# Patient Record
Sex: FEMALE | Race: WHITE | HISPANIC OR LATINO | Employment: OTHER | URBAN - METROPOLITAN AREA
[De-identification: names, ages, dates, MRNs, and addresses within clinical notes are randomized per-mention and may not be internally consistent; named-entity substitution may affect disease eponyms.]

---

## 2017-01-04 ENCOUNTER — OFFICE VISIT (OUTPATIENT)
Dept: FAMILY MEDICINE | Facility: CLINIC | Age: 67
End: 2017-01-04
Payer: MEDICARE

## 2017-01-04 ENCOUNTER — HOSPITAL ENCOUNTER (OUTPATIENT)
Dept: RADIOLOGY | Facility: CLINIC | Age: 67
Discharge: HOME | End: 2017-01-04
Attending: FAMILY MEDICINE
Payer: MEDICARE

## 2017-01-04 VITALS
RESPIRATION RATE: 12 BRPM | DIASTOLIC BLOOD PRESSURE: 59 MMHG | SYSTOLIC BLOOD PRESSURE: 99 MMHG | HEIGHT: 66 IN | HEART RATE: 59 BPM | BODY MASS INDEX: 18.39 KG/M2 | WEIGHT: 114.44 LBS

## 2017-01-04 DIAGNOSIS — E07.9 THYROID DISEASE: ICD-10-CM

## 2017-01-04 DIAGNOSIS — F41.9 ANXIETY: Primary | ICD-10-CM

## 2017-01-04 DIAGNOSIS — E08.649 DIABETES MELLITUS DUE TO UNDERLYING CONDITION WITH HYPOGLYCEMIA WITHOUT COMA, WITHOUT LONG-TERM CURRENT USE OF INSULIN: Chronic | ICD-10-CM

## 2017-01-04 DIAGNOSIS — R19.4 CHANGE IN BOWEL HABITS: ICD-10-CM

## 2017-01-04 DIAGNOSIS — F33.9 RECURRENT MAJOR DEPRESSION RESISTANT TO TREATMENT: ICD-10-CM

## 2017-01-04 DIAGNOSIS — K21.9 GASTROESOPHAGEAL REFLUX DISEASE, ESOPHAGITIS PRESENCE NOT SPECIFIED: ICD-10-CM

## 2017-01-04 DIAGNOSIS — R51.9 OCCIPITAL HEADACHE: ICD-10-CM

## 2017-01-04 DIAGNOSIS — R63.4 ABNORMAL WEIGHT LOSS: ICD-10-CM

## 2017-01-04 DIAGNOSIS — M32.9 LUPUS (SYSTEMIC LUPUS ERYTHEMATOSUS): Primary | ICD-10-CM

## 2017-01-04 DIAGNOSIS — E16.2 HYPOGLYCEMIA: ICD-10-CM

## 2017-01-04 DIAGNOSIS — Z23 NEED FOR INFLUENZA VACCINATION: ICD-10-CM

## 2017-01-04 DIAGNOSIS — B02.29 POST HERPETIC NEURALGIA: ICD-10-CM

## 2017-01-04 DIAGNOSIS — F41.8 DEPRESSION WITH ANXIETY: Chronic | ICD-10-CM

## 2017-01-04 DIAGNOSIS — Z23 NEED FOR PNEUMOCOCCAL VACCINATION: ICD-10-CM

## 2017-01-04 DIAGNOSIS — J31.0 CHRONIC RHINITIS: ICD-10-CM

## 2017-01-04 PROCEDURE — G0008 ADMIN INFLUENZA VIRUS VAC: HCPCS | Mod: S$GLB,,, | Performed by: FAMILY MEDICINE

## 2017-01-04 PROCEDURE — 1159F MED LIST DOCD IN RCRD: CPT | Mod: S$GLB,,, | Performed by: PHYSICIAN ASSISTANT

## 2017-01-04 PROCEDURE — 99214 OFFICE O/P EST MOD 30 MIN: CPT | Mod: 25,S$GLB,, | Performed by: PHYSICIAN ASSISTANT

## 2017-01-04 PROCEDURE — 99999 PR PBB SHADOW E&M-EST. PATIENT-LVL V: CPT | Mod: PBBFAC,,, | Performed by: PHYSICIAN ASSISTANT

## 2017-01-04 PROCEDURE — 90670 PCV13 VACCINE IM: CPT | Mod: S$GLB,,, | Performed by: PHYSICIAN ASSISTANT

## 2017-01-04 PROCEDURE — G0009 ADMIN PNEUMOCOCCAL VACCINE: HCPCS | Mod: S$GLB,,, | Performed by: PHYSICIAN ASSISTANT

## 2017-01-04 PROCEDURE — 72050 X-RAY EXAM NECK SPINE 4/5VWS: CPT | Mod: 26,,, | Performed by: RADIOLOGY

## 2017-01-04 PROCEDURE — 72050 X-RAY EXAM NECK SPINE 4/5VWS: CPT | Mod: TC,PO

## 2017-01-04 PROCEDURE — 90662 IIV NO PRSV INCREASED AG IM: CPT | Mod: S$GLB,,, | Performed by: FAMILY MEDICINE

## 2017-01-04 RX ORDER — HYDROXYCHLOROQUINE SULFATE 200 MG/1
TABLET, FILM COATED ORAL NIGHTLY
Status: CANCELLED | OUTPATIENT
Start: 2017-01-04

## 2017-01-04 RX ORDER — CYPROHEPTADINE HYDROCHLORIDE 4 MG/1
4 TABLET ORAL 2 TIMES DAILY PRN
Qty: 180 TABLET | Refills: 3 | Status: SHIPPED | OUTPATIENT
Start: 2017-01-04 | End: 2017-08-14

## 2017-01-04 RX ORDER — VALACYCLOVIR HYDROCHLORIDE 500 MG/1
500 TABLET, FILM COATED ORAL DAILY
Qty: 90 TABLET | Refills: 3 | Status: SHIPPED | OUTPATIENT
Start: 2017-01-04 | End: 2017-03-13 | Stop reason: SDUPTHER

## 2017-01-04 RX ORDER — GABAPENTIN 800 MG/1
800 TABLET ORAL 3 TIMES DAILY
Qty: 270 TABLET | Refills: 3 | Status: SHIPPED | OUTPATIENT
Start: 2017-01-04 | End: 2019-01-24 | Stop reason: SDUPTHER

## 2017-01-04 RX ORDER — FLUTICASONE PROPIONATE 50 MCG
SPRAY, SUSPENSION (ML) NASAL
Qty: 48 G | Refills: 3 | Status: SHIPPED | OUTPATIENT
Start: 2017-01-04 | End: 2017-08-14 | Stop reason: SDUPTHER

## 2017-01-04 RX ORDER — LEVOTHYROXINE SODIUM 125 UG/1
TABLET ORAL
Qty: 90 TABLET | Refills: 3 | Status: SHIPPED | OUTPATIENT
Start: 2017-01-04 | End: 2018-02-08 | Stop reason: SDUPTHER

## 2017-01-04 RX ORDER — CITALOPRAM 40 MG/1
20 TABLET, FILM COATED ORAL DAILY
Qty: 90 TABLET | Refills: 4 | Status: SHIPPED | OUTPATIENT
Start: 2017-01-04 | End: 2017-07-19 | Stop reason: SDUPTHER

## 2017-01-04 NOTE — PROGRESS NOTES
"Subjective:       Patient ID: Erica Marsh is a 66 y.o. female.    Chief Complaint: Physical    HPI Comments: Ms. Marsh comes to clinic today for follow up. She recent had blood work that returned normal. The patient does have chronic pain related to fibromyalgia and lupus. She is followed by Dr. Alvarenga. She reports her pain has been increased recently and Dr. Alvarenga has prescribed tramadol; the patient does not like to take this as she is afraid she will become addicted. The patient is due for flu and prevnar vaccines. The patient does complain of change in her stools; she reports her stools are "fatty." The patient was prescribed pancreatic enzymes to help with this but there has been little improvement. The patient would like to see gastroenterology for further evaluation. The patient does complain of frequent hypoglycemia episodes. She has to take glucose pills and check her blood sugar frequently. The patient reports that she keeps the glucose tabs on hand. The patient is interested in seeing an endocrinologist. The patient does continue to have depression due to the death of her twin brother just over a year ago. She is traveling to [facility] to be with her family for the next 6 months.     Review of Systems   Constitutional: Positive for activity change and fatigue. Negative for appetite change and fever.   HENT: Negative for postnasal drip, rhinorrhea and sinus pressure.    Eyes: Negative for visual disturbance.   Respiratory: Negative for cough and shortness of breath.    Cardiovascular: Negative for chest pain.   Gastrointestinal: Negative for abdominal distention and abdominal pain.   Genitourinary: Negative for difficulty urinating and dysuria.   Musculoskeletal: Positive for arthralgias and myalgias.   Neurological: Negative for headaches.   Hematological: Negative for adenopathy.   Psychiatric/Behavioral: Positive for dysphoric mood. The patient is nervous/anxious.        Objective:      Physical " Exam   Constitutional: She is oriented to person, place, and time.   HENT:   Mouth/Throat: Oropharynx is clear and moist. No oropharyngeal exudate.   Cardiovascular: Normal rate and regular rhythm.    Pulmonary/Chest: Effort normal and breath sounds normal. She has no wheezes.   Abdominal: Soft. Bowel sounds are normal. There is no tenderness.   Musculoskeletal: She exhibits tenderness. She exhibits no edema.   Lymphadenopathy:     She has no cervical adenopathy.   Neurological: She is alert and oriented to person, place, and time.   Skin: No erythema.   Psychiatric: Her behavior is normal.   Patient continues to have dysphoric mood.        Assessment:       1. Lupus (systemic lupus erythematosus)    2. Thyroid disease    3. Depression with anxiety    4. Gastroesophageal reflux disease, esophagitis presence not specified    5. Post herpetic neuralgia    6. Chronic rhinitis    7. Abnormal weight loss    8. Hypoglycemia    9. Occipital headache    10. Diabetes mellitus due to underlying condition with hypoglycemia without coma, without long-term current use of insulin    11. Change in bowel habits    12. Need for influenza vaccination    13. Need for pneumococcal vaccination        Plan:     Erica was seen today for physical.    Diagnoses and all orders for this visit:    Lupus (systemic lupus erythematosus)  Continue follow up with Dr. Alvarenga  Thyroid disease  -     levothyroxine (SYNTHROID) 125 MCG tablet; TAKE 1 TABLET ONE TIME DAILY    Depression with anxiety  Continue current medication  Clonazepam refill sent to Dr. Ornelas  Gastroesophageal reflux disease, esophagitis presence not specified  Stable  Avoid trigger foods  Stop eating 2-3 hours before bed  Post herpetic neuralgia  -     valacyclovir (VALTREX) 500 MG tablet; Take 1 tablet (500 mg total) by mouth once daily.    Chronic rhinitis  -     fluticasone (FLONASE) 50 mcg/actuation nasal spray; ADMINISTER 1 SPRAY IN EACH NARE 2 TIMES DAILY.    Abnormal weight  loss  -     cyproheptadine (PERIACTIN) 4 mg tablet; Take 1 tablet (4 mg total) by mouth 2 (two) times daily as needed.    Hypoglycemia  -     blood sugar diagnostic (BLOOD GLUCOSE TEST) Strp; Accucheck Annabelle strip ac x 3 daily due to hypoglycemia    Occipital headache  -     X-Ray Cervical Spine Complete 5 view; Future    Diabetes mellitus due to underlying condition with hypoglycemia without coma, without long-term current use of insulin  -     Ambulatory referral to Endocrinology    Change in bowel habits  -     Ambulatory referral to Gastroenterology    Need for influenza vaccination  -     Influenza - High Dose (65+) (PF) (IM)    Need for pneumococcal vaccination  -     Pneumococcal Conjugate Vaccine (13 Valent) (IM)    Other orders  -     Cancel: hydroxychloroquine (PLAQUENIL) 200 mg tablet; every evening. Three times a day  -     gabapentin (NEURONTIN) 800 MG tablet; Take 1 tablet (800 mg total) by mouth 3 (three) times daily.

## 2017-01-04 NOTE — TELEPHONE ENCOUNTER
----- Message from Tiffani Nation sent at 1/4/2017 11:02 AM CST -----  Contact: Anju from [employer] [phone]  Anju called from [employer] a few days ago for a refill on the patient's test strips please call back today.

## 2017-01-04 NOTE — PROGRESS NOTES
Two Patient identifiers used, Name and . VIS information given to patient and procedure was explained. Patient verbalized understanding. FluZone HD administered IM in the  right deltoid  And Prevnar 13 administered IM in the left deltoid both using sterile technique.  No residual bleeding noted. Patient tolerated procedure well.

## 2017-01-04 NOTE — MR AVS SNAPSHOT
[facility]  [address]  [place] [address]  Phone: [phone]  Fax: [phone]                  Erica Marsh   2017 3:40 PM   Office Visit    Description:  Female : 1950   Provider:  Michelle Renteria PA-C   Department:  [place] - Family [facility]           Reason for Visit     Physical           Diagnoses this Visit        Comments    Lupus (systemic lupus erythematosus)    -  Primary     Thyroid disease         Depression with anxiety         Gastroesophageal reflux disease, esophagitis presence not specified         Post herpetic neuralgia         Chronic rhinitis         Abnormal weight loss         Hypoglycemia         Occipital headache         Diabetes mellitus due to underlying condition with hypoglycemia without coma, without long-term current use of insulin         Change in bowel habits         Need for influenza vaccination         Need for pneumococcal vaccination                To Do List           Future Appointments        Provider Department Dept Phone    2017 4:45 PM [facility] XR1 [facility]- X[facility]Ray [phone]    3/15/2017 1:00 PM Jose Guadalupe Ornelas MD [facility] [phone]    2017 8:00 AM Jose Guadalupe Ornelas MD [facility] [phone]      Goals (5 Years of Data)     None       These Medications        Disp Refills Start End    valacyclovir (VALTREX) 500 MG tablet 90 tablet 3 2017     Take 1 tablet (500 mg total) by mouth once daily. - Oral    Pharmacy: [facility] [address] [place] [address]  [facility] Ph #: [phone]       levothyroxine (SYNTHROID) 125 MCG tablet 90 tablet 3 2017     TAKE 1 TABLET ONE TIME DAILY    Pharmacy: [facility] [address] [place] [address]  [facility] Ph #: [phone]       gabapentin (NEURONTIN) 800 MG tablet 270 tablet 3 2017     Take 1 tablet (800 mg total) by mouth 3 (three) times daily. - Oral     Pharmacy: [facility] [address] AT [facility] #: [phone]       fluticasone (FLONASE) 50 mcg/actuation nasal spray 48 g 3 1/4/2017     ADMINISTER 1 SPRAY IN EACH NARE 2 TIMES DAILY.    Pharmacy: [address] #: [phone]       cyproheptadine (PERIACTIN) 4 mg tablet 180 tablet 3 1/4/2017     Take 1 tablet (4 mg total) by mouth 2 (two) times daily as needed. - Oral    Pharmacy: [facility] [address] #: [phone]       Notes to Pharmacy: 4 months    blood sugar diagnostic (BLOOD GLUCOSE TEST) Strp 300 each 3 1/4/2017     Accucheck Annabelle strip ac x 3 daily due to hypoglycemia    Pharmacy: [facility] [address] #: [phone]         [facility]     [facility] - 24/7 Assistance  Registered nurses in the [facility] provide clinical advisement, health education, appointment booking, and other advisory services.  Call for this free service at [phone].             Medications           Message regarding Medications     Verify the changes and/or additions to your medication regime listed below are the same as discussed with your clinician today.  If any of these changes or additions are incorrect, please notify your healthcare provider.             Verify that the below list of medications is an accurate representation of the medications you are currently taking.  If none reported, the list may be blank. If incorrect, please contact your healthcare provider. Carry this list with you in case of emergency.           Current Medications     aspirin (ECOTRIN) 81 MG EC tablet Take 81 mg by mouth nightly.    blood sugar diagnostic (BLOOD GLUCOSE TEST) Strp Accucheck Annabelle strip ac x 3 daily due to hypoglycemia    blood  sugar diagnostic Strp 1 each by Misc.(Non-Drug; Combo Route) route 3 (three) times daily before meals.    cholecalciferol, vitamin D3, 1,000 unit capsule Take 2 capsules (2,000 Units total) by mouth once daily.    citalopram (CELEXA) 40 MG tablet Take 0.5 tablets (20 mg total) by mouth once daily.    clonazePAM (KLONOPIN) 1 MG tablet Take 1 tablet (1 mg total) by mouth 3 (three) times daily.    cyanocobalamin, vitamin B-12, 2,500 mcg Subl Place 5,000 mcg under the tongue once daily.    cyproheptadine (PERIACTIN) 4 mg tablet Take 1 tablet (4 mg total) by mouth 2 (two) times daily as needed.    fluticasone (FLONASE) 50 mcg/actuation nasal spray ADMINISTER 1 SPRAY IN EACH NARE 2 TIMES DAILY.    gabapentin (NEURONTIN) 800 MG tablet Take 1 tablet (800 mg total) by mouth 3 (three) times daily.    hydroxychloroquine (PLAQUENIL) 200 mg tablet every evening. Three times a day    lancets (ACCU-CHEK SOFTCLIX LANCETS) Misc Use as directed    lansoprazole (PREVACID) 30 MG capsule Take 1 capsule (30 mg total) by mouth once daily. Every day    levothyroxine (SYNTHROID) 125 MCG tablet TAKE 1 TABLET ONE TIME DAILY    lidocaine (LIDODERM) 5 % Remove & Discard patch within 12 hours or as directed by MD    lipase-protease-amylase 24,000-76,000-120,000 units (CREON) 24,000-76,000 -120,000 unit capsule Take 1 capsule by mouth 3 (three) times daily with meals.    thiamine 50 MG tablet Take 1 tablet (50 mg total) by mouth once daily.    valacyclovir (VALTREX) 500 MG tablet Take 1 tablet (500 mg total) by mouth once daily.    vitamin E 1000 UNIT capsule Take 1,000 Units by mouth once daily.    glucagon (human recombinant) inj 1mg/mL kit Inject 1 mL (1 mg total) into the muscle as needed.           Clinical Reference Information           Vital Signs - Last Recorded  Most recent update: 1/4/2017  3:21 PM by Roman Mccormick MA    BP Pulse Resp Ht Wt BMI    (!) 99/59 (BP Location: Right arm, Patient Position: Sitting, BP Method: Automatic)  "(!) 59 12 5' 6" (1.676 m) 51.9 kg (114 lb 6.7 oz) 18.47 kg/m2      Blood Pressure          Most Recent Value    BP  (!)  99/59      Allergies as of 1/4/2017     Codeine    Plaquenil [Hydroxychloroquine]    Sulfa (Sulfonamide Antibiotics)      Immunizations Administered on Date of Encounter - 1/4/2017     Name Date Dose VIS Date Route    Influenza - High Dose  Incomplete 0.5 mL 8/7/2015 Intramuscular    Pneumococcal Conjugate - 13 Valent  Incomplete 0.5 mL 11/5/2015 Intramuscular      Orders Placed During Today's Visit      Normal Orders This Visit    Ambulatory referral to Endocrinology     Ambulatory referral to Gastroenterology     Influenza - High Dose (65+) (PF) (IM)     Pneumococcal Conjugate Vaccine (13 Valent) (IM)     Future Labs/Procedures Expected by Expires    X-Ray Cervical Spine Complete 5 view  1/4/2017 1/4/2018      "

## 2017-01-07 RX ORDER — CLONAZEPAM 1 MG/1
1 TABLET ORAL 3 TIMES DAILY
Qty: 90 TABLET | Refills: 3 | Status: SHIPPED | OUTPATIENT
Start: 2017-01-07 | End: 2017-03-20 | Stop reason: SDUPTHER

## 2017-01-31 ENCOUNTER — TELEPHONE (OUTPATIENT)
Dept: ENDOCRINOLOGY | Facility: CLINIC | Age: 67
End: 2017-01-31

## 2017-03-13 DIAGNOSIS — K21.9 GASTROESOPHAGEAL REFLUX DISEASE WITHOUT ESOPHAGITIS: ICD-10-CM

## 2017-03-13 DIAGNOSIS — B02.29 POST HERPETIC NEURALGIA: ICD-10-CM

## 2017-03-13 RX ORDER — VALACYCLOVIR HYDROCHLORIDE 500 MG/1
TABLET, FILM COATED ORAL
Qty: 180 TABLET | Refills: 9 | Status: SHIPPED | OUTPATIENT
Start: 2017-03-13 | End: 2018-02-08 | Stop reason: SDUPTHER

## 2017-03-13 RX ORDER — LANSOPRAZOLE 30 MG/1
CAPSULE, DELAYED RELEASE ORAL
Qty: 90 CAPSULE | Refills: 0 | Status: SHIPPED | OUTPATIENT
Start: 2017-03-13 | End: 2017-06-15 | Stop reason: SDUPTHER

## 2017-03-13 RX ORDER — AMPICILLIN TRIHYDRATE 500 MG
CAPSULE ORAL
Qty: 180 CAPSULE | Refills: 0 | Status: SHIPPED | OUTPATIENT
Start: 2017-03-13 | End: 2018-02-08 | Stop reason: SDUPTHER

## 2017-03-13 RX ORDER — MAGNESIUM HYDROXIDE 400 MG/5ML
SUSPENSION, ORAL (FINAL DOSE FORM) ORAL
Qty: 90 TABLET | Refills: 0 | Status: SHIPPED | OUTPATIENT
Start: 2017-03-13 | End: 2017-08-14 | Stop reason: SDUPTHER

## 2017-03-20 DIAGNOSIS — F41.9 ANXIETY: ICD-10-CM

## 2017-03-20 DIAGNOSIS — E08.649: Chronic | ICD-10-CM

## 2017-03-20 RX ORDER — CLONAZEPAM 1 MG/1
1 TABLET ORAL 3 TIMES DAILY
Qty: 90 TABLET | Refills: 3 | Status: CANCELLED | OUTPATIENT
Start: 2017-03-20

## 2017-03-20 RX ORDER — LANCETS
EACH MISCELLANEOUS
Qty: 100 EACH | Refills: 11 | Status: SHIPPED | OUTPATIENT
Start: 2017-03-20 | End: 2018-02-08 | Stop reason: SDUPTHER

## 2017-03-21 RX ORDER — CLONAZEPAM 1 MG/1
1 TABLET ORAL 3 TIMES DAILY
Qty: 90 TABLET | Refills: 3 | Status: SHIPPED | OUTPATIENT
Start: 2017-03-21 | End: 2017-07-19 | Stop reason: SDUPTHER

## 2017-06-15 DIAGNOSIS — K21.9 GASTROESOPHAGEAL REFLUX DISEASE WITHOUT ESOPHAGITIS: ICD-10-CM

## 2017-06-15 RX ORDER — PANCRELIPASE 24000; 76000; 120000 [USP'U]/1; [USP'U]/1; [USP'U]/1
CAPSULE, DELAYED RELEASE PELLETS ORAL
Qty: 90 CAPSULE | Refills: 0 | Status: SHIPPED | OUTPATIENT
Start: 2017-06-15 | End: 2017-08-14 | Stop reason: SDUPTHER

## 2017-06-16 RX ORDER — LANSOPRAZOLE 30 MG/1
CAPSULE, DELAYED RELEASE ORAL
Qty: 90 CAPSULE | Refills: 11 | Status: SHIPPED | OUTPATIENT
Start: 2017-06-16 | End: 2017-08-14 | Stop reason: SINTOL

## 2017-07-17 ENCOUNTER — TELEPHONE (OUTPATIENT)
Dept: FAMILY MEDICINE | Facility: CLINIC | Age: 67
End: 2017-07-17

## 2017-07-17 NOTE — TELEPHONE ENCOUNTER
----- Message from Annmarie Miller sent at 7/17/2017  8:50 AM CDT -----  Contact: self  Patient calling regarding her appointment on 08/14/17. [place] and would like to be seen by Dr Ornelas this week if possible. Please call patient at [phone]. Thanks!

## 2017-07-17 NOTE — TELEPHONE ENCOUNTER
Spoke to patient .  No available appointment with dr. Ornelas at this time.  Offered patient appointment with [facility] level.  Patient declined.   Will keep appointment in August.

## 2017-07-19 ENCOUNTER — OFFICE VISIT (OUTPATIENT)
Dept: RHEUMATOLOGY | Facility: CLINIC | Age: 67
End: 2017-07-19
Payer: MEDICARE

## 2017-07-19 VITALS
BODY MASS INDEX: 18.4 KG/M2 | HEIGHT: 66 IN | SYSTOLIC BLOOD PRESSURE: 106 MMHG | DIASTOLIC BLOOD PRESSURE: 66 MMHG | WEIGHT: 114.5 LBS

## 2017-07-19 DIAGNOSIS — F33.9 RECURRENT MAJOR DEPRESSION RESISTANT TO TREATMENT: ICD-10-CM

## 2017-07-19 DIAGNOSIS — M32.8 OTHER FORMS OF SYSTEMIC LUPUS ERYTHEMATOSUS: Primary | ICD-10-CM

## 2017-07-19 DIAGNOSIS — F41.9 ANXIETY: ICD-10-CM

## 2017-07-19 LAB
ALBUMIN SERPL-MCNC: 3.9 G/DL (ref 3.1–4.7)
ALP SERPL-CCNC: 57 IU/L (ref 40–104)
ALT (SGPT): 21 IU/L (ref 3–33)
AST SERPL-CCNC: 27 IU/L (ref 10–40)
BASOPHILS NFR BLD: 0.1 K/UL (ref 0–0.2)
BASOPHILS NFR BLD: 1.4 %
BILIRUB SERPL-MCNC: 0.7 MG/DL (ref 0.3–1)
BUN SERPL-MCNC: 18 MG/DL (ref 8–20)
CALCIUM SERPL-MCNC: 9.3 MG/DL (ref 7.7–10.4)
CHLORIDE: 103 MMOL/L (ref 98–110)
CO2 SERPL-SCNC: 26.2 MMOL/L (ref 22.8–31.6)
CREATININE: 0.78 MG/DL (ref 0.6–1.4)
CRP SERPL-MCNC: 0.05 MG/DL (ref 0–1.4)
EOSINOPHIL NFR BLD: 0.2 K/UL (ref 0–0.7)
EOSINOPHIL NFR BLD: 3.8 %
ERYTHROCYTE [DISTWIDTH] IN BLOOD BY AUTOMATED COUNT: 12 % (ref 11.7–14.9)
GLUCOSE: 82 MG/DL (ref 70–99)
GRAN #: 2.3 K/UL (ref 1.4–6.5)
GRAN%: 42.1 %
HCT VFR BLD AUTO: 36.9 % (ref 36–48)
HGB BLD-MCNC: 12.4 G/DL (ref 12–15)
IMMATURE GRANS (ABS): 0 K/UL (ref 0–1)
IMMATURE GRANULOCYTES: 0.2 %
LYMPH #: 2.4 K/UL (ref 1.2–3.4)
LYMPH%: 43.3 %
MCH RBC QN AUTO: 33.2 PG (ref 25–35)
MCHC RBC AUTO-ENTMCNC: 33.6 G/DL (ref 31–36)
MCV RBC AUTO: 98.7 FL (ref 79–98)
MONO #: 0.5 K/UL (ref 0.1–0.6)
MONO%: 9.2 %
NUCLEATED RBCS: 0 %
PLATELET # BLD AUTO: 301 K/UL (ref 140–440)
PMV BLD AUTO: 10.6 FL (ref 8.8–12.7)
POTASSIUM SERPL-SCNC: 4.2 MMOL/L (ref 3.5–5)
PROT SERPL-MCNC: 6.8 G/DL (ref 6–8.2)
RBC # BLD AUTO: 3.74 M/UL (ref 3.5–5.5)
SODIUM: 140 MMOL/L (ref 134–144)
WBC # BLD AUTO: 5.6 K/UL (ref 5–10)

## 2017-07-19 PROCEDURE — 99213 OFFICE O/P EST LOW 20 MIN: CPT | Mod: ,,, | Performed by: INTERNAL MEDICINE

## 2017-07-19 PROCEDURE — 1159F MED LIST DOCD IN RCRD: CPT | Mod: ,,, | Performed by: INTERNAL MEDICINE

## 2017-07-19 RX ORDER — CLONAZEPAM 1 MG/1
1 TABLET ORAL 3 TIMES DAILY
Qty: 90 TABLET | Refills: 3 | Status: SHIPPED | OUTPATIENT
Start: 2017-07-19 | End: 2017-08-14 | Stop reason: SDUPTHER

## 2017-07-19 RX ORDER — HYDROXYCHLOROQUINE SULFATE 200 MG/1
200 TABLET, FILM COATED ORAL 2 TIMES DAILY
Qty: 180 TABLET | Refills: 1 | Status: SHIPPED | OUTPATIENT
Start: 2017-07-19 | End: 2019-01-24 | Stop reason: SDUPTHER

## 2017-07-19 RX ORDER — CITALOPRAM 40 MG/1
20 TABLET, FILM COATED ORAL DAILY
Qty: 90 TABLET | Refills: 4 | Status: SHIPPED | OUTPATIENT
Start: 2017-07-19 | End: 2018-02-12 | Stop reason: SDUPTHER

## 2017-07-19 NOTE — PROGRESS NOTES
[facility] RHEUMATOLOGY            PROGRESS NOTE      Subjective:       Patient ID:   NAME: Erica Marsh : 1950     67 y.o. female    Referring Doc: No ref. provider found  Other Physicians:    Chief Complaint:  Lupus (feeling  flu like x 7 days ,did have fever and joint pains)      History of Present Illness:     Patient returns today for a regularly scheduled follow-up visit for  SLE.     The patient is recovering from URI.  Still feeling tired and achy.No CP,SOB prior to URI.Occ palpitations.  Occ diarrhea ,with mucus but no blood.Will see bariatric surgeon for fololw up.              ROS:   GEN:  No  fever, night sweats . weight is stable   + fatigue  SKIN: no rashes, no bruising, no ulcerations, no Raynaud's  HEENT: + HA's, No visual changes, + mucosal ulcers, + sicca symptoms,  CV:   no CP, SOB, PND, HUGHES, no orthopnea, no palpitations  PULM: normal with no SOB, cough, hemoptysis, sputum or pleuritic pain  GI:  no abdominal pain, nausea, vomiting, constipation, +diarrhea, melanotic stools, BRBPR, hematemesis, no dysphagia  :   no dysuria  NEURO: no paresthesias, +headaches, visual disturbances, muscle weakness  MUSCULOSKELETAL:no joint swelling, prolonged AM stiffness, + chronic  back pain, + muscle pain  Allergies:  Review of patient's allergies indicates:   Allergen Reactions    Codeine Rash and Other (See Comments)    Plaquenil [hydroxychloroquine] Rash and Other (See Comments)     ONE BRAND OF MED    Sulfa (sulfonamide antibiotics) Rash and Other (See Comments)       Medications:    Current Outpatient Prescriptions:     aspirin (ECOTRIN) 81 MG EC tablet, Take 81 mg by mouth nightly., Disp: , Rfl:     blood sugar diagnostic (BLOOD GLUCOSE TEST) Strp, Accucheck Annabelle strip ac x 3 daily due to hypoglycemia, Disp: 300 each, Rfl: 3    blood sugar diagnostic Strp, 1 each by Misc.(Non-Drug; Combo Route) route 3 (three) times daily before meals., Disp: 200 each, Rfl: 11    citalopram (CELEXA) 40  MG tablet, Take 0.5 tablets (20 mg total) by mouth once daily., Disp: 90 tablet, Rfl: 4    clonazePAM (KLONOPIN) 1 MG tablet, Take 1 tablet (1 mg total) by mouth 3 (three) times daily., Disp: 90 tablet, Rfl: 3    CREON 24,000-76,000 -120,000 unit capsule, TAKE 1 CAPSULE BY MOUTH THREE TIMES DAILY WITH MEALS, Disp: 90 capsule, Rfl: 0    cyanocobalamin, vitamin B-12, 2,500 mcg Subl, Place 5,000 mcg under the tongue once daily., Disp: 180 each, Rfl: 3    cyanocobalamin, vitamin B-12, 5,000 mcg TbDL, PLACE ONE TABLET ON THE TONGUE AND LET DISSOLVE DAILY, Disp: 90 tablet, Rfl: 0    cyproheptadine (PERIACTIN) 4 mg tablet, Take 1 tablet (4 mg total) by mouth 2 (two) times daily as needed., Disp: 180 tablet, Rfl: 3    fluticasone (FLONASE) 50 mcg/actuation nasal spray, ADMINISTER 1 SPRAY IN EACH NARE 2 TIMES DAILY., Disp: 48 g, Rfl: 3    gabapentin (NEURONTIN) 800 MG tablet, Take 1 tablet (800 mg total) by mouth 3 (three) times daily., Disp: 270 tablet, Rfl: 3    hydroxychloroquine (PLAQUENIL) 200 mg tablet, every evening. Three times a day, Disp: , Rfl:     lancets (ACCU-CHEK SOFTCLIX LANCETS) Misc, Use as directed, Disp: 100 each, Rfl: 11    lansoprazole (PREVACID) 30 MG capsule, TAKE ONE CAPSULE BY MOUTH DAILY, Disp: 90 capsule, Rfl: 11    levothyroxine (SYNTHROID) 125 MCG tablet, TAKE 1 TABLET ONE TIME DAILY, Disp: 90 tablet, Rfl: 3    lidocaine (LIDODERM) 5 %, Remove & Discard patch within 12 hours or as directed by MD, Disp: 60 patch, Rfl: 3    thiamine 50 MG tablet, Take 1 tablet (50 mg total) by mouth once daily., Disp: 90 tablet, Rfl: 4    valacyclovir (VALTREX) 500 MG tablet, TAKE ONE TABLET BY MOUTH DAILY, Disp: 180 tablet, Rfl: 9    VITAMIN D3 1,000 unit capsule, TAKE 2 CAPSULES BY MOUTH ONCE A DAY, Disp: 180 capsule, Rfl: 0    vitamin E 1000 UNIT capsule, Take 1,000 Units by mouth once daily., Disp: , Rfl:     glucagon (human recombinant) inj 1mg/mL kit, Inject 1 mL (1 mg total) into the muscle  "as needed., Disp: 3 kit, Rfl: 11    PMHx/PSHx Updates:          Last Eye Exam :6 months ago      Objective:     Vitals:  Blood pressure 106/66, height 5' 6" (1.676 m), weight 51.9 kg (114 lb 8 oz).    Physical Examination:   GEN: no apparent distress, comfortable; AAOx3  SKIN: no rashes,no ulceration, no Raynaud's, no petechiae, no SQ nodules,  HEAD: normal  EYES: no pallor, no icterus, PERRLA  ENT:  ,no mucosal dryness or ulcerations  NECK: no masses, thyroid normal, trachea midline, no LAD/LN's, supple  CV: RRR with no murmur; l S1 and S2 reg. ,no gallop no rubs,   CHEST: Normal respiratory effort; CTAB; normal breath sounds; no wheeze or crackles  ABDOM: nontender and nondistended; soft; no masses; no rebound/guarding  MUSC/Skeletal: ROM normal; no crepitus; joints without synovitis,  no deformities  No joint swelling or tenderness of PIP, MCP, wrist, elbow, shoulder, or knee joints.Widespread trigger point  EXTREM: no clubbing, cyanosis, no edema,normal  pulses   NEURO: grossly intact; motor WNL; AAOx3;   LYMPH: normal cervical, supraclavicular          Labs:   Lab Results   Component Value Date    WBC 6.35 12/29/2016    HGB 12.9 12/29/2016    HCT 38.7 12/29/2016     (H) 12/29/2016     12/29/2016    CMP  @LASTLAB(NA,K,CL,CO2,GLU,BUN,Creatinine,Calcium,PROT,Albumin,Bilitot,Alkphos,AST,ALT,CRP,ESR,RF,CCP,CARLENE,SSA,CPK,uric acid) )@  I have reviewed all available lab results and radiology reports.    Radiology/Diagnostic Studies:        Assessment/Plan:   (1) 67 y.o. female with diagnosis of SLE,Fibromyalgia,Osteoarthritis..stable      CBC,CMP,UA,C3,C4          Discussion:     I have explained all of the above in detail and the patient understands all of the current recommendation(s). I have answered all questions to the best of my ability and to their complete satisfaction.       The patient is to continue with the current management plan         RTC in   4 months      Electronically signed by " Mingo Alvarenga MD

## 2017-07-20 LAB
C3 SERPL-MCNC: 83 MG/DL (ref 82–167)
C4 NEF SERPL-MCNC: 12 MG/DL (ref 14–44)

## 2017-07-25 RX ORDER — HYDROXYCHLOROQUINE SULFATE 200 MG/1
200 TABLET, FILM COATED ORAL DAILY
Qty: 90 TABLET | Refills: 1 | Status: SHIPPED | OUTPATIENT
Start: 2017-07-25 | End: 2018-02-08 | Stop reason: SDUPTHER

## 2017-07-25 RX ORDER — TRAMADOL HYDROCHLORIDE 50 MG/1
TABLET ORAL
COMMUNITY
End: 2018-02-22 | Stop reason: SDUPTHER

## 2017-07-25 RX ORDER — HYDROXYCHLOROQUINE SULFATE 200 MG/1
TABLET, FILM COATED ORAL
COMMUNITY
Start: 2016-12-29 | End: 2017-07-25 | Stop reason: SDUPTHER

## 2017-07-25 RX ORDER — CYCLOBENZAPRINE HCL 10 MG
TABLET ORAL
COMMUNITY
Start: 2016-12-29 | End: 2018-02-15

## 2017-07-25 RX ORDER — CITALOPRAM 40 MG/1
TABLET, FILM COATED ORAL
COMMUNITY
Start: 2016-12-29 | End: 2017-08-28 | Stop reason: SDUPTHER

## 2017-07-26 DIAGNOSIS — R11.0 NAUSEA: Primary | ICD-10-CM

## 2017-07-26 DIAGNOSIS — R63.4 WEIGHT LOSS: ICD-10-CM

## 2017-07-27 ENCOUNTER — LAB VISIT (OUTPATIENT)
Dept: LAB | Facility: HOSPITAL | Age: 67
End: 2017-07-27
Attending: SURGERY
Payer: MEDICARE

## 2017-07-27 DIAGNOSIS — Z13.220 SCREENING FOR LIPOID DISORDERS: ICD-10-CM

## 2017-07-27 DIAGNOSIS — Z13.6 SCREENING FOR ISCHEMIC HEART DISEASE: ICD-10-CM

## 2017-07-27 DIAGNOSIS — R68.89 OTHER ABNORMAL CLINICAL FINDING: ICD-10-CM

## 2017-07-27 DIAGNOSIS — R53.83 FATIGUE: ICD-10-CM

## 2017-07-27 DIAGNOSIS — Z79.899 ENCOUNTER FOR LONG-TERM (CURRENT) USE OF OTHER MEDICATIONS: Primary | ICD-10-CM

## 2017-07-27 DIAGNOSIS — E56.9 UNSPECIFIED VITAMIN DEFICIENCY: ICD-10-CM

## 2017-07-27 DIAGNOSIS — Z13.21 SCREENING FOR MALNUTRITION: ICD-10-CM

## 2017-07-27 LAB
25(OH)D3+25(OH)D2 SERPL-MCNC: 49 NG/ML
ALBUMIN SERPL BCP-MCNC: 3.7 G/DL
ALP SERPL-CCNC: 71 U/L
ALT SERPL W/O P-5'-P-CCNC: 20 U/L
ANION GAP SERPL CALC-SCNC: 9 MMOL/L
AST SERPL-CCNC: 27 U/L
BASOPHILS # BLD AUTO: 0.06 K/UL
BASOPHILS NFR BLD: 1.4 %
BILIRUB SERPL-MCNC: 0.6 MG/DL
BUN SERPL-MCNC: 17 MG/DL
CALCIUM SERPL-MCNC: 9.3 MG/DL
CHLORIDE SERPL-SCNC: 104 MMOL/L
CHOLEST/HDLC SERPL: 2.2 {RATIO}
CO2 SERPL-SCNC: 28 MMOL/L
CREAT SERPL-MCNC: 0.9 MG/DL
DIFFERENTIAL METHOD: ABNORMAL
EOSINOPHIL # BLD AUTO: 0.2 K/UL
EOSINOPHIL NFR BLD: 3.6 %
ERYTHROCYTE [DISTWIDTH] IN BLOOD BY AUTOMATED COUNT: 12.9 %
EST. GFR  (AFRICAN AMERICAN): >60 ML/MIN/1.73 M^2
EST. GFR  (NON AFRICAN AMERICAN): >60 ML/MIN/1.73 M^2
FERRITIN SERPL-MCNC: 249 NG/ML
GLUCOSE SERPL-MCNC: 97 MG/DL
HCT VFR BLD AUTO: 40.3 %
HDL/CHOLESTEROL RATIO: 44.7 %
HDLC SERPL-MCNC: 190 MG/DL
HDLC SERPL-MCNC: 85 MG/DL
HGB BLD-MCNC: 13.3 G/DL
LDLC SERPL CALC-MCNC: 92.6 MG/DL
LYMPHOCYTES # BLD AUTO: 2.3 K/UL
LYMPHOCYTES NFR BLD: 51.1 %
MAGNESIUM SERPL-MCNC: 2.2 MG/DL
MCH RBC QN AUTO: 32.9 PG
MCHC RBC AUTO-ENTMCNC: 33 G/DL
MCV RBC AUTO: 100 FL
MONOCYTES # BLD AUTO: 0.4 K/UL
MONOCYTES NFR BLD: 8.4 %
NEUTROPHILS # BLD AUTO: 1.6 K/UL
NEUTROPHILS NFR BLD: 35.5 %
NONHDLC SERPL-MCNC: 105 MG/DL
PLATELET # BLD AUTO: 259 K/UL
PMV BLD AUTO: 10.6 FL
POTASSIUM SERPL-SCNC: 4.6 MMOL/L
PROT SERPL-MCNC: 6.8 G/DL
PTH-INTACT SERPL-MCNC: 42 PG/ML
RBC # BLD AUTO: 4.04 M/UL
SODIUM SERPL-SCNC: 141 MMOL/L
TRIGL SERPL-MCNC: 62 MG/DL
TSH SERPL DL<=0.005 MIU/L-ACNC: 0.49 UIU/ML
WBC # BLD AUTO: 4.4 K/UL

## 2017-07-27 PROCEDURE — 80061 LIPID PANEL: CPT

## 2017-07-27 PROCEDURE — 82728 ASSAY OF FERRITIN: CPT

## 2017-07-27 PROCEDURE — 84443 ASSAY THYROID STIM HORMONE: CPT

## 2017-07-27 PROCEDURE — 80053 COMPREHEN METABOLIC PANEL: CPT

## 2017-07-27 PROCEDURE — 82306 VITAMIN D 25 HYDROXY: CPT

## 2017-07-27 PROCEDURE — 83735 ASSAY OF MAGNESIUM: CPT

## 2017-07-27 PROCEDURE — 85025 COMPLETE CBC W/AUTO DIFF WBC: CPT

## 2017-07-27 PROCEDURE — 36415 COLL VENOUS BLD VENIPUNCTURE: CPT | Mod: PO

## 2017-07-27 PROCEDURE — 84425 ASSAY OF VITAMIN B-1: CPT

## 2017-07-27 PROCEDURE — 83036 HEMOGLOBIN GLYCOSYLATED A1C: CPT

## 2017-07-27 PROCEDURE — 84630 ASSAY OF ZINC: CPT

## 2017-07-27 PROCEDURE — 84590 ASSAY OF VITAMIN A: CPT

## 2017-07-27 PROCEDURE — 83970 ASSAY OF PARATHORMONE: CPT

## 2017-07-28 LAB
ESTIMATED AVG GLUCOSE: 103 MG/DL
HBA1C MFR BLD HPLC: 5.2 %

## 2017-07-29 ENCOUNTER — HOSPITAL ENCOUNTER (OUTPATIENT)
Dept: RADIOLOGY | Facility: HOSPITAL | Age: 67
Discharge: HOME | End: 2017-07-29
Attending: SURGERY
Payer: MEDICARE

## 2017-07-29 DIAGNOSIS — R11.0 NAUSEA: ICD-10-CM

## 2017-07-29 DIAGNOSIS — R63.4 WEIGHT LOSS: ICD-10-CM

## 2017-07-29 PROCEDURE — 74177 CT ABD & PELVIS W/CONTRAST: CPT | Mod: 26,,, | Performed by: RADIOLOGY

## 2017-07-29 PROCEDURE — 25500020 PHARM REV CODE 255

## 2017-07-29 PROCEDURE — 74177 CT ABD & PELVIS W/CONTRAST: CPT | Mod: TC

## 2017-07-29 RX ADMIN — IOHEXOL 75 ML: 350 INJECTION, SOLUTION INTRAVENOUS at 11:07

## 2017-07-31 LAB
VIT A SERPL-MCNC: 48 UG/DL (ref 38–106)
VIT B1 SERPL-MCNC: 76 UG/L (ref 38–122)
ZINC SERPL-MCNC: 74 UG/DL (ref 60–130)

## 2017-08-11 ENCOUNTER — DOCUMENTATION ONLY (OUTPATIENT)
Dept: FAMILY MEDICINE | Facility: CLINIC | Age: 67
End: 2017-08-11

## 2017-08-11 NOTE — PROGRESS NOTES
Pre-Visit Chart Review  For Appointment Scheduled on 08/14/2017    Health Maintenance Due   Topic Date Due    Colonoscopy  06/29/2001    Zoster Vaccine  06/29/2011    Influenza Vaccine  08/01/2017

## 2017-08-14 ENCOUNTER — CLINICAL SUPPORT (OUTPATIENT)
Dept: DIABETES | Facility: CLINIC | Age: 67
End: 2017-08-14
Payer: MEDICARE

## 2017-08-14 ENCOUNTER — TELEPHONE (OUTPATIENT)
Dept: FAMILY MEDICINE | Facility: CLINIC | Age: 67
End: 2017-08-14

## 2017-08-14 ENCOUNTER — OFFICE VISIT (OUTPATIENT)
Dept: FAMILY MEDICINE | Facility: CLINIC | Age: 67
End: 2017-08-14
Payer: MEDICARE

## 2017-08-14 ENCOUNTER — HOSPITAL ENCOUNTER (OUTPATIENT)
Dept: RADIOLOGY | Facility: CLINIC | Age: 67
Discharge: HOME | End: 2017-08-14
Attending: FAMILY MEDICINE
Payer: MEDICARE

## 2017-08-14 VITALS
TEMPERATURE: 98 F | DIASTOLIC BLOOD PRESSURE: 66 MMHG | WEIGHT: 116.88 LBS | HEART RATE: 48 BPM | BODY MASS INDEX: 18.79 KG/M2 | HEIGHT: 66 IN | SYSTOLIC BLOOD PRESSURE: 115 MMHG

## 2017-08-14 DIAGNOSIS — E11.9 DM TYPE 2, NOT AT GOAL: ICD-10-CM

## 2017-08-14 DIAGNOSIS — R00.1 BRADYCARDIA, SINUS: ICD-10-CM

## 2017-08-14 DIAGNOSIS — R63.4 ABNORMAL WEIGHT LOSS: ICD-10-CM

## 2017-08-14 DIAGNOSIS — E16.2 HYPOGLYCEMIA: ICD-10-CM

## 2017-08-14 DIAGNOSIS — Z11.4 ENCOUNTER FOR SCREENING FOR HIV: ICD-10-CM

## 2017-08-14 DIAGNOSIS — E11.9 DM TYPE 2, NOT AT GOAL: Primary | ICD-10-CM

## 2017-08-14 DIAGNOSIS — E41 NUTRITIONAL MARASMUS: ICD-10-CM

## 2017-08-14 DIAGNOSIS — F41.9 ANXIETY: ICD-10-CM

## 2017-08-14 DIAGNOSIS — R19.7 DIARRHEA IN ADULT PATIENT: ICD-10-CM

## 2017-08-14 DIAGNOSIS — Z12.31 ENCOUNTER FOR SCREENING MAMMOGRAM FOR BREAST CANCER: ICD-10-CM

## 2017-08-14 DIAGNOSIS — B02.29 POST HERPETIC NEURALGIA: ICD-10-CM

## 2017-08-14 DIAGNOSIS — Z12.31 ENCOUNTER FOR SCREENING MAMMOGRAM FOR BREAST CANCER: Primary | ICD-10-CM

## 2017-08-14 DIAGNOSIS — Z71.3 ENCOUNTER FOR NUTRITION EVALUATION PRIOR TO BARIATRIC SURGERY: ICD-10-CM

## 2017-08-14 DIAGNOSIS — J30.1 CHRONIC SEASONAL ALLERGIC RHINITIS DUE TO POLLEN: ICD-10-CM

## 2017-08-14 PROCEDURE — 99214 OFFICE O/P EST MOD 30 MIN: CPT | Mod: S$GLB,,, | Performed by: FAMILY MEDICINE

## 2017-08-14 PROCEDURE — G0108 DIAB MANAGE TRN  PER INDIV: HCPCS | Mod: S$GLB,,, | Performed by: DIETITIAN, REGISTERED

## 2017-08-14 PROCEDURE — 77067 SCR MAMMO BI INCL CAD: CPT | Mod: 26,,, | Performed by: RADIOLOGY

## 2017-08-14 PROCEDURE — 1159F MED LIST DOCD IN RCRD: CPT | Mod: S$GLB,,, | Performed by: FAMILY MEDICINE

## 2017-08-14 PROCEDURE — 1126F AMNT PAIN NOTED NONE PRSNT: CPT | Mod: S$GLB,,, | Performed by: FAMILY MEDICINE

## 2017-08-14 PROCEDURE — 99999 PR PBB SHADOW E&M-EST. PATIENT-LVL III: CPT | Mod: PBBFAC,,,

## 2017-08-14 PROCEDURE — 77063 BREAST TOMOSYNTHESIS BI: CPT | Mod: 26,,, | Performed by: RADIOLOGY

## 2017-08-14 PROCEDURE — 3008F BODY MASS INDEX DOCD: CPT | Mod: S$GLB,,, | Performed by: FAMILY MEDICINE

## 2017-08-14 PROCEDURE — 99999 PR PBB SHADOW E&M-EST. PATIENT-LVL III: CPT | Mod: PBBFAC,,, | Performed by: FAMILY MEDICINE

## 2017-08-14 PROCEDURE — 3044F HG A1C LEVEL LT 7.0%: CPT | Mod: S$GLB,,, | Performed by: FAMILY MEDICINE

## 2017-08-14 PROCEDURE — 77067 SCR MAMMO BI INCL CAD: CPT | Mod: TC

## 2017-08-14 RX ORDER — MAGNESIUM HYDROXIDE 400 MG/5ML
2 SUSPENSION, ORAL (FINAL DOSE FORM) ORAL DAILY
Qty: 180 TABLET | Refills: 6 | Status: SHIPPED | OUTPATIENT
Start: 2017-08-14 | End: 2017-08-14 | Stop reason: SDUPTHER

## 2017-08-14 RX ORDER — AMPICILLIN TRIHYDRATE 500 MG
CAPSULE ORAL
Qty: 180 CAPSULE | Refills: 0 | Status: SHIPPED | OUTPATIENT
Start: 2017-08-14 | End: 2018-02-08 | Stop reason: SDUPTHER

## 2017-08-14 RX ORDER — FLUTICASONE PROPIONATE 50 MCG
SPRAY, SUSPENSION (ML) NASAL
Qty: 48 G | Refills: 3 | Status: SHIPPED | OUTPATIENT
Start: 2017-08-14 | End: 2018-02-08 | Stop reason: SDUPTHER

## 2017-08-14 RX ORDER — MAGNESIUM HYDROXIDE 400 MG/5ML
SUSPENSION, ORAL (FINAL DOSE FORM) ORAL
Qty: 90 TABLET | Refills: 0 | Status: SHIPPED | OUTPATIENT
Start: 2017-08-14 | End: 2018-02-08 | Stop reason: SDUPTHER

## 2017-08-14 RX ORDER — THIAMINE HCL 50 MG
50 TABLET ORAL DAILY
Qty: 90 TABLET | Refills: 4 | Status: SHIPPED | OUTPATIENT
Start: 2017-08-14 | End: 2018-07-05

## 2017-08-14 RX ORDER — CLONAZEPAM 1 MG/1
1 TABLET ORAL 3 TIMES DAILY
Qty: 90 TABLET | Refills: 3 | Status: SHIPPED | OUTPATIENT
Start: 2017-08-14 | End: 2018-02-20 | Stop reason: SDUPTHER

## 2017-08-14 RX ORDER — MEGESTROL ACETATE 125 MG/ML
625 SUSPENSION ORAL DAILY
Qty: 450 ML | Refills: 11 | Status: SHIPPED | OUTPATIENT
Start: 2017-08-14 | End: 2018-02-08 | Stop reason: SDUPTHER

## 2017-08-14 NOTE — PROGRESS NOTES
SUBJECTIVE:  67 y.o. female for follow up of diabetes/glucose intolerance. She has asthenia, loss of mood. She has chest tightness, and recent poos sleep cycles due to family stress. Denies suicidal ROS: no polyuria or polydipsia, no chest pain, dyspnea or TIA's, no numbness, tingling or pain in extremities, no unusual visual symptoms, blurry vision.  Other symptoms and concerns: Hypoglycemia..She has infrequent low blood sugars since she has lost weight.she has skip meals and better protein intake, and sporadically exercises.Great labs from recent . Weight loss of 100 pounds.she has diarrhea, fatty and foul smelling stool, better since Lipase. Depression is better, good appetite.She has rheumatoid and generalized myalgia.    Current Outpatient Prescriptions   Medication Sig Dispense Refill    aspirin (ECOTRIN) 81 MG EC tablet Take 81 mg by mouth nightly.      blood sugar diagnostic (BLOOD GLUCOSE TEST) Strp Accucheck Annabelle strip ac x 3 daily due to hypoglycemia 300 each 3    blood sugar diagnostic Strp 1 each by Misc.(Non-Drug; Combo Route) route 3 (three) times daily before meals. 200 each 11    citalopram (CELEXA) 40 MG tablet Take 0.5 tablets (20 mg total) by mouth once daily. 90 tablet 4    citalopram (CELEXA) 40 MG tablet Take by mouth.      clonazePAM (KLONOPIN) 1 MG tablet Take 1 tablet (1 mg total) by mouth 3 (three) times daily. 90 tablet 3    cyanocobalamin, vitamin B-12, 5,000 mcg TbDL Take 2 tablets by mouth once daily. 180 tablet 6    cyclobenzaprine (FLEXERIL) 10 MG tablet Take by mouth.      fluticasone (FLONASE) 50 mcg/actuation nasal spray ADMINISTER 1 SPRAY IN EACH NARE 2 TIMES DAILY. 48 g 3    gabapentin (NEURONTIN) 800 MG tablet Take 1 tablet (800 mg total) by mouth 3 (three) times daily. 270 tablet 3    hydroxychloroquine (PLAQUENIL) 200 mg tablet Take 1 tablet (200 mg total) by mouth 2 (two) times daily. Three times a day 180 tablet 1    hydroxychloroquine (PLAQUENIL) 200 mg tablet  "Take 1 tablet (200 mg total) by mouth once daily. 90 tablet 1    lancets (ACCU-CHEK SOFTCLIX LANCETS) Misc Use as directed 100 each 11    levothyroxine (SYNTHROID) 125 MCG tablet TAKE 1 TABLET ONE TIME DAILY 90 tablet 3    lidocaine (LIDODERM) 5 % Remove & Discard patch within 12 hours or as directed by MD 60 patch 3    lipase-protease-amylase 24,000-76,000-120,000 units (CREON) 24,000-76,000 -120,000 unit capsule Take 3 capsules by mouth 3 (three) times daily with meals. 810 capsule 3    thiamine 50 MG tablet Take 1 tablet (50 mg total) by mouth once daily. 90 tablet 4    tramadol (ULTRAM) 50 mg tablet Take by mouth.      valacyclovir (VALTREX) 500 MG tablet TAKE ONE TABLET BY MOUTH DAILY 180 tablet 9    VITAMIN D3 1,000 unit capsule TAKE 2 CAPSULES BY MOUTH ONCE A  capsule 0    vitamin E 1000 UNIT capsule Take 1,000 Units by mouth once daily.      glucagon (human recombinant) inj 1mg/mL kit Inject 1 mL (1 mg total) into the muscle as needed. 3 kit 11    megestrol (MEGACE ES) 625 mg/5 mL Susp Take 5 mLs (625 mg total) by mouth once daily. 450 mL 11    ranitidine (ZANTAC) 150 MG tablet Take 1 tablet (150 mg total) by mouth 2 (two) times daily. 60 tablet 11     No current facility-administered medications for this visit.        OBJECTIVE:  Appearance: alert, well appearing, and in no distress, oriented to person, place, and time, overweight and playful, active.  /66 (BP Location: Right arm, Patient Position: Sitting, BP Method: Small (Automatic))   Pulse (!) 48   Temp 98.3 °F (36.8 °C) (Oral)   Ht 5' 6" (1.676 m)   Wt 53 kg (116 lb 13.5 oz)   BMI 18.86 kg/m²     Exam: fundi A:V decreased, no background diabetic retinopathy, no hemorrhages or exudates and no hypertensive retinopathy, heart sounds normal rate, regular rhythm, normal S1, S2, no murmurs, rubs, clicks or gallops, normal rate and regular rhythm, S1 and S2 normal, no murmurs noted, no gallops noted, normal bilateral carotid " upstroke without bruits, chest clear, no hepatosplenomegaly, no carotid bruits, feet: warm, good capillary refill, blanching with elevation noted, normal DP and PT pulses and normal monofilament exam  Chronic neck and low back pain.she has been chronically depressed.  No psychosis no sebaceous.  No recent lab work revealed.   ASSESSMENT:  Diabetes Mellitus: well controlled, improved and needs to follow diet more regularly  DM type 2, not at goal  -     C-PEPTIDE; Future; Expected date: 08/14/2017  -     Ambulatory consult to Diabetic Education    Abnormal weight loss  -     Rotavirus antigen, stool; Future; Expected date: 08/14/2017  -     Stool Exam-Ova,Cysts,Parasites; Future; Expected date: 08/14/2017  -     WBC, Stool; Future; Expected date: 08/14/2017  -     Occult blood x 1, stool; Future; Expected date: 08/14/2017  -     Occult blood x 1, stool; Future; Expected date: 08/14/2017  -     Occult blood x 1, stool; Future; Expected date: 08/14/2017  -     lipase-protease-amylase 24,000-76,000-120,000 units (CREON) 24,000-76,000 -120,000 unit capsule; Take 3 capsules by mouth 3 (three) times daily with meals.  Dispense: 810 capsule; Refill: 3  -     Vitamin B12; Future; Expected date: 08/14/2017  -     megestrol (MEGACE ES) 625 mg/5 mL Susp; Take 5 mLs (625 mg total) by mouth once daily.  Dispense: 450 mL; Refill: 11  -     HIV 1 / 2 ANTIBODY; Future; Expected date: 08/14/2017  -     cyanocobalamin, vitamin B-12, 5,000 mcg TbDL; Take 2 tablets by mouth once daily.  Dispense: 180 tablet; Refill: 6  -     LACTIC ACID, PLASMA; Future; Expected date: 08/14/2017  -     CORTISOL, 8AM; Future; Expected date: 08/14/2017    Diarrhea in adult patient  -     Fecal fat, qualitative; Future; Expected date: 08/14/2017  -     lipase-protease-amylase 24,000-76,000-120,000 units (CREON) 24,000-76,000 -120,000 unit capsule; Take 3 capsules by mouth 3 (three) times daily with meals.  Dispense: 810 capsule; Refill: 3  -     Vitamin  B12; Future; Expected date: 08/14/2017  -     Amylase; Future; Expected date: 08/14/2017  -     Lipase; Future; Expected date: 08/14/2017    Encounter for nutrition evaluation prior to bariatric surgery  -     ranitidine (ZANTAC) 150 MG tablet; Take 1 tablet (150 mg total) by mouth 2 (two) times daily.  Dispense: 60 tablet; Refill: 11  -     Folate; Future; Expected date: 08/14/2017  -     megestrol (MEGACE ES) 625 mg/5 mL Susp; Take 5 mLs (625 mg total) by mouth once daily.  Dispense: 450 mL; Refill: 11  -     Amylase; Future; Expected date: 08/14/2017    Bradycardia, sinus  -     Holter monitor - 48 hour; Future  -     cyanocobalamin, vitamin B-12, 5,000 mcg TbDL; Take 2 tablets by mouth once daily.  Dispense: 180 tablet; Refill: 6  -     LACTIC ACID, PLASMA; Future; Expected date: 08/14/2017  -     CORTISOL, 8AM; Future; Expected date: 08/14/2017    Anxiety  -     clonazePAM (KLONOPIN) 1 MG tablet; Take 1 tablet (1 mg total) by mouth 3 (three) times daily.  Dispense: 90 tablet; Refill: 3    Nutritional marasmus   -     Vitamin B12; Future; Expected date: 08/14/2017  -     Folate; Future; Expected date: 08/14/2017  -     megestrol (MEGACE ES) 625 mg/5 mL Susp; Take 5 mLs (625 mg total) by mouth once daily.  Dispense: 450 mL; Refill: 11  -     Amylase; Future; Expected date: 08/14/2017  -     Lipase; Future; Expected date: 08/14/2017  -     C-PEPTIDE; Future; Expected date: 08/14/2017    Encounter for screening for HIV   -     HIV 1 / 2 ANTIBODY; Future; Expected date: 08/14/2017    Chronic seasonal allergic rhinitis due to pollen  -     fluticasone (FLONASE) 50 mcg/actuation nasal spray; ADMINISTER 1 SPRAY IN EACH NARE 2 TIMES DAILY.  Dispense: 48 g; Refill: 3    Post herpetic neuralgia  -     thiamine 50 MG tablet; Take 1 tablet (50 mg total) by mouth once daily.  Dispense: 90 tablet; Refill: 4    Hypoglycemia  -     blood sugar diagnostic (BLOOD GLUCOSE TEST) Strp; Accucheck Annabelle strip ac x 3 daily due to  hypoglycemia  Dispense: 300 each; Refill: 3      PLAN:Patient readiness/barriers to change: acceptance    During the course of the visit the patient was educated and counseled about the following:     Diabetes:  Discussed general issues about diabetes pathophysiology and management.  Educational material distributed.  Addressed ADA diet.  Suggested low cholesterol diet.  Encouraged aerobic exercise.  Discussed foot care.  Reminded to get yearly retinal exam.  Discussed ways to avoid symptomatic hypoglycemia.    The following self management tools provided: blood pressure log  blood glucose log  excercise log    Patient Instructions (the written plan) was given to the patient/family.     Time spent with patient: 30 minutes  See orders for this visit as documented in the electronic medical record.  Issues reviewed with her: home glucose monitoring demonstrated and taught, all medications, side effects and compliance discussed carefully, glycohemoglobin and other lab monitoring discussed and labs immediately prior to next visit.

## 2017-08-15 ENCOUNTER — LAB VISIT (OUTPATIENT)
Dept: LAB | Facility: HOSPITAL | Age: 67
End: 2017-08-15
Attending: FAMILY MEDICINE
Payer: MEDICARE

## 2017-08-15 VITALS — BODY MASS INDEX: 18.79 KG/M2 | HEIGHT: 66 IN | WEIGHT: 116.88 LBS

## 2017-08-15 DIAGNOSIS — R00.1 BRADYCARDIA, SINUS: ICD-10-CM

## 2017-08-15 DIAGNOSIS — R63.4 ABNORMAL WEIGHT LOSS: ICD-10-CM

## 2017-08-15 LAB
CORTIS SERPL-MCNC: 13.2 UG/DL
O+P STL TRI STN: NORMAL
RV AG STL QL IA.RAPID: NEGATIVE

## 2017-08-15 PROCEDURE — 36415 COLL VENOUS BLD VENIPUNCTURE: CPT | Mod: PO

## 2017-08-15 PROCEDURE — 82533 TOTAL CORTISOL: CPT

## 2017-08-15 RX ORDER — HYDROXYCHLOROQUINE SULFATE 200 MG/1
TABLET, FILM COATED ORAL
Qty: 180 TABLET | Refills: 0 | Status: SHIPPED | OUTPATIENT
Start: 2017-08-15 | End: 2018-11-11 | Stop reason: SDUPTHER

## 2017-08-15 NOTE — PROGRESS NOTES
08/14/17 0000   Diabetes Education Visit   Diabetes Education Record Assessment/Progress Initial   Diabetes Type   Diabetes Type  Type II   Diabetes History   Diabetes Diagnosis >10 years   Nutrition   Meal Planning water;snacks between meal   Meal Plan 24 Hour Recall - Breakfast (One egg with black coffee)   Meal Plan 24 Hour Recall - Lunch (Poultry with vegetable will also have a fruit smoothie)   Meal Plan 24 Hour Recall - Dinner (Toast with cream cheese and sometimes will eat beans)   Meal Plan 24 Hour Recall - Snack (Snacks on fruit through out the day)   Monitoring    Monitoring Other   Self Monitoring  (Reports blood sugars <100)   Blood Glucose Logs No   Exercise    Exercise Type (No routine exercise)   Current Diabetes Treatment    Current Treatment Diet   Social History   Preferred Learning Method Face to Face   Primary Support Self;Family   Smoking Status Never a Smoker   Alcohol Use Never   Barriers to Change   Barriers to Change None   Learning Challenges  None   Readiness to Learn    Readiness to Learn  Acceptance   Cultural Influences   Cultural Influences None   Diabetes Education Assessment/Progress   Acute Complications (preventing, detecting, and treating acute complications) DC   Chronic Complications (preventing, detecting, and treating chronic complications) DC   Diabetes Disease Process (diabetes disease process and treatment options) DC   Nutrition (Incorporating nutritional management into one's lifestyle) DC Educated patient on importance of meeting protein needs.  Patient consuming <15 grams of protein per day. Most of her intake is from fresh fruits.  Patient underwent Gastric bypass surgery over 10 years prior and is losing too much weight as she is not eating calorie/nutritional dense foods.   Physical Activity (incorporating physical activity into one's lifestyle) DC   Medications (states correct name, dose, onset, peak, duration, side effects & timing of meds) DC   Monitoring  (monitoring blood glucose/other parameters & using results) DC Instructed patient to test blood sugars more often to assess glucose ranges.   Goal Setting and Problem Solving (verbalizes behavior change strategies & sets realistic goals) DC   Behavior Change (developing personal strategies to health & behavior change) DC   Psychosocial Issues (developing personal srategies to address psychosocial concerns) DC   Goals   Healthy Eating Set   Start Date 08/14/17   Target Date 09/14/17   Physical Activity In Progress   Monitoring In Progress   Medications In Progress   Problem Solving In Progress   Healthy Coping In Progress   Reducing Risks In Progress   Diabetes Self-Management Support Plan   Exercise/Nutrition other   Other exercise/nutrition (Provided list of ready to drink protein supplements and list of protein contents of foods)   Stress Management family;friends   Review Status Patient has selected and agrees to support plan.   Diabetes Care Plan/Intervention   Education Plan/Intervention In F/U DSMT   Diabetes Meal Plan   Restrictions Restricted Carbohydrate   Calories 1400   Carbohydrate Per Meal 30-45g   Carbohydrate Per Snack  7-15g   Protein (Eat protein with meals and snacks)   Education Units of Time    Time Spent 60 min

## 2017-08-21 ENCOUNTER — CLINICAL SUPPORT (OUTPATIENT)
Dept: CARDIOLOGY | Facility: CLINIC | Age: 67
End: 2017-08-21
Payer: MEDICARE

## 2017-08-21 DIAGNOSIS — R00.1 BRADYCARDIA, SINUS: ICD-10-CM

## 2017-08-21 PROCEDURE — 93224 XTRNL ECG REC UP TO 48 HRS: CPT | Mod: S$GLB,,, | Performed by: INTERNAL MEDICINE

## 2017-08-24 DIAGNOSIS — Z12.11 COLON CANCER SCREENING: ICD-10-CM

## 2017-08-28 ENCOUNTER — OFFICE VISIT (OUTPATIENT)
Dept: FAMILY MEDICINE | Facility: CLINIC | Age: 67
End: 2017-08-28
Payer: MEDICARE

## 2017-08-28 VITALS
DIASTOLIC BLOOD PRESSURE: 60 MMHG | HEART RATE: 50 BPM | BODY MASS INDEX: 18.67 KG/M2 | HEIGHT: 66 IN | SYSTOLIC BLOOD PRESSURE: 113 MMHG | TEMPERATURE: 98 F | WEIGHT: 116.19 LBS | RESPIRATION RATE: 12 BRPM | OXYGEN SATURATION: 97 %

## 2017-08-28 DIAGNOSIS — M54.9 BACK PAIN, UNSPECIFIED BACK LOCATION, UNSPECIFIED BACK PAIN LATERALITY, UNSPECIFIED CHRONICITY: ICD-10-CM

## 2017-08-28 DIAGNOSIS — M32.9 SYSTEMIC LUPUS ERYTHEMATOSUS, UNSPECIFIED SLE TYPE, UNSPECIFIED ORGAN INVOLVEMENT STATUS: ICD-10-CM

## 2017-08-28 DIAGNOSIS — F41.8 DEPRESSION WITH ANXIETY: Chronic | ICD-10-CM

## 2017-08-28 DIAGNOSIS — R19.4 CHANGE IN BOWEL HABITS: ICD-10-CM

## 2017-08-28 DIAGNOSIS — R00.1 BRADYCARDIA: Primary | ICD-10-CM

## 2017-08-28 PROCEDURE — 3008F BODY MASS INDEX DOCD: CPT | Mod: S$GLB,,, | Performed by: PHYSICIAN ASSISTANT

## 2017-08-28 PROCEDURE — 1159F MED LIST DOCD IN RCRD: CPT | Mod: S$GLB,,, | Performed by: PHYSICIAN ASSISTANT

## 2017-08-28 PROCEDURE — 99214 OFFICE O/P EST MOD 30 MIN: CPT | Mod: S$GLB,,, | Performed by: PHYSICIAN ASSISTANT

## 2017-08-28 PROCEDURE — 1125F AMNT PAIN NOTED PAIN PRSNT: CPT | Mod: S$GLB,,, | Performed by: PHYSICIAN ASSISTANT

## 2017-08-28 PROCEDURE — 99999 PR PBB SHADOW E&M-EST. PATIENT-LVL V: CPT | Mod: PBBFAC,,, | Performed by: PHYSICIAN ASSISTANT

## 2017-08-28 NOTE — PROGRESS NOTES
Subjective:       Patient ID: Erica Marsh is a 67 y.o. female.    Chief Complaint: Follow-up (2wk f/u)    Ms. Marsh comes to clinic today for follow up. The patient recently had stool studies that returned normal. The patient also had a holter monitor to evaluate bradycardia. The patient continues to have heart rates dropping into the 40s. The patient does not take medication that would lower her heart rate. The patient has chronic pain related to lupus and fibromyalgia. The patient is followed by rheumatologist, Dr. Alvarenga. The patient does complain of back pain today. She reports this is chronic but recently worsening due to weather changes. The patient was also seen by Dr. Ornelas for changes in his bowel habits. The patient has been started on creon with improvement in diarrhea symptoms. The patient reports she cannot have a colonoscopy due to the fasting required with the prep due to her hypoglycemia. The patient will be leaving the country and going to [facility] for 6 months starting this week. She reports this has greatly improved her depression symptoms to be with her family in her home country.       Back Pain   This is a recurrent problem. The current episode started more than 1 month ago. The problem occurs daily. The problem has been waxing and waning since onset. The pain is present in the gluteal, lumbar spine, thoracic spine and costovertebral angle. The quality of the pain is described as aching, cramping and shooting. The pain radiates to the left knee, left thigh, right knee and right thigh. The pain is at a severity of 8/10. The pain is moderate. The pain is the same all the time. The symptoms are aggravated by standing. Stiffness is present in the morning. Associated symptoms include leg pain and tingling. Pertinent negatives include no abdominal pain, chest pain, dysuria, fever, headaches or pelvic pain. She has tried analgesics, heat, home exercises and walking for the symptoms. The treatment  provided mild relief.     Review of Systems   Constitutional: Negative for activity change, appetite change and fever.   HENT: Negative for postnasal drip, rhinorrhea and sinus pressure.    Eyes: Negative for visual disturbance.   Respiratory: Negative for cough and shortness of breath.    Cardiovascular: Negative for chest pain.   Gastrointestinal: Negative for abdominal distention and abdominal pain.   Genitourinary: Negative for difficulty urinating, dysuria and pelvic pain.   Musculoskeletal: Positive for arthralgias, back pain and myalgias.   Neurological: Positive for tingling. Negative for headaches.   Hematological: Negative for adenopathy.   Psychiatric/Behavioral: The patient is not nervous/anxious.        Objective:      Physical Exam   Constitutional: She is oriented to person, place, and time.   Cardiovascular: Regular rhythm.    Decreased heart rate   Pulmonary/Chest: Effort normal and breath sounds normal. She has no wheezes.   Abdominal: Soft. Bowel sounds are normal. She exhibits no distension. There is no tenderness.   Musculoskeletal: She exhibits no edema.   Neurological: She is alert and oriented to person, place, and time.   Skin: No erythema.   Psychiatric: Her behavior is normal.       Assessment:       1. Bradycardia    2. Systemic lupus erythematosus, unspecified SLE type, unspecified organ involvement status    3. Change in bowel habits    4. Back pain, unspecified back location, unspecified back pain laterality, unspecified chronicity    5. Depression with anxiety        Plan:     Erica was seen today for follow-up.    Diagnoses and all orders for this visit:    Bradycardia  -     Ambulatory referral to Cardiology    Systemic lupus erythematosus, unspecified SLE type, unspecified organ involvement status  Continue follow up with Dr. Alvarenga  Change in bowel habits  Continue creon  Back pain, unspecified back location, unspecified back pain laterality, unspecified chronicity  -     Cancel:  Ambulatory Referral to Physical/Occupational Therapy  -     Ambulatory referral to Physical Medicine Rehab    Depression with anxiety  Improving   Continue current medication

## 2017-08-28 NOTE — PATIENT INSTRUCTIONS
Diabetes (General Information)  Diabetes is a long-term health problem. It means your body does not make enough insulin. Or it may mean that your body cannot use the insulin it makes. Insulin is a hormone in your body. It lets blood sugar (glucose) reach the cells in your body. All of your cells need glucose for fuel.  When you have diabetes, the glucose in your blood builds up because it cannot get into the cells. This buildup is called high blood sugar (hyperglycemia).  Your blood sugar level depends on several things. It depends on what kind of food you eat and how much of it you eat. It also depends on how much exercise you get, and how much insulin you have in your body. Eating too much of the wrong kinds of food or not taking diabetes medicine on time can cause high blood sugar. Infections can cause high blood sugar even if you are taking medicines correctly.  These things can also cause low blood sugar:  · Missing meals  · Not eating enough food  · Taking too much diabetes medicine  Diabetes can cause serious problems over time if you do not get treated. These problems include heart disease, stroke, kidney failure, and blindness. They also include nerve pain or loss of feeling in your legs and feet, and gangrene of the feet. By keeping your blood sugar under control you can prevent or delay these problems.  Normal blood sugar levels are 80 to 100 before a meal and less than 180 in the 1 to 2 hours after a meal.  Home care  Follow these guidelines when caring for yourself at home:  · Follow the diet your healthcare provider gives you. Take insulin or other diabetes medicine exactly as told to.  · Watch your blood sugar as you are told to. Keep a log of your results. This will help your provider change your medicines to keep your blood sugar under control.  · Try to reach your ideal weight. You may be able to cut back on or not have to take diabetes medicine if you eat the right foods and get exercise.  · Do  not smoke. Smoking worsens the effects of diabetes on your circulation. You are much more likely to have a heart attack if you have diabetes and you smoke.  · Take good care of your feet. If you have lost feeling in your feet, you may not see an injury or infection. Check your feet and between your toes at least once a week.  · Wear a medical alert bracelet or necklace, or carry a card in your wallet that says you have diabetes. This will help healthcare providers give you the right care if you get very ill and cannot tell them that you have diabetes.  Sick day plan  If you get a cold, the flu, or a bacterial or viral infection, take these steps:  · Look at your diabetes sick plan and call your healthcare provider as you were told to. You may need to call your provider right away if:  ¨ Your blood sugar is above 240 while taking your diabetes medicine  ¨ Your urine ketone levels are above normal or high  ¨ You have been vomiting more than 6 hours  ¨ You have trouble breathing or your breath ha s a fruity smell  ¨ You have a high fever  ¨ You have a fever for several days and you are not getting better  ¨ You get light-headed and are sleepier than usual  · Keep taking your diabetes pills (oral medicine) even if you have been vomiting and are feeling sick. Call your provider right away because you may need insulin to lower your blood sugar until you recover from your illness.  · Keep taking your insulin even if you have been vomiting and are feeling sick. Call your provider right away to ask if you need to change your insulin dose. This will depend on your blood sugar results.  · Check your blood sugar every 2 to 4 hours, or at least 4 times a day.  · Check your ketones often. If you are vomiting and having diarrhea, watch them more often.  · Do not skip meals. Try to eat small meals on a regular schedule. Do this even if you do not feel like eating.  · Drink water or other liquids that do not have caffeine or  calories. This will keep you from getting dehydrated. If you are nauseated or vomiting, takes small sips every 5 minutes. To prevent dehydration try to drink a cup (8 ounces) of fluids every hour while you are awake.  General care  Always bring a source of fast-acting sugar with you in case you have symptoms of low blood sugar (below 70). At the first sign of low blood sugar, eat or drink 15 to 20 grams of fast-acting sugar to raise your blood sugar. Examples are:  · 3 to 4 glucose tablets. You can buy these at most [employer].  · 4 ounces (1/2 cup) of regular (not diet) soft drinks  · 4 ounces (1/2 cup) of any fruit juice  · 8 ounces (1 cup) of milk  · 5 to 6 pieces of hard candy  · 1 tablespoon of honey  Check your blood sugar 15 minutes after treating yourself. If it is still below 70, take 15 to 20 more grams of fast-acting sugar. Test again in 15 minutes. If it returns to normal (70 or above), eat a snack or meal to keep your blood sugar in a safe range. If it stays low, call your doctor or go to an emergency room.  Follow-up care  Follow-up with your healthcare provider, or as advised. For more information about diabetes, visit the American Diabetes Association website at www.diabetes.org or call 610-871-1300.  When to seek medical advice  Call your healthcare provider right away if you have any of these symptoms of high blood sugar:  · Frequent urination  · Dizziness  · Drowsiness  · Thirst  · Headache  · Nausea or vomiting  · Abdominal pain  · Eyesight changes  · Fast breathing  · Confusion or loss of consciousness  Also call your provider right away if you have any of these signs of low blood sugar:  · Fatigue  · Headache  · Shakes  · Excess sweating  · Hunger  · Feeling anxious or restless  · Eyesight changes  · Drowsiness  · Weakness  · Confusion or loss of consciousness  Call 911  Call for emergency help right away if any of these occur:  · Chest pain or shortness of breath  · Dizziness or  fainting  · Weakness of an arm or leg or one side of the face  · Trouble speaking or seeing   Date Last Reviewed: 6/1/2016  © 1326-9608 [employer] [address] [place] [address], [place] [address]. All rights reserved. This information is not intended as a substitute for professional medical care. Always follow your healthcare professional's instructions.

## 2017-09-18 ENCOUNTER — TELEPHONE (OUTPATIENT)
Dept: FAMILY MEDICINE | Facility: CLINIC | Age: 67
End: 2017-09-18

## 2017-10-02 ENCOUNTER — TELEPHONE (OUTPATIENT)
Dept: FAMILY MEDICINE | Facility: CLINIC | Age: 67
End: 2017-10-02

## 2017-10-12 DIAGNOSIS — E11.9 TYPE 2 DIABETES MELLITUS WITHOUT COMPLICATION: ICD-10-CM

## 2017-12-11 ENCOUNTER — TELEPHONE (OUTPATIENT)
Dept: CARDIOLOGY | Facility: CLINIC | Age: 67
End: 2017-12-11

## 2017-12-11 NOTE — TELEPHONE ENCOUNTER
Called pt to offer options for reschedule due to Dr Cash no longer seeing pts at this office. No answer. Left message for pt to call back to discuss.

## 2017-12-12 NOTE — TELEPHONE ENCOUNTER
Called pt to offer options for reschedule due to Dr Cash no longer seeing pts at this office. No answer. No voicemail. Unable to leave message. Will continue to call.

## 2017-12-15 NOTE — TELEPHONE ENCOUNTER
Made multiple calls to pt to offer options for reschedule due to Dr Cash no longer seeing pts at this office. No answer. Left message advising pt that the appointment was being rescheduled to Dr Chavez's schedule and a letter would be sent out. Left message for pt to call back to discuss if she wished to do so. Letter mailed out with new date and time.     Also placed call to Emergency contact in attempt to reach pt. No answer. Left message for pt to call back.

## 2018-01-18 DIAGNOSIS — Z13.5 DIABETIC RETINOPATHY SCREENING: ICD-10-CM

## 2018-01-26 ENCOUNTER — PATIENT MESSAGE (OUTPATIENT)
Dept: CARDIOLOGY | Facility: CLINIC | Age: 68
End: 2018-01-26

## 2018-02-01 ENCOUNTER — TELEPHONE (OUTPATIENT)
Dept: CARDIOLOGY | Facility: CLINIC | Age: 68
End: 2018-02-01

## 2018-02-01 NOTE — TELEPHONE ENCOUNTER
Called pt to reschedule 2/12 appt due to Dr Chavez out of office. Spoke to sister, Tiffanie, who advised pt was out of town. Left message with sister that appt was canceled on 2/12 and rescheduled to first availabe and that pt needed to call back to reschedule if date does not work for her.  Sister verbalized understanding and stated that she would have pt call back. Letter also mailed out. No further issues discussed.

## 2018-02-06 DIAGNOSIS — E11.9 TYPE 2 DIABETES MELLITUS WITHOUT COMPLICATION, WITHOUT LONG-TERM CURRENT USE OF INSULIN: Primary | ICD-10-CM

## 2018-02-07 DIAGNOSIS — R63.4 ABNORMAL WEIGHT LOSS: ICD-10-CM

## 2018-02-07 DIAGNOSIS — R19.7 DIARRHEA IN ADULT PATIENT: ICD-10-CM

## 2018-02-08 DIAGNOSIS — E41 NUTRITIONAL MARASMUS: ICD-10-CM

## 2018-02-08 DIAGNOSIS — R00.1 BRADYCARDIA, SINUS: ICD-10-CM

## 2018-02-08 DIAGNOSIS — J30.1 CHRONIC SEASONAL ALLERGIC RHINITIS DUE TO POLLEN: ICD-10-CM

## 2018-02-08 DIAGNOSIS — E07.9 THYROID DISEASE: ICD-10-CM

## 2018-02-08 DIAGNOSIS — R19.7 DIARRHEA IN ADULT PATIENT: ICD-10-CM

## 2018-02-08 DIAGNOSIS — B02.29 POST HERPETIC NEURALGIA: ICD-10-CM

## 2018-02-08 DIAGNOSIS — F41.9 ANXIETY: ICD-10-CM

## 2018-02-08 DIAGNOSIS — Z71.3 ENCOUNTER FOR NUTRITION EVALUATION PRIOR TO BARIATRIC SURGERY: ICD-10-CM

## 2018-02-08 DIAGNOSIS — R63.4 ABNORMAL WEIGHT LOSS: ICD-10-CM

## 2018-02-08 RX ORDER — HYDROXYCHLOROQUINE SULFATE 200 MG/1
TABLET, FILM COATED ORAL
Qty: 90 TABLET | Refills: 1 | OUTPATIENT
Start: 2018-02-08

## 2018-02-08 RX ORDER — LANCETS
EACH MISCELLANEOUS
Qty: 300 EACH | Refills: 3 | Status: SHIPPED | OUTPATIENT
Start: 2018-02-08 | End: 2021-10-28 | Stop reason: SDUPTHER

## 2018-02-08 RX ORDER — CLONAZEPAM 1 MG/1
1 TABLET ORAL 3 TIMES DAILY
Qty: 90 TABLET | Refills: 3 | Status: CANCELLED | OUTPATIENT
Start: 2018-02-08

## 2018-02-08 RX ORDER — MAGNESIUM HYDROXIDE 400 MG/5ML
1 SUSPENSION, ORAL (FINAL DOSE FORM) ORAL DAILY
Qty: 90 TABLET | Refills: 3 | Status: SHIPPED | OUTPATIENT
Start: 2018-02-08 | End: 2019-06-07 | Stop reason: SDUPTHER

## 2018-02-08 RX ORDER — VIT C/E/ZN/COPPR/LUTEIN/ZEAXAN 250MG-90MG
1000 CAPSULE ORAL 2 TIMES DAILY
Qty: 180 CAPSULE | Refills: 3 | Status: SHIPPED | OUTPATIENT
Start: 2018-02-08 | End: 2019-06-07 | Stop reason: SDUPTHER

## 2018-02-08 RX ORDER — LEVOTHYROXINE SODIUM 125 UG/1
TABLET ORAL
Qty: 90 TABLET | Refills: 1 | Status: SHIPPED | OUTPATIENT
Start: 2018-02-08 | End: 2018-05-11 | Stop reason: SDUPTHER

## 2018-02-08 RX ORDER — VALACYCLOVIR HYDROCHLORIDE 500 MG/1
500 TABLET, FILM COATED ORAL DAILY
Qty: 180 TABLET | Refills: 9 | Status: SHIPPED | OUTPATIENT
Start: 2018-02-08 | End: 2019-01-24 | Stop reason: SDUPTHER

## 2018-02-08 RX ORDER — VIT C/E/ZN/COPPR/LUTEIN/ZEAXAN 250MG-90MG
CAPSULE ORAL
Qty: 180 CAPSULE | Refills: 0 | Status: CANCELLED | OUTPATIENT
Start: 2018-02-08

## 2018-02-08 RX ORDER — PANCRELIPASE 24000; 76000; 120000 [USP'U]/1; [USP'U]/1; [USP'U]/1
CAPSULE, DELAYED RELEASE PELLETS ORAL
Qty: 200 CAPSULE | Refills: 2 | Status: SHIPPED | OUTPATIENT
Start: 2018-02-08 | End: 2018-02-16

## 2018-02-08 RX ORDER — MEGESTROL ACETATE 125 MG/ML
625 SUSPENSION ORAL DAILY
Qty: 450 ML | Refills: 3 | Status: SHIPPED | OUTPATIENT
Start: 2018-02-08 | End: 2018-02-12

## 2018-02-08 RX ORDER — FLUTICASONE PROPIONATE 50 MCG
SPRAY, SUSPENSION (ML) NASAL
Qty: 48 G | Refills: 3 | Status: SHIPPED | OUTPATIENT
Start: 2018-02-08 | End: 2019-06-07 | Stop reason: SDUPTHER

## 2018-02-12 ENCOUNTER — LAB VISIT (OUTPATIENT)
Dept: LAB | Facility: HOSPITAL | Age: 68
End: 2018-02-12
Attending: FAMILY MEDICINE
Payer: MEDICARE

## 2018-02-12 ENCOUNTER — OFFICE VISIT (OUTPATIENT)
Dept: FAMILY MEDICINE | Facility: CLINIC | Age: 68
End: 2018-02-12
Payer: MEDICARE

## 2018-02-12 VITALS
SYSTOLIC BLOOD PRESSURE: 98 MMHG | DIASTOLIC BLOOD PRESSURE: 54 MMHG | TEMPERATURE: 98 F | BODY MASS INDEX: 18.69 KG/M2 | WEIGHT: 119.06 LBS | HEART RATE: 57 BPM | HEIGHT: 67 IN

## 2018-02-12 DIAGNOSIS — G89.29 CHRONIC MIDLINE THORACIC BACK PAIN: ICD-10-CM

## 2018-02-12 DIAGNOSIS — F33.9 RECURRENT MAJOR DEPRESSION RESISTANT TO TREATMENT: ICD-10-CM

## 2018-02-12 DIAGNOSIS — R93.7 ABNORMAL X-RAY OF THORACIC SPINE: ICD-10-CM

## 2018-02-12 DIAGNOSIS — G57.00 SCIATIC NEUROPATHY, UNSPECIFIED LATERALITY: ICD-10-CM

## 2018-02-12 DIAGNOSIS — M54.12 NEUROPATHY, CERVICAL (RADICULAR): Primary | ICD-10-CM

## 2018-02-12 DIAGNOSIS — R00.2 INTERMITTENT PALPITATIONS: ICD-10-CM

## 2018-02-12 DIAGNOSIS — M54.6 CHRONIC MIDLINE THORACIC BACK PAIN: ICD-10-CM

## 2018-02-12 LAB
ALBUMIN SERPL BCP-MCNC: 3.6 G/DL
ALP SERPL-CCNC: 78 U/L
ALT SERPL W/O P-5'-P-CCNC: 53 U/L
ANION GAP SERPL CALC-SCNC: 9 MMOL/L
AST SERPL-CCNC: 45 U/L
BASOPHILS # BLD AUTO: 0.07 K/UL
BASOPHILS NFR BLD: 1 %
BILIRUB SERPL-MCNC: 0.4 MG/DL
BUN SERPL-MCNC: 20 MG/DL
CALCIUM SERPL-MCNC: 9 MG/DL
CHLORIDE SERPL-SCNC: 102 MMOL/L
CO2 SERPL-SCNC: 30 MMOL/L
CREAT SERPL-MCNC: 0.8 MG/DL
CRP SERPL-MCNC: 0.3 MG/L
DIFFERENTIAL METHOD: ABNORMAL
EOSINOPHIL # BLD AUTO: 0.3 K/UL
EOSINOPHIL NFR BLD: 3.9 %
ERYTHROCYTE [DISTWIDTH] IN BLOOD BY AUTOMATED COUNT: 13.1 %
EST. GFR  (AFRICAN AMERICAN): >60 ML/MIN/1.73 M^2
EST. GFR  (NON AFRICAN AMERICAN): >60 ML/MIN/1.73 M^2
GLUCOSE SERPL-MCNC: 84 MG/DL
HCT VFR BLD AUTO: 38.1 %
HGB BLD-MCNC: 12.7 G/DL
IMM GRANULOCYTES # BLD AUTO: 0.01 K/UL
IMM GRANULOCYTES NFR BLD AUTO: 0.1 %
LYMPHOCYTES # BLD AUTO: 2.9 K/UL
LYMPHOCYTES NFR BLD: 40.8 %
MCH RBC QN AUTO: 33.6 PG
MCHC RBC AUTO-ENTMCNC: 33.3 G/DL
MCV RBC AUTO: 101 FL
MONOCYTES # BLD AUTO: 0.7 K/UL
MONOCYTES NFR BLD: 9.1 %
NEUTROPHILS # BLD AUTO: 3.2 K/UL
NEUTROPHILS NFR BLD: 45.1 %
NRBC BLD-RTO: 0 /100 WBC
PLATELET # BLD AUTO: 254 K/UL
PMV BLD AUTO: 10.7 FL
POTASSIUM SERPL-SCNC: 4.7 MMOL/L
PROT SERPL-MCNC: 6.8 G/DL
RBC # BLD AUTO: 3.78 M/UL
SODIUM SERPL-SCNC: 141 MMOL/L
TSH SERPL DL<=0.005 MIU/L-ACNC: 1.03 UIU/ML
WBC # BLD AUTO: 7.13 K/UL

## 2018-02-12 PROCEDURE — 99999 PR PBB SHADOW E&M-EST. PATIENT-LVL V: CPT | Mod: PBBFAC,,, | Performed by: FAMILY MEDICINE

## 2018-02-12 PROCEDURE — 3008F BODY MASS INDEX DOCD: CPT | Mod: S$GLB,,, | Performed by: FAMILY MEDICINE

## 2018-02-12 PROCEDURE — 1126F AMNT PAIN NOTED NONE PRSNT: CPT | Mod: S$GLB,,, | Performed by: FAMILY MEDICINE

## 2018-02-12 PROCEDURE — 85025 COMPLETE CBC W/AUTO DIFF WBC: CPT

## 2018-02-12 PROCEDURE — 80053 COMPREHEN METABOLIC PANEL: CPT

## 2018-02-12 PROCEDURE — 36415 COLL VENOUS BLD VENIPUNCTURE: CPT | Mod: PO

## 2018-02-12 PROCEDURE — 1159F MED LIST DOCD IN RCRD: CPT | Mod: S$GLB,,, | Performed by: FAMILY MEDICINE

## 2018-02-12 PROCEDURE — 86140 C-REACTIVE PROTEIN: CPT

## 2018-02-12 PROCEDURE — 84443 ASSAY THYROID STIM HORMONE: CPT

## 2018-02-12 PROCEDURE — 99214 OFFICE O/P EST MOD 30 MIN: CPT | Mod: S$GLB,,, | Performed by: FAMILY MEDICINE

## 2018-02-12 RX ORDER — CITALOPRAM 40 MG/1
40 TABLET, FILM COATED ORAL DAILY
Qty: 90 TABLET | Refills: 4 | Status: SHIPPED | OUTPATIENT
Start: 2018-02-12 | End: 2018-07-23 | Stop reason: SDUPTHER

## 2018-02-12 RX ORDER — FOLIC ACID 1 MG/1
1 TABLET ORAL DAILY
Qty: 90 TABLET | Refills: 4 | Status: SHIPPED | OUTPATIENT
Start: 2018-02-12 | End: 2019-05-28 | Stop reason: SDUPTHER

## 2018-02-12 RX ORDER — BUPROPION HYDROCHLORIDE 75 MG/1
75 TABLET ORAL 2 TIMES DAILY
Qty: 60 TABLET | Refills: 11 | Status: SHIPPED | OUTPATIENT
Start: 2018-02-12 | End: 2018-02-22 | Stop reason: SDUPTHER

## 2018-02-12 NOTE — PROGRESS NOTES
Subjective:       Patient ID: Erica Marsh is a 67 y.o. female.    Chief Complaint: Back Pain and Leg Pain    She has frequent hypoglycemia,       Back Pain   This is a chronic problem. The problem occurs intermittently. The problem has been gradually worsening since onset. The pain is present in the thoracic spine. The pain is at a severity of 7/10. The pain is moderate. The pain is worse during the night. The symptoms are aggravated by lying down. Associated symptoms include leg pain. Pertinent negatives include no abdominal pain, bladder incontinence, bowel incontinence or headaches.   Leg Pain        Review of Systems   Gastrointestinal: Negative for abdominal pain and bowel incontinence.   Genitourinary: Negative for bladder incontinence.   Musculoskeletal: Positive for back pain.   Neurological: Negative for headaches.       Patient Active Problem List   Diagnosis    Lupus (systemic lupus erythematosus)    GERD (gastroesophageal reflux disease)    Arthritis    DM type 2, not at goal    Thyroid disease    Trigger finger    Hypoglycemia    Abdominal pain, other specified site    Constipation    Change in bowel habits    Rectal bleeding    Hypoglycemia after GI (gastrointestinal) surgery    Head trauma    Depression with anxiety       Objective:      Physical Exam   Musculoskeletal:        Thoracic back: She exhibits decreased range of motion, tenderness and bony tenderness.   She has progressive hyperesthesia, mild scoliosis, mild DDD, cervical and lumbar radiculopathy. Hx of Herpes. Poor anxiety controlled, poor exercise , poor family support.       Lab Results   Component Value Date    WBC 4.40 07/27/2017    HGB 13.3 07/27/2017    HCT 40.3 07/27/2017     07/27/2017    CHOL 190 07/27/2017    TRIG 62 07/27/2017    HDL 85 (H) 07/27/2017    ALT 20 07/27/2017    AST 27 07/27/2017     07/27/2017    K 4.6 07/27/2017     07/27/2017    CREATININE 0.9 07/27/2017    BUN 17 07/27/2017     CO2 28 07/27/2017    TSH 0.486 07/27/2017    HGBA1C 5.2 07/27/2017     The 10-year ASCVD risk score (Edenyesi CALIXTO Jr., et al., 2013) is: 5.9%    Values used to calculate the score:      Age: 67 years      Sex: Female      Is Non- : No      Diabetic: Yes      Tobacco smoker: No      Systolic Blood Pressure: 98 mmHg      Is BP treated: No      HDL Cholesterol: 85 mg/dL      Total Cholesterol: 190 mg/dL    Assessment:       1. Neuropathy, cervical (radicular)    2. Sciatic neuropathy, unspecified laterality    3. Chronic midline thoracic back pain    4. Recurrent major depression resistant to treatment    5. Abnormal x-ray of thoracic spine    6. Intermittent palpitations        Plan:       Neuropathy, cervical (radicular)    Sciatic neuropathy, unspecified laterality  -     Comprehensive metabolic panel; Future; Expected date: 02/12/2018  -     CBC auto differential; Future; Expected date: 02/12/2018  -     C-reactive protein; Future; Expected date: 02/12/2018  -     Ambulatory referral to Pain Clinic  -     MRI Thoracic Spine Without Contrast; Future; Expected date: 02/12/2018  -     folic acid (FOLVITE) 1 MG tablet; Take 1 tablet (1 mg total) by mouth once daily.  Dispense: 90 tablet; Refill: 4    Chronic midline thoracic back pain    Recurrent major depression resistant to treatment  -     citalopram (CELEXA) 40 MG tablet; Take 1 tablet (40 mg total) by mouth once daily.  Dispense: 90 tablet; Refill: 4  -     TSH; Future; Expected date: 02/12/2018  -     buPROPion (WELLBUTRIN) 75 MG tablet; Take 1 tablet (75 mg total) by mouth 2 (two) times daily.  Dispense: 60 tablet; Refill: 11    Abnormal x-ray of thoracic spine  -     MRI Thoracic Spine Without Contrast; Future; Expected date: 02/12/2018    Intermittent palpitations  -     Ambulatory referral to Cardiology      Patient readiness: acceptance and barriers:poor sleep habits    During the course of the visit the patient was educated and counseled  about the following:     Underweight:  Nutritional supplements    Goals: Underweight: Increase calorie intake and BMI      Did patient meet goals/outcomes: Yes    The following self management tools provided: excercise log    Patient Instructions (the written plan) was given to the patient/family.     Time spent with patient: 45 minutes    Barriers to medications present (yes )    Adverse reactions to current medications (yes)    Over the counter medications reviewed (Yes)         30 minutes spent counseling patient on diet, exercise, and weight loss.  This has been fully explained to the patient, who indicates understanding.

## 2018-02-15 ENCOUNTER — LAB VISIT (OUTPATIENT)
Dept: LAB | Facility: HOSPITAL | Age: 68
End: 2018-02-15
Attending: PHYSICAL MEDICINE & REHABILITATION
Payer: MEDICARE

## 2018-02-15 ENCOUNTER — INITIAL CONSULT (OUTPATIENT)
Dept: PHYSICAL MEDICINE AND REHAB | Facility: CLINIC | Age: 68
End: 2018-02-15
Payer: MEDICARE

## 2018-02-15 VITALS
SYSTOLIC BLOOD PRESSURE: 93 MMHG | DIASTOLIC BLOOD PRESSURE: 61 MMHG | HEART RATE: 60 BPM | BODY MASS INDEX: 18.69 KG/M2 | WEIGHT: 119.06 LBS | HEIGHT: 67 IN

## 2018-02-15 DIAGNOSIS — M79.10 MYALGIA: ICD-10-CM

## 2018-02-15 DIAGNOSIS — M54.50 CHRONIC BILATERAL LOW BACK PAIN WITHOUT SCIATICA: ICD-10-CM

## 2018-02-15 DIAGNOSIS — M25.50 ARTHRALGIA, UNSPECIFIED JOINT: Primary | ICD-10-CM

## 2018-02-15 DIAGNOSIS — M25.50 ARTHRALGIA, UNSPECIFIED JOINT: ICD-10-CM

## 2018-02-15 DIAGNOSIS — G89.29 CHRONIC BILATERAL LOW BACK PAIN WITHOUT SCIATICA: ICD-10-CM

## 2018-02-15 LAB
CCP AB SER IA-ACNC: <0.5 U/ML
CRP SERPL-MCNC: 0.3 MG/L
ERYTHROCYTE [SEDIMENTATION RATE] IN BLOOD BY WESTERGREN METHOD: 9 MM/HR

## 2018-02-15 PROCEDURE — 85651 RBC SED RATE NONAUTOMATED: CPT | Mod: PO

## 2018-02-15 PROCEDURE — 99999 PR PBB SHADOW E&M-EST. PATIENT-LVL IV: CPT | Mod: PBBFAC,,, | Performed by: PHYSICAL MEDICINE & REHABILITATION

## 2018-02-15 PROCEDURE — 86200 CCP ANTIBODY: CPT

## 2018-02-15 PROCEDURE — 86431 RHEUMATOID FACTOR QUANT: CPT

## 2018-02-15 PROCEDURE — 86140 C-REACTIVE PROTEIN: CPT

## 2018-02-15 PROCEDURE — 20553 NJX 1/MLT TRIGGER POINTS 3/>: CPT | Mod: S$GLB,,, | Performed by: PHYSICAL MEDICINE & REHABILITATION

## 2018-02-15 PROCEDURE — 86038 ANTINUCLEAR ANTIBODIES: CPT

## 2018-02-15 PROCEDURE — 81374 HLA I TYPING 1 ANTIGEN LR: CPT | Mod: PO

## 2018-02-15 PROCEDURE — 99204 OFFICE O/P NEW MOD 45 MIN: CPT | Mod: 25,S$GLB,, | Performed by: PHYSICAL MEDICINE & REHABILITATION

## 2018-02-15 PROCEDURE — 1125F AMNT PAIN NOTED PAIN PRSNT: CPT | Mod: S$GLB,,, | Performed by: PHYSICAL MEDICINE & REHABILITATION

## 2018-02-15 PROCEDURE — 36415 COLL VENOUS BLD VENIPUNCTURE: CPT | Mod: PO

## 2018-02-15 PROCEDURE — 3008F BODY MASS INDEX DOCD: CPT | Mod: S$GLB,,, | Performed by: PHYSICAL MEDICINE & REHABILITATION

## 2018-02-15 PROCEDURE — 1159F MED LIST DOCD IN RCRD: CPT | Mod: S$GLB,,, | Performed by: PHYSICAL MEDICINE & REHABILITATION

## 2018-02-15 RX ORDER — BACLOFEN 10 MG/1
10 TABLET ORAL NIGHTLY
Qty: 30 TABLET | Refills: 6 | Status: SHIPPED | OUTPATIENT
Start: 2018-02-15 | End: 2018-03-15

## 2018-02-15 RX ORDER — LIDOCAINE HYDROCHLORIDE 10 MG/ML
3 INJECTION INFILTRATION; PERINEURAL
Status: COMPLETED | OUTPATIENT
Start: 2018-02-15 | End: 2018-02-15

## 2018-02-15 RX ORDER — DICLOFENAC SODIUM AND MISOPROSTOL 50; 200 MG/1; UG/1
1 TABLET, DELAYED RELEASE ORAL 3 TIMES DAILY
Qty: 90 TABLET | Refills: 11 | Status: SHIPPED | OUTPATIENT
Start: 2018-02-15 | End: 2018-03-07 | Stop reason: SDUPTHER

## 2018-02-15 RX ADMIN — LIDOCAINE HYDROCHLORIDE 3 ML: 10 INJECTION INFILTRATION; PERINEURAL at 11:02

## 2018-02-15 NOTE — LETTER
February 15, 2018      Michelle Renteria PA-C  [address]  [facility] [address]           [facility]  [address] [place] [address] Drive [facility] [address]  [place] [address]  Phone: [phone]  Fax: [phone]          Patient: Erica Marsh   MR Number: [id number]   YOB: 1950   Date of Visit: 2/15/2018       Dear Michelle Renteria:    Thank you for referring Erica Marsh to me for evaluation. Attached you will find relevant portions of my assessment and plan of care.    If you have questions, please do not hesitate to call me. I look forward to following Erica Marsh along with you.    Sincerely,    Hattie Stallings,     Enclosure  CC:  No Recipients    If you would like to receive this communication electronically, please contact [email or web address] or [phone] to request more information on [facility] access.    For providers and/or their staff who would like to refer a patient to [facility], please contact us through our one-stop-shop provider referral line, [facility], at [phone].    If you feel you have received this communication in error or would no longer like to receive these types of communications, please e-mail [email or web address]

## 2018-02-15 NOTE — PROGRESS NOTES
HPI:  Patient is a 67 y.o. year old female w. Neck and back pain. She has a h/o lupus and fibromyalgia , she is followed by Katelyn. She has pain all over her body including hands.In past her rheum work up has been neg for RA. She has a h/o gastric bypass she lost over 100lbs. She is compliant w. Vitamin supplements.    Imaging    AP, lateral, open mouth and bilateral oblique views of the cervical spine were obtained.    The alignment is normal.  The vertebral bodies are intact without fracture or compression.  There is disc space narrowing identified at C5-6 and C6-7 with spondylosis and mild left neural foraminal stenosis identified at C5-6 and C6-7 and right neural foraminal stenosis identified at C4-5, C5-6 and C6-7.  The rest of the disc spaces are well-maintained.  The posterior elements and the dens are intact.   Impression      Chronic degenerative disc disease with spondylosis and neural foraminal stenosis at C5-6 and C6-7.  Right-sided spondylosis and neural foraminal stenosis also seen at C4-5.     Routine multiplanar imaging through this 63-year-old females lumbar spine was obtained with comparison made to 11/7/08.  A stir sequence was added.    The spinal cord appears to terminate at the T12-L1 level.  Intervertebral disk height loss is noted at the L1-L2 L4-L5 and L5-S1 levels in particular.  Modic type degenerative changes are noted at the L4-L5 and L5-S1 levels.  Decreased T2 signal is noted   from the L1-L2 through the L 5 S1 levels suggestive of degenerative disk desiccation.      The spinal cord appears to terminate at the T12-L1 level.  The vertebral bodies appear well aligned.  Vertebral body heights appear well preserved.    Mild bulging is noted within the lower thoracic spine at the T10-T11 and T11-T12 levels it may be mildly progressed since the prior exam.  These are not covered axially but no obvious cord contact is noted , mild central canal narrowing would be noted   that most and only  mild neural foraminal narrowing would be suspected.    No stir signal abnormality is noted to suggest an aggressive bone marrow replacement process or recent fracture.    The common bile duct appears prominent 11 mm.  A distal biliary obstructive type process cannot be excluded by size alone.  Please clinically correlate.    At the T12-L1 level, mild facet arthropathy and ligamentous hypertrophy is noted.  Mild broad-based bulging is noted on the 2 mm paracentric to the left greater than right.  Minimal central canal narrowing is noted.  No significant nerve foraminal   narrowing is noted.    At the L1-L2 level, facet arthropathy is noted greater on the left than the right.  Broad-based bulging is noted towards the left nerve foramen greater than right neural foramen and centrally of approximately 4 mm maximally.  Mild right and mild/moderate   left neural foraminal narrowing appears to be present.  Mild central canal narrowing is noted to 13 mm.  Mild progression of neural foraminal narrowing on the left appears to be present since the prior.      At the L2-L3 level, broad-based bulging appears to be present centrally and paracentric to the right of approximately 3 mm.  Mild right greater than left nerve foraminal narrowing is noted.  Mild central canal narrowing is noted.  Facet arthropathy and   ligamentous hypertrophy are noted.  A similar appearance is noted on the prior.  Mild progression of bulging may be noted.    At the L3-L4 level, broad based bulging appears to be present along with facet arthropathy and ligamentous hypertrophy.  The bulging or protrusion is to approximately 4 mm.  Mild central canal stenosis is noted.  Mild bilateral neural femoral stenosis is   noted.  No convincing detrimental change is noted since the prior.  A slightly asymmetric component to the bulge may be noted at this level towards the far lateral right and right neural foramen    At the L4-L5 level, broad-based bulging or  protrusion is noted of approximately 4 to 5 mm that actually appears less noticeable than on the prior.  Moderate bilateral nerve foraminal stenosis appears to be present similar since the prior.  Mild central   canal narrowing is noted.  The exiting nerve roots bilaterally come near the disk material and contact cannot be excluded particularly on the right    At the L5-S1 level, broad-based bulging is noted of approximately 3 mm.  Increased T2 signal is noted within the bulge centrally suggestive of an annular tear.  Minimal central canal narrowing is noted.  Mild bilateral neural foraminal narrowing is   noted.  No convincing detrimental change is noted since the prior.   Impression          1.  Multilevel degenerative changes as described above.    2.  Prominent common bile duct.  A distal biliary obstructive type process cannot be excluded by size alone.  Please clinically correlate.            Electronically signed by: Juan Manuel Lucas MD     Routine multiplanar imaging through this 63-year-old females cervical spine was obtained.  Comparison was made with 8/3/11.  The vertebral bodies appear well aligned the vertebral body heights appear well preserved.    Decreased T2 signal is noted at the C2-C3 C3-C4 C4-C5 C5-C6 C6-C7 levels consistent with degenerative disk desiccation.  Intervertebral disk height loss is noted at the C5-C6 and C6-C7 levels.    At the C2-C3 level, uncovertebral spurring is noted of 2 mm.  No significant central canal stenosis or neuroforaminal stenosis is noted.    At the C3-C4 level, bilateral facet arthropathy is noted.  Minimal disk osteophyte and bulging of one to 2 mm may be noted.  No significant central canal stenosis or neuroforaminal stenosis is noted.    At the C4-C5 level, a mild disk osteophyte complex is noted of 3 mm paracentric to the right.  Mild right nerve foraminal narrowing is noted.  Mild central canal narrowing is noted.  No significant left-sided nerve foraminal stenosis  is noted.  No   convincing detriment change is noted since the prior.    At the C5-C6 level, a broad based disk osteophyte bulge or protrusion appears to present a 3 mm paracentric to the right and centrally.  Mild central canal stenosis is noted.  Mild bilateral neuroforaminal narrowing is noted.  Bilateral  facet   arthropathy is noted.  No convincing detriment change is noted since the prior    At the C6-C7 level, a disk osteophyte protrusion appears to be present paracentrally to the right and left of approximately 4 mm maximally paracentric to the left greater than right.  Bilateral facet arthropathy is noted.  Mild bilateral nerve foraminal   narrowing is noted.  Mild central canal narrowing is noted 8 mm.    At the C7-T1 level, no significant disk bulge, central canal stenosis, or foraminal stenosis is noted.    At the T2-T3 level, a minimal bulge is noted of approximately 2 to 3 mm.  No significant central canal stenosis or nerve foraminal stenosis noted.   Impression      This examination is probably most notable for protrusion at the C6-C7 level and to a lesser degree at the C5-C6 level without convincing detrimental change since the prior of 8/3/11     Labs  AST/ALT elev 45/53  egfr cr gluc nl    Past Medical History:   Diagnosis Date    Arthritis     Depression     Diabetes mellitus     NO MED SINCE GASTRIC BYPASS    Diabetes mellitus type II     GERD (gastroesophageal reflux disease)     Hypertension     NO MED SINCE GASTRIC BYPASS    Kidney disease     Lupus erythematosus     Shingles     Thyroid disease     Trigger finger     RIGHT LONG     Past Surgical History:   Procedure Laterality Date    APPENDECTOMY      BREAST BIOPSY Left 1979    benign    CHOLECYSTECTOMY      GASTRIC BYPASS      HAND SURGERY      HYSTERECTOMY      TONSILLECTOMY       No family history on file.  Social History     Social History    Marital status:      Spouse name: N/A    Number of children: N/A     Years of education: N/A     Social History Main Topics    Smoking status: Never Smoker    Smokeless tobacco: Never Used    Alcohol use No    Drug use: No    Sexual activity: No     Other Topics Concern    Not on file     Social History Narrative    No narrative on file       Review of patient's allergies indicates:   Allergen Reactions    Codeine     Sulfa (sulfonamide antibiotics)     Codeine Rash and Other (See Comments)    Plaquenil [hydroxychloroquine] Rash and Other (See Comments)     ONE BRAND OF MED    Sulfa (sulfonamide antibiotics) Rash and Other (See Comments)       Current Outpatient Prescriptions:     aspirin (ECOTRIN) 81 MG EC tablet, Take 81 mg by mouth nightly., Disp: , Rfl:     blood sugar diagnostic Strp, 1 each by Misc.(Non-Drug; Combo Route) route 3 (three) times daily before meals., Disp: 200 each, Rfl: 11    buPROPion (WELLBUTRIN) 75 MG tablet, Take 1 tablet (75 mg total) by mouth 2 (two) times daily., Disp: 60 tablet, Rfl: 11    cholecalciferol, vitamin D3, (VITAMIN D3) 1,000 unit capsule, Take 1 capsule (1,000 Units total) by mouth 2 (two) times daily., Disp: 180 capsule, Rfl: 3    citalopram (CELEXA) 40 MG tablet, Take 1 tablet (40 mg total) by mouth once daily., Disp: 90 tablet, Rfl: 4    clonazePAM (KLONOPIN) 1 MG tablet, Take 1 tablet (1 mg total) by mouth 3 (three) times daily., Disp: 90 tablet, Rfl: 3    CREON 24,000-76,000 -120,000 unit capsule, TAKE 1 CAPSULE THREE TIMES DAILY WITH MEALS, Disp: 200 capsule, Rfl: 2    cyanocobalamin, vitamin B-12, 5,000 mcg TbDL, Take 1 tablet by mouth once daily., Disp: 90 tablet, Rfl: 3    cyclobenzaprine (FLEXERIL) 10 MG tablet, Take by mouth., Disp: , Rfl:     fluticasone (FLONASE) 50 mcg/actuation nasal spray, ADMINISTER 1 SPRAY IN EACH NARE 2 TIMES DAILY., Disp: 48 g, Rfl: 3    folic acid (FOLVITE) 1 MG tablet, Take 1 tablet (1 mg total) by mouth once daily., Disp: 90 tablet, Rfl: 4    gabapentin (NEURONTIN) 800 MG tablet,  Take 1 tablet (800 mg total) by mouth 3 (three) times daily., Disp: 270 tablet, Rfl: 3    hydroxychloroquine (PLAQUENIL) 200 mg tablet, Take 1 tablet (200 mg total) by mouth 2 (two) times daily. Three times a day, Disp: 180 tablet, Rfl: 1    lancets (ACCU-CHEK SOFTCLIX LANCETS) Misc, Use to test blood sugar three times a day   DX: E08.649, Disp: 300 each, Rfl: 3    levothyroxine (SYNTHROID) 125 MCG tablet, TAKE 1 TABLET ONE TIME DAILY, Disp: 90 tablet, Rfl: 1    lidocaine (LIDODERM) 5 %, Remove & Discard patch within 12 hours or as directed by MD, Disp: 60 patch, Rfl: 3    lipase-protease-amylase 24,000-76,000-120,000 units (CREON) 24,000-76,000 -120,000 unit capsule, Take 3 capsules by mouth 3 (three) times daily with meals., Disp: 810 capsule, Rfl: 3    ranitidine (ZANTAC) 150 MG tablet, Take 1 tablet (150 mg total) by mouth 2 (two) times daily., Disp: 60 tablet, Rfl: 11    thiamine 50 MG tablet, Take 1 tablet (50 mg total) by mouth once daily., Disp: 90 tablet, Rfl: 4    tramadol (ULTRAM) 50 mg tablet, Take by mouth., Disp: , Rfl:     valACYclovir (VALTREX) 500 MG tablet, Take 1 tablet (500 mg total) by mouth once daily., Disp: 180 tablet, Rfl: 9    vitamin E 1000 UNIT capsule, Take 1,000 Units by mouth once daily., Disp: , Rfl:     glucagon (human recombinant) inj 1mg/mL kit, Inject 1 mL (1 mg total) into the muscle as needed., Disp: 3 kit, Rfl: 11      Review of Systems:    No nausea, vomiting, fevers, chills , contipation, diarrhea + sweats,no weight change, +neck stiffness, no chest pain, no sob, no change bladder habits,+constipation  no coordination issues      Physical Exam:      Vitals:    02/15/18 1149   BP: 93/61   Pulse: 60     alert and oriented ×4 follows commands answers all questions appropriately,affect blunted  Manual muscle test 5 out of 5 sensation to light touch grossly intact  +excruciate tenderness b/l QL and cervical paraspinal  unlevelled iliac crest  Prominent scoliotic  "curvature    antalig gait  - modified slR  No clonus  DTR's symmetric 2+  No C/C/E      Assessment:  Fibromyalgia   lupus  Lumbar and cervical spondylosis w. Recurrent musc spasm  Possible RA    Plan:  Rheumatologic work up  Trial of arthrotec and baclofen  tpi's to low back today   if they work will do neck next time  Since pt has h/o ckd will check egfr, cr after a couple of mos of starting new meds    pROCEDURE NOTE:  Risk and benefit of trigger point injections given to pt. Injections performed w. A 1.5" 25G needle after sterile prep w. Betadine, verbal consent obtained . NO complications. B/l Quadratus Lumborum  AND ILIOCOSTALIS were inJected with a total of 3ML of 1% Lidocaine          "

## 2018-02-16 ENCOUNTER — TELEPHONE (OUTPATIENT)
Dept: FAMILY MEDICINE | Facility: CLINIC | Age: 68
End: 2018-02-16

## 2018-02-16 DIAGNOSIS — R63.4 ABNORMAL WEIGHT LOSS: ICD-10-CM

## 2018-02-16 DIAGNOSIS — R19.7 DIARRHEA IN ADULT PATIENT: ICD-10-CM

## 2018-02-16 LAB
ANA SER QL IF: NORMAL
RHEUMATOID FACT SERPL-ACNC: <10 IU/ML

## 2018-02-16 NOTE — TELEPHONE ENCOUNTER
----- Message from Smita Fisher sent at 2/16/2018  9:50 AM CST -----  Stefano with [facility] pharmacy at [phone] called regarding medication: Creon capsules for above patient. They are requesting Rx clarification. Thanks!

## 2018-02-16 NOTE — TELEPHONE ENCOUNTER
Spoke to representative at [facility] who states they received 2 different e-scribes for the same medication on the same day with different directions.   1. The first prescription states the following:  Lipase-Protease-Amylase 24,000 take 3 capsules by mouth 3 times daily with meals.   2. The second prescription states the following:  Creon 24,000 take 1 capsule by mouth 3 times daily.     Pharmacy requesting clarification.  Writer requested the two prescriptions e-scribed on 2-8-18 be canceled, requesting new prescription with correct dosage directions be e-scribed to [facility] for clarity. Please advise.

## 2018-02-20 DIAGNOSIS — Z51.89 ENCOUNTER FOR PATIENT COMPLIANCE MONITORING IN DRUG TREATMENT PROGRAM: Primary | ICD-10-CM

## 2018-02-20 DIAGNOSIS — F41.9 ANXIETY: ICD-10-CM

## 2018-02-20 NOTE — TELEPHONE ENCOUNTER
Patient requesting a refill of Clonazepam.  LR--8-14-17  LOV--2-12-18  FOV--4-3-18  Urine Toxicology--None Noted  Pain Contract--None Noted

## 2018-02-20 NOTE — TELEPHONE ENCOUNTER
----- Message from Talia Harrell sent at 2/20/2018  2:48 PM CST -----  Contact: Nerissa Perdue [phone]  Requesting prescription for the patients clonazePAM (KLONOPIN) 1 MG tablet.  Please fax over to [phone].   Thank you!

## 2018-02-21 ENCOUNTER — PATIENT MESSAGE (OUTPATIENT)
Dept: RHEUMATOLOGY | Facility: CLINIC | Age: 68
End: 2018-02-21

## 2018-02-21 RX ORDER — CLONAZEPAM 1 MG/1
1 TABLET ORAL 3 TIMES DAILY
Qty: 90 TABLET | Refills: 0 | Status: SHIPPED | OUTPATIENT
Start: 2018-02-21 | End: 2018-07-23 | Stop reason: SDUPTHER

## 2018-02-22 DIAGNOSIS — R19.7 DIARRHEA IN ADULT PATIENT: ICD-10-CM

## 2018-02-22 DIAGNOSIS — R63.4 ABNORMAL WEIGHT LOSS: ICD-10-CM

## 2018-02-22 DIAGNOSIS — F33.9 RECURRENT MAJOR DEPRESSION RESISTANT TO TREATMENT: ICD-10-CM

## 2018-02-22 RX ORDER — BUPROPION HYDROCHLORIDE 75 MG/1
75 TABLET ORAL 2 TIMES DAILY
Qty: 180 TABLET | Refills: 3 | Status: SHIPPED | OUTPATIENT
Start: 2018-02-22 | End: 2018-04-18

## 2018-02-22 RX ORDER — TRAMADOL HYDROCHLORIDE 50 MG/1
50 TABLET ORAL EVERY 6 HOURS PRN
Qty: 180 TABLET | Refills: 1 | Status: SHIPPED | OUTPATIENT
Start: 2018-02-22 | End: 2020-04-23 | Stop reason: SDUPTHER

## 2018-02-23 ENCOUNTER — TELEPHONE (OUTPATIENT)
Dept: FAMILY MEDICINE | Facility: CLINIC | Age: 68
End: 2018-02-23

## 2018-02-23 LAB
HLA-B27 RELATED AG QL: NEGATIVE
HLA-B27 RELATED AG QL: NORMAL

## 2018-02-23 NOTE — TELEPHONE ENCOUNTER
Please see attached message from April Ross requesting to know if MRI/Cat Scan is medically urgent or non-medically. Please advise.

## 2018-02-23 NOTE — TELEPHONE ENCOUNTER
"Is this procedure medically urgent or non-medically?   Please respond via In-basket or contact [facility] @ [phone]                   Medical Urgency Definition( below)   If you can answer Yes to one of the following questions, the    Case will be considered "Medically Urgent" and will be done as   Scheduled irrespective of whether [facility] will receive payment.      *If this particular case is not done as scheduled, would the patient   Experience loss of life?   *Loss of Limb?   *Irreversible loss of function?   *Would their condition significantly worsen?         If none of these questions have a yes answer, the case   Should be postponed until such a time as the finances   can be sorted out.    Thanks April        "

## 2018-02-24 NOTE — TELEPHONE ENCOUNTER
Ms Erica Starrda with chronic weight loss, persistent mid back pain despite therapy and medications.She has severe thoracic neuropathy with DD of Bone cancer vs Osteoporosis  And vertebra fracture.

## 2018-02-26 ENCOUNTER — OFFICE VISIT (OUTPATIENT)
Dept: PODIATRY | Facility: CLINIC | Age: 68
End: 2018-02-26
Payer: MEDICARE

## 2018-02-26 ENCOUNTER — HOSPITAL ENCOUNTER (OUTPATIENT)
Dept: RADIOLOGY | Facility: HOSPITAL | Age: 68
Discharge: HOME | End: 2018-02-26
Attending: FAMILY MEDICINE
Payer: MEDICARE

## 2018-02-26 ENCOUNTER — HOSPITAL ENCOUNTER (OUTPATIENT)
Dept: RADIOLOGY | Facility: CLINIC | Age: 68
Discharge: HOME | End: 2018-02-26
Attending: PODIATRIST
Payer: MEDICARE

## 2018-02-26 VITALS — BODY MASS INDEX: 18.89 KG/M2 | WEIGHT: 120.56 LBS

## 2018-02-26 DIAGNOSIS — M79.672 FOOT PAIN, BILATERAL: ICD-10-CM

## 2018-02-26 DIAGNOSIS — M79.671 FOOT PAIN, BILATERAL: ICD-10-CM

## 2018-02-26 DIAGNOSIS — G57.00 SCIATIC NEUROPATHY, UNSPECIFIED LATERALITY: ICD-10-CM

## 2018-02-26 DIAGNOSIS — R93.7 ABNORMAL X-RAY OF THORACIC SPINE: ICD-10-CM

## 2018-02-26 DIAGNOSIS — M72.2 PLANTAR FASCIITIS: ICD-10-CM

## 2018-02-26 DIAGNOSIS — M72.2 PLANTAR FASCIITIS: Primary | ICD-10-CM

## 2018-02-26 DIAGNOSIS — M24.573 EQUINUS CONTRACTURE OF ANKLE: ICD-10-CM

## 2018-02-26 PROCEDURE — 3008F BODY MASS INDEX DOCD: CPT | Mod: S$GLB,,, | Performed by: PODIATRIST

## 2018-02-26 PROCEDURE — 73630 X-RAY EXAM OF FOOT: CPT | Mod: 26,RT,S$GLB, | Performed by: RADIOLOGY

## 2018-02-26 PROCEDURE — 72146 MRI CHEST SPINE W/O DYE: CPT | Mod: TC

## 2018-02-26 PROCEDURE — 73630 X-RAY EXAM OF FOOT: CPT | Mod: 50,TC,FY,PO

## 2018-02-26 PROCEDURE — 72146 MRI CHEST SPINE W/O DYE: CPT | Mod: 26,,, | Performed by: RADIOLOGY

## 2018-02-26 PROCEDURE — 99999 PR PBB SHADOW E&M-EST. PATIENT-LVL III: CPT | Mod: PBBFAC,,, | Performed by: PODIATRIST

## 2018-02-26 PROCEDURE — 29540 STRAPPING ANKLE &/FOOT: CPT | Mod: 50,S$GLB,, | Performed by: PODIATRIST

## 2018-02-26 PROCEDURE — 99203 OFFICE O/P NEW LOW 30 MIN: CPT | Mod: 25,S$GLB,, | Performed by: PODIATRIST

## 2018-02-26 PROCEDURE — 73630 X-RAY EXAM OF FOOT: CPT | Mod: 26,59,LT,S$GLB | Performed by: RADIOLOGY

## 2018-02-26 PROCEDURE — 1125F AMNT PAIN NOTED PAIN PRSNT: CPT | Mod: S$GLB,,, | Performed by: PODIATRIST

## 2018-02-26 PROCEDURE — 1159F MED LIST DOCD IN RCRD: CPT | Mod: S$GLB,,, | Performed by: PODIATRIST

## 2018-02-26 RX ORDER — LIDOCAINE HYDROCHLORIDE 20 MG/ML
JELLY TOPICAL
Qty: 30 ML | Refills: 2 | Status: SHIPPED | OUTPATIENT
Start: 2018-02-26 | End: 2021-07-30 | Stop reason: ALTCHOICE

## 2018-02-26 NOTE — PROGRESS NOTES
Subjective:      Patient ID: Erica aMrsh is a 67 y.o. female.    Chief Complaint: Foot Pain (bilateral )    Sharp deep pain bottom both heels.  Gradual onset, worsening over past several weeks, aggravated by increased weight bearing, shoe gear, pressure.  No previous medical treatment.  OTC pain med not helping.  Denies trauma, surgery both feet.    Review of Systems   Constitution: Negative for chills, diaphoresis, fever, malaise/fatigue and night sweats.   Cardiovascular: Negative for claudication, cyanosis, leg swelling and syncope.   Skin: Negative for color change, dry skin, nail changes, rash, suspicious lesions and unusual hair distribution.   Musculoskeletal: Negative for falls, joint pain, joint swelling, muscle cramps, muscle weakness and stiffness.   Gastrointestinal: Negative for constipation, diarrhea, nausea and vomiting.   Neurological: Negative for brief paralysis, disturbances in coordination, focal weakness, numbness, paresthesias, sensory change and tremors.           Objective:      Physical Exam   Constitutional: She is oriented to person, place, and time. She appears well-developed and well-nourished. She is cooperative. No distress.   Cardiovascular:   Pulses:       Popliteal pulses are 2+ on the right side, and 2+ on the left side.        Dorsalis pedis pulses are 2+ on the right side, and 2+ on the left side.        Posterior tibial pulses are 2+ on the right side, and 2+ on the left side.   Capillary refill 3 seconds all toes/distal feet, all toes/both feet warm to touch.      Negative lymphadenopathy bilateral popliteal fossa and tarsal tunnel.      Negavie lower extremity edema bilateral.     Musculoskeletal:        Right ankle: She exhibits normal range of motion, no swelling, no ecchymosis, no deformity, no laceration and normal pulse. Achilles tendon normal. Achilles tendon exhibits no pain, no defect and normal Braun's test results.   Sharp deep pain to palpation inferior heel  right and left at medial calcaneal tubercle without ecchymosis, erythema, edema, or cardinal signs infection, and no signs of trauma.    Ankle dorsiflexion decreased at <10 degrees bilateral with moderate increase with knee flexion bilateral.    Otherwise, Normal angle, base, station of gait. All ten toes without clubbing, cyanosis, or signs of ischemia.  No pain to palpation bilateral lower extremities.  Range of motion, stability, muscle strength, and muscle tone normal bilateral feet and legs.     Lymphadenopathy:   Negative lymphadenopathy bilateral popliteal fossa and tarsal tunnel.    Negative lymphangitic streaking bilateral feet/ankles/legs.   Neurological: She is alert and oriented to person, place, and time. She has normal strength. She displays no atrophy and no tremor. No sensory deficit. She exhibits normal muscle tone. Gait normal.   Reflex Scores:       Patellar reflexes are 2+ on the right side and 2+ on the left side.       Achilles reflexes are 2+ on the right side and 2+ on the left side.  Negative tinel sign to percussion sural, superficial peroneal, deep peroneal, saphenous, and posterior tibial nerves right and left ankles and feet.     Skin: Skin is warm, dry and intact. Capillary refill takes 2 to 3 seconds. No abrasion, no bruising, no burn, no ecchymosis, no laceration, no lesion and no rash noted. She is not diaphoretic. No cyanosis or erythema. No pallor. Nails show no clubbing.     Skin is normal age and health appropriate color, turgor, texture, and temperature bilateral lower extremities without ulceration, hyperpigmentation, discoloration, masses nodules or cords palpated.  No ecchymosis, erythema, edema, or cardinal signs of infection bilateral lower extremities.     Psychiatric: She has a normal mood and affect.             Assessment:       Encounter Diagnoses   Name Primary?    Plantar fasciitis Yes    Foot pain, bilateral     Equinus contracture of ankle          Plan:        Erica was seen today for foot pain.    Diagnoses and all orders for this visit:    Plantar fasciitis  -     X-Ray Foot Complete Bilateral; Future    Foot pain, bilateral  -     X-Ray Foot Complete Bilateral; Future    Equinus contracture of ankle  -     X-Ray Foot Complete Bilateral; Future    Other orders  -     lidocaine HCL 2% (XYLOCAINE) 2 % jelly; Apply topically as needed. Apply topically once nightly to affected area both heels.      I counseled the patient on her conditions, their implications and medical management.    Patient will stretch the tendo achilles complex three times daily as demonstrated in the office.  Literature was dispensed illustrating proper stretching technique.    I applied a plantar rest strapping to the patient's right and left foot to offload symptomatic area, support the arch, and relieve pain.    Patient will obtain over the counter arch supports and wear them in shoes whenever possible.  Athletic shoes intended for walking or running are usually best.    The patient was advised that NSAID-type medications have two very important potential side effects: gastrointestinal irritation including hemorrhage and renal injuries. She was asked to take the medication with food and to stop if she experiences any GI upset. I asked her to call for vomiting, abdominal pain or black/bloody stools. The patient expresses understanding of these issues and questions were answered.    Discussed conservative treatment with shoes of adequate dimensions, material, and style to alleviate symptoms and delay or prevent surgical intervention.    Rx xrays, lidocaine gel.          Follow-up in about 1 month (around 3/26/2018).

## 2018-03-07 ENCOUNTER — TELEPHONE (OUTPATIENT)
Dept: PHYSICAL MEDICINE AND REHAB | Facility: CLINIC | Age: 68
End: 2018-03-07

## 2018-03-07 DIAGNOSIS — E08.649: Chronic | ICD-10-CM

## 2018-03-07 RX ORDER — DICLOFENAC SODIUM AND MISOPROSTOL 50; 200 MG/1; UG/1
1 TABLET, DELAYED RELEASE ORAL 3 TIMES DAILY
Qty: 270 TABLET | Refills: 2 | Status: SHIPPED | OUTPATIENT
Start: 2018-03-07 | End: 2018-03-15

## 2018-03-15 ENCOUNTER — OFFICE VISIT (OUTPATIENT)
Dept: PHYSICAL MEDICINE AND REHAB | Facility: CLINIC | Age: 68
End: 2018-03-15
Payer: MEDICARE

## 2018-03-15 ENCOUNTER — OFFICE VISIT (OUTPATIENT)
Dept: RHEUMATOLOGY | Facility: CLINIC | Age: 68
End: 2018-03-15
Payer: MEDICARE

## 2018-03-15 ENCOUNTER — TELEPHONE (OUTPATIENT)
Dept: FAMILY MEDICINE | Facility: CLINIC | Age: 68
End: 2018-03-15

## 2018-03-15 ENCOUNTER — HOSPITAL ENCOUNTER (OUTPATIENT)
Dept: RADIOLOGY | Facility: CLINIC | Age: 68
Discharge: HOME | End: 2018-03-15
Attending: INTERNAL MEDICINE
Payer: MEDICARE

## 2018-03-15 VITALS — WEIGHT: 122 LBS | DIASTOLIC BLOOD PRESSURE: 60 MMHG | BODY MASS INDEX: 19.11 KG/M2 | SYSTOLIC BLOOD PRESSURE: 108 MMHG

## 2018-03-15 VITALS
SYSTOLIC BLOOD PRESSURE: 95 MMHG | BODY MASS INDEX: 18.83 KG/M2 | HEIGHT: 67 IN | DIASTOLIC BLOOD PRESSURE: 58 MMHG | HEART RATE: 50 BPM | WEIGHT: 120 LBS

## 2018-03-15 DIAGNOSIS — M79.7 FIBROMYALGIA: ICD-10-CM

## 2018-03-15 DIAGNOSIS — M54.50 ACUTE BILATERAL LOW BACK PAIN WITHOUT SCIATICA: ICD-10-CM

## 2018-03-15 DIAGNOSIS — M54.50 ACUTE LOW BACK PAIN WITHOUT SCIATICA, UNSPECIFIED BACK PAIN LATERALITY: ICD-10-CM

## 2018-03-15 DIAGNOSIS — M32.19 OTHER SYSTEMIC LUPUS ERYTHEMATOSUS WITH OTHER ORGAN INVOLVEMENT: Primary | ICD-10-CM

## 2018-03-15 DIAGNOSIS — R52 PAIN: Primary | ICD-10-CM

## 2018-03-15 LAB
CK SERPL-CCNC: 74 IU/L (ref 13–135)
MAGNESIUM SERPL-MCNC: 2.2 MG/DL (ref 1.5–2.6)

## 2018-03-15 PROCEDURE — 99214 OFFICE O/P EST MOD 30 MIN: CPT | Mod: ,,, | Performed by: INTERNAL MEDICINE

## 2018-03-15 PROCEDURE — 72110 X-RAY EXAM L-2 SPINE 4/>VWS: CPT | Mod: TC,FY,PO

## 2018-03-15 PROCEDURE — 72110 X-RAY EXAM L-2 SPINE 4/>VWS: CPT | Mod: 26,,, | Performed by: RADIOLOGY

## 2018-03-15 PROCEDURE — 99214 OFFICE O/P EST MOD 30 MIN: CPT | Mod: S$GLB,,, | Performed by: PHYSICAL MEDICINE & REHABILITATION

## 2018-03-15 PROCEDURE — 99999 PR PBB SHADOW E&M-EST. PATIENT-LVL II: CPT | Mod: PBBFAC,,, | Performed by: PHYSICAL MEDICINE & REHABILITATION

## 2018-03-15 RX ORDER — DICLOFENAC SODIUM 50 MG/1
50 TABLET, DELAYED RELEASE ORAL 3 TIMES DAILY
Qty: 90 TABLET | Refills: 3 | Status: SHIPPED | OUTPATIENT
Start: 2018-03-15 | End: 2018-05-17 | Stop reason: SDUPTHER

## 2018-03-15 RX ORDER — BACLOFEN 20 MG/1
20 TABLET ORAL 3 TIMES DAILY
Qty: 90 TABLET | Refills: 6 | Status: SHIPPED | OUTPATIENT
Start: 2018-03-15 | End: 2018-03-15

## 2018-03-15 RX ORDER — CYCLOBENZAPRINE HCL 10 MG
TABLET ORAL
Qty: 30 TABLET | Refills: 1 | Status: SHIPPED | OUTPATIENT
Start: 2018-03-15 | End: 2018-03-21 | Stop reason: SDUPTHER

## 2018-03-15 NOTE — TELEPHONE ENCOUNTER
----- Message from Nataliia Lai sent at 3/15/2018  4:33 PM CDT -----  Contact: Patient  Pt had a few questions for the nurse in regards to her Psychology referral from Dr Ornelas. Please give her a call back at [phone].    Thank you,  Elsy ALMANZA

## 2018-03-15 NOTE — PROGRESS NOTES
HPI:  Patient is a 67 y.o. year old female w. A h/o lupus and fibromyalgia. She lives in chronic pain. I did a rheum work up to rule out RA - it was neg. TPI's last visit didn't help. Baclofen 10mg 3x a day has helped- it does not make her tired or give her any bad side effects. Arthrotec was not covered by insurance    Labs  Labs AST/ALT 45/53  egfr cr nl    Imaging    FINDINGS:  Vertebral column: The thoracic vertebral bodies maintain normal height and alignment.  There is no fracture.  The discs are desiccated.  There is mild disc space narrowing from T5-6 through T9-10 with moderate disc space narrowing at the T10-11 and T11-12 levels.  There is no significant disc space narrowing in the upper thoracic spine.  There is extensive multilevel degenerative disc disease in the included lower cervical spine as seen on the sagittal images.  This does result in at least mild spinal stenosis but no cord compression.  Degenerative change in the thoracic spine will be described by level.    Spinal canal, cord, epidural space: The spinal canal is developmentally normal.  The cord is normal in caliber, contour and signal intensity.  There is no abnormal epidural collection or mass.    Findings by level: The C6-7 level is included on the axial images.  There is bilateral uncovertebral spurring and facet joint arthropathy in addition to a disc osteophyte complex which narrows subarachnoid space and contributes to mild spinal stenosis without cord compression.  There is moderate bilateral foraminal stenosis.    C7-T1: There is mild facet joint arthropathy.  There is no spinal canal or foraminal stenosis.    T1-2: There is no spinal canal or foraminal stenosis.    T2-3: There is a very shallow broad right paracentral disc protrusion.  There is no spinal canal or foraminal stenosis.    T3-4: There is no spinal canal or foraminal stenosis.    T4-5: There is no spinal canal or foraminal stenosis.    T5-6: There is no spinal canal  or foraminal stenosis.    T6-7: There is no spinal canal or foraminal stenosis.    T7-8: There is no spinal canal or foraminal stenosis.    T8-9: There is no spinal canal or foraminal stenosis.    T9-10: There is disc space narrowing.  There is a disc bulge with shallow broad left paracentral disc protrusion/osteophyte.  There is mild facet joint arthropathy.  There is no spinal stenosis or cord compression.  There is mild left foraminal stenosis.    T10-11: There is moderate disc space narrowing.  There is a diffuse disc bulge and mild facet joint arthropathy.  There is no spinal stenosis or cord compression but there is mild-to-moderate bilateral foraminal stenosis.    T11-12: There is moderate disc space narrowing.  There is a broad left paracentral disc protrusion/osteophyte.  There is mild bilateral, left greater than right, facet joint arthropathy with ligamentum flavum thickening.  There is no spinal stenosis or cord compression.  There is moderate left foraminal stenosis.  The right foramina is patent.    T12-L1: There is mild-to-moderate facet joint arthropathy.  There is a central left paracentral broad shallow disc protrusion.  There is no spinal stenosis.  There is mild left foraminal stenosis.    Soft tissues, other: The prevertebral soft tissues are normal.  There is atherosclerosis but no aneurysmal dilatation of the aorta.   Impression       1. There is degenerative change in the included lower cervical spine as well as in the lower thoracic spine.  There is however no significant spinal stenosis and there is no cord compression.  Changes of disc bulge and/or protrusions in addition to facet joint arthropathy contribute to some degree of foraminal stenosis at several levels.  2. At the T9-10 level, there is mild left foraminal stenosis.  There is mild-to-moderate bilateral foraminal stenosis at the T10-11 level.  There is moderate left foraminal stenosis at the T11-T12 level.  There is mild left  foraminal stenosis at the T12-L1 level.  Please see above complete discussion.       Routine multiplanar imaging through this 63-year-old females lumbar spine was obtained with comparison made to 11/7/08.  A stir sequence was added.    The spinal cord appears to terminate at the T12-L1 level.  Intervertebral disk height loss is noted at the L1-L2 L4-L5 and L5-S1 levels in particular.  Modic type degenerative changes are noted at the L4-L5 and L5-S1 levels.  Decreased T2 signal is noted   from the L1-L2 through the L 5 S1 levels suggestive of degenerative disk desiccation.      The spinal cord appears to terminate at the T12-L1 level.  The vertebral bodies appear well aligned.  Vertebral body heights appear well preserved.    Mild bulging is noted within the lower thoracic spine at the T10-T11 and T11-T12 levels it may be mildly progressed since the prior exam.  These are not covered axially but no obvious cord contact is noted , mild central canal narrowing would be noted   that most and only mild neural foraminal narrowing would be suspected.    No stir signal abnormality is noted to suggest an aggressive bone marrow replacement process or recent fracture.    The common bile duct appears prominent 11 mm.  A distal biliary obstructive type process cannot be excluded by size alone.  Please clinically correlate.    At the T12-L1 level, mild facet arthropathy and ligamentous hypertrophy is noted.  Mild broad-based bulging is noted on the 2 mm paracentric to the left greater than right.  Minimal central canal narrowing is noted.  No significant nerve foraminal   narrowing is noted.    At the L1-L2 level, facet arthropathy is noted greater on the left than the right.  Broad-based bulging is noted towards the left nerve foramen greater than right neural foramen and centrally of approximately 4 mm maximally.  Mild right and mild/moderate   left neural foraminal narrowing appears to be present.  Mild central canal narrowing  is noted to 13 mm.  Mild progression of neural foraminal narrowing on the left appears to be present since the prior.      At the L2-L3 level, broad-based bulging appears to be present centrally and paracentric to the right of approximately 3 mm.  Mild right greater than left nerve foraminal narrowing is noted.  Mild central canal narrowing is noted.  Facet arthropathy and   ligamentous hypertrophy are noted.  A similar appearance is noted on the prior.  Mild progression of bulging may be noted.    At the L3-L4 level, broad based bulging appears to be present along with facet arthropathy and ligamentous hypertrophy.  The bulging or protrusion is to approximately 4 mm.  Mild central canal stenosis is noted.  Mild bilateral neural femoral stenosis is   noted.  No convincing detrimental change is noted since the prior.  A slightly asymmetric component to the bulge may be noted at this level towards the far lateral right and right neural foramen    At the L4-L5 level, broad-based bulging or protrusion is noted of approximately 4 to 5 mm that actually appears less noticeable than on the prior.  Moderate bilateral nerve foraminal stenosis appears to be present similar since the prior.  Mild central   canal narrowing is noted.  The exiting nerve roots bilaterally come near the disk material and contact cannot be excluded particularly on the right    At the L5-S1 level, broad-based bulging is noted of approximately 3 mm.  Increased T2 signal is noted within the bulge centrally suggestive of an annular tear.  Minimal central canal narrowing is noted.  Mild bilateral neural foraminal narrowing is   noted.  No convincing detrimental change is noted since the prior.   Impression          1.  Multilevel degenerative changes as described above.    2.  Prominent common bile duct.  A distal biliary obstructive type process cannot be excluded by size alone.  Please clinically correlate.       Routine multiplanar imaging through this  63-year-old females cervical spine was obtained.  Comparison was made with 8/3/11.  The vertebral bodies appear well aligned the vertebral body heights appear well preserved.    Decreased T2 signal is noted at the C2-C3 C3-C4 C4-C5 C5-C6 C6-C7 levels consistent with degenerative disk desiccation.  Intervertebral disk height loss is noted at the C5-C6 and C6-C7 levels.    At the C2-C3 level, uncovertebral spurring is noted of 2 mm.  No significant central canal stenosis or neuroforaminal stenosis is noted.    At the C3-C4 level, bilateral facet arthropathy is noted.  Minimal disk osteophyte and bulging of one to 2 mm may be noted.  No significant central canal stenosis or neuroforaminal stenosis is noted.    At the C4-C5 level, a mild disk osteophyte complex is noted of 3 mm paracentric to the right.  Mild right nerve foraminal narrowing is noted.  Mild central canal narrowing is noted.  No significant left-sided nerve foraminal stenosis is noted.  No   convincing detriment change is noted since the prior.    At the C5-C6 level, a broad based disk osteophyte bulge or protrusion appears to present a 3 mm paracentric to the right and centrally.  Mild central canal stenosis is noted.  Mild bilateral neuroforaminal narrowing is noted.  Bilateral  facet   arthropathy is noted.  No convincing detriment change is noted since the prior    At the C6-C7 level, a disk osteophyte protrusion appears to be present paracentrally to the right and left of approximately 4 mm maximally paracentric to the left greater than right.  Bilateral facet arthropathy is noted.  Mild bilateral nerve foraminal   narrowing is noted.  Mild central canal narrowing is noted 8 mm.    At the C7-T1 level, no significant disk bulge, central canal stenosis, or foraminal stenosis is noted.    At the T2-T3 level, a minimal bulge is noted of approximately 2 to 3 mm.  No significant central canal stenosis or nerve foraminal stenosis noted.   Impression       This examination is probably most notable for protrusion at the C6-C7 level and to a lesser degree at the C5-C6 level without convincing detrimental change since the prior of 8/3/11           Past Medical History:   Diagnosis Date    Arthritis     Depression     Diabetes mellitus     NO MED SINCE GASTRIC BYPASS    Diabetes mellitus type II     GERD (gastroesophageal reflux disease)     Hypertension     NO MED SINCE GASTRIC BYPASS    Kidney disease     Lupus erythematosus     Shingles     Thyroid disease     Trigger finger     RIGHT LONG     Past Surgical History:   Procedure Laterality Date    APPENDECTOMY      BREAST BIOPSY Left 1979    benign    CHOLECYSTECTOMY      GASTRIC BYPASS      HAND SURGERY      HYSTERECTOMY      TONSILLECTOMY       No family history on file.  Social History     Social History    Marital status:      Spouse name: N/A    Number of children: N/A    Years of education: N/A     Social History Main Topics    Smoking status: Never Smoker    Smokeless tobacco: Never Used    Alcohol use No    Drug use: No    Sexual activity: No     Other Topics Concern    Not on file     Social History Narrative    No narrative on file       Review of patient's allergies indicates:   Allergen Reactions    Codeine     Sulfa (sulfonamide antibiotics)     Codeine Rash and Other (See Comments)    Plaquenil [hydroxychloroquine] Rash and Other (See Comments)     ONE BRAND OF MED    Sulfa (sulfonamide antibiotics) Rash and Other (See Comments)       Current Outpatient Prescriptions:     aspirin (ECOTRIN) 81 MG EC tablet, Take 81 mg by mouth nightly., Disp: , Rfl:     baclofen (LIORESAL) 20 MG tablet, Take 1 tablet (20 mg total) by mouth 3 (three) times daily., Disp: 90 tablet, Rfl: 6    blood sugar diagnostic Strp, 1 each by Misc.(Non-Drug; Combo Route) route 3 (three) times daily before meals., Disp: 200 each, Rfl: 11    buPROPion (WELLBUTRIN) 75 MG tablet, Take 1 tablet  (75 mg total) by mouth 2 (two) times daily., Disp: 180 tablet, Rfl: 3    cholecalciferol, vitamin D3, (VITAMIN D3) 1,000 unit capsule, Take 1 capsule (1,000 Units total) by mouth 2 (two) times daily., Disp: 180 capsule, Rfl: 3    citalopram (CELEXA) 40 MG tablet, Take 1 tablet (40 mg total) by mouth once daily., Disp: 90 tablet, Rfl: 4    clonazePAM (KLONOPIN) 1 MG tablet, Take 1 tablet (1 mg total) by mouth 3 (three) times daily., Disp: 90 tablet, Rfl: 0    cyanocobalamin, vitamin B-12, 5,000 mcg TbDL, Take 1 tablet by mouth once daily., Disp: 90 tablet, Rfl: 3    diclofenac (VOLTAREN) 50 MG EC tablet, Take 1 tablet (50 mg total) by mouth 3 (three) times daily., Disp: 90 tablet, Rfl: 3    fluticasone (FLONASE) 50 mcg/actuation nasal spray, ADMINISTER 1 SPRAY IN EACH NARE 2 TIMES DAILY., Disp: 48 g, Rfl: 3    folic acid (FOLVITE) 1 MG tablet, Take 1 tablet (1 mg total) by mouth once daily., Disp: 90 tablet, Rfl: 4    gabapentin (NEURONTIN) 800 MG tablet, Take 1 tablet (800 mg total) by mouth 3 (three) times daily., Disp: 270 tablet, Rfl: 3    glucagon (human recombinant) inj 1mg/mL kit, Inject 1 mL (1 mg total) into the muscle as needed., Disp: 3 kit, Rfl: 11    hydroxychloroquine (PLAQUENIL) 200 mg tablet, Take 1 tablet (200 mg total) by mouth 2 (two) times daily. Three times a day, Disp: 180 tablet, Rfl: 1    lancets (ACCU-CHEK SOFTCLIX LANCETS) Misc, Use to test blood sugar three times a day   DX: E08.649, Disp: 300 each, Rfl: 3    levothyroxine (SYNTHROID) 125 MCG tablet, TAKE 1 TABLET ONE TIME DAILY, Disp: 90 tablet, Rfl: 1    lidocaine (LIDODERM) 5 %, Remove & Discard patch within 12 hours or as directed by MD, Disp: 60 patch, Rfl: 3    lidocaine HCL 2% (XYLOCAINE) 2 % jelly, Apply topically as needed. Apply topically once nightly to affected area both heels., Disp: 30 mL, Rfl: 2    lipase-protease-amylase 24,000-76,000-120,000 units (PANLIPASE) 24,000-76,000 -120,000 unit capsule, Take 1  capsule by mouth 3 (three) times daily with meals., Disp: 270 capsule, Rfl: 3    ranitidine (ZANTAC) 150 MG tablet, Take 1 tablet (150 mg total) by mouth 2 (two) times daily., Disp: 60 tablet, Rfl: 11    thiamine 50 MG tablet, Take 1 tablet (50 mg total) by mouth once daily., Disp: 90 tablet, Rfl: 4    traMADol (ULTRAM) 50 mg tablet, Take 1 tablet (50 mg total) by mouth every 6 (six) hours as needed for Pain., Disp: 180 tablet, Rfl: 1    valACYclovir (VALTREX) 500 MG tablet, Take 1 tablet (500 mg total) by mouth once daily., Disp: 180 tablet, Rfl: 9    vitamin E 1000 UNIT capsule, Take 1,000 Units by mouth once daily., Disp: , Rfl:          Review of Systems  No nausea, vomiting, fevers, Chills , contipation, diarrhea or sweats      Physical Exam:      Vitals:    03/15/18 1338   BP: (!) 95/58   Pulse: (!) 50     alert and oriented ×4 follows commands answers all questions appropriately,affect wnl  Manual muscle test 5 out of 5 sensation to light touch grossly intact  antalgic gait  No C/C/E      Assessment:  Lupus  Fibromyalgia  Chronic pain  Cervical, lumbar, thoracic spondylosis  elev LFT's-f/u w. pcp  Plan:  Inc baclofen to 20mg tid  Diclofenac trial- if this helps will need to recheck lft's in a couple of months  Keep appt. W. Pain managment

## 2018-03-15 NOTE — TELEPHONE ENCOUNTER
Please see attached message from patient. Call placed to patient. No answer received. Left message requesting return call to office.

## 2018-03-15 NOTE — PROGRESS NOTES
[facility] RHEUMATOLOGY            PROGRESS NOTE      Subjective:       Patient ID:   NAME: Erica Marsh : 1950     67 y.o. female    Referring Doc: No ref. provider found  Other Physicians:    Chief Complaint:  Low-back Pain      History of Present Illness:     Patient returns today for a regularly scheduled follow-up visit for SLE,Fibromyalgia     The patient has been experiencing  severe  back pain( thoracic) for last 3 wks.  She had trigger point injections 3 wks ago without relief.MRI of thoracic spine with DJD changes.Since yesterday with low back pain.No thoracic painNo paresthesias.Diffuse musculoskeletal pains,mainly myalgias.Maybe some weakness,difficulty getting out of bed and chairs.No fevers.No skin rashes.Fatigue.No abdominal pain,some nausea occ.No joint swelling.  No cough,SOB or chest pains,Occ palpitations,feels she is anxious occ.  Occ headaches ,from post neck to occiput      ROS:   GEN:  No  fever, night sweats . weight is stable   + fatigue  SKIN: no rashes, no bruising, no ulcerations, no Raynaud's  HEENT: no HA's, No visual changes, no mucosal ulcers, no sicca symptoms,  CV:   no CP, SOB, PND, HUGHES, no orthopnea, no palpitations  PULM: normal with no SOB, cough, hemoptysis, sputum or pleuritic pain  GI:  no abdominal pain, nausea, vomiting, constipation, diarrhea, melanotic stools, BRBPR, hematemesis, no dysphagia  :   no dysuria  NEURO: no paresthesias, headaches, visual disturbances, muscle weakness  MUSCULOSKELETAL:no joint swelling, prolonged AM stiffness, no back pain, no muscle pain  Allergies:  Review of patient's allergies indicates:   Allergen Reactions    Codeine     Sulfa (sulfonamide antibiotics)     Codeine Rash and Other (See Comments)    Plaquenil [hydroxychloroquine] Rash and Other (See Comments)     ONE BRAND OF MED    Sulfa (sulfonamide antibiotics) Rash and Other (See Comments)       Medications:    Current Outpatient Prescriptions:     aspirin (ECOTRIN)  81 MG EC tablet, Take 81 mg by mouth nightly., Disp: , Rfl:     baclofen (LIORESAL) 20 MG tablet, Take 1 tablet (20 mg total) by mouth 3 (three) times daily., Disp: 90 tablet, Rfl: 6    blood sugar diagnostic Strp, 1 each by Misc.(Non-Drug; Combo Route) route 3 (three) times daily before meals., Disp: 200 each, Rfl: 11    buPROPion (WELLBUTRIN) 75 MG tablet, Take 1 tablet (75 mg total) by mouth 2 (two) times daily., Disp: 180 tablet, Rfl: 3    cholecalciferol, vitamin D3, (VITAMIN D3) 1,000 unit capsule, Take 1 capsule (1,000 Units total) by mouth 2 (two) times daily., Disp: 180 capsule, Rfl: 3    citalopram (CELEXA) 40 MG tablet, Take 1 tablet (40 mg total) by mouth once daily., Disp: 90 tablet, Rfl: 4    clonazePAM (KLONOPIN) 1 MG tablet, Take 1 tablet (1 mg total) by mouth 3 (three) times daily., Disp: 90 tablet, Rfl: 0    cyanocobalamin, vitamin B-12, 5,000 mcg TbDL, Take 1 tablet by mouth once daily., Disp: 90 tablet, Rfl: 3    diclofenac (VOLTAREN) 50 MG EC tablet, Take 1 tablet (50 mg total) by mouth 3 (three) times daily., Disp: 90 tablet, Rfl: 3    fluticasone (FLONASE) 50 mcg/actuation nasal spray, ADMINISTER 1 SPRAY IN EACH NARE 2 TIMES DAILY., Disp: 48 g, Rfl: 3    folic acid (FOLVITE) 1 MG tablet, Take 1 tablet (1 mg total) by mouth once daily., Disp: 90 tablet, Rfl: 4    gabapentin (NEURONTIN) 800 MG tablet, Take 1 tablet (800 mg total) by mouth 3 (three) times daily., Disp: 270 tablet, Rfl: 3    hydroxychloroquine (PLAQUENIL) 200 mg tablet, Take 1 tablet (200 mg total) by mouth 2 (two) times daily. Three times a day, Disp: 180 tablet, Rfl: 1    lancets (ACCU-CHEK SOFTCLIX LANCETS) Misc, Use to test blood sugar three times a day   DX: E08.649, Disp: 300 each, Rfl: 3    levothyroxine (SYNTHROID) 125 MCG tablet, TAKE 1 TABLET ONE TIME DAILY, Disp: 90 tablet, Rfl: 1    lidocaine (LIDODERM) 5 %, Remove & Discard patch within 12 hours or as directed by MD, Disp: 60 patch, Rfl: 3    lidocaine  HCL 2% (XYLOCAINE) 2 % jelly, Apply topically as needed. Apply topically once nightly to affected area both heels., Disp: 30 mL, Rfl: 2    lipase-protease-amylase 24,000-76,000-120,000 units (PANLIPASE) 24,000-76,000 -120,000 unit capsule, Take 1 capsule by mouth 3 (three) times daily with meals., Disp: 270 capsule, Rfl: 3    ranitidine (ZANTAC) 150 MG tablet, Take 1 tablet (150 mg total) by mouth 2 (two) times daily., Disp: 60 tablet, Rfl: 11    thiamine 50 MG tablet, Take 1 tablet (50 mg total) by mouth once daily., Disp: 90 tablet, Rfl: 4    traMADol (ULTRAM) 50 mg tablet, Take 1 tablet (50 mg total) by mouth every 6 (six) hours as needed for Pain., Disp: 180 tablet, Rfl: 1    valACYclovir (VALTREX) 500 MG tablet, Take 1 tablet (500 mg total) by mouth once daily., Disp: 180 tablet, Rfl: 9    vitamin E 1000 UNIT capsule, Take 1,000 Units by mouth once daily., Disp: , Rfl:     glucagon (human recombinant) inj 1mg/mL kit, Inject 1 mL (1 mg total) into the muscle as needed., Disp: 3 kit, Rfl: 11    PMHx/PSHx Updates:        Last Eye Exam:4 months ago,ok      Objective:     Vitals:  Blood pressure 108/60, weight 55.3 kg (122 lb).    Physical Examination:   GEN: no apparent distress, comfortable; AAOx3  SKIN: no rashes,no ulceration, no Raynaud's, no petechiae, no SQ nodules,  HEAD: normal  EYES: no pallor, no icterus, PERRLA  ENT:  ,no mucosal dryness or ulcerations  NECK: no masses, thyroid normal, trachea midline, no LAD/LN's, supple  CV: RRR with no murmur; l S1 and S2 reg. ,no gallop no rubs,   CHEST: Normal respiratory effort; CTAB; normal breath sounds; no wheeze or crackles  ABDOM: nontender and nondistended; soft; no masses; no rebound/guarding  MUSC/Skeletal: ROM normal; no crepitus; joints without synovitis,  no deformities  No joint swelling or tenderness of PIP, MCP, wrist, elbow, shoulder, or knee joints.Paraspinal lumbar spasm  EXTREM: no clubbing, cyanosis, no edema,normal  pulses   NEURO:  grossly intact; motor WNL; AAOx3; , DTR's symmetric  PSYCH: normal mood, affect and behavior  LYMPH: normal cervical, supraclavicular          Labs:   Lab Results   Component Value Date    WBC 7.13 02/12/2018    HGB 12.7 02/12/2018    HCT 38.1 02/12/2018     (H) 02/12/2018     02/12/2018    CMP  @LASTLAB(NA,K,CL,CO2,GLU,BUN,Creatinine,Calcium,PROT,Albumin,Bilitot,Alkphos,AST,ALT,CRP,ESR,RF,CCP,CARLENE,SSA,CPK,uric acid) )@  I have reviewed all available lab results and radiology reports.    Radiology/Diagnostic Studies:        Assessment/Plan:   (1) 67 y.o. female with diagnosis of low back pain since yesterday  Plan: Flexeril 5-10 mg bid-tid prn  LS series  PT  2) Hx of SLE with GMN was on Cell Cept for 2 yrs ( 12 yrs ago)  Has not had any major SLE flares for a number of yrs ( recent serology negative) Plan: UA and complement levels  3) Depression ,should see a psychiatrist         Discussion:     I have explained all of the above in detail and the patient understands all of the current recommendation(s). I have answered all questions to the best of my ability and to their complete satisfaction.       The patient is to continue with the current management plan         RTC in   3 wks      Electronically signed by Mingo Alvarenga MD

## 2018-03-17 LAB
C3 SERPL-MCNC: 85 MG/DL (ref 82–167)
C4 NEF SERPL-MCNC: 12 MG/DL (ref 14–44)

## 2018-03-19 LAB — ALDOLASE SERPL-CCNC: 3 U/L (ref 3.3–10.3)

## 2018-03-21 ENCOUNTER — OFFICE VISIT (OUTPATIENT)
Dept: PAIN MEDICINE | Facility: CLINIC | Age: 68
End: 2018-03-21
Payer: MEDICARE

## 2018-03-21 VITALS
HEIGHT: 67 IN | SYSTOLIC BLOOD PRESSURE: 100 MMHG | DIASTOLIC BLOOD PRESSURE: 64 MMHG | BODY MASS INDEX: 18.83 KG/M2 | HEART RATE: 48 BPM | WEIGHT: 120 LBS

## 2018-03-21 DIAGNOSIS — M51.36 DDD (DEGENERATIVE DISC DISEASE), LUMBAR: ICD-10-CM

## 2018-03-21 DIAGNOSIS — M79.10 MYALGIA: ICD-10-CM

## 2018-03-21 DIAGNOSIS — M47.896 OTHER SPONDYLOSIS, LUMBAR REGION: Primary | ICD-10-CM

## 2018-03-21 PROCEDURE — 99204 OFFICE O/P NEW MOD 45 MIN: CPT | Mod: S$GLB,,, | Performed by: ANESTHESIOLOGY

## 2018-03-21 PROCEDURE — 99999 PR PBB SHADOW E&M-EST. PATIENT-LVL IV: CPT | Mod: PBBFAC,,, | Performed by: ANESTHESIOLOGY

## 2018-03-21 RX ORDER — CYCLOBENZAPRINE HCL 10 MG
TABLET ORAL
Qty: 90 TABLET | Refills: 0 | Status: SHIPPED | OUTPATIENT
Start: 2018-03-21 | End: 2018-05-12 | Stop reason: SDUPTHER

## 2018-03-21 NOTE — PROGRESS NOTES
This note was completed with dictation software and grammatical errors may exist.    Referring Physician: Jose Guadalupe Ornelas MD    PCP: Jose Guadalupe Ornelas MD      CC: low back pain    HPI:   Erica Marsh is a 67 y.o. female referred to us for lower back pain.  She has a history of fibromyalgia as well as lupus.  She seized rheumatology for above conditions.  Lower back pain is been present for many years.  She presents with constant aching, throbbing pain in her lower back.  Pain radiates to her bilateral buttocks and hips.  Pain does not travel past her knee.  Pain worsens with prolonged sitting, standing, bending, walking and getting up.  Pain improves with rest.  She does have history of lumbar DDD and lumbar facet arthropathy.  She underwent trigger point injections with minimal benefit.  She has not had any other lumbar spine interventions.  He denies any weakness.  No bowel bladder changes.  She rates her pain 6/10 today.    ROS:  CONSTITUTIONAL: No fevers, chills, night sweats, wt. loss, appetite changes  SKIN: no rashes or itching  ENT: No headaches, head trauma, vision changes, or eye pain  LYMPH NODES: None noticed   CV: No chest pain, palpitations.   RESP: No shortness of breath, dyspnea on exertion, cough, wheezing, or hemoptysis  GI: No nausea, emesis, diarrhea, constipation, melena, hematochezia, pain.    : No dysuria, hematuria, urgency, or frequency   HEME: No easy bruising, bleeding problems  PSYCHIATRIC: No depression, anxiety, psychosis, hallucinations.  NEURO: No seizures, memory loss, dizziness or difficulty sleeping  MSK: + History of present illness      Past Medical History:   Diagnosis Date    Arthritis     Depression     Diabetes mellitus     NO MED SINCE GASTRIC BYPASS    Diabetes mellitus type II     GERD (gastroesophageal reflux disease)     Hypertension     NO MED SINCE GASTRIC BYPASS    Kidney disease     Lupus erythematosus     Shingles     Thyroid disease     Trigger finger      "RIGHT LONG     Past Surgical History:   Procedure Laterality Date    APPENDECTOMY      BREAST BIOPSY Left 1979    benign    CHOLECYSTECTOMY      GASTRIC BYPASS      HAND SURGERY      HYSTERECTOMY      TONSILLECTOMY       No family history on file.  Social History     Social History    Marital status:      Spouse name: N/A    Number of children: N/A    Years of education: N/A     Social History Main Topics    Smoking status: Never Smoker    Smokeless tobacco: Never Used    Alcohol use No    Drug use: No    Sexual activity: No     Other Topics Concern    None     Social History Narrative    None         Medications/Allergies: See med card    Vitals:    03/21/18 1012   BP: 100/64   Pulse: (!) 48   Weight: 54.4 kg (120 lb)   Height: 5' 7" (1.702 m)   PainSc:   6   PainLoc: Back         Physical exam:    GENERAL: A and O x3, the patient appears well groomed and is in no acute distress.  Skin: No rashes or obvious lesions  HEENT: normocephalic, atraumatic  CARDIOVASCULAR:  Palpable peripheral pulses  LUNGS: easy work of breathing  ABDOMEN: soft, nontender   UPPER EXTREMITIES: Normal alignment, normal range of motion, no atrophy, no skin changes,  hair growth and nail growth normal and equal bilaterally. No swelling, no tenderness.    LOWER EXTREMITIES:  Normal alignment, normal range of motion, no atrophy, no skin changes,  hair growth and nail growth normal and equal bilaterally. No swelling, no tenderness.  CERVICAL SPINE:  Cervical spine: ROM is full in flexion, extension and lateral rotation without increased pain.  Spurling's maneuver causes no neck pain to either side.  Myofascial exam: Mild Tenderness to palpation across cervical paraspinous region bilaterally.    LUMBAR SPINE  Lumbar spine: ROM is limited with extension and oblique extension with moderate increased pain.    William's test causes no increased pain on either side.    Supine straight leg raise is negative bilaterally.  "   Internal and external rotation of the hip causes no increased pain on either side.  Myofascial exam: Mild tenderness to palpation across lumbar paraspinous muscles.      MENTAL STATUS: normal orientation, speech, language, and fund of knowledge for social situation.  Emotional state appropriate.    CRANIAL NERVES:  II:  PERRL bilaterally,   III,IV,VI: EOMI.    V:  Facial sensation equal bilaterally  VII:  Facial motor function normal.  VIII:  Hearing equal to finger rub bilaterally  IX/X: Gag normal, palate symmetric  XI:  Shoulder shrug equal, head turn equal  XII:  Tongue midline without fasciculations      MOTOR: Tone and bulk: normal bilateral upper and lower Strength: normal   Delt Bi Tri WE WF     R 5 5 5 5 5 5   L 5 5 5 5 5 5     IP ADD ABD Quad TA Gas HAM  R 5 5 5 5 5 5 5  L 5 5 5 5 5 5 5    SENSATION: Light touch and pinprick intact bilaterally  REFLEXES: normal, symmetric, nonbrisk.  Toes down, no clonus. No hoffmans.  GAIT: normal rise, base, steps, and arm swing.        Imaging:  MRI L-spine  8/2013  At the L1-L2 level, facet arthropathy is noted greater on the left than the right.  Broad-based bulging is noted towards the left nerve foramen greater than right neural foramen and centrally of approximately 4 mm maximally.  Mild right and mild/moderate   left neural foraminal narrowing appears to be present.  Mild central canal narrowing is noted to 13 mm.  Mild progression of neural foraminal narrowing on the left appears to be present since the prior.      At the L2-L3 level, broad-based bulging appears to be present centrally and paracentric to the right of approximately 3 mm.  Mild right greater than left nerve foraminal narrowing is noted.  Mild central canal narrowing is noted.  Facet arthropathy and   ligamentous hypertrophy are noted.  A similar appearance is noted on the prior.  Mild progression of bulging may be noted.    At the L3-L4 level, broad based bulging appears to be present along  with facet arthropathy and ligamentous hypertrophy.  The bulging or protrusion is to approximately 4 mm.  Mild central canal stenosis is noted.  Mild bilateral neural femoral stenosis is   noted.  No convincing detrimental change is noted since the prior.  A slightly asymmetric component to the bulge may be noted at this level towards the far lateral right and right neural foramen    At the L4-L5 level, broad-based bulging or protrusion is noted of approximately 4 to 5 mm that actually appears less noticeable than on the prior.  Moderate bilateral nerve foraminal stenosis appears to be present similar since the prior.  Mild central   canal narrowing is noted.  The exiting nerve roots bilaterally come near the disk material and contact cannot be excluded particularly on the right    Xray L-Spine 3/15/18  There is moderate-to-severe disc space narrowing noted throughout the lumbar spine and greatest at the L1-2, L4-5 and L5-S1 levels.  Prominent bilateral facet arthropathy noted from the L3-4 through the L5-S1 levels.     Assessment:  Patient referred for lower back pain  1. Other spondylosis, lumbar region    2. DDD (degenerative disc disease), lumbar    3. Myalgia          Plan:  - I have stressed the importance of physical activity and exercise to improve overall health  - Reviewed imaging with patient  - I believe her low back pain maybe due to facet arthropathy and have recommended lumbar medial branch blocks as a diagnostic procedure.  If successful, would proceed with radiofrequency ablation.  - Follow up after procedure      Thank you for referring this interesting patient, and I look forward to continuing to collaborate in her care.

## 2018-03-22 NOTE — TELEPHONE ENCOUNTER
Spoke to patient who requested to know the name and contact information of the physician Dr. Ornelas placed psychology referral. Contact information given. Referral faxed to Gaston Eaton's office at [phone]. Fax confirmation received.

## 2018-03-27 DIAGNOSIS — M48.8X6 OTHER SPECIFIED SPONDYLOPATHIES, LUMBAR REGION: Primary | ICD-10-CM

## 2018-04-03 ENCOUNTER — HOSPITAL ENCOUNTER (OUTPATIENT)
Facility: AMBULARY SURGERY CENTER | Age: 68
Discharge: HOME | End: 2018-04-03
Attending: ANESTHESIOLOGY | Admitting: ANESTHESIOLOGY
Payer: MEDICARE

## 2018-04-03 ENCOUNTER — SURGERY (OUTPATIENT)
Age: 68
End: 2018-04-03

## 2018-04-03 DIAGNOSIS — M47.896 OTHER SPONDYLOSIS, LUMBAR REGION: Primary | ICD-10-CM

## 2018-04-03 PROCEDURE — 64493 INJ PARAVERT F JNT L/S 1 LEV: CPT | Mod: 50,,, | Performed by: ANESTHESIOLOGY

## 2018-04-03 PROCEDURE — 64494 INJ PARAVERT F JNT L/S 2 LEV: CPT | Mod: RT | Performed by: ANESTHESIOLOGY

## 2018-04-03 PROCEDURE — 64494 INJ PARAVERT F JNT L/S 2 LEV: CPT | Mod: 50,,, | Performed by: ANESTHESIOLOGY

## 2018-04-03 PROCEDURE — 64495 INJ PARAVERT F JNT L/S 3 LEV: CPT | Mod: RT | Performed by: ANESTHESIOLOGY

## 2018-04-03 PROCEDURE — 99152 MOD SED SAME PHYS/QHP 5/>YRS: CPT | Mod: ,,, | Performed by: ANESTHESIOLOGY

## 2018-04-03 PROCEDURE — 64493 INJ PARAVERT F JNT L/S 1 LEV: CPT | Mod: RT | Performed by: ANESTHESIOLOGY

## 2018-04-03 RX ORDER — MIDAZOLAM HYDROCHLORIDE 2 MG/2ML
INJECTION, SOLUTION INTRAMUSCULAR; INTRAVENOUS
Status: DISCONTINUED | OUTPATIENT
Start: 2018-04-03 | End: 2018-04-03 | Stop reason: HOSPADM

## 2018-04-03 RX ORDER — MIDAZOLAM HYDROCHLORIDE 1 MG/ML
INJECTION INTRAMUSCULAR; INTRAVENOUS
Status: DISCONTINUED
Start: 2018-04-03 | End: 2018-04-03 | Stop reason: HOSPADM

## 2018-04-03 RX ORDER — BUPIVACAINE HYDROCHLORIDE 5 MG/ML
INJECTION, SOLUTION EPIDURAL; INTRACAUDAL
Status: DISCONTINUED | OUTPATIENT
Start: 2018-04-03 | End: 2018-04-03 | Stop reason: HOSPADM

## 2018-04-03 RX ORDER — SODIUM CHLORIDE, SODIUM LACTATE, POTASSIUM CHLORIDE, CALCIUM CHLORIDE 600; 310; 30; 20 MG/100ML; MG/100ML; MG/100ML; MG/100ML
INJECTION, SOLUTION INTRAVENOUS ONCE AS NEEDED
Status: COMPLETED | OUTPATIENT
Start: 2018-04-03 | End: 2018-04-03

## 2018-04-03 RX ADMIN — MIDAZOLAM HYDROCHLORIDE 2 MG: 2 INJECTION, SOLUTION INTRAMUSCULAR; INTRAVENOUS at 02:04

## 2018-04-03 RX ADMIN — BUPIVACAINE HYDROCHLORIDE 5 ML: 5 INJECTION, SOLUTION EPIDURAL; INTRACAUDAL at 02:04

## 2018-04-03 RX ADMIN — SODIUM CHLORIDE, SODIUM LACTATE, POTASSIUM CHLORIDE, CALCIUM CHLORIDE: 600; 310; 30; 20 INJECTION, SOLUTION INTRAVENOUS at 02:04

## 2018-04-03 NOTE — INTERVAL H&P NOTE
The patient has been examined and the H&P has been reviewed:    I concur with the findings and no changes have occurred since H&P was written.   This patient has been cleared for surgery in an ambulatory surgical facility    ASA 3,  Mallampatti Score 3  No history of anesthetic complications  Plan for RN IV sedation      Anesthesia/Surgery risks, benefits and alternative options discussed and understood by patient/family.          There are no hospital problems to display for this patient.

## 2018-04-03 NOTE — DISCHARGE INSTRUCTIONS
Anesthesia information    Anesthesia Safety      You have been given medicine  to sedate you during your procedure today. This may have included both a pain medicine and sleeping medicine. Most of the effects have worn off; however, you may continue to have some drowsiness for the next  24 hours. Anesthesia and pain medicines can cause nausea, sleepiness, dizziness and  constipation.    HOME CARE:  1) For the next EIGHT HOURS, you should be watched by a responsible adult to look for any worsening of your condition.  2) DO NOT DRINK any ALCOHOL for the next 24 HOURS.  3) DO NOT DRIVE or operate dangerous machinery during the next 24 HOURS.  FOLLOW UP with your doctor or this facility if you are not alert and back to your usual level of activity within 24 hrs.  GET PROMPT MEDICAL ATTENTION if any of the following occur:  -- Increased drowsiness  -- Increased weakness or dizziness  -- Repeated vomiting  -- If you cannot be awakened    NERVE BLOCK   This was a test not a treatment. Please keep pain journal as instructed  Tips for recovery  · You may use an ice pack at your injection site for comfort.  · You may shower this evening.   · Do not use a heating pad or take tub baths or swim for 2 days.  · Take your usual medication for pain if needed.  · Dont drive the day of the procedure.  · Wait until tomorrow to resume any blood thinners (aspirin, Plavix, Coumadin) but you may resume all your other medications today.  When to call your doctor  Call right away if you notice any of the following symptoms:  · Severe pain or headache  · Fever or chills  · Redness or swelling around the injection site   · Difficulty breathing  · Vomiting  · Increasing numbness or weakness in arms or legs  ·

## 2018-04-03 NOTE — OP NOTE
PROCEDURE DATE: 4/3/2018    PROCEDURE:  Bilateral L2,3,4,5 medial branch nerve blocks    DIAGNOSIS:  Other lumbar spondylosis    Post Op diagnosis: Same    PHYSICIAN: Christoph Pratt MD    MEDICATIONS INJECTED: 0.5% bupivicaine, 0.5 ml at each level    SEDATION MEDICATIONS:IV Versed    LOCAL ANESTHETIC USED: None    ESTIMATED BLOOD LOSS:  None    COMPLICATIONS:  None    TECHNIQUE: A time out was taken to identify the patient, procedure and side of the procedure. The patient was placed in a prone position, then prepped and draped in the usual sterile fashion using ChloraPrep and sterile towels.  The levels were determined under fluoroscopic guidance and then marked.  A 25-gauge 3.5 inch needle was introduced to the anatomic location of the L2,3,4,5 medial branch nerves on the bilateral side. The above medication was then injected. The patient tolerated the procedure well.     The patient was monitored after the procedure. Patient was given pain diary to record pain levels at home.     If found to have greater than a 50% recovery and so will be scheduled for a radiofrequency ablation of the corresponding nerves.  Patient was given post procedure and discharge instructions to follow at home.  The patient was discharged in a stable condition.

## 2018-04-03 NOTE — PLAN OF CARE
Patient is being driven home by her . Her upcoming appointments were reviewed. Her pain diary was reviewed and sent home with her.

## 2018-04-03 NOTE — DISCHARGE SUMMARY
[facility]  Discharge Note  Short Stay    Admit Date: 4/3/2018    Discharge Date and Time: 4/3/2018    Attending Physician: Christoph Pratt MD     Discharge Provider: Christoph Pratt    Diagnoses:  Active Hospital Problems    Diagnosis  POA    *Other spondylosis, lumbar region [M47.896]  Yes      Resolved Hospital Problems    Diagnosis Date Resolved POA   No resolved problems to display.       Hospital Course: Lumbar MBB  Discharged Condition: Good    Final Diagnoses:   Active Hospital Problems    Diagnosis  POA    *Other spondylosis, lumbar region [M47.896]  Yes      Resolved Hospital Problems    Diagnosis Date Resolved POA   No resolved problems to display.       Disposition: Home or Self Care    Follow up/Patient Instructions:    Medications:  Reconciled Home Medications:      Medication List      CONTINUE taking these medications    aspirin 81 MG EC tablet  Commonly known as:  ECOTRIN     blood sugar diagnostic Strp  1 each by Misc.(Non-Drug; Combo Route) route 3 (three) times daily before meals.     buPROPion 75 MG tablet  Commonly known as:  WELLBUTRIN  Take 1 tablet (75 mg total) by mouth 2 (two) times daily.     cholecalciferol (vitamin D3) 1,000 unit capsule  Commonly known as:  VITAMIN D3  Take 1 capsule (1,000 Units total) by mouth 2 (two) times daily.     citalopram 40 MG tablet  Commonly known as:  CELEXA  Take 1 tablet (40 mg total) by mouth once daily.     clonazePAM 1 MG tablet  Commonly known as:  KLONOPIN  Take 1 tablet (1 mg total) by mouth 3 (three) times daily.     cyanocobalamin (vitamin B-12) 5,000 mcg Tbdl  Take 1 tablet by mouth once daily.     cyclobenzaprine 10 MG tablet  Commonly known as:  FLEXERIL  TAKE 1 TABLET BY MOUTH THREE TIMES DAILY AS NEEDED     diclofenac 50 MG EC tablet  Commonly known as:  VOLTAREN  Take 1 tablet (50 mg total) by mouth 3 (three) times daily.     fluticasone 50 mcg/actuation nasal spray  Commonly known as:  FLONASE  ADMINISTER 1 SPRAY IN EACH NARE 2 TIMES  DAILY.     folic acid 1 MG tablet  Commonly known as:  FOLVITE  Take 1 tablet (1 mg total) by mouth once daily.     gabapentin 800 MG tablet  Commonly known as:  NEURONTIN  Take 1 tablet (800 mg total) by mouth 3 (three) times daily.     glucagon (human recombinant) 1 mg Kit  Commonly known as:  GLUCAGON EMERGENCY KIT (HUMAN)  Inject 1 mL (1 mg total) into the muscle as needed.     hydroxychloroquine 200 mg tablet  Commonly known as:  PLAQUENIL  Take 1 tablet (200 mg total) by mouth 2 (two) times daily. Three times a day     lancets Misc  Commonly known as:  ACCU-CHEK SOFTCLIX LANCETS  Use to test blood sugar three times a day   DX: E08.649     levothyroxine 125 MCG tablet  Commonly known as:  SYNTHROID  TAKE 1 TABLET ONE TIME DAILY     lidocaine 5 %  Commonly known as:  LIDODERM  Remove & Discard patch within 12 hours or as directed by MD     lidocaine HCL 2% 2 % jelly  Commonly known as:  XYLOCAINE  Apply topically as needed. Apply topically once nightly to affected area both heels.     lipase-protease-amylase 24,000-76,000-120,000 units 24,000-76,000 -120,000 unit capsule  Commonly known as:  PANLIPASE  Take 1 capsule by mouth 3 (three) times daily with meals.     ranitidine 150 MG tablet  Commonly known as:  ZANTAC  Take 1 tablet (150 mg total) by mouth 2 (two) times daily.     thiamine 50 MG tablet  Take 1 tablet (50 mg total) by mouth once daily.     traMADol 50 mg tablet  Commonly known as:  ULTRAM  Take 1 tablet (50 mg total) by mouth every 6 (six) hours as needed for Pain.     valACYclovir 500 MG tablet  Commonly known as:  VALTREX  Take 1 tablet (500 mg total) by mouth once daily.     vitamin E 1000 UNIT capsule        STOP taking these medications    QUEtiapine 50 MG tablet  Commonly known as:  SEROQUEL            Discharge Procedure Orders  Call MD for:  temperature >100.4     Call MD for:  persistent nausea and vomiting or diarrhea     Call MD for:  severe uncontrolled pain     Call MD for:  redness,  tenderness, or signs of infection (pain, swelling, redness, odor or green/yellow discharge around incision site)     Call MD for:  difficulty breathing or increased cough     Call MD for:  severe persistent headache          Follow up with MD in 2-3 weeks    Discharge Procedure Orders (must include Diet, Follow-up, Activity):    Discharge Procedure Orders (must include Diet, Follow-up, Activity)  Call MD for:  temperature >100.4     Call MD for:  persistent nausea and vomiting or diarrhea     Call MD for:  severe uncontrolled pain     Call MD for:  redness, tenderness, or signs of infection (pain, swelling, redness, odor or green/yellow discharge around incision site)     Call MD for:  difficulty breathing or increased cough     Call MD for:  severe persistent headache

## 2018-04-04 VITALS
RESPIRATION RATE: 18 BRPM | HEART RATE: 57 BPM | OXYGEN SATURATION: 95 % | BODY MASS INDEX: 18.83 KG/M2 | TEMPERATURE: 98 F | DIASTOLIC BLOOD PRESSURE: 67 MMHG | SYSTOLIC BLOOD PRESSURE: 119 MMHG | HEIGHT: 67 IN | WEIGHT: 120 LBS

## 2018-04-04 LAB — POCT GLUCOSE: 88 MG/DL (ref 70–110)

## 2018-04-09 ENCOUNTER — TELEPHONE (OUTPATIENT)
Dept: PAIN MEDICINE | Facility: CLINIC | Age: 68
End: 2018-04-09

## 2018-04-09 DIAGNOSIS — M48.8X6 OTHER SPECIFIED SPONDYLOPATHIES, LUMBAR REGION: Primary | ICD-10-CM

## 2018-04-11 ENCOUNTER — TELEPHONE (OUTPATIENT)
Dept: RHEUMATOLOGY | Facility: CLINIC | Age: 68
End: 2018-04-11

## 2018-04-17 ENCOUNTER — HOSPITAL ENCOUNTER (OUTPATIENT)
Facility: AMBULARY SURGERY CENTER | Age: 68
Discharge: HOME | End: 2018-04-17
Attending: ANESTHESIOLOGY | Admitting: ANESTHESIOLOGY
Payer: MEDICARE

## 2018-04-17 ENCOUNTER — SURGERY (OUTPATIENT)
Age: 68
End: 2018-04-17

## 2018-04-17 DIAGNOSIS — M47.896 OTHER SPONDYLOSIS, LUMBAR REGION: Primary | ICD-10-CM

## 2018-04-17 LAB — POCT GLUCOSE: 86 MG/DL (ref 70–110)

## 2018-04-17 PROCEDURE — 64636 DESTROY L/S FACET JNT ADDL: CPT | Mod: LT | Performed by: ANESTHESIOLOGY

## 2018-04-17 PROCEDURE — 99152 MOD SED SAME PHYS/QHP 5/>YRS: CPT | Mod: ,,, | Performed by: ANESTHESIOLOGY

## 2018-04-17 PROCEDURE — 64636 DESTROY L/S FACET JNT ADDL: CPT | Mod: 50,,, | Performed by: ANESTHESIOLOGY

## 2018-04-17 PROCEDURE — 64635 DESTROY LUMB/SAC FACET JNT: CPT | Mod: RT | Performed by: ANESTHESIOLOGY

## 2018-04-17 PROCEDURE — 64635 DESTROY LUMB/SAC FACET JNT: CPT | Mod: 50,,, | Performed by: ANESTHESIOLOGY

## 2018-04-17 RX ORDER — METHYLPREDNISOLONE ACETATE 80 MG/ML
INJECTION, SUSPENSION INTRA-ARTICULAR; INTRALESIONAL; INTRAMUSCULAR; SOFT TISSUE
Status: DISCONTINUED
Start: 2018-04-17 | End: 2018-04-17 | Stop reason: HOSPADM

## 2018-04-17 RX ORDER — MIDAZOLAM HYDROCHLORIDE 2 MG/2ML
INJECTION, SOLUTION INTRAMUSCULAR; INTRAVENOUS
Status: DISCONTINUED | OUTPATIENT
Start: 2018-04-17 | End: 2018-04-17 | Stop reason: HOSPADM

## 2018-04-17 RX ORDER — FENTANYL CITRATE 50 UG/ML
INJECTION, SOLUTION INTRAMUSCULAR; INTRAVENOUS
Status: DISCONTINUED
Start: 2018-04-17 | End: 2018-04-17 | Stop reason: HOSPADM

## 2018-04-17 RX ORDER — MIDAZOLAM HYDROCHLORIDE 1 MG/ML
INJECTION INTRAMUSCULAR; INTRAVENOUS
Status: DISCONTINUED
Start: 2018-04-17 | End: 2018-04-17 | Stop reason: HOSPADM

## 2018-04-17 RX ORDER — BUPIVACAINE HYDROCHLORIDE 2.5 MG/ML
INJECTION, SOLUTION EPIDURAL; INFILTRATION; INTRACAUDAL
Status: DISCONTINUED | OUTPATIENT
Start: 2018-04-17 | End: 2018-04-17 | Stop reason: HOSPADM

## 2018-04-17 RX ORDER — LIDOCAINE HYDROCHLORIDE 10 MG/ML
INJECTION, SOLUTION EPIDURAL; INFILTRATION; INTRACAUDAL; PERINEURAL
Status: DISCONTINUED
Start: 2018-04-17 | End: 2018-04-17 | Stop reason: HOSPADM

## 2018-04-17 RX ORDER — SODIUM CHLORIDE, SODIUM LACTATE, POTASSIUM CHLORIDE, CALCIUM CHLORIDE 600; 310; 30; 20 MG/100ML; MG/100ML; MG/100ML; MG/100ML
INJECTION, SOLUTION INTRAVENOUS ONCE AS NEEDED
Status: DISCONTINUED | OUTPATIENT
Start: 2018-04-17 | End: 2018-04-17 | Stop reason: HOSPADM

## 2018-04-17 RX ORDER — BUPIVACAINE HYDROCHLORIDE 2.5 MG/ML
INJECTION, SOLUTION EPIDURAL; INFILTRATION; INTRACAUDAL
Status: DISCONTINUED
Start: 2018-04-17 | End: 2018-04-17 | Stop reason: HOSPADM

## 2018-04-17 RX ORDER — DEXAMETHASONE SODIUM PHOSPHATE 10 MG/ML
INJECTION INTRAMUSCULAR; INTRAVENOUS
Status: DISCONTINUED | OUTPATIENT
Start: 2018-04-17 | End: 2018-04-17 | Stop reason: HOSPADM

## 2018-04-17 RX ORDER — METHYLPREDNISOLONE ACETATE 80 MG/ML
INJECTION, SUSPENSION INTRA-ARTICULAR; INTRALESIONAL; INTRAMUSCULAR; SOFT TISSUE
Status: DISCONTINUED | OUTPATIENT
Start: 2018-04-17 | End: 2018-04-17 | Stop reason: HOSPADM

## 2018-04-17 RX ORDER — LIDOCAINE HYDROCHLORIDE 20 MG/ML
INJECTION, SOLUTION EPIDURAL; INFILTRATION; INTRACAUDAL; PERINEURAL
Status: DISCONTINUED | OUTPATIENT
Start: 2018-04-17 | End: 2018-04-17 | Stop reason: HOSPADM

## 2018-04-17 RX ORDER — SODIUM CHLORIDE 9 MG/ML
INJECTION, SOLUTION INTRAVENOUS CONTINUOUS
Status: DISCONTINUED | OUTPATIENT
Start: 2018-04-17 | End: 2018-04-17 | Stop reason: HOSPADM

## 2018-04-17 RX ORDER — FENTANYL CITRATE 50 UG/ML
INJECTION, SOLUTION INTRAMUSCULAR; INTRAVENOUS
Status: DISCONTINUED | OUTPATIENT
Start: 2018-04-17 | End: 2018-04-17 | Stop reason: HOSPADM

## 2018-04-17 RX ORDER — LIDOCAINE HYDROCHLORIDE 10 MG/ML
INJECTION, SOLUTION EPIDURAL; INFILTRATION; INTRACAUDAL; PERINEURAL
Status: DISCONTINUED | OUTPATIENT
Start: 2018-04-17 | End: 2018-04-17 | Stop reason: HOSPADM

## 2018-04-17 RX ORDER — LIDOCAINE HYDROCHLORIDE 20 MG/ML
INJECTION, SOLUTION EPIDURAL; INFILTRATION; INTRACAUDAL; PERINEURAL
Status: DISCONTINUED
Start: 2018-04-17 | End: 2018-04-17 | Stop reason: HOSPADM

## 2018-04-17 RX ADMIN — BUPIVACAINE HYDROCHLORIDE 6 ML: 2.5 INJECTION, SOLUTION EPIDURAL; INFILTRATION; INTRACAUDAL at 03:04

## 2018-04-17 RX ADMIN — FENTANYL CITRATE 25 MCG: 50 INJECTION, SOLUTION INTRAMUSCULAR; INTRAVENOUS at 03:04

## 2018-04-17 RX ADMIN — SODIUM CHLORIDE: 9 INJECTION, SOLUTION INTRAVENOUS at 02:04

## 2018-04-17 RX ADMIN — METHYLPREDNISOLONE ACETATE 80 MG: 80 INJECTION, SUSPENSION INTRA-ARTICULAR; INTRALESIONAL; INTRAMUSCULAR; SOFT TISSUE at 03:04

## 2018-04-17 RX ADMIN — LIDOCAINE HYDROCHLORIDE 6 ML: 20 INJECTION, SOLUTION EPIDURAL; INFILTRATION; INTRACAUDAL; PERINEURAL at 03:04

## 2018-04-17 RX ADMIN — FENTANYL CITRATE 50 MCG: 50 INJECTION, SOLUTION INTRAMUSCULAR; INTRAVENOUS at 03:04

## 2018-04-17 RX ADMIN — MIDAZOLAM HYDROCHLORIDE 2 MG: 2 INJECTION, SOLUTION INTRAMUSCULAR; INTRAVENOUS at 03:04

## 2018-04-17 RX ADMIN — LIDOCAINE HYDROCHLORIDE 10 ML: 10 INJECTION, SOLUTION EPIDURAL; INFILTRATION; INTRACAUDAL; PERINEURAL at 03:04

## 2018-04-17 NOTE — DISCHARGE SUMMARY
[facility]  Discharge Note  Short Stay    Admit Date: 4/17/2018    Discharge Date and Time: 4/17/2018    Attending Physician: Christoph Pratt MD     Discharge Provider: Christoph Pratt    Diagnoses:  Active Hospital Problems    Diagnosis  POA    *Other spondylosis, lumbar region [M47.896]  Yes      Resolved Hospital Problems    Diagnosis Date Resolved POA   No resolved problems to display.       Hospital Course: Lumbar MB RFA  Discharged Condition: Good    Final Diagnoses:   Active Hospital Problems    Diagnosis  POA    *Other spondylosis, lumbar region [M47.896]  Yes      Resolved Hospital Problems    Diagnosis Date Resolved POA   No resolved problems to display.       Disposition: Home or Self Care    Follow up/Patient Instructions:    Medications:  Reconciled Home Medications:      Medication List      CONTINUE taking these medications    aspirin 81 MG EC tablet  Commonly known as:  ECOTRIN  Take 81 mg by mouth nightly.     blood sugar diagnostic Strp  1 each by Misc.(Non-Drug; Combo Route) route 3 (three) times daily before meals.     buPROPion 75 MG tablet  Commonly known as:  WELLBUTRIN  Take 1 tablet (75 mg total) by mouth 2 (two) times daily.     cholecalciferol (vitamin D3) 1,000 unit capsule  Commonly known as:  VITAMIN D3  Take 1 capsule (1,000 Units total) by mouth 2 (two) times daily.     citalopram 40 MG tablet  Commonly known as:  CELEXA  Take 1 tablet (40 mg total) by mouth once daily.     clonazePAM 1 MG tablet  Commonly known as:  KLONOPIN  Take 1 tablet (1 mg total) by mouth 3 (three) times daily.     cyanocobalamin (vitamin B-12) 5,000 mcg Tbdl  Take 1 tablet by mouth once daily.     cyclobenzaprine 10 MG tablet  Commonly known as:  FLEXERIL  TAKE 1 TABLET BY MOUTH THREE TIMES DAILY AS NEEDED     diclofenac 50 MG EC tablet  Commonly known as:  VOLTAREN  Take 1 tablet (50 mg total) by mouth 3 (three) times daily.     fluticasone 50 mcg/actuation nasal spray  Commonly known as:   FLONASE  ADMINISTER 1 SPRAY IN EACH NARE 2 TIMES DAILY.     folic acid 1 MG tablet  Commonly known as:  FOLVITE  Take 1 tablet (1 mg total) by mouth once daily.     gabapentin 800 MG tablet  Commonly known as:  NEURONTIN  Take 1 tablet (800 mg total) by mouth 3 (three) times daily.     glucagon (human recombinant) 1 mg Kit  Commonly known as:  GLUCAGON EMERGENCY KIT (HUMAN)  Inject 1 mL (1 mg total) into the muscle as needed.     hydroxychloroquine 200 mg tablet  Commonly known as:  PLAQUENIL  Take 1 tablet (200 mg total) by mouth 2 (two) times daily. Three times a day     lancets Misc  Commonly known as:  ACCU-CHEK SOFTCLIX LANCETS  Use to test blood sugar three times a day   DX: E08.649     levothyroxine 125 MCG tablet  Commonly known as:  SYNTHROID  TAKE 1 TABLET ONE TIME DAILY     lidocaine 5 %  Commonly known as:  LIDODERM  Remove & Discard patch within 12 hours or as directed by MD     lidocaine HCL 2% 2 % jelly  Commonly known as:  XYLOCAINE  Apply topically as needed. Apply topically once nightly to affected area both heels.     lipase-protease-amylase 24,000-76,000-120,000 units 24,000-76,000 -120,000 unit capsule  Commonly known as:  PANLIPASE  Take 1 capsule by mouth 3 (three) times daily with meals.     ranitidine 150 MG tablet  Commonly known as:  ZANTAC  Take 1 tablet (150 mg total) by mouth 2 (two) times daily.     thiamine 50 MG tablet  Take 1 tablet (50 mg total) by mouth once daily.     traMADol 50 mg tablet  Commonly known as:  ULTRAM  Take 1 tablet (50 mg total) by mouth every 6 (six) hours as needed for Pain.     valACYclovir 500 MG tablet  Commonly known as:  VALTREX  Take 1 tablet (500 mg total) by mouth once daily.     vitamin E 1000 UNIT capsule  Take 1,000 Units by mouth once daily.        STOP taking these medications    QUEtiapine 50 MG tablet  Commonly known as:  SEROQUEL            Discharge Procedure Orders  Call MD for:  temperature >100.4     Call MD for:  persistent nausea and  vomiting or diarrhea     Call MD for:  severe uncontrolled pain     Call MD for:  redness, tenderness, or signs of infection (pain, swelling, redness, odor or green/yellow discharge around incision site)     Call MD for:  difficulty breathing or increased cough     Call MD for:  severe persistent headache          Follow up with MD in 2-3 weeks    Discharge Procedure Orders (must include Diet, Follow-up, Activity):    Discharge Procedure Orders (must include Diet, Follow-up, Activity)  Call MD for:  temperature >100.4     Call MD for:  persistent nausea and vomiting or diarrhea     Call MD for:  severe uncontrolled pain     Call MD for:  redness, tenderness, or signs of infection (pain, swelling, redness, odor or green/yellow discharge around incision site)     Call MD for:  difficulty breathing or increased cough     Call MD for:  severe persistent headache

## 2018-04-17 NOTE — OR NURSING
Patient ate canteloupe at 9.  Dr Pratt said she can wait until the end of the day to get the procedure done.

## 2018-04-17 NOTE — DISCHARGE INSTRUCTIONS
Recovery After Procedural Sedation (Adult)  You have been given medicine by vein to make you sleep during your surgery. This may have included both a pain medicine and sleeping medicine. Most of the effects have worn off. But you may still have some drowsiness for the next 6 to 8 hours.  Home care  Follow these guidelines when you get home:  · For the next 8 hours, you should be watched by a responsible adult. This person should make sure your condition is not getting worse.  · Don't drink any alcohol for the next 24 hours.  · Don't drive, operate dangerous machinery, or make important business or personal decisions during the next 24 hours.  Note: Your healthcare provider may tell you not to take any medicine by mouth for pain or sleep in the next 4 hours. These medicines may react with the medicines you were given in the hospital. This could cause a much stronger response than usual.  Follow-up care  Follow up with your healthcare provider if you are not alert and back to your usual level of activity within 12 hours.  When to seek medical advice  Call your healthcare provider right away if any of these occur:  · Drowsiness gets worse  · Weakness or dizziness gets worse  · Repeated vomiting  · You can't be awakened   Date Last Reviewed: 10/18/2016  © 2368-5716 [employer]. [address] [place] [address], [place] [address]. All rights reserved. This information is not intended as a substitute for professional medical care. Always follow your healthcare professional's instructions.      Radiofrequency Thermocoagulation Recovery at Home  What to know about pain relief  An injection to reduce inflammation takes a few days to work, sometimes even up to a week. There may even be more pain at first.  Tips for recovery  · You may use an ice pack at your injection site for comfort.  · You may shower this evening.   · Do not use a heating pad or take tub baths or swim for 2 days.  · Take your usual medication for  pain if needed.  · Gradually increase your activities.  · Dont lift anything over 10 lbs for the first 24 hours  · Dont drive the day of the procedure.  · Wait until tomorrow to resume any blood thinners (aspirin, Plavix, Coumadin) but you may resume all your other medications today.  When to call your doctor  Call right away if you notice any of the following symptoms:  · Severe pain or headache  · Fever or chills  · Redness or swelling around the injection site   · Difficulty breathing  · Vomiting  · Increasing numbness or weakness in arms or legs  · If you are diabetic, a steroid injection can increase your blood sugar so moniter it carefully.

## 2018-04-17 NOTE — OP NOTE
PROCEDURE DATE: 4/17/2018    PROCEDURE:  Radiofrequency ablation of the L2,3,4,5 medial branch nerves on the bilateral-side utilizing fluoroscopy    DIAGNOSIS:  Other lumbar spondylosis  Post op Diagnosis: Same    PHYSICIAN: Christoph Pratt MD    MEDICATIONS INJECTED:  From a mixture of 6ml of 0.25% bupivicaine and 80mg of methylprednisone,  1ml of this solution was injected at each level.    LOCAL ANESTHETIC USED: Lidocaine 1%, 2 ml given at each site.    SEDATION MEDICATIONS: RN IV Sedation    ESTIMATED BLOOD LOSS:  None    COMPLICATIONS:  None    TECHNIQUE:  A time out was taken to identify patient and procedure side prior to starting the procedure. Laying in a prone position, the patient was prepped and draped in the usual sterile fashion using ChloraPrep and sterile towels.  The levels were determined under fluoroscopic guidance and then marked.  Local anesthetic was given by raising a wheal at the skin over each site and then infiltrated approximately 2cm deeper.  A 20-gauge  100 mm RF needle was introduced to the anatomic location of the bilateral L2,3,4,5 medial branch nerves. S Motor stimulation up to 2 Volts at each level confirmed no motor nerve involvement.  Impedance was less than 800 ohms at each level.  1ml of 2% lidocaine was instilled prior to lesioning.  Ablation was performed per level utilizing radiofrequency generator 80°C for 60 seconds.  Needles were then rotated 90° and a second lesion was performed.  The above noted medication was then injected slowly. The patient tolerated the procedure well.     The patient was monitored after the procedure.  Patient was given post procedure and discharge instructions to follow at home.  The patient was discharged in a stable condition

## 2018-04-18 ENCOUNTER — OFFICE VISIT (OUTPATIENT)
Dept: FAMILY MEDICINE | Facility: CLINIC | Age: 68
End: 2018-04-18
Payer: MEDICARE

## 2018-04-18 VITALS
HEIGHT: 67 IN | RESPIRATION RATE: 12 BRPM | OXYGEN SATURATION: 97 % | BODY MASS INDEX: 19.44 KG/M2 | TEMPERATURE: 98 F | WEIGHT: 123.88 LBS | HEART RATE: 58 BPM | DIASTOLIC BLOOD PRESSURE: 60 MMHG | SYSTOLIC BLOOD PRESSURE: 95 MMHG

## 2018-04-18 VITALS
TEMPERATURE: 99 F | DIASTOLIC BLOOD PRESSURE: 61 MMHG | HEIGHT: 67 IN | OXYGEN SATURATION: 97 % | SYSTOLIC BLOOD PRESSURE: 117 MMHG | WEIGHT: 120 LBS | BODY MASS INDEX: 18.83 KG/M2 | RESPIRATION RATE: 18 BRPM | HEART RATE: 57 BPM

## 2018-04-18 DIAGNOSIS — F13.20 BENZODIAZEPINE DEPENDENCE: ICD-10-CM

## 2018-04-18 DIAGNOSIS — Z12.11 COLON CANCER SCREENING: ICD-10-CM

## 2018-04-18 DIAGNOSIS — Z79.899 MEDICATION MANAGEMENT: ICD-10-CM

## 2018-04-18 DIAGNOSIS — Z12.11 COLON CANCER SCREENING: Primary | ICD-10-CM

## 2018-04-18 DIAGNOSIS — M32.9 SYSTEMIC LUPUS ERYTHEMATOSUS, UNSPECIFIED SLE TYPE, UNSPECIFIED ORGAN INVOLVEMENT STATUS: ICD-10-CM

## 2018-04-18 DIAGNOSIS — F41.8 DEPRESSION WITH ANXIETY: Primary | Chronic | ICD-10-CM

## 2018-04-18 DIAGNOSIS — F41.8 DEPRESSION WITH ANXIETY: Chronic | ICD-10-CM

## 2018-04-18 DIAGNOSIS — G47.9 SLEEP DISTURBANCE: ICD-10-CM

## 2018-04-18 DIAGNOSIS — G89.29 OTHER CHRONIC PAIN: ICD-10-CM

## 2018-04-18 PROCEDURE — 99999 PR PBB SHADOW E&M-EST. PATIENT-LVL III: CPT | Mod: PBBFAC,,, | Performed by: PHYSICIAN ASSISTANT

## 2018-04-18 PROCEDURE — 80307 DRUG TEST PRSMV CHEM ANLYZR: CPT

## 2018-04-18 PROCEDURE — 99214 OFFICE O/P EST MOD 30 MIN: CPT | Mod: S$GLB,,, | Performed by: PHYSICIAN ASSISTANT

## 2018-04-18 RX ORDER — MIRTAZAPINE 7.5 MG/1
TABLET, FILM COATED ORAL
Qty: 90 TABLET | Refills: 0 | OUTPATIENT
Start: 2018-04-18

## 2018-04-18 RX ORDER — AMITRIPTYLINE HYDROCHLORIDE 10 MG/1
10 TABLET, FILM COATED ORAL NIGHTLY PRN
Qty: 30 TABLET | Refills: 0 | Status: SHIPPED | OUTPATIENT
Start: 2018-04-18 | End: 2018-05-17 | Stop reason: SDUPTHER

## 2018-04-18 RX ORDER — MIRTAZAPINE 7.5 MG/1
7.5 TABLET, FILM COATED ORAL NIGHTLY
Qty: 30 TABLET | Refills: 0 | Status: SHIPPED | OUTPATIENT
Start: 2018-04-18 | End: 2018-04-18 | Stop reason: ALTCHOICE

## 2018-04-18 NOTE — TELEPHONE ENCOUNTER
Please advise pharmacy to not fill remeron but fill elavil. Please advise patient that we will prescribed elavil rather than remeron as I believe this will help depression, insomnia, and pain.

## 2018-04-18 NOTE — PROGRESS NOTES
Subjective:       Patient ID: Erica Marsh is a 67 y.o. female.    Chief Complaint: Follow-up (2mth f/u)    Ms. Marsh comes to clinic today for follow up. The patient recently returned [facility] where she spent 4 months with family. The patient reports that she was recently started on wellbutrin; she did not tolerate this medication and stopped it. She would like to try a different medication to help with depression. The patient also has insomnia. The patient recently had a nerve block to help with back pain. She reports that her back pain is worse today but she hopes this will improve over the next 2 days. The patient is followed by Dr. Alvarenga for Lupus; Dr. Alvarenga prescribe's the patients tramadol. The patient does have chronic anxiety for which she takes clonazepam as needed. The patient also continues to take celexa. The patient is due for colonoscopy; she is unable to complete this due to her risk for hypoglycemia with the prep. The patient reports she had her eye exam completed 2 weeks ago with Dr. Mo.       Review of Systems   Constitutional: Negative for activity change, appetite change and fever.   HENT: Negative for postnasal drip, rhinorrhea and sinus pressure.    Eyes: Negative for visual disturbance.   Respiratory: Negative for cough and shortness of breath.    Cardiovascular: Negative for chest pain.   Gastrointestinal: Negative for abdominal distention and abdominal pain.   Genitourinary: Negative for difficulty urinating, dysuria and pelvic pain.   Musculoskeletal: Positive for arthralgias, back pain and myalgias.   Neurological: Negative for headaches.   Hematological: Negative for adenopathy.   Psychiatric/Behavioral: The patient is not nervous/anxious.        Objective:      Physical Exam   Constitutional: She is oriented to person, place, and time.   HENT:   Mouth/Throat: Oropharynx is clear and moist. No oropharyngeal exudate.   Eyes: Conjunctivae are normal. Pupils are equal, round,  and reactive to light.   Cardiovascular: Normal rate and regular rhythm.    Pulmonary/Chest: Effort normal and breath sounds normal. She has no wheezes.   Abdominal: Soft. Bowel sounds are normal. There is no tenderness.   Musculoskeletal: She exhibits no edema.   Lymphadenopathy:     She has no cervical adenopathy.   Neurological: She is alert and oriented to person, place, and time.   Skin: No erythema.   Psychiatric: Her behavior is normal.       Assessment:       1. Depression with anxiety    2. Systemic lupus erythematosus, unspecified SLE type, unspecified organ involvement status    3. Colon cancer screening    4. Medication management    5. Other chronic pain    6. Benzodiazepine dependence    7. Sleep disturbance        Plan:   Erica was seen today for follow-up.    Diagnoses and all orders for this visit:    Depression with anxiety  -     Discontinue: mirtazapine (REMERON) 7.5 MG Tab; Take 1 tablet (7.5 mg total) by mouth every evening.  -     amitriptyline (ELAVIL) 10 MG tablet; Take 1 tablet (10 mg total) by mouth nightly as needed for Insomnia.  Continue celexa  Medication contract signed today. Urine tox today  Follow up in 3 months  Systemic lupus erythematosus, unspecified SLE type, unspecified organ involvement status  Continue current medication  Follow up with Dr. Alvarenga as scheduled  Colon cancer screening  FIT kit given  Medication management  -     [facility] Drug Screen  Medication contract signed today. Urine tox today  Other chronic pain  -     [facility] Drug Screen  -     amitriptyline (ELAVIL) 10 MG tablet; Take 1 tablet (10 mg total) by mouth nightly as needed for Insomnia.  Continue follow up with Dr. Alvarenga for follow up and treatment of lupus.  Follow up with Dr. Pratt for back pain  Benzodiazepine dependence  -     [facility] Drug Screen    Sleep disturbance  -     Discontinue: mirtazapine (REMERON) 7.5 MG Tab; Take 1 tablet (7.5 mg total) by mouth every evening.  -      amitriptyline (ELAVIL) 10 MG tablet; Take 1 tablet (10 mg total) by mouth nightly as needed for Insomnia.

## 2018-04-18 NOTE — TELEPHONE ENCOUNTER
Called and spoke to pharmacist regarding medication. Pharmacist was informed to not filled the Remeron, but to fill Elavil instead.

## 2018-04-23 LAB
6MAM UR QL: NOT DETECTED
7AMINOCLONAZEPAM UR QL: PRESENT
A-OH ALPRAZ UR QL: NOT DETECTED
ALPRAZ UR QL: NOT DETECTED
AMPHET UR QL SCN: NOT DETECTED
ANNOTATION COMMENT IMP: NORMAL
ANNOTATION COMMENT IMP: NORMAL
BARBITURATES UR QL: NOT DETECTED
BUPRENORPHINE UR QL: NOT DETECTED
BZE UR QL: NOT DETECTED
CARBOXYTHC UR QL: NOT DETECTED
CARISOPRODOL UR QL: NOT DETECTED
CLONAZEPAM UR QL: NOT DETECTED
CODEINE UR QL: NOT DETECTED
CREAT UR-MCNC: 98.4 MG/DL (ref 20–400)
DIAZEPAM UR QL: NOT DETECTED
ETHYL GLUCURONIDE UR QL: NOT DETECTED
FENTANYL UR QL: PRESENT
HYDROCODONE UR QL: NOT DETECTED
HYDROMORPHONE UR QL: NOT DETECTED
LORAZEPAM UR QL: NOT DETECTED
MDA UR QL: NOT DETECTED
MDEA UR QL: NOT DETECTED
MDMA UR QL: NOT DETECTED
ME-PHENIDATE UR QL: NOT DETECTED
MEPERIDINE UR QL: NOT DETECTED
METHADONE UR QL: NOT DETECTED
METHAMPHET UR QL: NOT DETECTED
MIDAZOLAM UR QL SCN: NOT DETECTED
MORPHINE UR QL: NOT DETECTED
NORBUPRENORPHINE UR QL CFM: NOT DETECTED
NORDIAZEPAM UR QL: NOT DETECTED
NORFENTANYL UR QL: PRESENT
NORHYDROCODONE UR QL CFM: NOT DETECTED
NOROXYCODONE UR QL CFM: NOT DETECTED
NOROXYMORPHONE: NOT DETECTED
OXAZEPAM UR QL: NOT DETECTED
OXYCODONE UR QL: NOT DETECTED
OXYMORPHONE UR QL: NOT DETECTED
PATHOLOGY STUDY: NORMAL
PCP UR QL: NOT DETECTED
PHENTERMINE UR QL: NOT DETECTED
PROPOXYPH UR QL: NOT DETECTED
SERVICE CMNT-IMP: NORMAL
TAPENTADOL UR QL SCN: NOT DETECTED
TAPENTADOL-O-SULF: NOT DETECTED
TEMAZEPAM UR QL: NOT DETECTED
TRAMADOL UR QL: NOT DETECTED
ZOLPIDEM UR QL: NOT DETECTED

## 2018-05-11 DIAGNOSIS — E07.9 THYROID DISEASE: ICD-10-CM

## 2018-05-11 RX ORDER — LEVOTHYROXINE SODIUM 125 UG/1
TABLET ORAL
Qty: 90 TABLET | Refills: 1 | Status: SHIPPED | OUTPATIENT
Start: 2018-05-11 | End: 2019-04-15 | Stop reason: SDUPTHER

## 2018-05-12 RX ORDER — CYCLOBENZAPRINE HCL 10 MG
TABLET ORAL
Qty: 30 TABLET | Refills: 1 | Status: SHIPPED | OUTPATIENT
Start: 2018-05-12 | End: 2019-01-24 | Stop reason: ALTCHOICE

## 2018-05-17 DIAGNOSIS — F41.8 DEPRESSION WITH ANXIETY: Chronic | ICD-10-CM

## 2018-05-17 DIAGNOSIS — G47.9 SLEEP DISTURBANCE: ICD-10-CM

## 2018-05-17 DIAGNOSIS — G89.29 OTHER CHRONIC PAIN: ICD-10-CM

## 2018-05-18 RX ORDER — DICLOFENAC SODIUM 50 MG/1
TABLET, DELAYED RELEASE ORAL
Qty: 270 TABLET | Refills: 3 | Status: SHIPPED | OUTPATIENT
Start: 2018-05-18 | End: 2018-07-05 | Stop reason: SDUPTHER

## 2018-05-18 RX ORDER — AMITRIPTYLINE HYDROCHLORIDE 10 MG/1
TABLET, FILM COATED ORAL
Qty: 30 TABLET | Refills: 0 | Status: SHIPPED | OUTPATIENT
Start: 2018-05-18 | End: 2018-06-11 | Stop reason: SDUPTHER

## 2018-05-29 ENCOUNTER — OFFICE VISIT (OUTPATIENT)
Dept: PAIN MEDICINE | Facility: CLINIC | Age: 68
End: 2018-05-29
Payer: MEDICARE

## 2018-05-29 ENCOUNTER — OFFICE VISIT (OUTPATIENT)
Dept: PHYSICAL MEDICINE AND REHAB | Facility: CLINIC | Age: 68
End: 2018-05-29
Payer: MEDICARE

## 2018-05-29 VITALS
HEART RATE: 49 BPM | HEIGHT: 67 IN | BODY MASS INDEX: 19.3 KG/M2 | SYSTOLIC BLOOD PRESSURE: 92 MMHG | DIASTOLIC BLOOD PRESSURE: 56 MMHG | WEIGHT: 123 LBS

## 2018-05-29 VITALS
SYSTOLIC BLOOD PRESSURE: 96 MMHG | BODY MASS INDEX: 19.26 KG/M2 | WEIGHT: 123 LBS | HEART RATE: 54 BPM | DIASTOLIC BLOOD PRESSURE: 58 MMHG

## 2018-05-29 DIAGNOSIS — M54.14 THORACIC RADICULOPATHY: Primary | ICD-10-CM

## 2018-05-29 DIAGNOSIS — M51.36 DDD (DEGENERATIVE DISC DISEASE), LUMBAR: ICD-10-CM

## 2018-05-29 DIAGNOSIS — M25.50 ARTHRALGIA, UNSPECIFIED JOINT: Primary | ICD-10-CM

## 2018-05-29 DIAGNOSIS — M79.10 MYALGIA: ICD-10-CM

## 2018-05-29 DIAGNOSIS — M47.819 FACET ARTHROPATHY: Primary | ICD-10-CM

## 2018-05-29 PROCEDURE — 99999 PR PBB SHADOW E&M-EST. PATIENT-LVL IV: CPT | Mod: PBBFAC,,, | Performed by: PHYSICAL MEDICINE & REHABILITATION

## 2018-05-29 PROCEDURE — 99214 OFFICE O/P EST MOD 30 MIN: CPT | Mod: S$GLB,,, | Performed by: PHYSICAL MEDICINE & REHABILITATION

## 2018-05-29 PROCEDURE — 99214 OFFICE O/P EST MOD 30 MIN: CPT | Mod: S$GLB,,, | Performed by: PHYSICIAN ASSISTANT

## 2018-05-29 PROCEDURE — 99999 PR PBB SHADOW E&M-EST. PATIENT-LVL IV: CPT | Mod: PBBFAC,,, | Performed by: PHYSICIAN ASSISTANT

## 2018-05-29 RX ORDER — FLUOROMETHOLONE 1 MG/ML
SUSPENSION/ DROPS OPHTHALMIC
Refills: 0 | COMMUNITY
Start: 2018-05-08 | End: 2018-07-23

## 2018-05-29 RX ORDER — BACLOFEN 20 MG/1
20 TABLET ORAL 3 TIMES DAILY
Qty: 270 TABLET | Refills: 2 | Status: SHIPPED | OUTPATIENT
Start: 2018-05-29 | End: 2019-01-24 | Stop reason: SDUPTHER

## 2018-05-29 RX ORDER — BACLOFEN 20 MG/1
TABLET ORAL
Refills: 5 | COMMUNITY
Start: 2018-04-21 | End: 2018-07-05 | Stop reason: SDUPTHER

## 2018-05-29 RX ORDER — DICLOFENAC SODIUM 50 MG/1
50 TABLET, DELAYED RELEASE ORAL 3 TIMES DAILY
Qty: 270 TABLET | Refills: 2 | Status: SHIPPED | OUTPATIENT
Start: 2018-05-29 | End: 2018-07-23

## 2018-05-29 NOTE — PATIENT INSTRUCTIONS
Exercises To Strengthen Your Lower Back  Strong lower-back and abdominal muscles work together to support your spine. These exercises will help strengthen the muscles of the lower back. It is important that you begin exercising slowly and increase levels gradually.  Always begin any exercise program with stretching. If you feel pain while doing any of these exercises, stop and talk to your doctor about a more specific exercise program that suits your condition better.  Low Back Stretch  · Lie on your back with your knees bent and both feet on the ground.  ·   Slowly raise your left knee to your chest as you flatten your lower back against the floor. Hold for 5 seconds.  · Relax and repeat the exercise with your right knee.  · Do 10 of these exercises for each leg.  · Repeat hugging both knees to your chest at the same time.  Building Lower Back Strength  1. Kneeling Lumbar Extension: Begin on your hands and knees. Simultaneously raise and straighten your right arm and left leg until they are parallel to the ground. Hold for 2 seconds and come back slowly to a starting position. Repeat with left arm and right leg, alternating 10 times.  2. Prone Lumbar Extension: Lie face down, arms extended overhead, palms on the floor. Simultaneously raise your right arm and left leg as high as comfortably possible. Hold for 10 seconds and slowly return to start. Repeat with left arm and right leg, alternating 10 times. Gradually build up to 20 times. (Advanced: Repeat this exercise raising both arms and both legs a few inches off the floor at the same time. Hold for 5 seconds and release.)  3. Pelvic Tilt: Lie on the floor on your back with your knees bent at 90 degrees. Your feet should be flat on the floor. Inhale, exhale, then slowly contract your abdominal muscles bringing your navel toward your spine. Let your pelvis rock back until your lower back is flat on the floor. Hold for 10 seconds while breathing  smoothly.  4. Abdominal Crunch: Perform a Pelvic Tilt (above) flattening your lower back against the floor. Holding the tension in your abdominal muscles, take another breath and raise your shoulder blades off the ground (this is not a full sit-up). Keep your head in line with your body (dont bend your neck forward). Hold for 2 seconds, then slowly lower.      Back Exercises: Abdominal Lift  The Abdominal Lift strengthens your lower abdominal muscles, helping you keep your pelvis and back stable.  · Lie on the floor with both knees bent. Put your feet flat on the floor and your arms by your sides. Tighten your abdominal muscles.  · Lift one bent knee and move it toward your upper body. Keep your abdominal muscles tight and your back flat on the floor. Hold for 10 seconds.  · Repeat 3 times. Then, switch legs.         Back Exercises: Bridge  The Bridge exercise strengthens your abdominal, buttocks, and hamstring muscles. This helps keep your back stable and aligned when you walk.  · Lie on the floor with your back and palms flat. Bend your knees. Keep your feet flat on the floor.  · Contract your abdominal and buttocks muscles. Slowly lift your buttocks off the floor until there is a straight line from your knees to your shoulders.  · Hold for 5  seconds. Repeat 10 times.          Back Exercises: Hip Lift        To start, lie on your back with your knees bent and feet flat on the floor. Dont press your neck or lower back to the floor. Breathe deeply. You should feel comfortable and relaxed in this position.  · Slowly raise your hips upward.  · Tighten your abdomen and buttocks. Be careful not to arch your back.  · Hold for 5 seconds. Lower your hips to the floor.  · Repeat 10 times.  For your safety, check with your healthcare provider before starting an exercise program.       Back Exercises: Hip Rotator Stretch        To start, lie on your back with your knees bent and feet flat on the floor. Dont press your  neck or lower back to the floor. Breathe deeply. You should feel comfortable and relaxed in this position.  · Rest your right ankle on your left knee.  · Place a towel behind your left thigh and use it to pull the knee toward your chest. Feel the stretch in your buttocks.  · Hold for 30-60 seconds. Release.  · Repeat 2 times.  · Switch legs.   For your safety, check with your healthcare provider before starting an exercise program.       Back Exercises: Knee Lift        To start, lie on your back with your knees bent and feet flat on the floor. Dont press your neck or lower back to the floor. Breathe deeply. You should feel comfortable and relaxed in this position.  · Lift one bent knee and move it toward your upper body. Keep your abdominal muscles tight and your back flat on the floor.  · Hold for 10 seconds. Return to start position.  · Repeat 3 times.  · Switch legs.        Back Exercises: Leg Pull        To start, lie on your back with your knees bent and feet flat on the floor. Dont press your neck or lower back to the floor. Breathe deeply. You should feel comfortable and relaxed in this position.  · Pull one knee to your chest.  · Hold for 30-60 seconds. Return to starting position.  · Repeat 2 times.  · Switch legs.  · For a double leg pull, pull both legs to your chest at the same time. Repeat 2 times.  For your safety, check with your healthcare provider before starting an exercise program.       Back Exercises: Leg Reach        Do this exercise on your hands and knees. Keep your knees under your hips and your hands under your shoulders. Keep your spine in a neutral position (not arched or sagging). Be sure to maintain your necks natural curve.  · Extend one leg straight back. Dont arch your back or let your head or body sag.  · Hold for 5 seconds. Return to starting position.  · Repeat 5 times.  · Switch legs.       Back Exercises: Lower Back Rotation  To start, lie on your back with your knees bent  and feet flat on the floor. Dont press your neck or lower back to the floor. Breathe deeply. You should feel comfortable and relaxed in this position.  · Drop both knees to one side. Turn your head to the other side. Keep your shoulders flat on the floor.  · Hold for 20 seconds.  · Slowly switch sides.  · Repeat 2 times.                                   Back Exercises: Lower Back Stretch                        To start, sit in a chair with your feet flat on the floor. Shift your weight slightly forward to avoid rounding your back. Relax, and keep your ears, shoulders, and hips aligned.  · Sit with your feet well apart.  · Bend forward and touch the floor with the backs of your hands. Relax and let your body drop.  · Hold for 20 seconds. Return to starting position.  · Repeat 2 times.       Back Exercises: Partial Curl-Ups        To start, lie on your back with your knees bent and feet flat on the floor. Dont press your neck or lower back to the floor. Breathe deeply. You should feel comfortable and relaxed in this position.  · Cross your arms loosely.  · Tighten your abdomen and curl assisted up, keeping your head in line with your shoulders.  · Hold for 5 seconds. Uncurl to lie down.  · Repeat 5 times.       Back Exercises: Pelvic Tilt  To start, lie on your back with your knees bent and feet flat on the floor. Dont press your neck or lower back to the floor. Breathe deeply. You should feel comfortable and relaxed in this position.  · Tighten your abdomen and buttocks, and press your lower back toward the floor. This should be a small, subtle movement.  · Hold for 5 seconds. Release.  · Repeat 5 times.                           Back Exercises: Seated Rotation      To start, sit in a chair with your feet flat on the floor. Shift your weight slightly forward to avoid rounding your back. Relax, and keep your ears, shoulders, and hips aligned.  · Fold your arms, elbows just below shoulder height.  · Turn from the  waist with hips forward. Turn your head last.  · Hold for a count of 5. Return to starting position.  · Repeat 5 times on one side. Then switch sides.          Back Exercises: Side Stretch  To start, sit in a chair with your feet flat on the floor. Shift your weight slightly forward to avoid rounding your back. Relax. Keep your ears, shoulders, and hips aligned.  · Stretch your right arm overhead.  · Slowly bend to the left. Dont twist your torso.  · Hold for 20 seconds. Return to starting position.  · Repeat 2 times. Then, switch to the other side.      © 0442-6732 [employer]. [address] [place] [address], [place] [address]. All rights reserved. This information is not intended as a substitute for professional medical care. Always follow your healthcare professional's instructions.              Exercises at Your Workstation: Eyes, Neck, and Head  Breathe deeply as you do your exercises. Inhale through your nose, and exhale through your mouth.   Tired eyes? Stiff neck? A few easy moves can help prevent these kinds of problems. Take a few minutes during your day to do these exercises--right at your desk. They'll loosen up your muscles, keep you more alert, and make a big difference in how you work and feel.    For Your Eyes  Eye Cup  · Lean forward with your elbows on your desk.  · Cup your hands and place them lightly over your closed eyes. Hold for a minute, while breathing deeply in and out.  · Slowly uncover and open your eyes. Repeat 2 times.  Eye Roll  · Close your eyes. Slowly roll your eyeballs clockwise all the way around. Repeat 3 times.  · Now slowly roll them all the way around counterclockwise. Repeat 3 times.  Eye Rest  · Every 20 minutes, look away from the computer screen. Focus on an object at least 20 feet away. Stay focused on this object for a full 20 seconds.    For Your Neck and Head  Warm-up  · Drop your head gently to your chest. While breathing in, slowly roll your head up to your  left shoulder. While breathing out, slowly roll your head back to center. Repeat to the right.  · Repeat 3 times on each side.  Head Tilt  · Sit up straight. Tuck in your chin.  · Slowly tip your head to the left. Return to the center. Then, tip your head to the right.  · Repeat 3 times on each side.  If you feel pain while performing these stretching exercises, please stop and consult your doctor.   Head Turn  · Sit up straight.  · Slowly turn your head and look over your left shoulder. Hold for a few seconds. Go back to the center, then repeat to your right.  · Repeat 3 times on each side.    Neck Exercises: Active Neck Rotation    To start, lie on your back, knees bent and feet flat on the floor. Keep your ears, shoulders, and hips aligned, but dont press your lower back to the floor. Rest your hands on your pelvis. Breathe deeply and relax.    · Use your neck muscles to turn your head to one side until you feel a stretch in the muscles.  · Hold for 5 seconds. Then turn to the other side.  · Repeat 5 times on each side.  Note: Keep your shoulders on the floor. Dont lift or tuck your chin as you turn your head.      Neck Exercises: Arm Lift            To start, lie on your back, knees bent and feet flat on the floor. Keep your ears, shoulders, and hips aligned, but dont press your lower back to the floor. Rest your hands on your pelvis. Breathe deeply and relax.  · Raise one arm overhead, then lower it. As you lower that arm, raise the other arm.  · Continue to move both arms in slow, smooth arcs. Keep your arms straight and your head and neck relaxed.  · Repeat 10 times with each arm.  For your safety, check with your healthcare provider before starting an exercise program.     Neck Exercises: Head Lifts     Do this exercise on your hands and knees. Keep your knees under your hips and your hands under your shoulders. Keep your spine in a neutral position (not arched or sagging). Keep your ears in line with  your shoulders. Hold for a few seconds before starting the exercise.  · Keeping your back straight, slowly drop your chin toward your chest. Tuck in your chin.  · Hold for 5 seconds. Then lift your head until your neck is level with your back.  · Hold for 5 seconds. Repeat 5 times.      Neck Exercises: Neck Flex      To start, sit in a chair with your feet flat on the floor. Your weight should be slightly forward so that youre balanced evenly on your buttocks. Relax your shoulders and keep your head level. Avoid arching your back or rounding your shoulders. Using a chair with arms may help you keep your balance.  · Rest the back of your left hand against your lower back. Place your right palm on the top of your head.  · Gently pull your head forward and down until you feel a stretch in the neck muscles. Dont force the motion.  · Hold for 20 seconds, then return to starting position. Switch arms.  © 7872-0741 [employer]. [address] [place] [address]. All rights reserved. This information is not intended as a substitute for professional medical care. Always follow your healthcare professional's instructions.          Exercises: Neck Isometrics  To start, sit in a chair with your feet flat on the floor. Your weight should be slightly forward so that youre balanced evenly on your buttocks. Relax your shoulders and keep your head level. Using a chair with arms may help you keep your balance.  1. Press your palm against your forehead. Resist with your neck muscles. Hold for 10 seconds. Relax. Repeat 5 times.  2. Do the exercise again, pressing on the side of your head. Repeat 5 times. Switch sides.  3. Do the exercise again, pressing on the back of your head. Repeat 5 times.       For your safety, check with your healthcare provider before starting an exercise program.       Neck Exercises: Passive Neck Rotation        To start, lie on your back, knees bent and feet flat on the floor. Keep  your ears, shoulders, and hips aligned, but dont press your lower back to the floor. Rest your hands on your pelvis. Breathe deeply and relax.  · With your neck relaxed, place the palm of one hand on your forehead. Use your hand to turn your head to one side until you feel a stretch in the neck muscles. Do not push through pain.  · Hold for 5 seconds. Then turn to the other side.  · Repeat 5 times on each side.   Note: Keep your shoulders on the floor. Dont lift your chin as you turn your head.    Neck Exercises: Neck Glide  To start, lie on your back, knees bent and feet flat on the floor. Keep your ears, shoulders, and hips aligned. Dont press your lower back to the floor. Rest your hands on your pelvis. Breathe deeply and relax.  · Gently flatten the curve of your neck against the floor.  · Lengthen your neck as though youre growing taller. Dont jam your chin into your chest, and dont push too hard.  · Hold for 5 seconds. Release. Repeat 3 times.      © 3323-2013 [employer]. [address] [place] [address], [place] [address]. All rights reserved. This information is not intended as a substitute for professional medical care. Always follow your healthcare professional's instructions.

## 2018-05-29 NOTE — PROGRESS NOTES
HPI:  Patient is a 67 y.o. year old female w. Lupus,fibromyalgia and cervical and lumbar ddd. She is doing well w. Baclofen 10mg tid and diclofenac 50mg tid.She is also following w. Pain management. Overall, she is doing better w. Pain.     Labs  2/2018  elev lft's        Past Medical History:   Diagnosis Date    Arthritis     Depression     Diabetes mellitus     NO MED SINCE GASTRIC BYPASS has hypoglycemia    Diabetes mellitus type II     GERD (gastroesophageal reflux disease)     Hypertension     NO MED SINCE GASTRIC BYPASS    Kidney disease     Lupus erythematosus     Shingles     Thyroid disease     Trigger finger     RIGHT LONG     Past Surgical History:   Procedure Laterality Date    APPENDECTOMY      BREAST BIOPSY Left 1979    benign    CHOLECYSTECTOMY      GASTRIC BYPASS      HAND SURGERY      HYSTERECTOMY      TONSILLECTOMY       No family history on file.  Social History     Social History    Marital status:      Spouse name: N/A    Number of children: N/A    Years of education: N/A     Social History Main Topics    Smoking status: Never Smoker    Smokeless tobacco: Never Used    Alcohol use No    Drug use: No    Sexual activity: No     Other Topics Concern    Not on file     Social History Narrative    No narrative on file       Review of patient's allergies indicates:   Allergen Reactions    Codeine     Sulfa (sulfonamide antibiotics)     Codeine Rash and Other (See Comments)    Plaquenil [hydroxychloroquine] Rash and Other (See Comments)     ONE BRAND OF MED    Sulfa (sulfonamide antibiotics) Rash and Other (See Comments)       Current Outpatient Prescriptions:     amitriptyline (ELAVIL) 10 MG tablet, TAKE 1 TABLET(10 MG) BY MOUTH EVERY NIGHT AS NEEDED FOR INSOMNIA, Disp: 30 tablet, Rfl: 0    aspirin (ECOTRIN) 81 MG EC tablet, Take 81 mg by mouth nightly., Disp: , Rfl:     baclofen (LIORESAL) 20 MG tablet, , Disp: , Rfl: 5    blood sugar diagnostic Strp, 1  each by Misc.(Non-Drug; Combo Route) route 3 (three) times daily before meals., Disp: 200 each, Rfl: 11    cholecalciferol, vitamin D3, (VITAMIN D3) 1,000 unit capsule, Take 1 capsule (1,000 Units total) by mouth 2 (two) times daily., Disp: 180 capsule, Rfl: 3    citalopram (CELEXA) 40 MG tablet, Take 1 tablet (40 mg total) by mouth once daily., Disp: 90 tablet, Rfl: 4    clonazePAM (KLONOPIN) 1 MG tablet, Take 1 tablet (1 mg total) by mouth 3 (three) times daily., Disp: 90 tablet, Rfl: 0    cyanocobalamin, vitamin B-12, 5,000 mcg TbDL, Take 1 tablet by mouth once daily., Disp: 90 tablet, Rfl: 3    cyclobenzaprine (FLEXERIL) 10 MG tablet, TAKE 1/2 TO 1 TABLET THREE TIMES DAILY AS NEEDED, Disp: 30 tablet, Rfl: 1    diclofenac (VOLTAREN) 50 MG EC tablet, TAKE 1 TABLET(50 MG) BY MOUTH THREE TIMES DAILY, Disp: 270 tablet, Rfl: 3    fluorometholone 0.1% (FML) 0.1 % DrpS, , Disp: , Rfl: 0    fluticasone (FLONASE) 50 mcg/actuation nasal spray, ADMINISTER 1 SPRAY IN EACH NARE 2 TIMES DAILY., Disp: 48 g, Rfl: 3    folic acid (FOLVITE) 1 MG tablet, Take 1 tablet (1 mg total) by mouth once daily., Disp: 90 tablet, Rfl: 4    gabapentin (NEURONTIN) 800 MG tablet, Take 1 tablet (800 mg total) by mouth 3 (three) times daily., Disp: 270 tablet, Rfl: 3    glucagon (human recombinant) inj 1mg/mL kit, Inject 1 mL (1 mg total) into the muscle as needed., Disp: 3 kit, Rfl: 11    hydroxychloroquine (PLAQUENIL) 200 mg tablet, Take 1 tablet (200 mg total) by mouth 2 (two) times daily. Three times a day, Disp: 180 tablet, Rfl: 1    lancets (ACCU-CHEK SOFTCLIX LANCETS) Misc, Use to test blood sugar three times a day   DX: E08.649, Disp: 300 each, Rfl: 3    levothyroxine (SYNTHROID) 125 MCG tablet, TAKE 1 TABLET EVERY DAY, Disp: 90 tablet, Rfl: 1    lidocaine (LIDODERM) 5 %, Remove & Discard patch within 12 hours or as directed by MD, Disp: 60 patch, Rfl: 3    lidocaine HCL 2% (XYLOCAINE) 2 % jelly, Apply topically as needed.  Apply topically once nightly to affected area both heels., Disp: 30 mL, Rfl: 2    lipase-protease-amylase 24,000-76,000-120,000 units (PANLIPASE) 24,000-76,000 -120,000 unit capsule, Take 1 capsule by mouth 3 (three) times daily with meals., Disp: 270 capsule, Rfl: 3    ranitidine (ZANTAC) 150 MG tablet, Take 1 tablet (150 mg total) by mouth 2 (two) times daily., Disp: 60 tablet, Rfl: 11    thiamine 50 MG tablet, Take 1 tablet (50 mg total) by mouth once daily., Disp: 90 tablet, Rfl: 4    traMADol (ULTRAM) 50 mg tablet, Take 1 tablet (50 mg total) by mouth every 6 (six) hours as needed for Pain., Disp: 180 tablet, Rfl: 1    valACYclovir (VALTREX) 500 MG tablet, Take 1 tablet (500 mg total) by mouth once daily., Disp: 180 tablet, Rfl: 9    vitamin E 1000 UNIT capsule, Take 1,000 Units by mouth once daily., Disp: , Rfl:     baclofen (LIORESAL) 20 MG tablet, Take 1 tablet (20 mg total) by mouth 3 (three) times daily., Disp: 270 tablet, Rfl: 2    diclofenac (VOLTAREN) 50 MG EC tablet, Take 1 tablet (50 mg total) by mouth 3 (three) times daily., Disp: 270 tablet, Rfl: 2    Review of Systems  No nausea, vomiting, fevers, Chills , contipation, diarrhea or sweats    Physical Exam:      Vitals:    05/29/18 1112   BP: (!) 92/56   Pulse: (!) 49     alert and oriented ×4 follows commands answers all questions appropriately,affect blunted  Manual muscle test 5 out of 5 sensation to light touch grossly intact  Nl gait  No C/C/E      Assessment:  Lumbar ddd w.radic  Cervical ddd w. radic  fibromyaliga  lupus    Plan:    Refill baclofen and diclofenac  Inc baclofen to 20mg tid if tolerated, may cause sedation  Cont. F/u w. Pain management  Check cmp

## 2018-05-29 NOTE — PROGRESS NOTES
Referring Physician: No ref. provider found    PCP: Jose Guadalupe Ornelas MD      CC: low back pain    Interval History:  Erica Marsh is a 67 y.o. female with chronic back pain who presents today for f/u s/p lumbar MB RFA at L2, 3, 4, 5 bilaterally. Reports 40% pain relief. Today c/o mid back pain that radiates bilaterally but does not wrap around to abdomin. She also c/o neck pain. She denies LE weakness or b/b changes. Pain today is rated 4/10.  Pt has been seen in the clinic before, however pt is new to me.     History below per Dr. Pratt    HPI:   Erica Marsh is a 67 y.o. female referred to us for lower back pain.  She has a history of fibromyalgia as well as lupus.  She seized rheumatology for above conditions.  Lower back pain is been present for many years.  She presents with constant aching, throbbing pain in her lower back.  Pain radiates to her bilateral buttocks and hips.  Pain does not travel past her knee.  Pain worsens with prolonged sitting, standing, bending, walking and getting up.  Pain improves with rest.  She does have history of lumbar DDD and lumbar facet arthropathy.  She underwent trigger point injections with minimal benefit.  She has not had any other lumbar spine interventions.  He denies any weakness.  No bowel bladder changes.  She rates her pain 6/10 today.    ROS:  CONSTITUTIONAL: No fevers, chills, night sweats, wt. loss, appetite changes  SKIN: no rashes or itching  ENT: No headaches, head trauma, vision changes, or eye pain  LYMPH NODES: None noticed   CV: No chest pain, palpitations.   RESP: No shortness of breath, dyspnea on exertion, cough, wheezing, or hemoptysis  GI: No nausea, emesis, diarrhea, constipation, melena, hematochezia, pain.    : No dysuria, hematuria, urgency, or frequency   HEME: No easy bruising, bleeding problems  PSYCHIATRIC: No depression, anxiety, psychosis, hallucinations.  NEURO: No seizures, memory loss, dizziness or difficulty sleeping  MSK: + History of  present illness      Past Medical History:   Diagnosis Date    Arthritis     Depression     Diabetes mellitus     NO MED SINCE GASTRIC BYPASS has hypoglycemia    Diabetes mellitus type II     GERD (gastroesophageal reflux disease)     Hypertension     NO MED SINCE GASTRIC BYPASS    Kidney disease     Lupus erythematosus     Shingles     Thyroid disease     Trigger finger     RIGHT LONG     Past Surgical History:   Procedure Laterality Date    APPENDECTOMY      BREAST BIOPSY Left 1979    benign    CHOLECYSTECTOMY      GASTRIC BYPASS      HAND SURGERY      HYSTERECTOMY      TONSILLECTOMY       History reviewed. No pertinent family history.  Social History     Social History    Marital status:      Spouse name: N/A    Number of children: N/A    Years of education: N/A     Social History Main Topics    Smoking status: Never Smoker    Smokeless tobacco: Never Used    Alcohol use No    Drug use: No    Sexual activity: No     Other Topics Concern    None     Social History Narrative    None         Medications/Allergies: See med card    Vitals:    05/29/18 0913   BP: (!) 96/58   Pulse: (!) 54   Weight: 55.8 kg (123 lb)   PainSc:   4   PainLoc: Back         Physical exam:    GENERAL: A and O x3, the patient appears well groomed and is in no acute distress.  Skin: No rashes or obvious lesions  HEENT: normocephalic, atraumatic  CARDIOVASCULAR: Bradycardia  LUNGS: easy work of breathing  ABDOMEN: soft, nontender   UPPER EXTREMITIES: Normal alignment, normal range of motion, no atrophy, no skin changes,  hair growth and nail growth normal and equal bilaterally. No swelling, no tenderness.    LOWER EXTREMITIES:  Normal alignment, normal range of motion, no atrophy, no skin changes,  hair growth and nail growth normal and equal bilaterally. No swelling, no tenderness.  CERVICAL SPINE:  Cervical spine: ROM is full in flexion, extension and lateral rotation without increased pain.  Spurling's  maneuver causes no neck pain to either side.  Myofascial exam: Mild Tenderness to palpation across cervical paraspinous region bilaterally.    Thoracic spine: TTP at T9, 10, 11. Mild TTP across thoracic paraspinous muscles bilaterally.   LUMBAR SPINE  Lumbar spine: ROM is limited with extension and oblique extension with moderate increased pain.    William's test causes no increased pain on either side.    Supine straight leg raise is negative bilaterally.    Internal and external rotation of the hip causes no increased pain on either side.  Myofascial exam: Mild tenderness to palpation across lumbar paraspinous muscles.      MENTAL STATUS: normal orientation, speech, language, and fund of knowledge for social situation.  Emotional state appropriate.    CRANIAL NERVES:  II:  PERRL bilaterally,   III,IV,VI: EOMI.    V:  Facial sensation equal bilaterally  VII:  Facial motor function normal.  VIII:  Hearing equal to finger rub bilaterally  IX/X: Gag normal, palate symmetric  XI:  Shoulder shrug equal, head turn equal  XII:  Tongue midline without fasciculations      MOTOR: Tone and bulk: normal bilateral upper and lower Strength: normal   Delt Bi Tri WE WF     R 5 5 5 5 5 5   L 5 5 5 5 5 5     IP ADD ABD Quad TA Gas HAM  R 5 5 5 5 5 5 5  L 5 5 5 5 5 5 5    SENSATION: Light touch and pinprick intact bilaterally  REFLEXES: normal, symmetric, nonbrisk.  Toes down, no clonus. No hoffmans.  GAIT: normal rise, base, steps, and arm swing.        Imaging:    Thoracic Spine MRI 2/12/18  T9-10: There is disc space narrowing.  There is a disc bulge with shallow broad left paracentral disc protrusion/osteophyte.  There is mild facet joint arthropathy.  There is no spinal stenosis or cord compression.  There is mild left foraminal stenosis.    T10-11: There is moderate disc space narrowing.  There is a diffuse disc bulge and mild facet joint arthropathy.  There is no spinal stenosis or cord compression but there is  mild-to-moderate bilateral foraminal stenosis.    T11-12: There is moderate disc space narrowing.  There is a broad left paracentral disc protrusion/osteophyte.  There is mild bilateral, left greater than right, facet joint arthropathy with ligamentum flavum thickening.  There is no spinal stenosis or cord compression.  There is moderate left foraminal stenosis.  The right foramina is patent.    T12-L1: There is mild-to-moderate facet joint arthropathy.  There is a central left paracentral broad shallow disc protrusion.  There is no spinal stenosis.  There is mild left foraminal stenosis.    Soft tissues, other: The prevertebral soft tissues are normal.  There is atherosclerosis but no aneurysmal dilatation of the aorta.   Impression       1. There is degenerative change in the included lower cervical spine as well as in the lower thoracic spine.  There is however no significant spinal stenosis and there is no cord compression.  Changes of disc bulge and/or protrusions in addition to facet joint arthropathy contribute to some degree of foraminal stenosis at several levels.  2. At the T9-10 level, there is mild left foraminal stenosis.  There is mild-to-moderate bilateral foraminal stenosis at the T10-11 level.  There is moderate left foraminal stenosis at the T11-T12 level.  There is mild left foraminal stenosis at the T12-L1 level.  Please see above complete discussion.       MRI L-spine  8/2013  At the L1-L2 level, facet arthropathy is noted greater on the left than the right.  Broad-based bulging is noted towards the left nerve foramen greater than right neural foramen and centrally of approximately 4 mm maximally.  Mild right and mild/moderate   left neural foraminal narrowing appears to be present.  Mild central canal narrowing is noted to 13 mm.  Mild progression of neural foraminal narrowing on the left appears to be present since the prior.      At the L2-L3 level, broad-based bulging appears to be present  centrally and paracentric to the right of approximately 3 mm.  Mild right greater than left nerve foraminal narrowing is noted.  Mild central canal narrowing is noted.  Facet arthropathy and   ligamentous hypertrophy are noted.  A similar appearance is noted on the prior.  Mild progression of bulging may be noted.    At the L3-L4 level, broad based bulging appears to be present along with facet arthropathy and ligamentous hypertrophy.  The bulging or protrusion is to approximately 4 mm.  Mild central canal stenosis is noted.  Mild bilateral neural femoral stenosis is   noted.  No convincing detrimental change is noted since the prior.  A slightly asymmetric component to the bulge may be noted at this level towards the far lateral right and right neural foramen    At the L4-L5 level, broad-based bulging or protrusion is noted of approximately 4 to 5 mm that actually appears less noticeable than on the prior.  Moderate bilateral nerve foraminal stenosis appears to be present similar since the prior.  Mild central   canal narrowing is noted.  The exiting nerve roots bilaterally come near the disk material and contact cannot be excluded particularly on the right    Xray L-Spine 3/15/18  There is moderate-to-severe disc space narrowing noted throughout the lumbar spine and greatest at the L1-2, L4-5 and L5-S1 levels.  Prominent bilateral facet arthropathy noted from the L3-4 through the L5-S1 levels.     Assessment:  Erica Marsh is a 67 y.o. female with low back pain  1. Facet arthropathy    2. DDD (degenerative disc disease), lumbar    3. Myalgia      Plan:  1. I have stressed the importance of physical activity and exercise to improve overall health  2. Schedule Thoracic HERMINIA at T10-11. I have explained the risks, benefits, and alternatives of the procedure in detail. The patient voices understanding and all questions have been answered. The patient agrees to proceed as planned. Written Consent obtained.   3. Home  lumbar and cervical exercises provided.   4. Will further evaluate neck pain in the future.  5. F/u s/p thoracic HERMINIA

## 2018-05-30 ENCOUNTER — OFFICE VISIT (OUTPATIENT)
Dept: CARDIOLOGY | Facility: CLINIC | Age: 68
End: 2018-05-30
Payer: MEDICARE

## 2018-05-30 VITALS
HEIGHT: 67 IN | DIASTOLIC BLOOD PRESSURE: 62 MMHG | SYSTOLIC BLOOD PRESSURE: 104 MMHG | BODY MASS INDEX: 19.8 KG/M2 | WEIGHT: 126.13 LBS | HEART RATE: 50 BPM | OXYGEN SATURATION: 98 %

## 2018-05-30 DIAGNOSIS — Z76.89 ESTABLISHING CARE WITH NEW DOCTOR, ENCOUNTER FOR: ICD-10-CM

## 2018-05-30 DIAGNOSIS — Z76.89 ESTABLISHING CARE WITH NEW DOCTOR, ENCOUNTER FOR: Primary | ICD-10-CM

## 2018-05-30 DIAGNOSIS — R00.1 BRADYCARDIA: Primary | ICD-10-CM

## 2018-05-30 DIAGNOSIS — R00.1 SINUS BRADYCARDIA: ICD-10-CM

## 2018-05-30 DIAGNOSIS — E07.9 THYROID DISEASE: ICD-10-CM

## 2018-05-30 PROCEDURE — 93000 ELECTROCARDIOGRAM COMPLETE: CPT | Mod: S$GLB,,, | Performed by: INTERNAL MEDICINE

## 2018-05-30 PROCEDURE — 99204 OFFICE O/P NEW MOD 45 MIN: CPT | Mod: S$GLB,,, | Performed by: INTERNAL MEDICINE

## 2018-05-30 PROCEDURE — 99999 PR PBB SHADOW E&M-EST. PATIENT-LVL III: CPT | Mod: PBBFAC,,, | Performed by: INTERNAL MEDICINE

## 2018-05-30 NOTE — LETTER
May 30, 2018      Michelle Renteria PA-C  [address]  [place] [address]           [place] - Cardiology  [address]  [place] [address]  Phone: [phone]          Patient: Erica Marsh   MR Number: [id number]   YOB: 1950   Date of Visit: 5/30/2018       Dear Michelle Renteria:    Thank you for referring Erica Marsh to me for evaluation. Attached you will find relevant portions of my assessment and plan of care.    If you have questions, please do not hesitate to call me. I look forward to following Erica Marsh along with you.    Sincerely,    Waqas Chavez MD    Enclosure  CC:  No Recipients    If you would like to receive this communication electronically, please contact [email or web address] or [phone] to request more information on [facility] access.    For providers and/or their staff who would like to refer a patient to [facility], please contact us through our one-stop-shop provider referral line, [facility] , at [phone].    If you feel you have received this communication in error or would no longer like to receive these types of communications, please e-mail [email or web address]

## 2018-05-30 NOTE — PROGRESS NOTES
[facility]    CC: Bradycardia    Patient ID: Erica Marsh is a 67 y.o. female with a past medical history of diabetes, hypertension  HPI  Patient is here to seek help with her bradycardia.  She is known to have bradycardia for a while.  She has been investigated with a Holter monitor.  Reports are dictated below.  Patient denies any episodes of presyncope or syncope  She complains of occasional chest pains and some heaviness in her neck.  This is not associated with shortness of breath.  Past Medical History:   Diagnosis Date    Arthritis     Depression     Diabetes mellitus     NO MED SINCE GASTRIC BYPASS has hypoglycemia    Diabetes mellitus type II     GERD (gastroesophageal reflux disease)     Hypertension     NO MED SINCE GASTRIC BYPASS    Kidney disease     Lupus erythematosus     Shingles     Thyroid disease     Trigger finger     RIGHT LONG     Past Surgical History:   Procedure Laterality Date    APPENDECTOMY      BREAST BIOPSY Left 1979    benign    CHOLECYSTECTOMY      GASTRIC BYPASS      HAND SURGERY      HYSTERECTOMY      TONSILLECTOMY       Social History     Social History    Marital status:      Spouse name: N/A    Number of children: N/A    Years of education: N/A     Occupational History    Not on file.     Social History Main Topics    Smoking status: Never Smoker    Smokeless tobacco: Never Used    Alcohol use No    Drug use: No    Sexual activity: No     Other Topics Concern    Not on file     Social History Narrative    No narrative on file     No family history on file.    Review of patient's allergies indicates:   Allergen Reactions    Codeine     Sulfa (sulfonamide antibiotics)     Codeine Rash and Other (See Comments)    Plaquenil [hydroxychloroquine] Rash and Other (See Comments)     ONE BRAND OF MED    Sulfa (sulfonamide antibiotics) Rash and Other (See Comments)       Medication List with Changes/Refills   Current Medications     AMITRIPTYLINE (ELAVIL) 10 MG TABLET    TAKE 1 TABLET(10 MG) BY MOUTH EVERY NIGHT AS NEEDED FOR INSOMNIA    ASPIRIN (ECOTRIN) 81 MG EC TABLET    Take 81 mg by mouth nightly.    BACLOFEN (LIORESAL) 20 MG TABLET        BACLOFEN (LIORESAL) 20 MG TABLET    Take 1 tablet (20 mg total) by mouth 3 (three) times daily.    BLOOD SUGAR DIAGNOSTIC STRP    1 each by Misc.(Non-Drug; Combo Route) route 3 (three) times daily before meals.    CHOLECALCIFEROL, VITAMIN D3, (VITAMIN D3) 1,000 UNIT CAPSULE    Take 1 capsule (1,000 Units total) by mouth 2 (two) times daily.    CITALOPRAM (CELEXA) 40 MG TABLET    Take 1 tablet (40 mg total) by mouth once daily.    CLONAZEPAM (KLONOPIN) 1 MG TABLET    Take 1 tablet (1 mg total) by mouth 3 (three) times daily.    CYANOCOBALAMIN, VITAMIN B-12, 5,000 MCG TBDL    Take 1 tablet by mouth once daily.    CYCLOBENZAPRINE (FLEXERIL) 10 MG TABLET    TAKE 1/2 TO 1 TABLET THREE TIMES DAILY AS NEEDED    DICLOFENAC (VOLTAREN) 50 MG EC TABLET    TAKE 1 TABLET(50 MG) BY MOUTH THREE TIMES DAILY    DICLOFENAC (VOLTAREN) 50 MG EC TABLET    Take 1 tablet (50 mg total) by mouth 3 (three) times daily.    FLUOROMETHOLONE 0.1% (FML) 0.1 % DRPS        FLUTICASONE (FLONASE) 50 MCG/ACTUATION NASAL SPRAY    ADMINISTER 1 SPRAY IN EACH NARE 2 TIMES DAILY.    FOLIC ACID (FOLVITE) 1 MG TABLET    Take 1 tablet (1 mg total) by mouth once daily.    GABAPENTIN (NEURONTIN) 800 MG TABLET    Take 1 tablet (800 mg total) by mouth 3 (three) times daily.    GLUCAGON (HUMAN RECOMBINANT) INJ 1MG/ML KIT    Inject 1 mL (1 mg total) into the muscle as needed.    HYDROXYCHLOROQUINE (PLAQUENIL) 200 MG TABLET    Take 1 tablet (200 mg total) by mouth 2 (two) times daily. Three times a day    LANCETS (ACCU-CHEK SOFTCLIX LANCETS) MISC    Use to test blood sugar three times a day   DX: E08.649    LEVOTHYROXINE (SYNTHROID) 125 MCG TABLET    TAKE 1 TABLET EVERY DAY    LIDOCAINE (LIDODERM) 5 %    Remove & Discard patch within 12 hours or as  "directed by MD    LIDOCAINE HCL 2% (XYLOCAINE) 2 % JELLY    Apply topically as needed. Apply topically once nightly to affected area both heels.    LIPASE-PROTEASE-AMYLASE 24,000-76,000-120,000 UNITS (PANLIPASE) 24,000-76,000 -120,000 UNIT CAPSULE    Take 1 capsule by mouth 3 (three) times daily with meals.    RANITIDINE (ZANTAC) 150 MG TABLET    Take 1 tablet (150 mg total) by mouth 2 (two) times daily.    THIAMINE 50 MG TABLET    Take 1 tablet (50 mg total) by mouth once daily.    TRAMADOL (ULTRAM) 50 MG TABLET    Take 1 tablet (50 mg total) by mouth every 6 (six) hours as needed for Pain.    VALACYCLOVIR (VALTREX) 500 MG TABLET    Take 1 tablet (500 mg total) by mouth once daily.    VITAMIN E 1000 UNIT CAPSULE    Take 1,000 Units by mouth once daily.       Review of Systems  Constitution: Denies chills, fever, and sweats.  HENT: Denies headaches or blurry vision.  Cardiovascular: Denies chest pain or irregular heart beat.  Respiratory: Denies cough or shortness of breath.  Gastrointestinal: Denies abdominal pain, nausea, or vomiting.  Musculoskeletal: Denies muscle cramps.  Neurological: Denies dizziness or focal weakness.  Psychiatric/Behavioral: Normal mental status.  Hematologic/Lymphatic: Denies bleeding problem or easy bruising/bleeding.  Skin: Denies rash or suspicious lesions    Physical Examination  /62 (BP Location: Left arm, Patient Position: Sitting)   Pulse (!) 50   Ht 5' 7" (1.702 m)   Wt 57.2 kg (126 lb 1.7 oz)   SpO2 98%   BMI 19.75 kg/m²     Constitutional: No acute distress, conversant  HEENT: Sclera anicteric, Pupils equal, round and reactive to light, extraocular motions intact, Oropharynx clear  Neck: No JVD, no carotid bruits  Cardiovascular: regular rate and rhythm, no murmur, rubs or gallops, normal S1/S2  Pulmonary: Clear to auscultation bilaterally  Abdominal: Abdomen soft, nontender, nondistended, positive bowel sounds  Extremities: No lower extremity edema,   Pulses:  Carotid " pulses are 2+ on the right side, and 2+ on the left side.  Radial pulses are 2+ on the right side, and 2+ on the left side.      Skin: No ecchymosis, erythema, or ulcers  Psych: Alert and oriented x 3, appropriate affect  Neuro: CNII-XII intact, no focal deficits    Labs:  Most Recent Data  CBC:   Lab Results   Component Value Date    WBC 7.13 02/12/2018    HGB 12.7 02/12/2018    HCT 38.1 02/12/2018     02/12/2018     (H) 02/12/2018    RDW 13.1 02/12/2018     BMP:   Lab Results   Component Value Date     02/12/2018    K 4.7 02/12/2018     02/12/2018    CO2 30 (H) 02/12/2018    BUN 20 02/12/2018    CREATININE 0.8 02/12/2018    GLU 84 02/12/2018    CALCIUM 9.0 02/12/2018    MG 2.2 03/15/2018     LFTS;   Lab Results   Component Value Date    PROT 6.8 02/12/2018    ALBUMIN 3.6 02/12/2018    BILITOT 0.4 02/12/2018    AST 45 (H) 02/12/2018    ALKPHOS 78 02/12/2018    ALT 53 (H) 02/12/2018     COAGS: No results found for: INR, PROTIME, PTT  FLP:   Lab Results   Component Value Date    CHOL 190 07/27/2017    HDL 85 (H) 07/27/2017    LDLCALC 92.6 07/27/2017    TRIG 62 07/27/2017    CHOLHDL 44.7 07/27/2017     CARDIAC:   Lab Results   Component Value Date    BNP 61 07/22/2009       Imaging:    EKG: Sinus bradycardia    PRE-TEST DATA   The diary was returned, but not completed.        TEST DESCRIPTION   PREDOMINANT RHYTHM  1. Sinus arrhythmia with heart rates varying between 46 and 96 bpm with an average of 59 bpm.     VENTRICULAR ARRHYTHMIAS  1. There were rare mostly monomorphic PVCs totalling 172 and averaging 3 per hour.     2. There were no episodes of ventricular tachycardia.    SUPRA VENTRICULAR ARRHYTHMIAS  1. There were rare PACs totalling 239 and averaging 4 per hour.  There were 2 couplets.    2. There were no episodes of sustained supraventricular tachycardia.    SINUS NODE FUNCTION  1. There was no evidence of high grade SA dalia block.     AV CONDUCTION  1. There was no evidence of high  grade AV block.     2. The longest RR interval was 1645 msec.     DIARY  1. The diary was returned, but not completed    2. There was 1 episode of   palpitations reported. The corresponding rhythm strips revealed the following:             During event 1 the rhythm was sinus bradycardia at 59 bpm.     MISCELLANEOUS  1. This was a tape of adequate length (48 hrs).         I have personally reviewed these images and echo data    Assessment/Plan:  Erica was seen today for advice only.    Diagnoses and all orders for this visit:    Bradycardia  -     Cancel: Holter monitor - 48 hour (CUPID ONLY-[facility], [facility], [facility], [place], [place]); Future  -     Transthoracic echo (TTE) complete (CUPID ONLY-[facility], [facility], [facility], [place], [place]); Future  -     Echocardiogram stress test (CUPID ONLY-[facility], [facility], [facility], [place], [place]); Future    Establishing care with new doctor, encounter for  -     EKG 12-lead    Thyroid disease    Sinus bradycardia        based on her current clinical status she does not need a permanent pacemaker.  Her bradycardia could be related to the multiple medications she is on including gabapentin and Flexeril    Follow-up if symptoms worsen or fail to improve.           Total duration of face to face visit time 45 minutes.  Total time spent counseling greater than fifty percent of total visit time.  Counseling included discussion regarding imaging findings, diagnosis, possibilities, treatment options, risks and benefits.  The patient had many questions regarding the options and long-term effect which were all answered to my best ability.      Waqas Chavez MD,MRCP,RPVI,FACC,FSCAI.  Interventional Cardiology   Phone [phone]

## 2018-05-30 NOTE — PROGRESS NOTES
[facility]    CC:   Chief Complaint   Patient presents with    Advice Only     Consult Jamar Renteria/Bradycardia       Patient ID: Erica Marsh is a 67 y.o. female with a past medical history of ***.  Pertinent history events include:  ***    HPI  Today {MR/MRS/MS/DR/PARENTSOF:07158} presents with ***    Past Medical History:   Diagnosis Date    Arthritis     Depression     Diabetes mellitus     NO MED SINCE GASTRIC BYPASS has hypoglycemia    Diabetes mellitus type II     GERD (gastroesophageal reflux disease)     Hypertension     NO MED SINCE GASTRIC BYPASS    Kidney disease     Lupus erythematosus     Shingles     Thyroid disease     Trigger finger     RIGHT LONG     Past Surgical History:   Procedure Laterality Date    APPENDECTOMY      BREAST BIOPSY Left 1979    benign    CHOLECYSTECTOMY      GASTRIC BYPASS      HAND SURGERY      HYSTERECTOMY      TONSILLECTOMY       Social History     Social History    Marital status:      Spouse name: N/A    Number of children: N/A    Years of education: N/A     Occupational History    Not on file.     Social History Main Topics    Smoking status: Never Smoker    Smokeless tobacco: Never Used    Alcohol use No    Drug use: No    Sexual activity: No     Other Topics Concern    Not on file     Social History Narrative    No narrative on file     No family history on file.    Review of patient's allergies indicates:   Allergen Reactions    Codeine     Sulfa (sulfonamide antibiotics)     Codeine Rash and Other (See Comments)    Plaquenil [hydroxychloroquine] Rash and Other (See Comments)     ONE BRAND OF MED    Sulfa (sulfonamide antibiotics) Rash and Other (See Comments)       Medication List with Changes/Refills   Current Medications    AMITRIPTYLINE (ELAVIL) 10 MG TABLET    TAKE 1 TABLET(10 MG) BY MOUTH EVERY NIGHT AS NEEDED FOR INSOMNIA    ASPIRIN (ECOTRIN) 81 MG EC TABLET    Take 81 mg by mouth nightly.    BACLOFEN  (LIORESAL) 20 MG TABLET        BACLOFEN (LIORESAL) 20 MG TABLET    Take 1 tablet (20 mg total) by mouth 3 (three) times daily.    BLOOD SUGAR DIAGNOSTIC STRP    1 each by Misc.(Non-Drug; Combo Route) route 3 (three) times daily before meals.    CHOLECALCIFEROL, VITAMIN D3, (VITAMIN D3) 1,000 UNIT CAPSULE    Take 1 capsule (1,000 Units total) by mouth 2 (two) times daily.    CITALOPRAM (CELEXA) 40 MG TABLET    Take 1 tablet (40 mg total) by mouth once daily.    CLONAZEPAM (KLONOPIN) 1 MG TABLET    Take 1 tablet (1 mg total) by mouth 3 (three) times daily.    CYANOCOBALAMIN, VITAMIN B-12, 5,000 MCG TBDL    Take 1 tablet by mouth once daily.    CYCLOBENZAPRINE (FLEXERIL) 10 MG TABLET    TAKE 1/2 TO 1 TABLET THREE TIMES DAILY AS NEEDED    DICLOFENAC (VOLTAREN) 50 MG EC TABLET    TAKE 1 TABLET(50 MG) BY MOUTH THREE TIMES DAILY    DICLOFENAC (VOLTAREN) 50 MG EC TABLET    Take 1 tablet (50 mg total) by mouth 3 (three) times daily.    FLUOROMETHOLONE 0.1% (FML) 0.1 % DRPS        FLUTICASONE (FLONASE) 50 MCG/ACTUATION NASAL SPRAY    ADMINISTER 1 SPRAY IN EACH NARE 2 TIMES DAILY.    FOLIC ACID (FOLVITE) 1 MG TABLET    Take 1 tablet (1 mg total) by mouth once daily.    GABAPENTIN (NEURONTIN) 800 MG TABLET    Take 1 tablet (800 mg total) by mouth 3 (three) times daily.    GLUCAGON (HUMAN RECOMBINANT) INJ 1MG/ML KIT    Inject 1 mL (1 mg total) into the muscle as needed.    HYDROXYCHLOROQUINE (PLAQUENIL) 200 MG TABLET    Take 1 tablet (200 mg total) by mouth 2 (two) times daily. Three times a day    LANCETS (ACCU-CHEK SOFTCLIX LANCETS) MISC    Use to test blood sugar three times a day   DX: E08.649    LEVOTHYROXINE (SYNTHROID) 125 MCG TABLET    TAKE 1 TABLET EVERY DAY    LIDOCAINE (LIDODERM) 5 %    Remove & Discard patch within 12 hours or as directed by MD    LIDOCAINE HCL 2% (XYLOCAINE) 2 % JELLY    Apply topically as needed. Apply topically once nightly to affected area both heels.    LIPASE-PROTEASE-AMYLASE  "24,000-76,000-120,000 UNITS (PANLIPASE) 24,000-76,000 -120,000 UNIT CAPSULE    Take 1 capsule by mouth 3 (three) times daily with meals.    RANITIDINE (ZANTAC) 150 MG TABLET    Take 1 tablet (150 mg total) by mouth 2 (two) times daily.    THIAMINE 50 MG TABLET    Take 1 tablet (50 mg total) by mouth once daily.    TRAMADOL (ULTRAM) 50 MG TABLET    Take 1 tablet (50 mg total) by mouth every 6 (six) hours as needed for Pain.    VALACYCLOVIR (VALTREX) 500 MG TABLET    Take 1 tablet (500 mg total) by mouth once daily.    VITAMIN E 1000 UNIT CAPSULE    Take 1,000 Units by mouth once daily.       Review of Systems  Constitution: Denies chills, fever, and sweats.  HENT: Denies headaches or blurry vision.  Cardiovascular: Denies chest pain or irregular heart beat.  Respiratory: Denies cough or shortness of breath.  Gastrointestinal: Denies abdominal pain, nausea, or vomiting.  Musculoskeletal: Denies muscle cramps.  Neurological: Denies dizziness or focal weakness.  Psychiatric/Behavioral: Normal mental status.  Hematologic/Lymphatic: Denies bleeding problem or easy bruising/bleeding.  Skin: Denies rash or suspicious lesions    Physical Examination  /62 (BP Location: Left arm, Patient Position: Sitting)   Pulse (!) 50   Ht 5' 7" (1.702 m)   Wt 57.2 kg (126 lb 1.7 oz)   SpO2 98%   BMI 19.75 kg/m²     Constitutional: No acute distress, conversant  HEENT: Sclera anicteric, Pupils equal, round and reactive to light, extraocular motions intact, Oropharynx clear  Neck: No JVD, no carotid bruits  Cardiovascular: regular rate and rhythm, no murmur, rubs or gallops, normal S1/S2  Pulmonary: Clear to auscultation bilaterally  Abdominal: Abdomen soft, nontender, nondistended, positive bowel sounds  Extremities: No lower extremity edema,   Pulses:  Carotid pulses are 2+ on the right side, and 2+ on the left side.  Radial pulses are 2+ on the right side, and 2+ on the left side.   Femoral pulses are 2+ on the right side, and " 2+ on the left side.  Popliteal pulses are 2+ on the right side, and 2+ on the left side.   Dorsalis pedis pulses are 2+ on the right side, and 2+ on the left side.   Posterior tibial pulses are 2+ on the right side, and 2+ on the left side.    Skin: No ecchymosis, erythema, or ulcers  Psych: Alert and oriented x 3, appropriate affect  Neuro: CNII-XII intact, no focal deficits    Labs:  Most Recent Data  CBC:   Lab Results   Component Value Date    WBC 7.13 02/12/2018    HGB 12.7 02/12/2018    HCT 38.1 02/12/2018     02/12/2018     (H) 02/12/2018    RDW 13.1 02/12/2018     BMP:   Lab Results   Component Value Date     02/12/2018    K 4.7 02/12/2018     02/12/2018    CO2 30 (H) 02/12/2018    BUN 20 02/12/2018    CREATININE 0.8 02/12/2018    GLU 84 02/12/2018    CALCIUM 9.0 02/12/2018    MG 2.2 03/15/2018     LFTS;   Lab Results   Component Value Date    PROT 6.8 02/12/2018    ALBUMIN 3.6 02/12/2018    BILITOT 0.4 02/12/2018    AST 45 (H) 02/12/2018    ALKPHOS 78 02/12/2018    ALT 53 (H) 02/12/2018     COAGS: No results found for: INR, PROTIME, PTT  FLP:   Lab Results   Component Value Date    CHOL 190 07/27/2017    HDL 85 (H) 07/27/2017    LDLCALC 92.6 07/27/2017    TRIG 62 07/27/2017    CHOLHDL 44.7 07/27/2017     CARDIAC:   Lab Results   Component Value Date    BNP 61 07/22/2009       Imaging:    EKG:     Echo:    Stress Testing:    ***  I have personally reviewed these images and echo data    Assessment/Plan:  Erica was seen today for advice only.    Diagnoses and all orders for this visit:    Establishing care with new doctor, encounter for  -     EKG 12-lead        No Follow-up on file.      ***     Total duration of face to face visit time {[facility] VISIT MINUTES:39108}.  Total time spent counseling greater than fifty percent of total visit time.  Counseling included discussion regarding imaging findings, diagnosis, possibilities, treatment options, risks and benefits.  The patient had many  questions regarding the options and long-term effect which were all answered to my best ability.      Waqas Chavez MD,MRCP,RPVI,FACC,FSCAI.  Interventional Cardiology   Phone [phone]

## 2018-06-05 ENCOUNTER — TELEPHONE (OUTPATIENT)
Dept: PAIN MEDICINE | Facility: CLINIC | Age: 68
End: 2018-06-05

## 2018-06-05 NOTE — TELEPHONE ENCOUNTER
Per patient request rescheduled to 6/22/18. Patient accepted and voiced understanding. [facility] notified

## 2018-06-05 NOTE — TELEPHONE ENCOUNTER
----- Message from Radha Posadas sent at 6/5/2018  9:34 AM CDT -----  Contact: self  Patient needs to reschedule her procedure. Please call [phone]

## 2018-06-11 DIAGNOSIS — G47.9 SLEEP DISTURBANCE: ICD-10-CM

## 2018-06-11 DIAGNOSIS — G89.29 OTHER CHRONIC PAIN: ICD-10-CM

## 2018-06-11 DIAGNOSIS — F41.8 DEPRESSION WITH ANXIETY: Chronic | ICD-10-CM

## 2018-06-11 RX ORDER — AMITRIPTYLINE HYDROCHLORIDE 10 MG/1
TABLET, FILM COATED ORAL
Qty: 30 TABLET | Refills: 0 | Status: SHIPPED | OUTPATIENT
Start: 2018-06-11 | End: 2018-07-10 | Stop reason: SDUPTHER

## 2018-06-14 ENCOUNTER — CLINICAL SUPPORT (OUTPATIENT)
Dept: CARDIOLOGY | Facility: CLINIC | Age: 68
End: 2018-06-14
Attending: INTERNAL MEDICINE
Payer: MEDICARE

## 2018-06-14 VITALS — BODY MASS INDEX: 19.8 KG/M2 | HEIGHT: 67 IN | WEIGHT: 126.13 LBS

## 2018-06-14 DIAGNOSIS — R00.1 BRADYCARDIA: ICD-10-CM

## 2018-06-14 PROCEDURE — 99999 PR PBB SHADOW E&M-EST. PATIENT-LVL I: CPT | Mod: PBBFAC,,,

## 2018-06-15 LAB
BSA FOR ECHO PROCEDURE: 1.64 M2
CV STRESS BASE HR: 47
CVPEAKDIABP: 66
CVPEAKSYSBP: 147
CVRESTDIABP: 76
CVRESTSYSBP: 116
STRESS ECHO POST ESTIMATED WORKLOAD: 12 METS
STRESS ECHO POST EXERCISE DUR MIN: 7 MIN
STRESS ECHO POST EXERCISE DUR SEC: 0

## 2018-06-22 ENCOUNTER — SURGERY (OUTPATIENT)
Age: 68
End: 2018-06-22

## 2018-06-22 ENCOUNTER — HOSPITAL ENCOUNTER (OUTPATIENT)
Facility: AMBULARY SURGERY CENTER | Age: 68
Discharge: HOME | End: 2018-06-22
Attending: ANESTHESIOLOGY | Admitting: ANESTHESIOLOGY
Payer: MEDICARE

## 2018-06-22 DIAGNOSIS — M51.34 DDD (DEGENERATIVE DISC DISEASE), THORACIC: Primary | ICD-10-CM

## 2018-06-22 LAB — POCT GLUCOSE: 97 MG/DL (ref 70–110)

## 2018-06-22 PROCEDURE — 62321 NJX INTERLAMINAR CRV/THRC: CPT | Mod: ,,, | Performed by: ANESTHESIOLOGY

## 2018-06-22 PROCEDURE — 99152 MOD SED SAME PHYS/QHP 5/>YRS: CPT | Mod: ,,, | Performed by: ANESTHESIOLOGY

## 2018-06-22 PROCEDURE — 62321 NJX INTERLAMINAR CRV/THRC: CPT | Performed by: ANESTHESIOLOGY

## 2018-06-22 RX ORDER — LIDOCAINE HYDROCHLORIDE 10 MG/ML
INJECTION, SOLUTION EPIDURAL; INFILTRATION; INTRACAUDAL; PERINEURAL
Status: DISCONTINUED | OUTPATIENT
Start: 2018-06-22 | End: 2018-06-22 | Stop reason: HOSPADM

## 2018-06-22 RX ORDER — LIDOCAINE HYDROCHLORIDE 10 MG/ML
INJECTION, SOLUTION EPIDURAL; INFILTRATION; INTRACAUDAL; PERINEURAL
Status: DISCONTINUED
Start: 2018-06-22 | End: 2018-06-22 | Stop reason: HOSPADM

## 2018-06-22 RX ORDER — FENTANYL CITRATE 50 UG/ML
INJECTION, SOLUTION INTRAMUSCULAR; INTRAVENOUS
Status: DISCONTINUED
Start: 2018-06-22 | End: 2018-06-22 | Stop reason: HOSPADM

## 2018-06-22 RX ORDER — MIDAZOLAM HYDROCHLORIDE 2 MG/2ML
INJECTION, SOLUTION INTRAMUSCULAR; INTRAVENOUS
Status: DISCONTINUED | OUTPATIENT
Start: 2018-06-22 | End: 2018-06-22 | Stop reason: HOSPADM

## 2018-06-22 RX ORDER — SODIUM CHLORIDE 9 MG/ML
INJECTION, SOLUTION INTRAMUSCULAR; INTRAVENOUS; SUBCUTANEOUS
Status: DISCONTINUED | OUTPATIENT
Start: 2018-06-22 | End: 2018-06-22 | Stop reason: HOSPADM

## 2018-06-22 RX ORDER — DEXAMETHASONE SODIUM PHOSPHATE 10 MG/ML
INJECTION INTRAMUSCULAR; INTRAVENOUS
Status: DISCONTINUED
Start: 2018-06-22 | End: 2018-06-22 | Stop reason: HOSPADM

## 2018-06-22 RX ORDER — SODIUM CHLORIDE, SODIUM LACTATE, POTASSIUM CHLORIDE, CALCIUM CHLORIDE 600; 310; 30; 20 MG/100ML; MG/100ML; MG/100ML; MG/100ML
INJECTION, SOLUTION INTRAVENOUS ONCE AS NEEDED
Status: COMPLETED | OUTPATIENT
Start: 2018-06-22 | End: 2018-06-22

## 2018-06-22 RX ORDER — MIDAZOLAM HYDROCHLORIDE 1 MG/ML
INJECTION INTRAMUSCULAR; INTRAVENOUS
Status: DISCONTINUED
Start: 2018-06-22 | End: 2018-06-22 | Stop reason: HOSPADM

## 2018-06-22 RX ORDER — DEXAMETHASONE SODIUM PHOSPHATE 10 MG/ML
INJECTION INTRAMUSCULAR; INTRAVENOUS
Status: DISCONTINUED | OUTPATIENT
Start: 2018-06-22 | End: 2018-06-22 | Stop reason: HOSPADM

## 2018-06-22 RX ORDER — FENTANYL CITRATE 50 UG/ML
INJECTION, SOLUTION INTRAMUSCULAR; INTRAVENOUS
Status: DISCONTINUED | OUTPATIENT
Start: 2018-06-22 | End: 2018-06-22 | Stop reason: HOSPADM

## 2018-06-22 RX ADMIN — SODIUM CHLORIDE 4 ML: 9 INJECTION, SOLUTION INTRAMUSCULAR; INTRAVENOUS; SUBCUTANEOUS at 10:06

## 2018-06-22 RX ADMIN — SODIUM CHLORIDE, SODIUM LACTATE, POTASSIUM CHLORIDE, CALCIUM CHLORIDE: 600; 310; 30; 20 INJECTION, SOLUTION INTRAVENOUS at 09:06

## 2018-06-22 RX ADMIN — DEXAMETHASONE SODIUM PHOSPHATE 10 MG: 10 INJECTION INTRAMUSCULAR; INTRAVENOUS at 10:06

## 2018-06-22 RX ADMIN — LIDOCAINE HYDROCHLORIDE 5 ML: 10 INJECTION, SOLUTION EPIDURAL; INFILTRATION; INTRACAUDAL; PERINEURAL at 10:06

## 2018-06-22 RX ADMIN — MIDAZOLAM HYDROCHLORIDE 2 MG: 2 INJECTION, SOLUTION INTRAMUSCULAR; INTRAVENOUS at 10:06

## 2018-06-22 RX ADMIN — FENTANYL CITRATE 50 MCG: 50 INJECTION, SOLUTION INTRAMUSCULAR; INTRAVENOUS at 10:06

## 2018-06-22 NOTE — INTERVAL H&P NOTE
The patient has been examined and the H&P has been reviewed:    I concur with the findings and no changes have occurred since H&P was written.   This patient has been cleared for surgery in an ambulatory surgical facility    ASA 3,  Mallampatti Score 3  No history of anesthetic complications  Plan for RN IV sedation        Anesthesia/Surgery risks, benefits and alternative options discussed and understood by patient/family.          Active Hospital Problems    Diagnosis  POA    DDD (degenerative disc disease), thoracic [M51.34]  Yes      Resolved Hospital Problems    Diagnosis Date Resolved POA   No resolved problems to display.

## 2018-06-22 NOTE — DISCHARGE SUMMARY
[facility]  Discharge Note  Short Stay    Admit Date: 6/22/2018    Discharge Date and Time: 6/22/2018    Attending Physician: Christoph Pratt MD     Discharge Provider: Christoph Pratt    Diagnoses:  Active Hospital Problems    Diagnosis  POA    *DDD (degenerative disc disease), thoracic [M51.34]  Yes      Resolved Hospital Problems    Diagnosis Date Resolved POA   No resolved problems to display.       Hospital Course: Thoracic HERMINIA  Discharged Condition: Good    Final Diagnoses:   Active Hospital Problems    Diagnosis  POA    *DDD (degenerative disc disease), thoracic [M51.34]  Yes      Resolved Hospital Problems    Diagnosis Date Resolved POA   No resolved problems to display.       Disposition: Home or Self Care    Follow up/Patient Instructions:    Medications:  Reconciled Home Medications:      Medication List      CONTINUE taking these medications    amitriptyline 10 MG tablet  Commonly known as:  ELAVIL  TAKE 1 TABLET(10 MG) BY MOUTH EVERY NIGHT AS NEEDED FOR INSOMNIA     aspirin 81 MG EC tablet  Commonly known as:  ECOTRIN  Take 81 mg by mouth nightly.     * baclofen 20 MG tablet  Commonly known as:  LIORESAL     * baclofen 20 MG tablet  Commonly known as:  LIORESAL  Take 1 tablet (20 mg total) by mouth 3 (three) times daily.     blood sugar diagnostic Strp  1 each by Misc.(Non-Drug; Combo Route) route 3 (three) times daily before meals.     cholecalciferol (vitamin D3) 1,000 unit capsule  Commonly known as:  VITAMIN D3  Take 1 capsule (1,000 Units total) by mouth 2 (two) times daily.     citalopram 40 MG tablet  Commonly known as:  CELEXA  Take 1 tablet (40 mg total) by mouth once daily.     clonazePAM 1 MG tablet  Commonly known as:  KLONOPIN  Take 1 tablet (1 mg total) by mouth 3 (three) times daily.     cyanocobalamin (vitamin B-12) 5,000 mcg Tbdl  Take 1 tablet by mouth once daily.     cyclobenzaprine 10 MG tablet  Commonly known as:  FLEXERIL  TAKE 1/2 TO 1 TABLET THREE TIMES DAILY AS NEEDED     *  diclofenac 50 MG EC tablet  Commonly known as:  VOLTAREN  TAKE 1 TABLET(50 MG) BY MOUTH THREE TIMES DAILY     * diclofenac 50 MG EC tablet  Commonly known as:  VOLTAREN  Take 1 tablet (50 mg total) by mouth 3 (three) times daily.     fluorometholone 0.1% 0.1 % Drps  Commonly known as:  FML     fluticasone 50 mcg/actuation nasal spray  Commonly known as:  FLONASE  ADMINISTER 1 SPRAY IN EACH NARE 2 TIMES DAILY.     folic acid 1 MG tablet  Commonly known as:  FOLVITE  Take 1 tablet (1 mg total) by mouth once daily.     gabapentin 800 MG tablet  Commonly known as:  NEURONTIN  Take 1 tablet (800 mg total) by mouth 3 (three) times daily.     glucagon (human recombinant) 1 mg Kit  Commonly known as:  GLUCAGON EMERGENCY KIT (HUMAN)  Inject 1 mL (1 mg total) into the muscle as needed.     hydroxychloroquine 200 mg tablet  Commonly known as:  PLAQUENIL  Take 1 tablet (200 mg total) by mouth 2 (two) times daily. Three times a day     lancets Misc  Commonly known as:  ACCU-CHEK SOFTCLIX LANCETS  Use to test blood sugar three times a day   DX: E08.649     levothyroxine 125 MCG tablet  Commonly known as:  SYNTHROID  TAKE 1 TABLET EVERY DAY     lidocaine 5 %  Commonly known as:  LIDODERM  Remove & Discard patch within 12 hours or as directed by MD     lidocaine HCL 2% 2 % jelly  Commonly known as:  XYLOCAINE  Apply topically as needed. Apply topically once nightly to affected area both heels.     lipase-protease-amylase 24,000-76,000-120,000 units 24,000-76,000 -120,000 unit capsule  Commonly known as:  PANLIPASE  Take 1 capsule by mouth 3 (three) times daily with meals.     ranitidine 150 MG tablet  Commonly known as:  ZANTAC  Take 1 tablet (150 mg total) by mouth 2 (two) times daily.     thiamine 50 MG tablet  Take 1 tablet (50 mg total) by mouth once daily.     traMADol 50 mg tablet  Commonly known as:  ULTRAM  Take 1 tablet (50 mg total) by mouth every 6 (six) hours as needed for Pain.     valACYclovir 500 MG tablet  Commonly  known as:  VALTREX  Take 1 tablet (500 mg total) by mouth once daily.     vitamin E 1000 UNIT capsule  Take 1,000 Units by mouth once daily.        * This list has 4 medication(s) that are the same as other medications prescribed for you. Read the directions carefully, and ask your doctor or other care provider to review them with you.            STOP taking these medications    QUEtiapine 50 MG tablet  Commonly known as:  SEROQUEL            Discharge Procedure Orders  Call MD for:  temperature >100.4     Call MD for:  persistent nausea and vomiting or diarrhea     Call MD for:  severe uncontrolled pain     Call MD for:  redness, tenderness, or signs of infection (pain, swelling, redness, odor or green/yellow discharge around incision site)     Call MD for:  difficulty breathing or increased cough     Call MD for:  severe persistent headache          Follow up with MD in 2-3 weeks    Discharge Procedure Orders (must include Diet, Follow-up, Activity):    Discharge Procedure Orders (must include Diet, Follow-up, Activity)  Call MD for:  temperature >100.4     Call MD for:  persistent nausea and vomiting or diarrhea     Call MD for:  severe uncontrolled pain     Call MD for:  redness, tenderness, or signs of infection (pain, swelling, redness, odor or green/yellow discharge around incision site)     Call MD for:  difficulty breathing or increased cough     Call MD for:  severe persistent headache

## 2018-06-22 NOTE — OP NOTE
PROCEDURE DATE: 6/22/2018    Procedure:   Interlaminar epidural steroid injection at T10-11 under fluoroscopic guidance.    Diagnosis: Thoracic DISC DISPLACEMENT WITHOUT MYELOPATHY  pOSTOP DIAGNOSIS: sAME    Physician: Christoph Pratt M.D.    Medications injected:10 mg dexamethasone with 4 ml of preservative free NaCl    Local anesthetic injected:    Lidocaine 1% 2 ml total    Sedation Medications: RN IV sedation    Estimated blood loss:  none    Complications:  None    Technique:  Time-out taken to identify patient and procedure prior to starting the procedure.  With the patient laying in a prone position, the area was prepped and draped in the usual sterile fashion using ChloraPrep and a fenestrated drape.  After determining the target level with an AP fluoroscopic view, local anesthetic was given using a 25-gauge 1.5 inch needle by raising a wheal and then infiltrating toward the interlaminar entry space.  A 3.5inch 20-gauge Touhy needle was introduced under AP fluoroscopic guidance to the interlaminar space of T10-11. Once the trajectory was established, the needle was visualized in the lateral view and advanced using loss of resistance technique. Once in the desired position, 1ml contrast was injected to confirm placement and there was no vascular uptake nor intrathecal spread.  The medication was then injected slowly. The patient tolerated the procedure well.      The patient was monitored after the procedure.   They were given post-procedure and discharge instructions to follow at home.  The patient was discharged in a stable condition.

## 2018-06-25 VITALS
SYSTOLIC BLOOD PRESSURE: 94 MMHG | DIASTOLIC BLOOD PRESSURE: 50 MMHG | BODY MASS INDEX: 19.8 KG/M2 | HEIGHT: 67 IN | WEIGHT: 126.13 LBS | OXYGEN SATURATION: 95 % | RESPIRATION RATE: 16 BRPM | TEMPERATURE: 97 F | HEART RATE: 58 BPM

## 2018-07-05 ENCOUNTER — OFFICE VISIT (OUTPATIENT)
Dept: PHYSICAL MEDICINE AND REHAB | Facility: CLINIC | Age: 68
End: 2018-07-05
Payer: MEDICARE

## 2018-07-05 VITALS
HEIGHT: 67 IN | HEART RATE: 54 BPM | BODY MASS INDEX: 19.8 KG/M2 | SYSTOLIC BLOOD PRESSURE: 93 MMHG | WEIGHT: 126.13 LBS | DIASTOLIC BLOOD PRESSURE: 61 MMHG

## 2018-07-05 DIAGNOSIS — M79.10 MYALGIA: Primary | ICD-10-CM

## 2018-07-05 DIAGNOSIS — M54.2 CERVICALGIA: ICD-10-CM

## 2018-07-05 PROCEDURE — 20553 NJX 1/MLT TRIGGER POINTS 3/>: CPT | Mod: S$GLB,,, | Performed by: PHYSICAL MEDICINE & REHABILITATION

## 2018-07-05 PROCEDURE — 99214 OFFICE O/P EST MOD 30 MIN: CPT | Mod: 25,S$GLB,, | Performed by: PHYSICAL MEDICINE & REHABILITATION

## 2018-07-05 PROCEDURE — 99999 PR PBB SHADOW E&M-EST. PATIENT-LVL IV: CPT | Mod: PBBFAC,,, | Performed by: PHYSICAL MEDICINE & REHABILITATION

## 2018-07-05 RX ORDER — DULOXETIN HYDROCHLORIDE 20 MG/1
20 CAPSULE, DELAYED RELEASE ORAL DAILY
Qty: 30 CAPSULE | Refills: 11 | Status: SHIPPED | OUTPATIENT
Start: 2018-07-05 | End: 2019-01-24

## 2018-07-05 RX ORDER — LIDOCAINE HYDROCHLORIDE 10 MG/ML
3 INJECTION INFILTRATION; PERINEURAL ONCE
Status: DISCONTINUED | OUTPATIENT
Start: 2018-07-05 | End: 2018-07-05

## 2018-07-05 RX ORDER — LIDOCAINE HYDROCHLORIDE 10 MG/ML
3 INJECTION INFILTRATION; PERINEURAL ONCE
Status: COMPLETED | OUTPATIENT
Start: 2018-07-05 | End: 2018-07-05

## 2018-07-05 RX ADMIN — LIDOCAINE HYDROCHLORIDE 3 ML: 10 INJECTION INFILTRATION; PERINEURAL at 11:07

## 2018-07-05 NOTE — PROGRESS NOTES
HPI:  Patient is a 68 y.o. year old female w.lupus, fibromyalgis, depression, Neck and back pain. Her pain continues unchanged. She is s/p thoracic elena w. No improvement. She had shingles of her thoracic spine 10yrs ago which left her w. Persistent pain. She also has cervical and lumbar radic. Today most of her pain is on her neck. The only thing that gives her any type of relief although mild is diclofenac tid and baclofen tid. However, she was started on the diclofenac only temporarily since she has a h/o of gastric bypass. She is still taking nsaids on a daily basis b/c of her pain. She follows rheumatology, [facility] routinely.    Imaging      FINDINGS:  Alignment: There is scoliosis of the lumbar spine.    Vertebrae: Vertebral body heights are maintained.  No suspicious appearing lytic or blastic lesions.    Discs and facets: There is moderate-to-severe disc space narrowing noted throughout the lumbar spine and greatest at the L1-2, L4-5 and L5-S1 levels.  Prominent bilateral facet arthropathy noted from the L3-4 through the L5-S1 levels.    Miscellaneous: Surgical clips noted in the right upper quadrant.  Generalized osteopenia noted.   Impression          FINDINGS:  Vertebral column: The thoracic vertebral bodies maintain normal height and alignment.  There is no fracture.  The discs are desiccated.  There is mild disc space narrowing from T5-6 through T9-10 with moderate disc space narrowing at the T10-11 and T11-12 levels.  There is no significant disc space narrowing in the upper thoracic spine.  There is extensive multilevel degenerative disc disease in the included lower cervical spine as seen on the sagittal images.  This does result in at least mild spinal stenosis but no cord compression.  Degenerative change in the thoracic spine will be described by level.    Spinal canal, cord, epidural space: The spinal canal is developmentally normal.  The cord is normal in caliber, contour and signal intensity.   There is no abnormal epidural collection or mass.    Findings by level: The C6-7 level is included on the axial images.  There is bilateral uncovertebral spurring and facet joint arthropathy in addition to a disc osteophyte complex which narrows subarachnoid space and contributes to mild spinal stenosis without cord compression.  There is moderate bilateral foraminal stenosis.    C7-T1: There is mild facet joint arthropathy.  There is no spinal canal or foraminal stenosis.    T1-2: There is no spinal canal or foraminal stenosis.    T2-3: There is a very shallow broad right paracentral disc protrusion.  There is no spinal canal or foraminal stenosis.    T3-4: There is no spinal canal or foraminal stenosis.    T4-5: There is no spinal canal or foraminal stenosis.    T5-6: There is no spinal canal or foraminal stenosis.    T6-7: There is no spinal canal or foraminal stenosis.    T7-8: There is no spinal canal or foraminal stenosis.    T8-9: There is no spinal canal or foraminal stenosis.    T9-10: There is disc space narrowing.  There is a disc bulge with shallow broad left paracentral disc protrusion/osteophyte.  There is mild facet joint arthropathy.  There is no spinal stenosis or cord compression.  There is mild left foraminal stenosis.    T10-11: There is moderate disc space narrowing.  There is a diffuse disc bulge and mild facet joint arthropathy.  There is no spinal stenosis or cord compression but there is mild-to-moderate bilateral foraminal stenosis.    T11-12: There is moderate disc space narrowing.  There is a broad left paracentral disc protrusion/osteophyte.  There is mild bilateral, left greater than right, facet joint arthropathy with ligamentum flavum thickening.  There is no spinal stenosis or cord compression.  There is moderate left foraminal stenosis.  The right foramina is patent.    T12-L1: There is mild-to-moderate facet joint arthropathy.  There is a central left paracentral broad shallow  disc protrusion.  There is no spinal stenosis.  There is mild left foraminal stenosis.    Soft tissues, other: The prevertebral soft tissues are normal.  There is atherosclerosis but no aneurysmal dilatation of the aorta.   Impression       1. There is degenerative change in the included lower cervical spine as well as in the lower thoracic spine.  There is however no significant spinal stenosis and there is no cord compression.  Changes of disc bulge and/or protrusions in addition to facet joint arthropathy contribute to some degree of foraminal stenosis at several levels.  2. At the T9-10 level, there is mild left foraminal stenosis.  There is mild-to-moderate bilateral foraminal stenosis at the T10-11 level.  There is moderate left foraminal stenosis at the T11-T12 level.  There is mild left foraminal stenosis at the T12-L1 level.  Please see above complete discussion.      Electronically      The alignment is normal.  The vertebral bodies are intact without fracture or compression.  There is disc space narrowing identified at C5-6 and C6-7 with spondylosis and mild left neural foraminal stenosis identified at C5-6 and C6-7 and right neural foraminal stenosis identified at C4-5, C5-6 and C6-7.  The rest of the disc spaces are well-maintained.  The posterior elements and the dens are intact.   Impression      Chronic degenerative disc disease with spondylosis and neural foraminal stenosis at C5-6 and C6-7.  Right-sided spondylosis and neural foraminal stenosis also seen at C4-5.       Labs  lft's elev 45/53  egfr cr nl      Past Medical History:   Diagnosis Date    Arthritis     Depression     Diabetes mellitus     NO MED SINCE GASTRIC BYPASS has hypoglycemia    Diabetes mellitus type II     GERD (gastroesophageal reflux disease)     Hypertension     NO MED SINCE GASTRIC BYPASS    Kidney disease     Lupus erythematosus     Shingles     Thyroid disease     Trigger finger     RIGHT LONG     Past Surgical  History:   Procedure Laterality Date    APPENDECTOMY      BREAST BIOPSY Left 1979    benign    CHOLECYSTECTOMY      EPIDURAL STEROID INJECTION INTO THORACIC SPINE N/A 6/22/2018    Procedure: Injection-steroid-epidural-thoracic;  Surgeon: Christoph Pratt MD;  Location: [facility] OR;  Service: Pain Management;  Laterality: N/A;  T10-11    GASTRIC BYPASS      HAND SURGERY      HYSTERECTOMY      TONSILLECTOMY       History reviewed. No pertinent family history.  Social History     Social History    Marital status:      Spouse name: N/A    Number of children: N/A    Years of education: N/A     Social History Main Topics    Smoking status: Never Smoker    Smokeless tobacco: Never Used    Alcohol use No    Drug use: No    Sexual activity: No     Other Topics Concern    None     Social History Narrative    None       Review of patient's allergies indicates:   Allergen Reactions    Codeine     Sulfa (sulfonamide antibiotics)     Codeine Rash and Other (See Comments)    Plaquenil [hydroxychloroquine] Rash and Other (See Comments)     ONE BRAND OF MED    Sulfa (sulfonamide antibiotics) Rash and Other (See Comments)       Current Outpatient Prescriptions:     amitriptyline (ELAVIL) 10 MG tablet, TAKE 1 TABLET(10 MG) BY MOUTH EVERY NIGHT AS NEEDED FOR INSOMNIA, Disp: 30 tablet, Rfl: 0    aspirin (ECOTRIN) 81 MG EC tablet, Take 81 mg by mouth nightly., Disp: , Rfl:     baclofen (LIORESAL) 20 MG tablet, Take 1 tablet (20 mg total) by mouth 3 (three) times daily., Disp: 270 tablet, Rfl: 2    blood sugar diagnostic Strp, 1 each by Misc.(Non-Drug; Combo Route) route 3 (three) times daily before meals., Disp: 200 each, Rfl: 11    cholecalciferol, vitamin D3, (VITAMIN D3) 1,000 unit capsule, Take 1 capsule (1,000 Units total) by mouth 2 (two) times daily., Disp: 180 capsule, Rfl: 3    citalopram (CELEXA) 40 MG tablet, Take 1 tablet (40 mg total) by mouth once daily., Disp: 90 tablet, Rfl: 4    clonazePAM  (KLONOPIN) 1 MG tablet, Take 1 tablet (1 mg total) by mouth 3 (three) times daily., Disp: 90 tablet, Rfl: 0    cyanocobalamin, vitamin B-12, 5,000 mcg TbDL, Take 1 tablet by mouth once daily., Disp: 90 tablet, Rfl: 3    cyclobenzaprine (FLEXERIL) 10 MG tablet, TAKE 1/2 TO 1 TABLET THREE TIMES DAILY AS NEEDED, Disp: 30 tablet, Rfl: 1    diclofenac (VOLTAREN) 50 MG EC tablet, Take 1 tablet (50 mg total) by mouth 3 (three) times daily., Disp: 270 tablet, Rfl: 2    fluorometholone 0.1% (FML) 0.1 % DrpS, , Disp: , Rfl: 0    fluticasone (FLONASE) 50 mcg/actuation nasal spray, ADMINISTER 1 SPRAY IN EACH NARE 2 TIMES DAILY., Disp: 48 g, Rfl: 3    folic acid (FOLVITE) 1 MG tablet, Take 1 tablet (1 mg total) by mouth once daily., Disp: 90 tablet, Rfl: 4    gabapentin (NEURONTIN) 800 MG tablet, Take 1 tablet (800 mg total) by mouth 3 (three) times daily., Disp: 270 tablet, Rfl: 3    hydroxychloroquine (PLAQUENIL) 200 mg tablet, Take 1 tablet (200 mg total) by mouth 2 (two) times daily. Three times a day, Disp: 180 tablet, Rfl: 1    lancets (ACCU-CHEK SOFTCLIX LANCETS) Misc, Use to test blood sugar three times a day   DX: E08.649, Disp: 300 each, Rfl: 3    levothyroxine (SYNTHROID) 125 MCG tablet, TAKE 1 TABLET EVERY DAY, Disp: 90 tablet, Rfl: 1    lidocaine HCL 2% (XYLOCAINE) 2 % jelly, Apply topically as needed. Apply topically once nightly to affected area both heels., Disp: 30 mL, Rfl: 2    lipase-protease-amylase 24,000-76,000-120,000 units (PANLIPASE) 24,000-76,000 -120,000 unit capsule, Take 1 capsule by mouth 3 (three) times daily with meals., Disp: 270 capsule, Rfl: 3    ranitidine (ZANTAC) 150 MG tablet, Take 1 tablet (150 mg total) by mouth 2 (two) times daily., Disp: 60 tablet, Rfl: 11    traMADol (ULTRAM) 50 mg tablet, Take 1 tablet (50 mg total) by mouth every 6 (six) hours as needed for Pain., Disp: 180 tablet, Rfl: 1    valACYclovir (VALTREX) 500 MG tablet, Take 1 tablet (500 mg total) by mouth  "once daily., Disp: 180 tablet, Rfl: 9    vitamin E 1000 UNIT capsule, Take 1,000 Units by mouth once daily., Disp: , Rfl:     DULoxetine (CYMBALTA) 20 MG capsule, Take 1 capsule (20 mg total) by mouth once daily., Disp: 30 capsule, Rfl: 11    glucagon (human recombinant) inj 1mg/mL kit, Inject 1 mL (1 mg total) into the muscle as needed., Disp: 3 kit, Rfl: 11  No current facility-administered medications for this visit.           Review of Systems  + nausea, no vomiting, fevers,+ Chills , no contipation, diarrhea or sweats    Physical Exam:      Vitals:    07/05/18 1116   BP: 93/61   Pulse: (!) 54     alert and oriented ×4 follows commands answers all questions appropriately,affect blunted  Manual muscle test 5 out of 5 sensation to light touch grossly intact  Nl gait  No C/C/E  +excruciate tenderness b/l cervical paraspinals  Nl gait  No C/C/E       Assessment:  Lumbar ddd w.radic  Cervical ddd w. radic  fibromyalgia  lupus   elev lft's  Gastric bypass on nsaids  H/o shingles w. Neuralgia  depression  Plan:   I had ordered a cmp in her last appt. But she was not able to get it. I have placed an other order. Diclofenac and baclofen give her the only relief thus far. This is not ideal since pt. Is s/p gastric bypass. She will follow up w. Her surgeon to see if he will allow her to continue the nsaids  Trial of cymbalta, if the low dose gives her any relief will try to switch her over completely from celexa  If the above does not help will ask for a second opinion from dr. pereira  tpi's to neck today    PROCEDURE NOTE:Risk and benefit of trigger point injections given to pt. Injections performed w. A" 25G needle after sterile prep w. Betadine, verbal consent obtained . NO complications. b/l trapezius, splenius cap and lev scapulae were inected with a total of 3ML of marcaine to each side        "

## 2018-07-10 DIAGNOSIS — F41.8 DEPRESSION WITH ANXIETY: Chronic | ICD-10-CM

## 2018-07-10 DIAGNOSIS — G89.29 OTHER CHRONIC PAIN: ICD-10-CM

## 2018-07-10 DIAGNOSIS — G47.9 SLEEP DISTURBANCE: ICD-10-CM

## 2018-07-11 ENCOUNTER — LAB VISIT (OUTPATIENT)
Dept: LAB | Facility: HOSPITAL | Age: 68
End: 2018-07-11
Attending: PHYSICAL MEDICINE & REHABILITATION
Payer: MEDICARE

## 2018-07-11 DIAGNOSIS — M79.10 MYALGIA: ICD-10-CM

## 2018-07-11 LAB
ALBUMIN SERPL BCP-MCNC: 3.6 G/DL
ALP SERPL-CCNC: 71 U/L
ALT SERPL W/O P-5'-P-CCNC: 35 U/L
ANION GAP SERPL CALC-SCNC: 9 MMOL/L
AST SERPL-CCNC: 33 U/L
BILIRUB SERPL-MCNC: 0.4 MG/DL
BUN SERPL-MCNC: 19 MG/DL
CALCIUM SERPL-MCNC: 9.7 MG/DL
CHLORIDE SERPL-SCNC: 105 MMOL/L
CO2 SERPL-SCNC: 29 MMOL/L
CREAT SERPL-MCNC: 0.9 MG/DL
EST. GFR  (AFRICAN AMERICAN): >60 ML/MIN/1.73 M^2
EST. GFR  (NON AFRICAN AMERICAN): >60 ML/MIN/1.73 M^2
GLUCOSE SERPL-MCNC: 107 MG/DL
POTASSIUM SERPL-SCNC: 4.5 MMOL/L
PROT SERPL-MCNC: 6.7 G/DL
SODIUM SERPL-SCNC: 143 MMOL/L

## 2018-07-11 PROCEDURE — 80053 COMPREHEN METABOLIC PANEL: CPT

## 2018-07-11 PROCEDURE — 36415 COLL VENOUS BLD VENIPUNCTURE: CPT | Mod: PO

## 2018-07-11 RX ORDER — AMITRIPTYLINE HYDROCHLORIDE 10 MG/1
TABLET, FILM COATED ORAL
Qty: 30 TABLET | Refills: 0 | Status: SHIPPED | OUTPATIENT
Start: 2018-07-11 | End: 2018-07-23 | Stop reason: SINTOL

## 2018-07-19 ENCOUNTER — PATIENT OUTREACH (OUTPATIENT)
Dept: ADMINISTRATIVE | Facility: HOSPITAL | Age: 68
End: 2018-07-19

## 2018-07-23 ENCOUNTER — OFFICE VISIT (OUTPATIENT)
Dept: FAMILY MEDICINE | Facility: CLINIC | Age: 68
End: 2018-07-23
Payer: MEDICARE

## 2018-07-23 ENCOUNTER — OFFICE VISIT (OUTPATIENT)
Dept: PAIN MEDICINE | Facility: CLINIC | Age: 68
End: 2018-07-23
Payer: MEDICARE

## 2018-07-23 VITALS
SYSTOLIC BLOOD PRESSURE: 111 MMHG | WEIGHT: 123 LBS | HEART RATE: 53 BPM | DIASTOLIC BLOOD PRESSURE: 70 MMHG | BODY MASS INDEX: 19.3 KG/M2 | HEIGHT: 67 IN

## 2018-07-23 VITALS
HEIGHT: 67 IN | SYSTOLIC BLOOD PRESSURE: 109 MMHG | HEART RATE: 56 BPM | TEMPERATURE: 98 F | BODY MASS INDEX: 19.37 KG/M2 | WEIGHT: 123.44 LBS | DIASTOLIC BLOOD PRESSURE: 63 MMHG

## 2018-07-23 DIAGNOSIS — M54.16 LUMBAR RADICULOPATHY: ICD-10-CM

## 2018-07-23 DIAGNOSIS — F32.1 CURRENT MODERATE EPISODE OF MAJOR DEPRESSIVE DISORDER WITHOUT PRIOR EPISODE: ICD-10-CM

## 2018-07-23 DIAGNOSIS — E03.9 HYPOTHYROIDISM, UNSPECIFIED TYPE: Primary | ICD-10-CM

## 2018-07-23 DIAGNOSIS — M51.36 DDD (DEGENERATIVE DISC DISEASE), LUMBAR: ICD-10-CM

## 2018-07-23 DIAGNOSIS — F33.9 RECURRENT MAJOR DEPRESSION RESISTANT TO TREATMENT: ICD-10-CM

## 2018-07-23 DIAGNOSIS — F34.1 DYSTHYMIC SYNDROME: ICD-10-CM

## 2018-07-23 DIAGNOSIS — M51.34 DDD (DEGENERATIVE DISC DISEASE), THORACIC: Primary | ICD-10-CM

## 2018-07-23 DIAGNOSIS — M79.10 MYALGIA: ICD-10-CM

## 2018-07-23 DIAGNOSIS — F41.9 ANXIETY: ICD-10-CM

## 2018-07-23 DIAGNOSIS — M54.14 THORACIC RADICULOPATHY: Primary | ICD-10-CM

## 2018-07-23 DIAGNOSIS — M47.819 FACET ARTHROPATHY: ICD-10-CM

## 2018-07-23 PROCEDURE — 99999 PR PBB SHADOW E&M-EST. PATIENT-LVL III: CPT | Mod: PBBFAC,,, | Performed by: FAMILY MEDICINE

## 2018-07-23 PROCEDURE — 99999 PR PBB SHADOW E&M-EST. PATIENT-LVL III: CPT | Mod: PBBFAC,,, | Performed by: PHYSICIAN ASSISTANT

## 2018-07-23 PROCEDURE — 99214 OFFICE O/P EST MOD 30 MIN: CPT | Mod: S$GLB,,, | Performed by: FAMILY MEDICINE

## 2018-07-23 PROCEDURE — 99213 OFFICE O/P EST LOW 20 MIN: CPT | Mod: S$GLB,,, | Performed by: PHYSICIAN ASSISTANT

## 2018-07-23 RX ORDER — CITALOPRAM 40 MG/1
20 TABLET, FILM COATED ORAL DAILY
Qty: 90 TABLET | Refills: 3 | Status: SHIPPED | OUTPATIENT
Start: 2018-07-23 | End: 2019-01-24

## 2018-07-23 RX ORDER — CLONAZEPAM 1 MG/1
1 TABLET ORAL 3 TIMES DAILY
Qty: 90 TABLET | Refills: 0 | Status: SHIPPED | OUTPATIENT
Start: 2018-07-23 | End: 2019-01-24 | Stop reason: SDUPTHER

## 2018-07-23 NOTE — PATIENT INSTRUCTIONS
Exercises To Strengthen Your Lower Back  Strong lower-back and abdominal muscles work together to support your spine. These exercises will help strengthen the muscles of the lower back. It is important that you begin exercising slowly and increase levels gradually.  Always begin any exercise program with stretching. If you feel pain while doing any of these exercises, stop and talk to your doctor about a more specific exercise program that suits your condition better.  Low Back Stretch  · Lie on your back with your knees bent and both feet on the ground.  ·   Slowly raise your left knee to your chest as you flatten your lower back against the floor. Hold for 5 seconds.  · Relax and repeat the exercise with your right knee.  · Do 10 of these exercises for each leg.  · Repeat hugging both knees to your chest at the same time.  Building Lower Back Strength  1. Kneeling Lumbar Extension: Begin on your hands and knees. Simultaneously raise and straighten your right arm and left leg until they are parallel to the ground. Hold for 2 seconds and come back slowly to a starting position. Repeat with left arm and right leg, alternating 10 times.  2. Prone Lumbar Extension: Lie face down, arms extended overhead, palms on the floor. Simultaneously raise your right arm and left leg as high as comfortably possible. Hold for 10 seconds and slowly return to start. Repeat with left arm and right leg, alternating 10 times. Gradually build up to 20 times. (Advanced: Repeat this exercise raising both arms and both legs a few inches off the floor at the same time. Hold for 5 seconds and release.)  3. Pelvic Tilt: Lie on the floor on your back with your knees bent at 90 degrees. Your feet should be flat on the floor. Inhale, exhale, then slowly contract your abdominal muscles bringing your navel toward your spine. Let your pelvis rock back until your lower back is flat on the floor. Hold for 10 seconds while breathing  smoothly.  4. Abdominal Crunch: Perform a Pelvic Tilt (above) flattening your lower back against the floor. Holding the tension in your abdominal muscles, take another breath and raise your shoulder blades off the ground (this is not a full sit-up). Keep your head in line with your body (dont bend your neck forward). Hold for 2 seconds, then slowly lower.      Back Exercises: Abdominal Lift  The Abdominal Lift strengthens your lower abdominal muscles, helping you keep your pelvis and back stable.  · Lie on the floor with both knees bent. Put your feet flat on the floor and your arms by your sides. Tighten your abdominal muscles.  · Lift one bent knee and move it toward your upper body. Keep your abdominal muscles tight and your back flat on the floor. Hold for 10 seconds.  · Repeat 3 times. Then, switch legs.         Back Exercises: Bridge  The Bridge exercise strengthens your abdominal, buttocks, and hamstring muscles. This helps keep your back stable and aligned when you walk.  · Lie on the floor with your back and palms flat. Bend your knees. Keep your feet flat on the floor.  · Contract your abdominal and buttocks muscles. Slowly lift your buttocks off the floor until there is a straight line from your knees to your shoulders.  · Hold for 5  seconds. Repeat 10 times.          Back Exercises: Hip Lift        To start, lie on your back with your knees bent and feet flat on the floor. Dont press your neck or lower back to the floor. Breathe deeply. You should feel comfortable and relaxed in this position.  · Slowly raise your hips upward.  · Tighten your abdomen and buttocks. Be careful not to arch your back.  · Hold for 5 seconds. Lower your hips to the floor.  · Repeat 10 times.  For your safety, check with your healthcare provider before starting an exercise program.       Back Exercises: Hip Rotator Stretch        To start, lie on your back with your knees bent and feet flat on the floor. Dont press your  neck or lower back to the floor. Breathe deeply. You should feel comfortable and relaxed in this position.  · Rest your right ankle on your left knee.  · Place a towel behind your left thigh and use it to pull the knee toward your chest. Feel the stretch in your buttocks.  · Hold for 30-60 seconds. Release.  · Repeat 2 times.  · Switch legs.   For your safety, check with your healthcare provider before starting an exercise program.       Back Exercises: Knee Lift        To start, lie on your back with your knees bent and feet flat on the floor. Dont press your neck or lower back to the floor. Breathe deeply. You should feel comfortable and relaxed in this position.  · Lift one bent knee and move it toward your upper body. Keep your abdominal muscles tight and your back flat on the floor.  · Hold for 10 seconds. Return to start position.  · Repeat 3 times.  · Switch legs.        Back Exercises: Leg Pull        To start, lie on your back with your knees bent and feet flat on the floor. Dont press your neck or lower back to the floor. Breathe deeply. You should feel comfortable and relaxed in this position.  · Pull one knee to your chest.  · Hold for 30-60 seconds. Return to starting position.  · Repeat 2 times.  · Switch legs.  · For a double leg pull, pull both legs to your chest at the same time. Repeat 2 times.  For your safety, check with your healthcare provider before starting an exercise program.       Back Exercises: Leg Reach        Do this exercise on your hands and knees. Keep your knees under your hips and your hands under your shoulders. Keep your spine in a neutral position (not arched or sagging). Be sure to maintain your necks natural curve.  · Extend one leg straight back. Dont arch your back or let your head or body sag.  · Hold for 5 seconds. Return to starting position.  · Repeat 5 times.  · Switch legs.       Back Exercises: Lower Back Rotation  To start, lie on your back with your knees bent  and feet flat on the floor. Dont press your neck or lower back to the floor. Breathe deeply. You should feel comfortable and relaxed in this position.  · Drop both knees to one side. Turn your head to the other side. Keep your shoulders flat on the floor.  · Hold for 20 seconds.  · Slowly switch sides.  · Repeat 2 times.                                   Back Exercises: Lower Back Stretch                        To start, sit in a chair with your feet flat on the floor. Shift your weight slightly forward to avoid rounding your back. Relax, and keep your ears, shoulders, and hips aligned.  · Sit with your feet well apart.  · Bend forward and touch the floor with the backs of your hands. Relax and let your body drop.  · Hold for 20 seconds. Return to starting position.  · Repeat 2 times.       Back Exercises: Partial Curl-Ups        To start, lie on your back with your knees bent and feet flat on the floor. Dont press your neck or lower back to the floor. Breathe deeply. You should feel comfortable and relaxed in this position.  · Cross your arms loosely.  · Tighten your abdomen and curl assisted up, keeping your head in line with your shoulders.  · Hold for 5 seconds. Uncurl to lie down.  · Repeat 5 times.       Back Exercises: Pelvic Tilt  To start, lie on your back with your knees bent and feet flat on the floor. Dont press your neck or lower back to the floor. Breathe deeply. You should feel comfortable and relaxed in this position.  · Tighten your abdomen and buttocks, and press your lower back toward the floor. This should be a small, subtle movement.  · Hold for 5 seconds. Release.  · Repeat 5 times.                           Back Exercises: Seated Rotation      To start, sit in a chair with your feet flat on the floor. Shift your weight slightly forward to avoid rounding your back. Relax, and keep your ears, shoulders, and hips aligned.  · Fold your arms, elbows just below shoulder height.  · Turn from the  waist with hips forward. Turn your head last.  · Hold for a count of 5. Return to starting position.  · Repeat 5 times on one side. Then switch sides.          Back Exercises: Side Stretch  To start, sit in a chair with your feet flat on the floor. Shift your weight slightly forward to avoid rounding your back. Relax. Keep your ears, shoulders, and hips aligned.  · Stretch your right arm overhead.  · Slowly bend to the left. Dont twist your torso.  · Hold for 20 seconds. Return to starting position.  · Repeat 2 times. Then, switch to the other side.      © 1396-5212 [employer]. [address] [place] [address], [place] [address]. All rights reserved. This information is not intended as a substitute for professional medical care. Always follow your healthcare professional's instructions.              Exercises at Your Workstation: Eyes, Neck, and Head  Breathe deeply as you do your exercises. Inhale through your nose, and exhale through your mouth.   Tired eyes? Stiff neck? A few easy moves can help prevent these kinds of problems. Take a few minutes during your day to do these exercises--right at your desk. They'll loosen up your muscles, keep you more alert, and make a big difference in how you work and feel.    For Your Eyes  Eye Cup  · Lean forward with your elbows on your desk.  · Cup your hands and place them lightly over your closed eyes. Hold for a minute, while breathing deeply in and out.  · Slowly uncover and open your eyes. Repeat 2 times.  Eye Roll  · Close your eyes. Slowly roll your eyeballs clockwise all the way around. Repeat 3 times.  · Now slowly roll them all the way around counterclockwise. Repeat 3 times.  Eye Rest  · Every 20 minutes, look away from the computer screen. Focus on an object at least 20 feet away. Stay focused on this object for a full 20 seconds.    For Your Neck and Head  Warm-up  · Drop your head gently to your chest. While breathing in, slowly roll your head up to your  left shoulder. While breathing out, slowly roll your head back to center. Repeat to the right.  · Repeat 3 times on each side.  Head Tilt  · Sit up straight. Tuck in your chin.  · Slowly tip your head to the left. Return to the center. Then, tip your head to the right.  · Repeat 3 times on each side.  If you feel pain while performing these stretching exercises, please stop and consult your doctor.   Head Turn  · Sit up straight.  · Slowly turn your head and look over your left shoulder. Hold for a few seconds. Go back to the center, then repeat to your right.  · Repeat 3 times on each side.    Neck Exercises: Active Neck Rotation    To start, lie on your back, knees bent and feet flat on the floor. Keep your ears, shoulders, and hips aligned, but dont press your lower back to the floor. Rest your hands on your pelvis. Breathe deeply and relax.    · Use your neck muscles to turn your head to one side until you feel a stretch in the muscles.  · Hold for 5 seconds. Then turn to the other side.  · Repeat 5 times on each side.  Note: Keep your shoulders on the floor. Dont lift or tuck your chin as you turn your head.      Neck Exercises: Arm Lift            To start, lie on your back, knees bent and feet flat on the floor. Keep your ears, shoulders, and hips aligned, but dont press your lower back to the floor. Rest your hands on your pelvis. Breathe deeply and relax.  · Raise one arm overhead, then lower it. As you lower that arm, raise the other arm.  · Continue to move both arms in slow, smooth arcs. Keep your arms straight and your head and neck relaxed.  · Repeat 10 times with each arm.  For your safety, check with your healthcare provider before starting an exercise program.     Neck Exercises: Head Lifts     Do this exercise on your hands and knees. Keep your knees under your hips and your hands under your shoulders. Keep your spine in a neutral position (not arched or sagging). Keep your ears in line with  your shoulders. Hold for a few seconds before starting the exercise.  · Keeping your back straight, slowly drop your chin toward your chest. Tuck in your chin.  · Hold for 5 seconds. Then lift your head until your neck is level with your back.  · Hold for 5 seconds. Repeat 5 times.      Neck Exercises: Neck Flex      To start, sit in a chair with your feet flat on the floor. Your weight should be slightly forward so that youre balanced evenly on your buttocks. Relax your shoulders and keep your head level. Avoid arching your back or rounding your shoulders. Using a chair with arms may help you keep your balance.  · Rest the back of your left hand against your lower back. Place your right palm on the top of your head.  · Gently pull your head forward and down until you feel a stretch in the neck muscles. Dont force the motion.  · Hold for 20 seconds, then return to starting position. Switch arms.  © 6539-9396 [employer]. [address] [place] [address]. All rights reserved. This information is not intended as a substitute for professional medical care. Always follow your healthcare professional's instructions.          Exercises: Neck Isometrics  To start, sit in a chair with your feet flat on the floor. Your weight should be slightly forward so that youre balanced evenly on your buttocks. Relax your shoulders and keep your head level. Using a chair with arms may help you keep your balance.  1. Press your palm against your forehead. Resist with your neck muscles. Hold for 10 seconds. Relax. Repeat 5 times.  2. Do the exercise again, pressing on the side of your head. Repeat 5 times. Switch sides.  3. Do the exercise again, pressing on the back of your head. Repeat 5 times.       For your safety, check with your healthcare provider before starting an exercise program.       Neck Exercises: Passive Neck Rotation        To start, lie on your back, knees bent and feet flat on the floor. Keep  your ears, shoulders, and hips aligned, but dont press your lower back to the floor. Rest your hands on your pelvis. Breathe deeply and relax.  · With your neck relaxed, place the palm of one hand on your forehead. Use your hand to turn your head to one side until you feel a stretch in the neck muscles. Do not push through pain.  · Hold for 5 seconds. Then turn to the other side.  · Repeat 5 times on each side.   Note: Keep your shoulders on the floor. Dont lift your chin as you turn your head.    Neck Exercises: Neck Glide  To start, lie on your back, knees bent and feet flat on the floor. Keep your ears, shoulders, and hips aligned. Dont press your lower back to the floor. Rest your hands on your pelvis. Breathe deeply and relax.  · Gently flatten the curve of your neck against the floor.  · Lengthen your neck as though youre growing taller. Dont jam your chin into your chest, and dont push too hard.  · Hold for 5 seconds. Release. Repeat 3 times.      © 4361-6413 [employer] [place]. [address] [place] [address], [place] [address]. All rights reserved. This information is not intended as a substitute for professional medical care. Always follow your healthcare professional's instructions.

## 2018-07-23 NOTE — PROGRESS NOTES
Referring Physician: No ref. provider found    PCP: Jose Guadalupe Ornelas MD      CC: low back pain    Interval History:  Erica Marsh is a 68 y.o. female with chronic back pain who presents today for f/u s/p thoracic HERMINIA at T10-11. Reports good relief for 2 1/2 weeks. She is interested in further pain relief. Pain is unchanged since LCV. She is s/p lumbar MB RFA at L2, 3, 4, 5 bilaterally that provided 40% pain relief. Neck pain has improved s/p TPI with Dr. Stallings. She denies LE weakness or b/b changes. Pain today is rated 6/10.      HPI:   Erica Marsh is a 68 y.o. female referred to us for lower back pain.  She has a history of fibromyalgia as well as lupus.  She seized rheumatology for above conditions.  Lower back pain is been present for many years.  She presents with constant aching, throbbing pain in her lower back.  Pain radiates to her bilateral buttocks and hips.  Pain does not travel past her knee.  Pain worsens with prolonged sitting, standing, bending, walking and getting up.  Pain improves with rest.  She does have history of lumbar DDD and lumbar facet arthropathy.  She underwent trigger point injections with minimal benefit.  She has not had any other lumbar spine interventions.  He denies any weakness.  No bowel bladder changes.  She rates her pain 6/10 today.    ROS:  CONSTITUTIONAL: No fevers, chills, night sweats, wt. loss, appetite changes  SKIN: no rashes or itching  ENT: No headaches, head trauma, vision changes, or eye pain  LYMPH NODES: None noticed   CV: No chest pain, palpitations.   RESP: No shortness of breath, dyspnea on exertion, cough, wheezing, or hemoptysis  GI: No nausea, emesis, diarrhea, constipation, melena, hematochezia, pain.    : No dysuria, hematuria, urgency, or frequency   HEME: No easy bruising, bleeding problems  PSYCHIATRIC: No depression, anxiety, psychosis, hallucinations.  NEURO: No seizures, memory loss, dizziness or difficulty sleeping  MSK: + History of present  "illness      Past Medical History:   Diagnosis Date    Arthritis     Depression     Diabetes mellitus     NO MED SINCE GASTRIC BYPASS has hypoglycemia    Diabetes mellitus type II     GERD (gastroesophageal reflux disease)     Hypertension     NO MED SINCE GASTRIC BYPASS    Kidney disease     Lupus erythematosus     Shingles     Thyroid disease     Trigger finger     RIGHT LONG     Past Surgical History:   Procedure Laterality Date    APPENDECTOMY      BREAST BIOPSY Left 1979    benign    CHOLECYSTECTOMY      EPIDURAL STEROID INJECTION INTO THORACIC SPINE N/A 6/22/2018    Procedure: Injection-steroid-epidural-thoracic;  Surgeon: Christoph Pratt MD;  Location: [facility];  Service: Pain Management;  Laterality: N/A;  T10-11    GASTRIC BYPASS      HAND SURGERY      HYSTERECTOMY      TONSILLECTOMY       History reviewed. No pertinent family history.  Social History     Social History    Marital status:      Spouse name: N/A    Number of children: N/A    Years of education: N/A     Social History Main Topics    Smoking status: Never Smoker    Smokeless tobacco: Never Used    Alcohol use No    Drug use: No    Sexual activity: No     Other Topics Concern    None     Social History Narrative    None         Medications/Allergies: See med card    Vitals:    07/23/18 1326   BP: 111/70   Pulse: (!) 53   Weight: 55.8 kg (123 lb)   Height: 5' 7" (1.702 m)   PainSc:   6   PainLoc: Back         Physical exam:    GENERAL: A and O x3, the patient appears well groomed and is in no acute distress.  Skin: No rashes or obvious lesions  HEENT: normocephalic, atraumatic  CARDIOVASCULAR: Bradycardia  LUNGS: easy work of breathing  ABDOMEN: soft, nontender   UPPER EXTREMITIES: Normal alignment, normal range of motion, no atrophy, no skin changes,  hair growth and nail growth normal and equal bilaterally. No swelling, no tenderness.    LOWER EXTREMITIES:  Normal alignment, normal range of motion, no atrophy, no " skin changes,  hair growth and nail growth normal and equal bilaterally. No swelling, no tenderness.  CERVICAL SPINE:  Cervical spine: ROM is full in flexion, extension and lateral rotation without increased pain.  Spurling's maneuver causes no neck pain to either side.  Myofascial exam: Mild Tenderness to palpation across cervical paraspinous region bilaterally.    Thoracic spine: TTP at T9, 10, 11. Mild TTP across thoracic paraspinous muscles bilaterally.   LUMBAR SPINE  Lumbar spine: ROM is limited with extension and oblique extension with moderate increased pain.    William's test causes no increased pain on either side.    Supine straight leg raise is negative bilaterally.    Internal and external rotation of the hip causes no increased pain on either side.  Myofascial exam: Mild tenderness to palpation across lumbar paraspinous muscles.      MENTAL STATUS: normal orientation, speech, language, and fund of knowledge for social situation.  Emotional state appropriate.    CRANIAL NERVES:  II:  PERRL bilaterally,   III,IV,VI: EOMI.    V:  Facial sensation equal bilaterally  VII:  Facial motor function normal.  VIII:  Hearing equal to finger rub bilaterally  IX/X: Gag normal, palate symmetric  XI:  Shoulder shrug equal, head turn equal  XII:  Tongue midline without fasciculations      MOTOR: Tone and bulk: normal bilateral upper and lower Strength: normal   Delt Bi Tri WE WF     R 5 5 5 5 5 5   L 5 5 5 5 5 5     IP ADD ABD Quad TA Gas HAM  R 5 5 5 5 5 5 5  L 5 5 5 5 5 5 5    SENSATION: Light touch and pinprick intact bilaterally  REFLEXES: normal, symmetric, nonbrisk.  Toes down, no clonus. No hoffmans.  GAIT: normal rise, base, steps, and arm swing.        Imaging:    Thoracic Spine MRI 2/12/18  T9-10: There is disc space narrowing.  There is a disc bulge with shallow broad left paracentral disc protrusion/osteophyte.  There is mild facet joint arthropathy.  There is no spinal stenosis or cord compression.   There is mild left foraminal stenosis.    T10-11: There is moderate disc space narrowing.  There is a diffuse disc bulge and mild facet joint arthropathy.  There is no spinal stenosis or cord compression but there is mild-to-moderate bilateral foraminal stenosis.    T11-12: There is moderate disc space narrowing.  There is a broad left paracentral disc protrusion/osteophyte.  There is mild bilateral, left greater than right, facet joint arthropathy with ligamentum flavum thickening.  There is no spinal stenosis or cord compression.  There is moderate left foraminal stenosis.  The right foramina is patent.    T12-L1: There is mild-to-moderate facet joint arthropathy.  There is a central left paracentral broad shallow disc protrusion.  There is no spinal stenosis.  There is mild left foraminal stenosis.    Soft tissues, other: The prevertebral soft tissues are normal.  There is atherosclerosis but no aneurysmal dilatation of the aorta.   Impression       1. There is degenerative change in the included lower cervical spine as well as in the lower thoracic spine.  There is however no significant spinal stenosis and there is no cord compression.  Changes of disc bulge and/or protrusions in addition to facet joint arthropathy contribute to some degree of foraminal stenosis at several levels.  2. At the T9-10 level, there is mild left foraminal stenosis.  There is mild-to-moderate bilateral foraminal stenosis at the T10-11 level.  There is moderate left foraminal stenosis at the T11-T12 level.  There is mild left foraminal stenosis at the T12-L1 level.  Please see above complete discussion.       MRI L-spine  8/2013  At the L1-L2 level, facet arthropathy is noted greater on the left than the right.  Broad-based bulging is noted towards the left nerve foramen greater than right neural foramen and centrally of approximately 4 mm maximally.  Mild right and mild/moderate   left neural foraminal narrowing appears to be  present.  Mild central canal narrowing is noted to 13 mm.  Mild progression of neural foraminal narrowing on the left appears to be present since the prior.      At the L2-L3 level, broad-based bulging appears to be present centrally and paracentric to the right of approximately 3 mm.  Mild right greater than left nerve foraminal narrowing is noted.  Mild central canal narrowing is noted.  Facet arthropathy and   ligamentous hypertrophy are noted.  A similar appearance is noted on the prior.  Mild progression of bulging may be noted.    At the L3-L4 level, broad based bulging appears to be present along with facet arthropathy and ligamentous hypertrophy.  The bulging or protrusion is to approximately 4 mm.  Mild central canal stenosis is noted.  Mild bilateral neural femoral stenosis is   noted.  No convincing detrimental change is noted since the prior.  A slightly asymmetric component to the bulge may be noted at this level towards the far lateral right and right neural foramen    At the L4-L5 level, broad-based bulging or protrusion is noted of approximately 4 to 5 mm that actually appears less noticeable than on the prior.  Moderate bilateral nerve foraminal stenosis appears to be present similar since the prior.  Mild central   canal narrowing is noted.  The exiting nerve roots bilaterally come near the disk material and contact cannot be excluded particularly on the right    Xray L-Spine 3/15/18  There is moderate-to-severe disc space narrowing noted throughout the lumbar spine and greatest at the L1-2, L4-5 and L5-S1 levels.  Prominent bilateral facet arthropathy noted from the L3-4 through the L5-S1 levels.     Assessment:  Erica Marsh is a 68 y.o. female with low back pain  1. Thoracic radiculopathy    2. Facet arthropathy    3. DDD (degenerative disc disease), lumbar    4. Myalgia      Plan:  1. I have stressed the importance of physical activity and exercise to improve overall health  2. Schedule  repeat Thoracic HERMINIA at T10-11. I have explained the risks, benefits, and alternatives of the procedure in detail. The patient voices understanding and all questions have been answered. The patient agrees to proceed as planned. Written Consent obtained.   3. Ordered PT to focus on back and neck. Will be beneifical to prevent further muscle atrophy and increase mobility and strength   Home lumbar and cervical exercises provided.   4. F/u with Dr. Stallings if neck pain worsens.  5. F/u s/p repeat thoracic HERMINIA

## 2018-08-02 DIAGNOSIS — E11.9 TYPE 2 DIABETES MELLITUS WITHOUT COMPLICATION: ICD-10-CM

## 2018-08-06 ENCOUNTER — SURGERY (OUTPATIENT)
Age: 68
End: 2018-08-06

## 2018-08-06 ENCOUNTER — HOSPITAL ENCOUNTER (OUTPATIENT)
Facility: AMBULARY SURGERY CENTER | Age: 68
Discharge: HOME | End: 2018-08-06
Attending: ANESTHESIOLOGY | Admitting: ANESTHESIOLOGY
Payer: MEDICARE

## 2018-08-06 DIAGNOSIS — M51.34 DDD (DEGENERATIVE DISC DISEASE), THORACIC: Primary | ICD-10-CM

## 2018-08-06 LAB — POCT GLUCOSE: 82 MG/DL (ref 70–110)

## 2018-08-06 PROCEDURE — 62321 NJX INTERLAMINAR CRV/THRC: CPT | Performed by: ANESTHESIOLOGY

## 2018-08-06 PROCEDURE — 99152 MOD SED SAME PHYS/QHP 5/>YRS: CPT | Mod: ,,, | Performed by: ANESTHESIOLOGY

## 2018-08-06 PROCEDURE — 62321 NJX INTERLAMINAR CRV/THRC: CPT | Mod: ,,, | Performed by: ANESTHESIOLOGY

## 2018-08-06 RX ORDER — ALPRAZOLAM 1 MG/1
TABLET ORAL
Status: COMPLETED
Start: 2018-08-06 | End: 2018-08-06

## 2018-08-06 RX ORDER — DEXAMETHASONE SODIUM PHOSPHATE 10 MG/ML
INJECTION INTRAMUSCULAR; INTRAVENOUS
Status: DISCONTINUED
Start: 2018-08-06 | End: 2018-08-06 | Stop reason: HOSPADM

## 2018-08-06 RX ORDER — LIDOCAINE HYDROCHLORIDE 10 MG/ML
INJECTION, SOLUTION EPIDURAL; INFILTRATION; INTRACAUDAL; PERINEURAL
Status: DISCONTINUED | OUTPATIENT
Start: 2018-08-06 | End: 2018-08-06 | Stop reason: HOSPADM

## 2018-08-06 RX ORDER — DEXAMETHASONE SODIUM PHOSPHATE 10 MG/ML
INJECTION INTRAMUSCULAR; INTRAVENOUS
Status: DISCONTINUED | OUTPATIENT
Start: 2018-08-06 | End: 2018-08-06 | Stop reason: HOSPADM

## 2018-08-06 RX ORDER — SODIUM CHLORIDE 9 MG/ML
INJECTION, SOLUTION INTRAMUSCULAR; INTRAVENOUS; SUBCUTANEOUS
Status: DISCONTINUED | OUTPATIENT
Start: 2018-08-06 | End: 2018-08-06 | Stop reason: HOSPADM

## 2018-08-06 RX ORDER — SODIUM CHLORIDE, SODIUM LACTATE, POTASSIUM CHLORIDE, CALCIUM CHLORIDE 600; 310; 30; 20 MG/100ML; MG/100ML; MG/100ML; MG/100ML
INJECTION, SOLUTION INTRAVENOUS ONCE AS NEEDED
Status: DISCONTINUED | OUTPATIENT
Start: 2018-08-06 | End: 2018-08-06 | Stop reason: HOSPADM

## 2018-08-06 RX ORDER — ALPRAZOLAM 1 MG/1
1 TABLET ORAL
Status: COMPLETED | OUTPATIENT
Start: 2018-08-06 | End: 2018-08-06

## 2018-08-06 RX ADMIN — LIDOCAINE HYDROCHLORIDE 5 ML: 10 INJECTION, SOLUTION EPIDURAL; INFILTRATION; INTRACAUDAL; PERINEURAL at 01:08

## 2018-08-06 RX ADMIN — ALPRAZOLAM 1 MG: 1 TABLET ORAL at 12:08

## 2018-08-06 RX ADMIN — DEXAMETHASONE SODIUM PHOSPHATE 10 MG: 10 INJECTION INTRAMUSCULAR; INTRAVENOUS at 01:08

## 2018-08-06 RX ADMIN — SODIUM CHLORIDE 4 ML: 9 INJECTION, SOLUTION INTRAMUSCULAR; INTRAVENOUS; SUBCUTANEOUS at 01:08

## 2018-08-06 NOTE — OR NURSING
Pt reported that she ate breakfast of toast and coffee at 8am this morning, which is too recent for fasting prior to IV sedation with procedure. Dr. Pratt notified and option given for pt receive oral sedation of Xanax 1 mg in place of IV sedation or reschedule procedure for another date. Pt opted for oral sedation. OR nurse notified.

## 2018-08-06 NOTE — DISCHARGE SUMMARY
[facility]  Discharge Note  Short Stay    Admit Date: 8/6/2018    Discharge Date and Time: 8/6/2018    Attending Physician: Christoph Pratt MD     Discharge Provider: Christoph Pratt    Diagnoses:  Active Hospital Problems    Diagnosis  POA    *DDD (degenerative disc disease), thoracic [M51.34]  Yes      Resolved Hospital Problems    Diagnosis Date Resolved POA   No resolved problems to display.       Hospital Course: Thoracic HERMINIA  Discharged Condition: Good    Final Diagnoses:   Active Hospital Problems    Diagnosis  POA    *DDD (degenerative disc disease), thoracic [M51.34]  Yes      Resolved Hospital Problems    Diagnosis Date Resolved POA   No resolved problems to display.       Disposition: Home or Self Care    Follow up/Patient Instructions:    Medications:  Reconciled Home Medications:      Medication List      CONTINUE taking these medications    baclofen 20 MG tablet  Commonly known as:  LIORESAL  Take 1 tablet (20 mg total) by mouth 3 (three) times daily.     blood sugar diagnostic Strp  1 each by Misc.(Non-Drug; Combo Route) route 3 (three) times daily before meals.     cholecalciferol (vitamin D3) 1,000 unit capsule  Commonly known as:  VITAMIN D3  Take 1 capsule (1,000 Units total) by mouth 2 (two) times daily.     citalopram 40 MG tablet  Commonly known as:  CELEXA  Take 0.5 tablets (20 mg total) by mouth once daily.     clonazePAM 1 MG tablet  Commonly known as:  KLONOPIN  Take 1 tablet (1 mg total) by mouth 3 (three) times daily.     cyanocobalamin (vitamin B-12) 5,000 mcg Tbdl  Take 1 tablet by mouth once daily.     cyclobenzaprine 10 MG tablet  Commonly known as:  FLEXERIL  TAKE 1/2 TO 1 TABLET THREE TIMES DAILY AS NEEDED     DULoxetine 20 MG capsule  Commonly known as:  CYMBALTA  Take 1 capsule (20 mg total) by mouth once daily.     fluticasone 50 mcg/actuation nasal spray  Commonly known as:  FLONASE  ADMINISTER 1 SPRAY IN EACH NARE 2 TIMES DAILY.     folic acid 1 MG tablet  Commonly known as:   FOLVITE  Take 1 tablet (1 mg total) by mouth once daily.     gabapentin 800 MG tablet  Commonly known as:  NEURONTIN  Take 1 tablet (800 mg total) by mouth 3 (three) times daily.     glucagon (human recombinant) 1 mg Kit  Commonly known as:  GLUCAGON EMERGENCY KIT (HUMAN)  Inject 1 mL (1 mg total) into the muscle as needed.     hydroxychloroquine 200 mg tablet  Commonly known as:  PLAQUENIL  Take 1 tablet (200 mg total) by mouth 2 (two) times daily. Three times a day     lancets Misc  Commonly known as:  ACCU-CHEK SOFTCLIX LANCETS  Use to test blood sugar three times a day   DX: E08.649     levothyroxine 125 MCG tablet  Commonly known as:  SYNTHROID  TAKE 1 TABLET EVERY DAY     lidocaine HCL 2% 2 % jelly  Commonly known as:  XYLOCAINE  Apply topically as needed. Apply topically once nightly to affected area both heels.     lipase-protease-amylase 24,000-76,000-120,000 units 24,000-76,000 -120,000 unit capsule  Commonly known as:  PANLIPASE  Take 1 capsule by mouth 3 (three) times daily with meals.     ranitidine 150 MG tablet  Commonly known as:  ZANTAC  Take 1 tablet (150 mg total) by mouth 2 (two) times daily.     traMADol 50 mg tablet  Commonly known as:  ULTRAM  Take 1 tablet (50 mg total) by mouth every 6 (six) hours as needed for Pain.     valACYclovir 500 MG tablet  Commonly known as:  VALTREX  Take 1 tablet (500 mg total) by mouth once daily.     vitamin E 1000 UNIT capsule  Take 1,000 Units by mouth once daily.        STOP taking these medications    QUEtiapine 50 MG tablet  Commonly known as:  SEROQUEL            Discharge Procedure Orders  Call MD for:  temperature >100.4     Call MD for:  persistent nausea and vomiting or diarrhea     Call MD for:  severe uncontrolled pain     Call MD for:  redness, tenderness, or signs of infection (pain, swelling, redness, odor or green/yellow discharge around incision site)     Call MD for:  difficulty breathing or increased cough     Call MD for:  severe persistent  headache          Follow up with MD in 2-3 weeks    Discharge Procedure Orders (must include Diet, Follow-up, Activity):    Discharge Procedure Orders (must include Diet, Follow-up, Activity)  Call MD for:  temperature >100.4     Call MD for:  persistent nausea and vomiting or diarrhea     Call MD for:  severe uncontrolled pain     Call MD for:  redness, tenderness, or signs of infection (pain, swelling, redness, odor or green/yellow discharge around incision site)     Call MD for:  difficulty breathing or increased cough     Call MD for:  severe persistent headache

## 2018-08-06 NOTE — OP NOTE
PROCEDURE DATE: 8/6/2018    Procedure:   Interlaminar epidural steroid injection at T10-11 under fluoroscopic guidance.    Diagnosis: Thoracic DISC DISPLACEMENT WITHOUT MYELOPATHY  pOSTOP DIAGNOSIS: sAME    Physician: Christoph Pratt M.D.    Medications injected:10 mg dexamethasone with 4 ml of preservative free NaCl    Local anesthetic injected:    Lidocaine 1% 2 ml total    Sedation Medications: RN IV sedation    Estimated blood loss:  None    Complications:  None    Technique:  Time-out taken to identify patient and procedure prior to starting the procedure.  With the patient laying in a prone position, the area was prepped and draped in the usual sterile fashion using ChloraPrep and a fenestrated drape.  After determining the target level with an AP fluoroscopic view, local anesthetic was given using a 25-gauge 1.5 inch needle by raising a wheal and then infiltrating toward the interlaminar entry space.  A 3.5inch 20-gauge Touhy needle was introduced under AP fluoroscopic guidance to the interlaminar space of T10-11. Once the trajectory was established, the needle was visualized in the lateral view and advanced using loss of resistance technique. Once in the desired position, 1ml contrast was injected to confirm placement and there was no vascular uptake nor intrathecal spread.  The medication was then injected slowly. The patient tolerated the procedure well.      The patient was monitored after the procedure.   They were given post-procedure and discharge instructions to follow at home.  The patient was discharged in a stable condition.

## 2018-08-06 NOTE — PLAN OF CARE
Patient sent home with a reusable ice pack. She was able to walk to the car. Her upcoming appointments were reviewed. Her  is driving her home.

## 2018-08-08 DIAGNOSIS — F41.8 DEPRESSION WITH ANXIETY: Chronic | ICD-10-CM

## 2018-08-08 DIAGNOSIS — G89.29 OTHER CHRONIC PAIN: ICD-10-CM

## 2018-08-08 DIAGNOSIS — G47.9 SLEEP DISTURBANCE: ICD-10-CM

## 2018-08-08 RX ORDER — AMITRIPTYLINE HYDROCHLORIDE 10 MG/1
TABLET, FILM COATED ORAL
Qty: 30 TABLET | Refills: 0 | Status: SHIPPED | OUTPATIENT
Start: 2018-08-08 | End: 2019-01-24 | Stop reason: ALTCHOICE

## 2018-08-09 VITALS
OXYGEN SATURATION: 97 % | BODY MASS INDEX: 19.3 KG/M2 | TEMPERATURE: 98 F | DIASTOLIC BLOOD PRESSURE: 53 MMHG | WEIGHT: 123 LBS | HEART RATE: 49 BPM | HEIGHT: 67 IN | RESPIRATION RATE: 19 BRPM | SYSTOLIC BLOOD PRESSURE: 102 MMHG

## 2018-08-19 NOTE — PROGRESS NOTES
Subjective:       Patient ID: Erica Marsh is a 68 y.o. female.    Chief Complaint: Back Pain    Depression: Patient complains of depression. She complains of anhedonia, depressed mood, difficulty concentrating, fatigue, impaired memory, insomnia and weight loss. Onset was approximately a few years ago, unchanged since that time.  She denies current suicidal and homicidal plan or intent.   Family history significant for depression.Possible organic causes contributing are: medications, endocrine/metabolic, neuro, nutritional.  Risk factors: previous episode of depression Previous treatment includes Zoloft and medication. She complains of the following side effects from the treatment: dry mouth and GI disturbance.        Review of Systems    Patient Active Problem List   Diagnosis    Lupus (systemic lupus erythematosus)    GERD (gastroesophageal reflux disease)    Arthritis    Thyroid disease    Trigger finger    Hypoglycemia    Abdominal pain, other specified site    Constipation    Change in bowel habits    Rectal bleeding    Hypoglycemia after GI (gastrointestinal) surgery    Head trauma    Depression with anxiety    Other spondylosis, lumbar region    Sinus bradycardia    DDD (degenerative disc disease), thoracic       Objective:      Physical Exam   Constitutional: She is oriented to person, place, and time. She appears well-developed and well-nourished. She appears cachectic. She appears ill.   Neck: No thyromegaly present.   Cardiovascular: Normal rate, regular rhythm and normal heart sounds.   Pulmonary/Chest: Effort normal and breath sounds normal. Right breast exhibits no inverted nipple, no mass, no nipple discharge, no skin change and no tenderness. Left breast exhibits no inverted nipple, no mass, no nipple discharge, no skin change and no tenderness. Breasts are symmetrical. There is no breast swelling.   Abdominal: Soft. Bowel sounds are normal. She exhibits no distension. There is no  tenderness.   Genitourinary: No breast tenderness, discharge or bleeding. No labial fusion. There is no rash, tenderness, lesion or injury on the right labia. There is no rash, tenderness, lesion or injury on the left labia. Cervix exhibits no motion tenderness, no discharge and no friability. Right adnexum displays no mass, no tenderness and no fullness. Left adnexum displays no mass, no tenderness and no fullness.   Neurological: She is alert and oriented to person, place, and time. She displays atrophy. She exhibits abnormal muscle tone. GCS eye subscore is 4. GCS verbal subscore is 5. GCS motor subscore is 6.   Reflex Scores:       Tricep reflexes are 2+ on the right side and 2+ on the left side.       Bicep reflexes are 2+ on the right side and 2+ on the left side.       Brachioradialis reflexes are 2+ on the right side and 2+ on the left side.       Patellar reflexes are 2+ on the right side and 2+ on the left side.       Achilles reflexes are 2+ on the right side and 2+ on the left side.  Skin: Skin is warm and dry.   Psychiatric: Her speech is normal. Judgment and thought content normal. Her mood appears anxious. She is not withdrawn. Cognition and memory are impaired. She exhibits a depressed mood. She is inattentive.   Nursing note and vitals reviewed.      Lab Results   Component Value Date    WBC 7.13 02/12/2018    HGB 12.7 02/12/2018    HCT 38.1 02/12/2018     02/12/2018    CHOL 190 07/27/2017    TRIG 62 07/27/2017    HDL 85 (H) 07/27/2017    ALT 35 07/11/2018    AST 33 07/11/2018     07/11/2018    K 4.5 07/11/2018     07/11/2018    CREATININE 0.9 07/11/2018    BUN 19 07/11/2018    CO2 29 07/11/2018    TSH 1.034 02/12/2018    HGBA1C 4.9 02/26/2018     The 10-year ASCVD risk score (Sarahyesi CALIXTO Jr., et al., 2013) is: 8.5%    Values used to calculate the score:      Age: 68 years      Sex: Female      Is Non- : No      Diabetic: Yes      Tobacco smoker: No       Systolic Blood Pressure: 111 mmHg      Is BP treated: No      HDL Cholesterol: 85 mg/dL      Total Cholesterol: 190 mg/dL    Assessment:       1. Hypothyroidism, unspecified type    2. Recurrent major depression resistant to treatment    3. Anxiety    4. Current moderate episode of major depressive disorder without prior episode    5. Dysthymic syndrome        Plan:       Hypothyroidism, unspecified type  -     TSH; Future; Expected date: 07/23/2018  -     T4; Future; Expected date: 07/23/2018  -     T3; Future; Expected date: 07/23/2018    Recurrent major depression resistant to treatment  -     citalopram (CELEXA) 40 MG tablet; Take 0.5 tablets (20 mg total) by mouth once daily.  Dispense: 90 tablet; Refill: 3  -     Ambulatory referral to Psychiatry    Anxiety  -     clonazePAM (KLONOPIN) 1 MG tablet; Take 1 tablet (1 mg total) by mouth 3 (three) times daily.  Dispense: 90 tablet; Refill: 0  -     Ambulatory referral to Psychiatry    Current moderate episode of major depressive disorder without prior episode  -     Ambulatory referral to Psychiatry    Dysthymic syndrome  -     Ambulatory referral to Psychiatry      Patient readiness: acceptance and barriers:readiness, social stressors and environmental    During the course of the visit the patient was educated and counseled about the following:     Hypertension:   Dietary sodium restriction.  Underweight:  Pharmacological interventions    Goals: Hypertension: Reduce Blood Pressure and Underweight: Increase calorie intake and BMI      Did patient meet goals/outcomes: Yes    The following self management tools provided: blood pressure log  excercise log    Patient Instructions (the written plan) was given to the patient/family.     Time spent with patient: 30 minutes    Barriers to medications present (yes )    Adverse reactions to current medications (no)    Over the counter medications reviewed (Yes)        40-minute visit. 20 minutes spent counseling patient on  diet, exercise, and weight loss.  This has been fully explained to the patient, who indicates understanding.

## 2018-10-13 ENCOUNTER — OFFICE VISIT (OUTPATIENT)
Dept: URGENT CARE | Facility: CLINIC | Age: 68
End: 2018-10-13
Payer: MEDICARE

## 2018-10-13 VITALS
DIASTOLIC BLOOD PRESSURE: 72 MMHG | TEMPERATURE: 98 F | BODY MASS INDEX: 18.89 KG/M2 | HEART RATE: 61 BPM | WEIGHT: 120.63 LBS | SYSTOLIC BLOOD PRESSURE: 118 MMHG | RESPIRATION RATE: 16 BRPM | OXYGEN SATURATION: 100 %

## 2018-10-13 DIAGNOSIS — J40 BRONCHITIS: ICD-10-CM

## 2018-10-13 DIAGNOSIS — R05.9 COUGH: Primary | ICD-10-CM

## 2018-10-13 PROCEDURE — 96372 THER/PROPH/DIAG INJ SC/IM: CPT | Mod: S$GLB,,, | Performed by: EMERGENCY MEDICINE

## 2018-10-13 PROCEDURE — 99203 OFFICE O/P NEW LOW 30 MIN: CPT | Mod: S$GLB,,, | Performed by: EMERGENCY MEDICINE

## 2018-10-13 PROCEDURE — 1101F PT FALLS ASSESS-DOCD LE1/YR: CPT | Mod: CPTII,S$GLB,, | Performed by: EMERGENCY MEDICINE

## 2018-10-13 PROCEDURE — 71046 X-RAY EXAM CHEST 2 VIEWS: CPT | Mod: S$GLB,,, | Performed by: EMERGENCY MEDICINE

## 2018-10-13 RX ORDER — PREDNISONE 20 MG/1
40 TABLET ORAL DAILY
Qty: 10 TABLET | Refills: 0 | Status: SHIPPED | OUTPATIENT
Start: 2018-10-13 | End: 2018-10-18

## 2018-10-13 RX ORDER — DEXAMETHASONE SODIUM PHOSPHATE 4 MG/ML
8 INJECTION, SOLUTION INTRA-ARTICULAR; INTRALESIONAL; INTRAMUSCULAR; INTRAVENOUS; SOFT TISSUE ONCE
Status: COMPLETED | OUTPATIENT
Start: 2018-10-13 | End: 2018-10-13

## 2018-10-13 RX ORDER — IPRATROPIUM BROMIDE AND ALBUTEROL SULFATE 2.5; .5 MG/3ML; MG/3ML
3 SOLUTION RESPIRATORY (INHALATION) EVERY 6 HOURS PRN
Qty: 25 VIAL | Refills: 2 | Status: SHIPPED | OUTPATIENT
Start: 2018-10-13 | End: 2019-04-18 | Stop reason: SDUPTHER

## 2018-10-13 RX ORDER — DOXYCYCLINE 100 MG/1
100 CAPSULE ORAL 2 TIMES DAILY
Qty: 20 CAPSULE | Refills: 0 | Status: SHIPPED | OUTPATIENT
Start: 2018-10-13 | End: 2018-10-23

## 2018-10-13 RX ORDER — BENZONATATE 100 MG/1
100 CAPSULE ORAL 3 TIMES DAILY PRN
Qty: 30 CAPSULE | Refills: 1 | Status: SHIPPED | OUTPATIENT
Start: 2018-10-13 | End: 2019-04-18 | Stop reason: SDUPTHER

## 2018-10-13 RX ADMIN — DEXAMETHASONE SODIUM PHOSPHATE 8 MG: 4 INJECTION, SOLUTION INTRA-ARTICULAR; INTRALESIONAL; INTRAMUSCULAR; INTRAVENOUS; SOFT TISSUE at 02:10

## 2018-10-13 NOTE — PATIENT INSTRUCTIONS
Acute Bronchitis  Your healthcare provider has told you that you have acute bronchitis. Bronchitis is infection or inflammation of the bronchial tubes (airways in the lungs). Normally, air moves easily in and out of the airways. Bronchitis narrows the airways, making it harder for air to flow in and out of the lungs. This causes symptoms such as shortness of breath, coughing up yellow or green mucus, and wheezing. Bronchitis can be acute or chronic. Acute means the condition comes on quickly and goes away in a short time, usually within 3 to 10 days. Chronic means a condition lasts a long time and often comes back.    What causes acute bronchitis?  Acute bronchitis almost always starts as a viral respiratory infection, such as a cold or the flu. Certain factors make it more likely for a cold or flu to turn into bronchitis. These include being very young, being elderly, having a heart or lung problem, or having a weak immune system. Cigarette smoking also makes bronchitis more likely.  When bronchitis develops, the airways become swollen. The airways may also become infected with bacteria. This is known as a secondary infection.  Diagnosing acute bronchitis  Your healthcare provider will examine you and ask about your symptoms and health history. You may also have a sputum culture to test the fluid in your lungs. Chest X-rays may be done to look for infection in the lungs.  Treating acute bronchitis  Bronchitis usually clears up as the cold or flu goes away. You can help feel better faster by doing the following:  · Take medicine as directed. You may be told to take ibuprofen or other over-the-counter medicines. These help relieve inflammation in your bronchial tubes. Your healthcare provider may prescribe an inhaler to help open up the bronchial tubes. Most of the time, acute bronchitis is caused by a viral infection. Antibiotics are usually not prescribed for viral infections.  · Drink plenty of fluids, such as  water, juice, or warm soup. Fluids loosen mucus so that you can cough it up. This helps you breathe more easily. Fluids also prevent dehydration.  · Make sure you get plenty of rest.  · Do not smoke. Do not allow anyone else to smoke in your home.  Recovery and follow-up  Follow up with your doctor as you are told. You will likely feel better in a week or two. But a dry cough can linger beyond that time. Let your doctor know if you still have symptoms (other than a dry cough) after 2 weeks, or if youre prone to getting bronchial infections. Take steps to protect yourself from future infections. These steps include stopping smoking and avoiding tobacco smoke, washing your hands often, and getting a yearly flu shot.  When to call your healthcare provider  Call the healthcare provider if you have any of the following:  · Fever of 100.4°F (38.0°C) or higher, or as advised  · Symptoms that get worse, or new symptoms  · Trouble breathing  · Symptoms that dont start to improve within a week, or within 3 days of taking antibiotics   Date Last Reviewed: 12/1/2016  © 5179-1125 [employer]. [address] [place] [address], [place] [address]. All rights reserved. This information is not intended as a substitute for professional medical care. Always follow your healthcare professional's instructions.

## 2018-10-13 NOTE — PROGRESS NOTES
Subjective: persistent  Cough, congestion       Patient ID: Erica Marsh is a 68 y.o. female.    Vitals:  weight is 54.7 kg (120 lb 9.6 oz). Her temperature is 97.8 °F (36.6 °C). Her blood pressure is 118/72 and her pulse is 61. Her respiration is 16 and oxygen saturation is 100%.     Chief Complaint: Cough    Pt presents with productive cough with green sputum x 4 weeks. She has seen her PCP twice and took two 10 day course of amoxil with only min improvement in her cough. She reports low grade fevers. She denies n/v/d, cp or sob.       Review of Systems   Constitution: Negative for chills, fever and malaise/fatigue.   HENT: Negative for congestion, ear pain, hoarse voice and sore throat.    Eyes: Negative for discharge and redness.   Cardiovascular: Negative for chest pain, dyspnea on exertion and leg swelling.   Respiratory: Positive for cough and sputum production. Negative for shortness of breath and wheezing.    Musculoskeletal: Negative for myalgias.   Gastrointestinal: Negative for abdominal pain and nausea.   Neurological: Negative for headaches.   All other systems reviewed and are negative.      Objective:      Physical Exam   Constitutional: She is oriented to person, place, and time. She appears well-developed and well-nourished. She is cooperative.  Non-toxic appearance. She does not appear ill. No distress.   HENT:   Head: Normocephalic and atraumatic.   Right Ear: Hearing, tympanic membrane, external ear and ear canal normal.   Left Ear: Hearing, tympanic membrane, external ear and ear canal normal.   Nose: Nose normal. No mucosal edema, rhinorrhea or nasal deformity. No epistaxis. Right sinus exhibits no maxillary sinus tenderness and no frontal sinus tenderness. Left sinus exhibits no maxillary sinus tenderness and no frontal sinus tenderness.   Mouth/Throat: Uvula is midline, oropharynx is clear and moist and mucous membranes are normal. No trismus in the jaw. Normal dentition. No uvula  swelling. No posterior oropharyngeal erythema.   Eyes: Conjunctivae and lids are normal. No scleral icterus.   Sclera clear bilat   Neck: Trachea normal, full passive range of motion without pain and phonation normal. Neck supple.   Cardiovascular: Normal rate, regular rhythm, normal heart sounds, intact distal pulses and normal pulses.   Pulmonary/Chest: Effort normal and breath sounds normal. No respiratory distress.   Abdominal: Soft. Normal appearance and bowel sounds are normal. She exhibits no distension. There is no tenderness.   Musculoskeletal: Normal range of motion. She exhibits no edema or deformity.   Neurological: She is alert and oriented to person, place, and time. She exhibits normal muscle tone. Coordination normal.   Skin: Skin is warm, dry and intact. She is not diaphoretic. No pallor.   Psychiatric: She has a normal mood and affect. Her speech is normal and behavior is normal. Judgment and thought content normal. Cognition and memory are normal.   Nursing note and vitals reviewed.      Assessment:       1. Cough    2. Bronchitis        Plan:         Cough  -     XR CHEST PA AND LATERAL; Future; Expected date: 10/13/2018  -     NEBULIZER KIT (SUPPLIES) FOR HOME USE    Bronchitis    Other orders  -     predniSONE (DELTASONE) 20 MG tablet; Take 2 tablets (40 mg total) by mouth once daily. for 5 days  Dispense: 10 tablet; Refill: 0  -     dexamethasone injection 8 mg; Inject 2 mLs (8 mg total) into the muscle once.  -     doxycycline (VIBRAMYCIN) 100 MG Cap; Take 1 capsule (100 mg total) by mouth 2 (two) times daily. for 10 days  Dispense: 20 capsule; Refill: 0  -     albuterol-ipratropium (DUO-NEB) 2.5 mg-0.5 mg/3 mL nebulizer solution; Take 3 mLs by nebulization every 6 (six) hours as needed for Wheezing. Rescue  Dispense: 25 vial; Refill: 2  -     benzonatate (TESSALON PERLES) 100 MG capsule; Take 1 capsule (100 mg total) by mouth 3 (three) times daily as needed for Cough.  Dispense: 30 capsule;  Refill: 1

## 2018-11-11 RX ORDER — HYDROXYCHLOROQUINE SULFATE 200 MG/1
TABLET, FILM COATED ORAL
Qty: 180 TABLET | Refills: 0 | Status: SHIPPED | OUTPATIENT
Start: 2018-11-11 | End: 2019-04-15 | Stop reason: SDUPTHER

## 2018-11-25 DIAGNOSIS — F41.9 ANXIETY: ICD-10-CM

## 2018-11-26 RX ORDER — CLONAZEPAM 1 MG/1
TABLET ORAL
Qty: 90 TABLET | Refills: 0 | Status: SHIPPED | OUTPATIENT
Start: 2018-11-26 | End: 2019-01-24 | Stop reason: SDUPTHER

## 2019-01-07 RX ORDER — HYDROXYCHLOROQUINE SULFATE 200 MG/1
TABLET, FILM COATED ORAL
Qty: 90 TABLET | Refills: 1 | OUTPATIENT
Start: 2019-01-07

## 2019-01-07 RX ORDER — CHOLECALCIFEROL (VITAMIN D3) 25 MCG
TABLET ORAL
Qty: 180 TABLET | Refills: 3 | Status: SHIPPED | OUTPATIENT
Start: 2019-01-07 | End: 2019-01-24 | Stop reason: SDUPTHER

## 2019-01-10 ENCOUNTER — PATIENT OUTREACH (OUTPATIENT)
Dept: ADMINISTRATIVE | Facility: HOSPITAL | Age: 69
End: 2019-01-10

## 2019-01-10 DIAGNOSIS — E11.9 TYPE 2 DIABETES MELLITUS WITHOUT COMPLICATION, WITHOUT LONG-TERM CURRENT USE OF INSULIN: Primary | ICD-10-CM

## 2019-01-10 NOTE — LETTER
January 18, 2019    Erica Marsh  [address] [place] [address]             [facility]  [address]  [place] [address]  Phone: [phone]     Dear [facility] is committed to your overall health and would like to ensure that you are up to date on your recommended test and/or procedures.   Jose Guadalupe Ornelas MD  has found that your chart shows you may be due for the following:     DIABETIC FOOT EXAM   HEMOGLOBIN  A1C   LIPID PANEL   URINE MICRO-ALBUMIN   COLORECTAL CANCER SCREENING       If you have had any of the above done at another facility, please let us know so that we may obtain copies from that facility.  If you have a copy of these records, please provide a copy for us to scan into your chart.  You are welcome to request that the report be faxed to us at  ([phone]).     Otherwise, please schedule these appointments at your earliest convenience by calling [phone] or going to [email or web address].     If you have an upcoming scheduled appointment, please disregard this letter.     Sincerely,   Your [facility]   MD Nancy Cuevas LPN Clinical Care Coordinator   [facility] [address] [place] [address]   Phone [phone]   Fax [phone]

## 2019-01-24 ENCOUNTER — OFFICE VISIT (OUTPATIENT)
Dept: FAMILY MEDICINE | Facility: CLINIC | Age: 69
End: 2019-01-24
Payer: MEDICARE

## 2019-01-24 ENCOUNTER — TELEPHONE (OUTPATIENT)
Dept: NEUROSURGERY | Facility: CLINIC | Age: 69
End: 2019-01-24

## 2019-01-24 VITALS
HEART RATE: 54 BPM | TEMPERATURE: 98 F | SYSTOLIC BLOOD PRESSURE: 112 MMHG | WEIGHT: 128.06 LBS | DIASTOLIC BLOOD PRESSURE: 61 MMHG | BODY MASS INDEX: 20.06 KG/M2

## 2019-01-24 DIAGNOSIS — M54.16 LUMBAR BACK PAIN WITH RADICULOPATHY AFFECTING RIGHT LOWER EXTREMITY: Primary | ICD-10-CM

## 2019-01-24 DIAGNOSIS — B02.29 POST HERPETIC NEURALGIA: ICD-10-CM

## 2019-01-24 DIAGNOSIS — E03.4 HYPOTHYROIDISM DUE TO ACQUIRED ATROPHY OF THYROID: ICD-10-CM

## 2019-01-24 DIAGNOSIS — E78.2 MIXED HYPERLIPIDEMIA: ICD-10-CM

## 2019-01-24 DIAGNOSIS — F33.9 RECURRENT MAJOR DEPRESSION RESISTANT TO TREATMENT: ICD-10-CM

## 2019-01-24 DIAGNOSIS — M32.9 SYSTEMIC LUPUS ERYTHEMATOSUS, UNSPECIFIED SLE TYPE, UNSPECIFIED ORGAN INVOLVEMENT STATUS: ICD-10-CM

## 2019-01-24 DIAGNOSIS — Z12.11 COLON CANCER SCREENING: ICD-10-CM

## 2019-01-24 DIAGNOSIS — F41.9 ANXIETY: ICD-10-CM

## 2019-01-24 PROCEDURE — 99214 OFFICE O/P EST MOD 30 MIN: CPT | Mod: 25,HCNC,S$GLB, | Performed by: FAMILY MEDICINE

## 2019-01-24 PROCEDURE — 90662 FLU VACCINE - HIGH DOSE (65+) PRESERVATIVE FREE IM: ICD-10-PCS | Mod: HCNC,S$GLB,, | Performed by: FAMILY MEDICINE

## 2019-01-24 PROCEDURE — 1100F PR PT FALLS ASSESS DOC 2+ FALLS/FALL W/INJURY/YR: ICD-10-PCS | Mod: HCNC,CPTII,S$GLB, | Performed by: FAMILY MEDICINE

## 2019-01-24 PROCEDURE — 3288F FALL RISK ASSESSMENT DOCD: CPT | Mod: HCNC,CPTII,S$GLB, | Performed by: FAMILY MEDICINE

## 2019-01-24 PROCEDURE — G0008 FLU VACCINE - HIGH DOSE (65+) PRESERVATIVE FREE IM: ICD-10-PCS | Mod: 59,HCNC,S$GLB, | Performed by: FAMILY MEDICINE

## 2019-01-24 PROCEDURE — 1100F PTFALLS ASSESS-DOCD GE2>/YR: CPT | Mod: HCNC,CPTII,S$GLB, | Performed by: FAMILY MEDICINE

## 2019-01-24 PROCEDURE — G0008 ADMIN INFLUENZA VIRUS VAC: HCPCS | Mod: 59,HCNC,S$GLB, | Performed by: FAMILY MEDICINE

## 2019-01-24 PROCEDURE — 99999 PR PBB SHADOW E&M-EST. PATIENT-LVL III: ICD-10-PCS | Mod: PBBFAC,,, | Performed by: FAMILY MEDICINE

## 2019-01-24 PROCEDURE — 3288F PR FALLS RISK ASSESSMENT DOCUMENTED: ICD-10-PCS | Mod: HCNC,CPTII,S$GLB, | Performed by: FAMILY MEDICINE

## 2019-01-24 PROCEDURE — 90662 IIV NO PRSV INCREASED AG IM: CPT | Mod: HCNC,S$GLB,, | Performed by: FAMILY MEDICINE

## 2019-01-24 PROCEDURE — 99999 PR PBB SHADOW E&M-EST. PATIENT-LVL III: CPT | Mod: PBBFAC,,, | Performed by: FAMILY MEDICINE

## 2019-01-24 PROCEDURE — 99214 PR OFFICE/OUTPT VISIT, EST, LEVL IV, 30-39 MIN: ICD-10-PCS | Mod: 25,HCNC,S$GLB, | Performed by: FAMILY MEDICINE

## 2019-01-24 RX ORDER — DULOXETINE 40 MG/1
40 CAPSULE, DELAYED RELEASE ORAL DAILY
Qty: 90 CAPSULE | Refills: 4 | Status: SHIPPED | OUTPATIENT
Start: 2019-01-24 | End: 2019-05-02

## 2019-01-24 RX ORDER — GABAPENTIN 800 MG/1
800 TABLET ORAL 3 TIMES DAILY
Qty: 270 TABLET | Refills: 3 | Status: SHIPPED | OUTPATIENT
Start: 2019-01-24 | End: 2019-05-02

## 2019-01-24 RX ORDER — BACLOFEN 20 MG/1
20 TABLET ORAL 3 TIMES DAILY
Qty: 270 TABLET | Refills: 2 | Status: SHIPPED | OUTPATIENT
Start: 2019-01-24 | End: 2019-06-27 | Stop reason: SDUPTHER

## 2019-01-24 RX ORDER — VALACYCLOVIR HYDROCHLORIDE 500 MG/1
500 TABLET, FILM COATED ORAL DAILY
Qty: 180 TABLET | Refills: 9 | Status: SHIPPED | OUTPATIENT
Start: 2019-01-24 | End: 2020-02-01 | Stop reason: SDUPTHER

## 2019-01-24 RX ORDER — CLONAZEPAM 1 MG/1
1 TABLET ORAL 3 TIMES DAILY
Qty: 90 TABLET | Refills: 3 | Status: SHIPPED | OUTPATIENT
Start: 2019-01-24 | End: 2019-04-18 | Stop reason: SDUPTHER

## 2019-01-24 RX ORDER — CITALOPRAM 40 MG/1
40 TABLET, FILM COATED ORAL DAILY
Qty: 90 TABLET | Refills: 3 | Status: SHIPPED | OUTPATIENT
Start: 2019-01-24 | End: 2019-05-02

## 2019-01-24 NOTE — PATIENT INSTRUCTIONS
Low-Salt Diet  This diet removes foods that are high in salt. It also limits the amount of salt you use when cooking. It is most often used for people with high blood pressure, edema (fluid retention), and kidney, liver, or heart disease.  Table salt contains the mineral sodium. Your body needs sodium to work normally. But too much sodium can make your health problems worse. Your healthcare provider is recommending a low-salt (also called low-sodium) diet for you. Your total daily allowance of salt is 1,500 to 2,300 milligrams (mg). It is less than 1 teaspoon of table salt. This means you can have only about 500 to 700 mg of sodium at each meal. People with certain health problems should limit salt intake to the lower end of the recommended range.    When you cook, dont add much salt. If you can cook without using salt, even better. Dont add salt to your food at the table.  When shopping, read food labels. Salt is often called sodium on the label. Choose foods that are salt-free, low salt, or very low salt. Note that foods with reduced salt may not lower your salt intake enough.    Beans, potatoes, and pasta  Ok: Dry beans, split peas, lentils, potatoes, rice, macaroni, pasta, spaghetti without added salt  Avoid: Potato chips, tortilla chips, and similar products  Breads and cereals  Ok: Low-sodium breads, rolls, cereals, and cakes; low-salt crackers, matzo crackers  Avoid: Salted crackers, pretzels, popcorn, Tamazight toast, pancakes, muffins  Dairy  Ok: Milk, chocolate milk, hot chocolate mix, low-salt cheeses, and yogurt  Avoid: Processed cheese and cheese spreads; Roquefort, Camembert, and cottage cheese; buttermilk, instant breakfast drink  Desserts  Ok: Ice cream, frozen yogurt, juice bars, gelatin, cookies and pies, sugar, honey, jelly, hard candy  Avoid: Most pies, cakes and cookies prepared or processed with salt; instant pudding  Drinks  Ok: Tea, coffee, fizzy (carbonated) drinks, juices  Avoid: Flavored  coffees, electrolyte replacement drinks, sports drinks  Meats  Ok: All fresh meat, fish, poultry, low-salt tuna, eggs, egg substitute  Avoid: Smoked, pickled, brine-cured, or salted meats and fish. This includes diggs, chipped beef, corned beef, hot dogs, deli meats, ham, kosher meats, salt pork, sausage, canned tuna, salted codfish, smoked salmon, herring, sardines, or anchovies.  Seasonings and spices  Ok: Most seasonings are okay. Good substitutes for salt include: fresh herb blends, hot sauce, lemon, garlic, alvarado, vinegar, dry mustard, parsley, cilantro, horseradish, tomato paste, regular margarine, mayonnaise, unsalted butter, cream cheese, vegetable oil, cream, low-salt salad dressing and gravy.  Avoid: Regular ketchup, relishes, pickles, soy sauce, teriyaki sauce, Worcestershire sauce, BBQ sauce, tartar sauce, meat tenderizer, chili sauce, regular gravy, regular salad dressing, salted butter  Soups  Ok: Low-salt soups and broths made with allowed foods  Avoid: Bouillon cubes, soups with smoked or salted meats, regular soup and broth  Vegetables  Ok: Most vegetables are okay; also low-salt tomato and vegetable juices  Avoid: Sauerkraut and other brine-soaked vegetables; pickles and other pickled vegetables; tomato juice, olives  Date Last Reviewed: 8/1/2016 © 2000-2017 [employer]. [address] [place] [address]. All rights reserved. This information is not intended as a substitute for professional medical care. Always follow your healthcare professional's instructions.        Advance Medical Directive    An advance medical directive is a form that lets you plan ahead for the care youd want if you could no longer express your wishes. This statement outlines the medical treatment youd want or names the person youd wish to make healthcare decisions for you. Be aware that laws vary from state to state, and it may be worthwhile to talk with an .  Writing down your wishes  · An  advance directive is important whether youre young or old. Injury or illness can strike at any age.  · Decide what is important to you and the kind of treatment youd want, or not want to have.  · Some states allow only one kind of advance directive. Some let you do both a Durable Power of  for Health Care and a Living Will. Some states put both kinds on the same form.  A Durable Power of  for Health Care  · This form lets you name someone else to be your agent.  · This person can decide on treatment for you only when you cant speak for yourself.  · You do not need to be at the end of your life. He or she could speak for you if you were in a coma but were likely to recover.  A Living Will  · This form lets you list the care you want at the end of your life.  · A living will applies only if you wont live without medical treatment. It would apply if you had advanced cancer, a massive stroke, or other serious illness from which you will not recover.  · It takes effect only when you can no longer express your wishes yourself.  Date Last Reviewed: 2/13/2016  © 4462-7298 [employer]. [address] [place] [address]. All rights reserved. This information is not intended as a substitute for professional medical care. Always follow your healthcare professional's instructions.        Life Support    If you understand how specific treatments may affect your quality of life, you can decide which ones youd choose or refuse. You may want to talk to your healthcare provider about the possible benefits and risks of treatments. The chance of good results from these therapies varies based on each individual clinical situation and can be very difficult to predict. Medical treatment, if your life is in danger, falls into three main categories.  Life supporting  · This care keeps your heart and lungs going when they can no longer work on their own.  · CPR restarts your heart and lungs if they  stop working.  · A respirator (or ventilator) keeps you breathing. Air is pumped into your lungs through a tube thats put into your windpipe.  Life sustaining  · This care keeps you alive longer when you have an illness that cant be cured.  · Tube feeding or TPN (total parenteral nutrition) provides food and fluids through a tube or IV. It is given if you cant chew or swallow on your own.  · Dialysis is a kidney machine that cleans your blood when your kidneys can no longer work on their own.  Life enhancing  · This care controls pain and discomfort, such as nausea or difficulty breathing. This type of care is not designed to prolong your life, but to enhance quality of life. Nothing is done to keep you alive longer.  · Hospice care is comfort care. It might provide food and fluids by mouth or help with bathing. Hospice care is given during the last stages of a terminal illness.  · Strong pain medicine can be given to help keep you comfortable.  Do Not Resuscitate  Would you want CPR if your heart stops while youre a patient in a hospital or nursing home? If not, talk to your healthcare provider about issuing a DNR (Do-Not-Resuscitate) order.  Be aware  DNRs and advance directives may not apply during anesthesia, in emergency rooms, or when emergency medical teams respond to a 911 call. Ask your healthcare provider how you can make sure your wishes will be followed. Also, a DNR will not prevent you from getting other kinds of needed medical care such as treatment for pain, or bleeding.   Date Last Reviewed: 2/26/2016  © 8706-8140 [employer]. [address] [place] [address], [place] [address]. All rights reserved. This information is not intended as a substitute for professional medical care. Always follow your healthcare professional's instructions.        Understanding DNR Orders    Do Not Resuscitate (DNR) orders tell hospital staff not to perform potentially life-saving measures, such as CPR, if the  patients heart and lungs stop working. In many states, a DNR order applies to staff outside the hospital (in nursing homes and emergency medical services) as well. A DNR order must be written by a healthcare provider (or, in some cases, certain other medical personnel). This can only be done with the patients or familys consent. If a patient has not written an advance directive, the family with the help of the healthcare team will decide on a DNR.  The patient can cancel a DNR order at any time. The medical team can answer questions about the DNR form.   Writing a DNR order  When might a DNR order be written? When the patient's medical condition is such that, in the case of cardiac arrest, CPR and other resuscitation methods are not desired. This could be either because the chance of successful resuscitation is very low, or the focus of the care plan has shifted to comfort measures instead of life-sustaining measures. Coma and terminal illness are instances when a DNR order might be used.  Irreversible coma  In a coma, a patient does not respond to sight, sound, or touch. The heart and lungs could be working, but brain function is damaged due to trauma or disease.  Terminal illness  In the last stages of heart disease, AIDS, cancer, and other illnesses, some patients dont want to prolong their suffering. If recovery isnt likely and quality of life is poor or getting worse, a patient or the family may agree to a DNR order.  DNR orders and hospice care  A hospice program can offer care during the final weeks of life. Hospice programs provide pain control and comfort care in the home or at special facilities. Hospice does not provide aggressive treatment. In fact, a DNR order will likely be discussed before a patient is admitted to hospice. A  or  may be able to help you arrange for hospice support.  Date Last Reviewed: 2/14/2016  © 1566-5910 [employer]. [address] [place] [address]  Road, [place] [address]. All rights reserved. This information is not intended as a substitute for professional medical care. Always follow your healthcare professional's instructions.        Stopping Life-Sustaining Treatments  Certain treatments can help sustain life when you have a serious illness. But as your illness progresses, there may come a time when these treatments are no longer a benefit. Decisions must then be made whether to continue or stop these treatments. This can be a hard task for you and your loved ones, but know that youre not alone. Your healthcare provider and healthcare team will guide you through these treatment decisions. They will also help answer any questions you may have.    What are life-sustaining treatments?  Life-sustaining treatments help keep you alive if a vital body function fails. These treatments can include CPR and the use of machines to help with heart, lung, or kidney function. They can also include the use of tubes to deliver food, fluids, blood, and medicines to the body. Blood transfusions and antibiotics are also types of life-sustaining treatments.  What happens if life-sustaining treatments are continued?  These treatments can help extend your life. But they will not cure your illness. If you are near the end of your life, you may find it hard to handle the side effects and problems that can occur with these treatments. In this case, the treatments may be too much of a burden on your body. They may cause more harm than good. They may also disrupt the natural dying process and prolong suffering.  What happens if life-sustaining treatments are stopped?  If these treatments are stopped, the focus of treatment will shift to comfort care. This involves measures to control pain and other symptoms you may have. These measures are not meant to cure your illness or help you live longer. Instead, they are meant to improve your quality of life during the time you have left. If  you are in the end stage of your illness, you may be referred to hospice by your healthcare provider. Hospice provides end-of-life care. This includes emotional, spiritual, and social support for you and your loved ones.  How do I decide whether to stop life-sustaining treatments?  Your healthcare provider and healthcare team will talk with you about the specific treatments that are part of your care plan. If you want, you may include family and friends in these meetings. Here are some questions to think about or ask your healthcare team:  · Is there is a chance that my illness will improve? Or will it continue to get worse?  · What are my goals of care? Do I want to extend the time I have left? Or do I want to focus my care on comfort and managing symptoms?  · How will stopping or continuing these treatments affect my health? Will they enhance my comfort and quality of life? Or will they cause more problems?  Consider your own values or collin. Also ask for advice from those who share your values.  How do I state my decisions about life-sustaining treatments?  Once youve made your decision to continue or stop specific treatments, you can tell your healthcare provider directly. It is best to also put your treatment wishes in writing with advance directives. These are legal forms related to healthcare decisions. Laws about advance directives vary from state to state. Ask your healthcare provider about what forms are needed to make sure your wishes will be followed. Some common forms include:  · A durable power of  for healthcare or a healthcare proxy form. This form allows you to name a person to make treatment decisions for you when you cant. This person is often called a healthcare proxy, medical or healthcare power of , or agent.  · A living will. This form tells others the kinds of treatment you want or dont want if you become too ill or injured to speak for yourself.  · A MOLST or POLST  (Medical or Physician Orders for Life-Sustaining Treatments, depending on the state where you live). These are legal forms obtained from your healthcare provider or hospital that document your wishes. They are actual healthcare provider's orders that must be followed by other medical professionals. The form belongs to you, not to the healthcare provider or hospital.   Keep in mind that you can change or cancel an advance directive at any time. Make it a practice to review your decisions each time there is a change in your health or goals of care. Also be sure to tell your healthcare proxy and loved ones of any changes in your decisions.  Deciding whether to stop life-sustaining treatments for a loved one  Ideally, the decision to stop treatment is made with the persons consent. But in some cases, the decision falls to the persons healthcare proxy or other adult. If you need to make a decision about stopping treatment for a loved one, start by talking to his or her healthcare provider. Review the goals of care and the benefits and burdens of specific treatments on your loved ones health. Also think about your loved ones wishes and values. If needed, seek advice from other healthcare team members, like a  or .   Date Last Reviewed: 3/1/2017  © 2378-1221 [employer]. [address] [place] [address], [place] [address]. All rights reserved. This information is not intended as a substitute for professional medical care. Always follow your healthcare professional's instructions.        Stopping Life-Sustaining Treatments  Certain treatments can help sustain life when you have a serious illness. But as your illness progresses, there may come a time when these treatments are no longer a benefit. Decisions must then be made whether to continue or stop these treatments. This can be a hard task for you and your loved ones, but know that youre not alone. Your healthcare provider and  healthcare team will guide you through these treatment decisions. They will also help answer any questions you may have.    What are life-sustaining treatments?  Life-sustaining treatments help keep you alive if a vital body function fails. These treatments can include CPR and the use of machines to help with heart, lung, or kidney function. They can also include the use of tubes to deliver food, fluids, blood, and medicines to the body. Blood transfusions and antibiotics are also types of life-sustaining treatments.  What happens if life-sustaining treatments are continued?  These treatments can help extend your life. But they will not cure your illness. If you are near the end of your life, you may find it hard to handle the side effects and problems that can occur with these treatments. In this case, the treatments may be too much of a burden on your body. They may cause more harm than good. They may also disrupt the natural dying process and prolong suffering.  What happens if life-sustaining treatments are stopped?  If these treatments are stopped, the focus of treatment will shift to comfort care. This involves measures to control pain and other symptoms you may have. These measures are not meant to cure your illness or help you live longer. Instead, they are meant to improve your quality of life during the time you have left. If you are in the end stage of your illness, you may be referred to hospice by your healthcare provider. Hospice provides end-of-life care. This includes emotional, spiritual, and social support for you and your loved ones.  How do I decide whether to stop life-sustaining treatments?  Your healthcare provider and healthcare team will talk with you about the specific treatments that are part of your care plan. If you want, you may include family and friends in these meetings. Here are some questions to think about or ask your healthcare team:  · Is there is a chance that my illness will  improve? Or will it continue to get worse?  · What are my goals of care? Do I want to extend the time I have left? Or do I want to focus my care on comfort and managing symptoms?  · How will stopping or continuing these treatments affect my health? Will they enhance my comfort and quality of life? Or will they cause more problems?  Consider your own values or collin. Also ask for advice from those who share your values.  How do I state my decisions about life-sustaining treatments?  Once youve made your decision to continue or stop specific treatments, you can tell your healthcare provider directly. It is best to also put your treatment wishes in writing with advance directives. These are legal forms related to healthcare decisions. Laws about advance directives vary from state to state. Ask your healthcare provider about what forms are needed to make sure your wishes will be followed. Some common forms include:  · A durable power of  for healthcare or a healthcare proxy form. This form allows you to name a person to make treatment decisions for you when you cant. This person is often called a healthcare proxy, medical or healthcare power of , or agent.  · A living will. This form tells others the kinds of treatment you want or dont want if you become too ill or injured to speak for yourself.  · A MOLST or POLST (Medical or Physician Orders for Life-Sustaining Treatments, depending on the state where you live). These are legal forms obtained from your healthcare provider or hospital that document your wishes. They are actual healthcare provider's orders that must be followed by other medical professionals. The form belongs to you, not to the healthcare provider or hospital.   Keep in mind that you can change or cancel an advance directive at any time. Make it a practice to review your decisions each time there is a change in your health or goals of care. Also be sure to tell your healthcare proxy  and loved ones of any changes in your decisions.  Deciding whether to stop life-sustaining treatments for a loved one  Ideally, the decision to stop treatment is made with the persons consent. But in some cases, the decision falls to the persons healthcare proxy or other adult. If you need to make a decision about stopping treatment for a loved one, start by talking to his or her healthcare provider. Review the goals of care and the benefits and burdens of specific treatments on your loved ones health. Also think about your loved ones wishes and values. If needed, seek advice from other healthcare team members, like a  or .   Date Last Reviewed: 3/1/2017  © 1831-7597 [employer]. [address] [place] [address], [place] [address]. All rights reserved. This information is not intended as a substitute for professional medical care. Always follow your healthcare professional's instructions.

## 2019-01-24 NOTE — TELEPHONE ENCOUNTER
----- Message from Taryn Danielle sent at 1/24/2019  2:18 PM CST -----  Pt was seen today by dr scott and needs an appt for Lumbar back pain with radiculopathy affecting right lower extremity  Please call her @ [phone]  Thanks !

## 2019-01-24 NOTE — PROGRESS NOTES
Subjective:       Patient ID: Erica Marsh is a 68 y.o. female.    Chief Complaint: No chief complaint on file.    She has fibromyalgia with DD of lumbar spine. Despite PT, epidural and medical treatment has lumbar radiculopathy. Please consider neurosurgery evaluation for possible laminectomy, or rhizotomy.  Hypoglycemia port Gastric bypass improved  SLE under Rheumatology, needs eye and rheumatology follow up  Fibromyalgia under Cymabalta with partial improvement.      Back Pain   This is a chronic problem. The problem occurs intermittently. The problem has been gradually worsening since onset. The pain is present in the thoracic spine. The pain is at a severity of 7/10. The pain is moderate. The pain is worse during the night. The symptoms are aggravated by lying down. Associated symptoms include leg pain. Pertinent negatives include no abdominal pain, bladder incontinence, bowel incontinence, chest pain, dysuria, headaches, numbness or pelvic pain.   Leg Pain    Pertinent negatives include no numbness.     Review of Systems   Constitutional: Positive for fatigue. Negative for activity change, appetite change, chills and diaphoresis.   HENT: Negative.  Negative for facial swelling, hearing loss, nosebleeds, postnasal drip, sneezing and trouble swallowing.    Eyes: Negative for photophobia, pain, discharge, redness, itching and visual disturbance.   Respiratory: Negative for apnea, cough, chest tightness, shortness of breath and wheezing.    Cardiovascular: Negative.  Negative for chest pain, palpitations and leg swelling.   Gastrointestinal: Negative.  Negative for abdominal distention, abdominal pain, anal bleeding, blood in stool, bowel incontinence and diarrhea.   Endocrine: Negative.  Negative for cold intolerance, heat intolerance, polydipsia, polyphagia and polyuria.   Genitourinary: Negative.  Negative for bladder incontinence, difficulty urinating, dysuria, frequency, genital sores, hematuria, pelvic  pain, urgency, vaginal bleeding and vaginal discharge.   Musculoskeletal: Positive for arthralgias, back pain, myalgias and neck pain. Negative for gait problem and neck stiffness.   Skin: Negative.  Negative for color change, pallor and rash.   Allergic/Immunologic: Negative.  Negative for environmental allergies and food allergies.   Neurological: Negative.  Negative for dizziness, tremors, seizures, syncope, speech difficulty, numbness and headaches.   Hematological: Negative for adenopathy.   Psychiatric/Behavioral: Positive for decreased concentration. Negative for agitation, behavioral problems, confusion, hallucinations, self-injury and sleep disturbance. The patient is nervous/anxious. The patient is not hyperactive.        Patient Active Problem List   Diagnosis    Lupus (systemic lupus erythematosus)    GERD (gastroesophageal reflux disease)    Arthritis    Thyroid disease    Trigger finger    Hypoglycemia    Abdominal pain, other specified site    Constipation    Change in bowel habits    Rectal bleeding    Hypoglycemia after GI (gastrointestinal) surgery    Head trauma    Depression with anxiety    Other spondylosis, lumbar region    Sinus bradycardia    DDD (degenerative disc disease), thoracic       Objective:      Physical Exam   Constitutional: She is oriented to person, place, and time. She appears well-developed and well-nourished.   HENT:   Head: Normocephalic and atraumatic.   Right Ear: External ear normal.   Left Ear: External ear normal.   Nose: Nose normal.   Mouth/Throat: No oropharyngeal exudate.   Eyes: Conjunctivae and EOM are normal. Pupils are equal, round, and reactive to light. Right eye exhibits no discharge. Left eye exhibits no discharge. No scleral icterus.   Neck: Normal range of motion. Neck supple. No JVD present. No tracheal deviation present. No thyromegaly present.   Cardiovascular: Normal rate, normal heart sounds and intact distal pulses. Exam reveals no  gallop and no friction rub.   No murmur heard.  Pulmonary/Chest: Effort normal. No stridor. No respiratory distress. She has no wheezes. She has no rales. She exhibits no tenderness.   Abdominal: Soft. Bowel sounds are normal. She exhibits no distension and no mass. There is no tenderness. There is no rebound and no guarding.   Musculoskeletal: She exhibits no edema.        Right shoulder: She exhibits decreased range of motion and tenderness.        Thoracic back: She exhibits decreased range of motion, tenderness and bony tenderness.        Lumbar back: She exhibits decreased range of motion, tenderness and bony tenderness.   She has progressive hyperesthesia, mild scoliosis, mild DDD, cervical and lumbar radiculopathy. Hx of Herpes. Poor anxiety controlled, poor exercise , poor family support.   Lymphadenopathy:     She has no cervical adenopathy.   Neurological: She is alert and oriented to person, place, and time. She displays normal reflexes. No cranial nerve deficit. She exhibits normal muscle tone. Coordination normal.   Skin: Skin is dry. No rash noted. She is not diaphoretic. No erythema. No pallor.   Psychiatric: She has a normal mood and affect. Her behavior is normal. Judgment and thought content normal.   Vitals reviewed.      Lab Results   Component Value Date    WBC 7.13 02/12/2018    HGB 12.7 02/12/2018    HCT 38.1 02/12/2018     02/12/2018    CHOL 190 07/27/2017    TRIG 62 07/27/2017    HDL 85 (H) 07/27/2017    ALT 35 07/11/2018    AST 33 07/11/2018     07/11/2018    K 4.5 07/11/2018     07/11/2018    CREATININE 0.9 07/11/2018    BUN 19 07/11/2018    CO2 29 07/11/2018    TSH 1.034 02/12/2018    HGBA1C 4.9 02/26/2018     The 10-year ASCVD risk score (Fingal DURGA Jr., et al., 2013) is: 8.6%    Values used to calculate the score:      Age: 68 years      Sex: Female      Is Non- : No      Diabetic: Yes      Tobacco smoker: No      Systolic Blood Pressure: 112  mmHg      Is BP treated: No      HDL Cholesterol: 85 mg/dL      Total Cholesterol: 190 mg/dL    Assessment:       1. Lumbar back pain with radiculopathy affecting right lower extremity    2. Anxiety    3. Post herpetic neuralgia    4. Recurrent major depression resistant to treatment    5. Systemic lupus erythematosus, unspecified SLE type, unspecified organ involvement status    6. Hypothyroidism due to acquired atrophy of thyroid    7. Mixed hyperlipidemia        Plan:       Lumbar back pain with radiculopathy affecting right lower extremity  -     clonazePAM (KLONOPIN) 1 MG tablet; Take 1 tablet (1 mg total) by mouth 3 (three) times daily.  Dispense: 90 tablet; Refill: 3  -     citalopram (CELEXA) 40 MG tablet; Take 1 tablet (40 mg total) by mouth once daily.  Dispense: 90 tablet; Refill: 3  -     baclofen (LIORESAL) 20 MG tablet; Take 1 tablet (20 mg total) by mouth 3 (three) times daily.  Dispense: 270 tablet; Refill: 2  -     DULoxetine 40 mg CpDR; Take 40 mg by mouth once daily.  Dispense: 90 capsule; Refill: 4  -     gabapentin (NEURONTIN) 800 MG tablet; Take 1 tablet (800 mg total) by mouth 3 (three) times daily.  Dispense: 270 tablet; Refill: 3  -     Ambulatory referral to Neurosurgery  -     Comprehensive metabolic panel; Future; Expected date: 01/24/2019  -     Lipid panel; Future; Expected date: 01/24/2019    Anxiety  -     clonazePAM (KLONOPIN) 1 MG tablet; Take 1 tablet (1 mg total) by mouth 3 (three) times daily.  Dispense: 90 tablet; Refill: 3  -     citalopram (CELEXA) 40 MG tablet; Take 1 tablet (40 mg total) by mouth once daily.  Dispense: 90 tablet; Refill: 3    Post herpetic neuralgia  -     clonazePAM (KLONOPIN) 1 MG tablet; Take 1 tablet (1 mg total) by mouth 3 (three) times daily.  Dispense: 90 tablet; Refill: 3  -     valACYclovir (VALTREX) 500 MG tablet; Take 1 tablet (500 mg total) by mouth once daily.  Dispense: 180 tablet; Refill: 9    Recurrent major depression resistant to  treatment  -     clonazePAM (KLONOPIN) 1 MG tablet; Take 1 tablet (1 mg total) by mouth 3 (three) times daily.  Dispense: 90 tablet; Refill: 3  -     citalopram (CELEXA) 40 MG tablet; Take 1 tablet (40 mg total) by mouth once daily.  Dispense: 90 tablet; Refill: 3    Systemic lupus erythematosus, unspecified SLE type, unspecified organ involvement status    Hypothyroidism due to acquired atrophy of thyroid  -     T3; Future; Expected date: 01/24/2019  -     TSH; Future; Expected date: 01/24/2019  -     CBC auto differential; Future; Expected date: 01/24/2019    Mixed hyperlipidemia  -     Lipid panel; Future; Expected date: 01/24/2019      Patient readiness: acceptance and barriers:poor sleep habits    During the course of the visit the patient was educated and counseled about the following:     Underweight:  Nutritional supplements    Goals: Underweight: Increase calorie intake and BMI      Did patient meet goals/outcomes: Yes    The following self management tools provided: excercise log    Patient Instructions (the written plan) was given to the patient/family.     Time spent with patient: 45 minutes    Barriers to medications present (yes )    Adverse reactions to current medications (yes)    Over the counter medications reviewed (Yes)         30 minutes spent counseling patient on diet, exercise, and weight loss.  This has been fully explained to the patient, who indicates understanding.

## 2019-02-03 RX ORDER — HYDROXYCHLOROQUINE SULFATE 200 MG/1
TABLET, FILM COATED ORAL
Qty: 180 TABLET | Refills: 0 | OUTPATIENT
Start: 2019-02-03

## 2019-02-15 ENCOUNTER — TELEPHONE (OUTPATIENT)
Dept: FAMILY MEDICINE | Facility: CLINIC | Age: 69
End: 2019-02-15

## 2019-03-10 DIAGNOSIS — E08.649: Chronic | ICD-10-CM

## 2019-03-11 DIAGNOSIS — E08.649: Chronic | ICD-10-CM

## 2019-03-11 RX ORDER — BLOOD SUGAR DIAGNOSTIC
STRIP MISCELLANEOUS
Qty: 200 STRIP | Refills: 0 | Status: SHIPPED | OUTPATIENT
Start: 2019-03-11 | End: 2019-03-11 | Stop reason: SDUPTHER

## 2019-03-12 RX ORDER — BLOOD SUGAR DIAGNOSTIC
STRIP MISCELLANEOUS
Qty: 500 STRIP | Refills: 0 | Status: SHIPPED | OUTPATIENT
Start: 2019-03-12 | End: 2020-02-01 | Stop reason: SDUPTHER

## 2019-03-25 NOTE — TELEPHONE ENCOUNTER
Patient present in office. Requesting orders for mammogram. Mammogram orders placed. Appointment scheduled for today 8-14-17. Patient agreed to appointment date and time.    Son notified of prescription and to call if symptoms do not resolve.  follow-up medication appointment made with MD for 5/18/19.

## 2019-04-04 ENCOUNTER — PATIENT OUTREACH (OUTPATIENT)
Dept: ADMINISTRATIVE | Facility: HOSPITAL | Age: 69
End: 2019-04-04

## 2019-04-15 ENCOUNTER — TELEPHONE (OUTPATIENT)
Dept: FAMILY MEDICINE | Facility: CLINIC | Age: 69
End: 2019-04-15

## 2019-04-15 DIAGNOSIS — E07.9 THYROID DISEASE: ICD-10-CM

## 2019-04-15 RX ORDER — HYDROXYCHLOROQUINE SULFATE 200 MG/1
200 TABLET, FILM COATED ORAL 2 TIMES DAILY
Qty: 180 TABLET | Refills: 0 | Status: SHIPPED | OUTPATIENT
Start: 2019-04-15 | End: 2019-06-07 | Stop reason: SDUPTHER

## 2019-04-15 RX ORDER — LEVOTHYROXINE SODIUM 125 UG/1
125 TABLET ORAL DAILY
Qty: 90 TABLET | Refills: 0 | Status: SHIPPED | OUTPATIENT
Start: 2019-04-15 | End: 2019-06-07 | Stop reason: SDUPTHER

## 2019-04-15 NOTE — TELEPHONE ENCOUNTER
The following medications are not located on patient's current medication list:  1. Creon--d/c'd 3/8/18  2. Bupropion--d/c'd 4/18/18 (citing pt no longer taking)  3. Diclofenac--d/c'd 5/18/18 by Dr. Stallings    Call placed to patient to discuss. No answer received. Left message requesting return call to office. Request for Levothyroxine and Hydroxychloroquine forwarded to Dr. Ornelas in a separate encounter.           ----- Message from RT Mariely sent at 4/15/2019  3:06 PM CDT -----  Contact: Rena,[phone] [facility]  Rena,[phone] [facility], pt is requesting multiple refill for 90 day supply: Creon ,levothyroxine, burproion, diclofenac, and hydroxychloroquine.

## 2019-04-18 ENCOUNTER — HOSPITAL ENCOUNTER (OUTPATIENT)
Dept: RADIOLOGY | Facility: CLINIC | Age: 69
Discharge: HOME | End: 2019-04-18
Attending: PHYSICIAN ASSISTANT
Payer: MEDICARE

## 2019-04-18 ENCOUNTER — OFFICE VISIT (OUTPATIENT)
Dept: FAMILY MEDICINE | Facility: CLINIC | Age: 69
End: 2019-04-18
Payer: MEDICARE

## 2019-04-18 VITALS
TEMPERATURE: 98 F | WEIGHT: 134.5 LBS | OXYGEN SATURATION: 99 % | BODY MASS INDEX: 21.11 KG/M2 | HEART RATE: 58 BPM | HEIGHT: 67 IN | DIASTOLIC BLOOD PRESSURE: 60 MMHG | SYSTOLIC BLOOD PRESSURE: 116 MMHG | RESPIRATION RATE: 16 BRPM

## 2019-04-18 DIAGNOSIS — R52 BODY ACHES: ICD-10-CM

## 2019-04-18 DIAGNOSIS — J01.00 ACUTE MAXILLARY SINUSITIS, RECURRENCE NOT SPECIFIED: ICD-10-CM

## 2019-04-18 DIAGNOSIS — J02.9 SORE THROAT: ICD-10-CM

## 2019-04-18 DIAGNOSIS — R05.9 COUGH: ICD-10-CM

## 2019-04-18 DIAGNOSIS — M25.562 LEFT KNEE PAIN, UNSPECIFIED CHRONICITY: ICD-10-CM

## 2019-04-18 DIAGNOSIS — F41.9 ANXIETY: ICD-10-CM

## 2019-04-18 DIAGNOSIS — F41.8 DEPRESSION WITH ANXIETY: Chronic | ICD-10-CM

## 2019-04-18 DIAGNOSIS — R30.0 DYSURIA: ICD-10-CM

## 2019-04-18 DIAGNOSIS — E16.2 HYPOGLYCEMIA: Primary | ICD-10-CM

## 2019-04-18 DIAGNOSIS — M32.9 SYSTEMIC LUPUS ERYTHEMATOSUS, UNSPECIFIED SLE TYPE, UNSPECIFIED ORGAN INVOLVEMENT STATUS: ICD-10-CM

## 2019-04-18 DIAGNOSIS — K21.9 GASTROESOPHAGEAL REFLUX DISEASE, ESOPHAGITIS PRESENCE NOT SPECIFIED: ICD-10-CM

## 2019-04-18 DIAGNOSIS — B02.29 POST HERPETIC NEURALGIA: ICD-10-CM

## 2019-04-18 DIAGNOSIS — Z79.899 MEDICATION MANAGEMENT: ICD-10-CM

## 2019-04-18 DIAGNOSIS — M54.16 LUMBAR BACK PAIN WITH RADICULOPATHY AFFECTING RIGHT LOWER EXTREMITY: ICD-10-CM

## 2019-04-18 DIAGNOSIS — F33.9 RECURRENT MAJOR DEPRESSION RESISTANT TO TREATMENT: ICD-10-CM

## 2019-04-18 LAB
ALBUMIN/CREAT UR: 8.7 UG/MG (ref 0–30)
AMPHET+METHAMPHET UR QL: NEGATIVE
BARBITURATES UR QL SCN>200 NG/ML: NEGATIVE
BENZODIAZ UR QL SCN>200 NG/ML: NEGATIVE
BILIRUB SERPL-MCNC: NORMAL MG/DL
BLOOD URINE, POC: NORMAL
BZE UR QL SCN: NEGATIVE
CANNABINOIDS UR QL SCN: NEGATIVE
COLOR, POC UA: NORMAL
CREAT UR-MCNC: 69 MG/DL (ref 15–325)
CREAT UR-MCNC: 69 MG/DL (ref 15–325)
CTP QC/QA: YES
ETHANOL UR-MCNC: <10 MG/DL
GLUCOSE UR QL STRIP: NORMAL
INFLUENZA A, MOLECULAR: NEGATIVE
INFLUENZA B, MOLECULAR: NEGATIVE
KETONES UR QL STRIP: NORMAL
LEUKOCYTE ESTERASE URINE, POC: NORMAL
METHADONE UR QL SCN>300 NG/ML: NEGATIVE
MICROALBUMIN UR DL<=1MG/L-MCNC: 6 UG/ML
NITRITE, POC UA: NORMAL
OPIATES UR QL SCN: NEGATIVE
PCP UR QL SCN>25 NG/ML: NEGATIVE
PH, POC UA: 6
PROTEIN, POC: NORMAL
S PYO RRNA THROAT QL PROBE: NEGATIVE
SPECIFIC GRAVITY, POC UA: 1.01
SPECIMEN SOURCE: NORMAL
TOXICOLOGY INFORMATION: NORMAL
UROBILINOGEN, POC UA: NORMAL

## 2019-04-18 PROCEDURE — 80307 DRUG TEST PRSMV CHEM ANLYZR: CPT | Mod: HCNC

## 2019-04-18 PROCEDURE — 96372 PR INJECTION,THERAP/PROPH/DIAG2ST, IM OR SUBCUT: ICD-10-PCS | Mod: HCNC,S$GLB,, | Performed by: PHYSICIAN ASSISTANT

## 2019-04-18 PROCEDURE — 73562 XR KNEE ORTHO LEFT WITH FLEXION: ICD-10-PCS | Mod: 26,HCNC,59,LT | Performed by: RADIOLOGY

## 2019-04-18 PROCEDURE — 99999 PR PBB SHADOW E&M-EST. PATIENT-LVL V: CPT | Mod: PBBFAC,HCNC,, | Performed by: PHYSICIAN ASSISTANT

## 2019-04-18 PROCEDURE — 81002 POCT URINE DIPSTICK WITHOUT MICROSCOPE: ICD-10-PCS | Mod: HCNC,S$GLB,, | Performed by: PHYSICIAN ASSISTANT

## 2019-04-18 PROCEDURE — 99214 OFFICE O/P EST MOD 30 MIN: CPT | Mod: HCNC,25,S$GLB, | Performed by: PHYSICIAN ASSISTANT

## 2019-04-18 PROCEDURE — 87086 URINE CULTURE/COLONY COUNT: CPT | Mod: HCNC

## 2019-04-18 PROCEDURE — 87880 POCT RAPID STREP A: ICD-10-PCS | Mod: QW,HCNC,S$GLB, | Performed by: PHYSICIAN ASSISTANT

## 2019-04-18 PROCEDURE — 96372 THER/PROPH/DIAG INJ SC/IM: CPT | Mod: HCNC,S$GLB,, | Performed by: PHYSICIAN ASSISTANT

## 2019-04-18 PROCEDURE — 82043 UR ALBUMIN QUANTITATIVE: CPT | Mod: HCNC

## 2019-04-18 PROCEDURE — 81002 URINALYSIS NONAUTO W/O SCOPE: CPT | Mod: HCNC,S$GLB,, | Performed by: PHYSICIAN ASSISTANT

## 2019-04-18 PROCEDURE — 73562 X-RAY EXAM OF KNEE 3: CPT | Mod: 26,HCNC,59,LT | Performed by: RADIOLOGY

## 2019-04-18 PROCEDURE — 87880 STREP A ASSAY W/OPTIC: CPT | Mod: QW,HCNC,S$GLB, | Performed by: PHYSICIAN ASSISTANT

## 2019-04-18 PROCEDURE — 99999 PR PBB SHADOW E&M-EST. PATIENT-LVL V: ICD-10-PCS | Mod: PBBFAC,HCNC,, | Performed by: PHYSICIAN ASSISTANT

## 2019-04-18 PROCEDURE — 1101F PT FALLS ASSESS-DOCD LE1/YR: CPT | Mod: HCNC,CPTII,S$GLB, | Performed by: PHYSICIAN ASSISTANT

## 2019-04-18 PROCEDURE — 73564 X-RAY EXAM KNEE 4 OR MORE: CPT | Mod: 26,HCNC,LT,S$GLB | Performed by: RADIOLOGY

## 2019-04-18 PROCEDURE — 87081 CULTURE SCREEN ONLY: CPT | Mod: 59,HCNC

## 2019-04-18 PROCEDURE — 99214 PR OFFICE/OUTPT VISIT, EST, LEVL IV, 30-39 MIN: ICD-10-PCS | Mod: HCNC,25,S$GLB, | Performed by: PHYSICIAN ASSISTANT

## 2019-04-18 PROCEDURE — 87502 INFLUENZA DNA AMP PROBE: CPT | Mod: HCNC,PO

## 2019-04-18 PROCEDURE — 73564 XR KNEE ORTHO LEFT WITH FLEXION: ICD-10-PCS | Mod: 26,HCNC,LT,S$GLB | Performed by: RADIOLOGY

## 2019-04-18 PROCEDURE — 1101F PR PT FALLS ASSESS DOC 0-1 FALLS W/OUT INJ PAST YR: ICD-10-PCS | Mod: HCNC,CPTII,S$GLB, | Performed by: PHYSICIAN ASSISTANT

## 2019-04-18 PROCEDURE — 73562 X-RAY EXAM OF KNEE 3: CPT | Mod: 59,TC,HCNC,FY,PO,RT

## 2019-04-18 RX ORDER — INSULIN PUMP SYRINGE, 3 ML
EACH MISCELLANEOUS
Qty: 1 EACH | Refills: 0 | Status: SHIPPED | OUTPATIENT
Start: 2019-04-18 | End: 2019-06-07 | Stop reason: SDUPTHER

## 2019-04-18 RX ORDER — BETAMETHASONE SODIUM PHOSPHATE AND BETAMETHASONE ACETATE 3; 3 MG/ML; MG/ML
6 INJECTION, SUSPENSION INTRA-ARTICULAR; INTRALESIONAL; INTRAMUSCULAR; SOFT TISSUE ONCE
Status: COMPLETED | OUTPATIENT
Start: 2019-04-18 | End: 2019-04-18

## 2019-04-18 RX ORDER — CLONAZEPAM 1 MG/1
1 TABLET ORAL 3 TIMES DAILY
Qty: 90 TABLET | Refills: 3 | Status: SHIPPED | OUTPATIENT
Start: 2019-04-18 | End: 2019-05-24

## 2019-04-18 RX ORDER — AMOXICILLIN AND CLAVULANATE POTASSIUM 875; 125 MG/1; MG/1
1 TABLET, FILM COATED ORAL 2 TIMES DAILY
Qty: 20 TABLET | Refills: 0 | Status: SHIPPED | OUTPATIENT
Start: 2019-04-18 | End: 2019-04-28

## 2019-04-18 RX ORDER — BENZONATATE 100 MG/1
100 CAPSULE ORAL 3 TIMES DAILY PRN
Qty: 30 CAPSULE | Refills: 1 | Status: SHIPPED | OUTPATIENT
Start: 2019-04-18 | End: 2020-02-01 | Stop reason: SDUPTHER

## 2019-04-18 RX ORDER — LANCETS
EACH MISCELLANEOUS
Qty: 360 EACH | Refills: 0 | Status: SHIPPED | OUTPATIENT
Start: 2019-04-18 | End: 2019-06-07 | Stop reason: SDUPTHER

## 2019-04-18 RX ORDER — IPRATROPIUM BROMIDE AND ALBUTEROL SULFATE 2.5; .5 MG/3ML; MG/3ML
3 SOLUTION RESPIRATORY (INHALATION) EVERY 6 HOURS PRN
Qty: 25 VIAL | Refills: 2 | Status: SHIPPED | OUTPATIENT
Start: 2019-04-18 | End: 2019-04-18 | Stop reason: SDUPTHER

## 2019-04-18 RX ADMIN — BETAMETHASONE SODIUM PHOSPHATE AND BETAMETHASONE ACETATE 6 MG: 3; 3 INJECTION, SUSPENSION INTRA-ARTICULAR; INTRALESIONAL; INTRAMUSCULAR; SOFT TISSUE at 02:04

## 2019-04-18 NOTE — TELEPHONE ENCOUNTER
Tox and contract updated today.  appropriate    Patient also needs refill of glucagon. I could not find the appropriate order in the system.

## 2019-04-18 NOTE — PROGRESS NOTES
Subjective:       Patient ID: Erica Marsh is a 68 y.o. female.    Chief Complaint: Follow-up (3 month ); Sore Throat; and Knee Pain (left )    Mr. Marsh comes to clinic today for follow. She does have Lupus; this is followed by Dr. Alvarenga. The patient does have known anxiety and depression for which she is prescribed clonazepam and celexa. The patient does complain today of sore throat, chills, and body aches today. The patient also complains of some burning with urination. The patient complains of left knee pain and instability with climbing stairs. The patient has chronic depression. She feels this is not improving with her current medications. The patient is interested in seeing a specialist for this. The patient does request medication refills today.     Review of Systems   Constitutional: Negative for activity change, appetite change and fever.   HENT: Positive for postnasal drip, rhinorrhea and sore throat. Negative for sinus pressure.    Eyes: Negative for visual disturbance.   Respiratory: Positive for cough. Negative for shortness of breath.    Cardiovascular: Negative for chest pain.   Gastrointestinal: Negative for abdominal distention and abdominal pain.   Genitourinary: Negative for difficulty urinating and dysuria.   Musculoskeletal: Positive for arthralgias and myalgias.   Neurological: Negative for headaches.   Hematological: Negative for adenopathy.   Psychiatric/Behavioral: Positive for dysphoric mood. The patient is nervous/anxious.        Objective:      Physical Exam   Constitutional: She is oriented to person, place, and time.   HENT:   Mouth/Throat: Oropharynx is clear and moist. No oropharyngeal exudate.   Mild erythema of posterior oropharynx   Eyes: Pupils are equal, round, and reactive to light. Conjunctivae are normal.   Cardiovascular: Normal rate and regular rhythm.   Pulmonary/Chest: Effort normal and breath sounds normal. She has no wheezes.   Abdominal: Soft. Bowel sounds are  normal. There is no tenderness.   Musculoskeletal: She exhibits no edema.   Lymphadenopathy:     She has no cervical adenopathy.   Neurological: She is alert and oriented to person, place, and time.   Skin: No erythema.   Psychiatric: Her behavior is normal.   Dysphoric mood       Assessment:       1. Hypoglycemia    2. Depression with anxiety    3. Gastroesophageal reflux disease, esophagitis presence not specified    4. Systemic lupus erythematosus, unspecified SLE type, unspecified organ involvement status    5. Sore throat    6. Body aches    7. Medication management    8. Dysuria    9. Left knee pain, unspecified chronicity    10. Encounter for nutrition evaluation prior to bariatric surgery    11. Cough    12. Acute maxillary sinusitis, recurrence not specified        Plan:     Erica was seen today for follow-up, sore throat and knee pain.    Diagnoses and all orders for this visit:    Hypoglycemia  -     blood-glucose meter kit; To check BG 4 times daily, to use with insurance preferred meter  -     lancets Misc; To check BG 4 times daily, to use with insurance preferred meter  -     blood sugar diagnostic Strp; To check BG 4 times daily, to use with insurance preferred meter    Depression with anxiety  -     TOXICOLOGY SCREEN, URINE, RANDOM (COMPLIANCE)  -     Ambulatory referral to Psychology  Contract and toxicology updated  Rx request sent to Dr. Ornelas.   Gastroesophageal reflux disease, esophagitis presence not specified  -     ranitidine (ZANTAC) 150 MG tablet; Take 1 tablet (150 mg total) by mouth 2 (two) times daily.  Avoid trigger foods  Stop eating 2-3 hours before bed    Systemic lupus erythematosus, unspecified SLE type, unspecified organ involvement status  -     Microalbumin/creatinine urine ratio; Future  -     Microalbumin/creatinine urine ratio    Sore throat  -     POCT rapid strep A  -     Influenza A & B by Molecular  -     Strep A culture, throat  -     betamethasone acetate-betamethasone  sodium phosphate injection 6 mg    Body aches  -     Cancel: Influenza A & B by Molecular  -     Influenza A & B by Molecular  -     betamethasone acetate-betamethasone sodium phosphate injection 6 mg    Medication management  -     TOXICOLOGY SCREEN, URINE, RANDOM (COMPLIANCE)    Dysuria  -     POCT URINE DIPSTICK WITHOUT MICROSCOPE  -     Urine culture; Future  -     Urine culture    Left knee pain, unspecified chronicity  -     X-ray Knee Ortho Left with Flexion; Future      Cough  -     benzonatate (TESSALON PERLES) 100 MG capsule; Take 1 capsule (100 mg total) by mouth 3 (three) times daily as needed for Cough.  -     albuterol-ipratropium (DUO-NEB) 2.5 mg-0.5 mg/3 mL nebulizer solution; Take 3 mLs by nebulization every 6 (six) hours as needed for Wheezing. Rescue  -     betamethasone acetate-betamethasone sodium phosphate injection 6 mg    Acute maxillary sinusitis, recurrence not specified  -     amoxicillin-clavulanate 875-125mg (AUGMENTIN) 875-125 mg per tablet; Take 1 tablet by mouth 2 (two) times daily. for 10 days  -     betamethasone acetate-betamethasone sodium phosphate injection 6 mg      Take antibiotics with food.  Increase fluid intake.  Call the clinic if symptoms worsen, new symptoms develop or if you are not any better after completion of your antibiotics.

## 2019-04-18 NOTE — PROGRESS NOTES
Patient verified by name and . Patient received 6mg/1mL betamethasone in right ventrogluteal. Patient tolerated injection well. Patient advised to wait in clinic for 15 minutes in case of adverse reactions. Patient demonstrated understanding.

## 2019-04-20 LAB — BACTERIA UR CULT: NO GROWTH

## 2019-04-21 LAB — BACTERIA THROAT CULT: NORMAL

## 2019-04-21 RX ORDER — IPRATROPIUM BROMIDE AND ALBUTEROL SULFATE 2.5; .5 MG/3ML; MG/3ML
SOLUTION RESPIRATORY (INHALATION)
Qty: 990 ML | Refills: 2 | Status: SHIPPED | OUTPATIENT
Start: 2019-04-21 | End: 2024-01-30 | Stop reason: SDUPTHER

## 2019-04-22 ENCOUNTER — TELEPHONE (OUTPATIENT)
Dept: FAMILY MEDICINE | Facility: CLINIC | Age: 69
End: 2019-04-22

## 2019-04-22 DIAGNOSIS — M25.569 KNEE PAIN, UNSPECIFIED CHRONICITY, UNSPECIFIED LATERALITY: Primary | ICD-10-CM

## 2019-04-22 DIAGNOSIS — R93.7 ABNORMAL BONE XRAY: ICD-10-CM

## 2019-04-22 NOTE — TELEPHONE ENCOUNTER
----- Message from Smtia Galeas LPN sent at 4/22/2019  1:38 PM CDT -----  Contact: self       ----- Message -----  From: Janie Kelly  Sent: 4/22/2019  12:13 PM  To: [facility] Staff    Placed call to pod, patient missed a call from your office regarding test results. Please call back at [phone] (djxs)

## 2019-04-23 ENCOUNTER — PATIENT MESSAGE (OUTPATIENT)
Dept: FAMILY MEDICINE | Facility: CLINIC | Age: 69
End: 2019-04-23

## 2019-04-23 ENCOUNTER — TELEPHONE (OUTPATIENT)
Dept: FAMILY MEDICINE | Facility: CLINIC | Age: 69
End: 2019-04-23

## 2019-04-23 NOTE — TELEPHONE ENCOUNTER
----- Message -----     From: Erica Marsh     Sent: 4/23/2019  1:07 PM CDT       To: Jose Guadalupe Ornelas MD  Subject: Prescription    I made a mistake please do not refill Wellbutrin Im not taking it thank you.

## 2019-04-23 NOTE — TELEPHONE ENCOUNTER
This matter was handled in a separate encounter on today's date (4/23/19). Refill request for Creon/Wellbutrin sent in a separate encounter.

## 2019-04-23 NOTE — TELEPHONE ENCOUNTER
----- Message from Gila Moore sent at 4/23/2019 10:49 AM CDT -----  Type:  Patient Returning Call    Who Called:pt  Who Left Message for Patient: nurse  Does the patient know what this is regarding?:  na  Best Call Back Number:  [phone]  Additional Information:  placed a call to pod

## 2019-04-23 NOTE — TELEPHONE ENCOUNTER
Spoke to patient who states she is still taking Wellbutrin 75 mg twice daily and Lipase-Protease-Amylase 24,000-76,000-120,000 units-1 capsule 3 times daily with meals. Requesting refill be e-scribed to [facility]. Please advise.

## 2019-05-01 ENCOUNTER — TELEPHONE (OUTPATIENT)
Dept: FAMILY MEDICINE | Facility: CLINIC | Age: 69
End: 2019-05-01

## 2019-05-02 ENCOUNTER — OFFICE VISIT (OUTPATIENT)
Dept: PSYCHIATRY | Facility: CLINIC | Age: 69
End: 2019-05-02
Payer: MEDICARE

## 2019-05-02 ENCOUNTER — OFFICE VISIT (OUTPATIENT)
Dept: ORTHOPEDICS | Facility: CLINIC | Age: 69
End: 2019-05-02
Payer: MEDICARE

## 2019-05-02 VITALS
BODY MASS INDEX: 20.93 KG/M2 | TEMPERATURE: 98 F | HEART RATE: 59 BPM | WEIGHT: 133.38 LBS | SYSTOLIC BLOOD PRESSURE: 105 MMHG | HEIGHT: 67 IN | DIASTOLIC BLOOD PRESSURE: 63 MMHG | RESPIRATION RATE: 20 BRPM

## 2019-05-02 VITALS
DIASTOLIC BLOOD PRESSURE: 58 MMHG | HEIGHT: 67 IN | SYSTOLIC BLOOD PRESSURE: 114 MMHG | HEART RATE: 54 BPM | WEIGHT: 134.5 LBS | BODY MASS INDEX: 21.11 KG/M2

## 2019-05-02 DIAGNOSIS — G47.00 INSOMNIA, UNSPECIFIED TYPE: ICD-10-CM

## 2019-05-02 DIAGNOSIS — M25.551 RIGHT HIP PAIN: ICD-10-CM

## 2019-05-02 DIAGNOSIS — F33.1 MODERATE EPISODE OF RECURRENT MAJOR DEPRESSIVE DISORDER: Primary | ICD-10-CM

## 2019-05-02 DIAGNOSIS — M23.8X2 OTHER INTERNAL DERANGEMENTS OF LEFT KNEE: Primary | ICD-10-CM

## 2019-05-02 DIAGNOSIS — M25.551 RIGHT HIP PAIN: Primary | ICD-10-CM

## 2019-05-02 DIAGNOSIS — G89.29 OTHER CHRONIC PAIN: ICD-10-CM

## 2019-05-02 PROCEDURE — 99204 OFFICE O/P NEW MOD 45 MIN: CPT | Mod: 25,HCNC,S$GLB, | Performed by: ORTHOPAEDIC SURGERY

## 2019-05-02 PROCEDURE — 90792 PR PSYCHIATRIC DIAGNOSTIC EVALUATION W/MEDICAL SERVICES: ICD-10-PCS | Mod: HCNC,S$GLB,, | Performed by: PSYCHOLOGIST

## 2019-05-02 PROCEDURE — 20610 LARGE JOINT ASPIRATION/INJECTION: ICD-10-PCS | Mod: HCNC,LT,S$GLB, | Performed by: ORTHOPAEDIC SURGERY

## 2019-05-02 PROCEDURE — 99999 PR PBB SHADOW E&M-EST. PATIENT-LVL III: ICD-10-PCS | Mod: PBBFAC,HCNC,, | Performed by: PSYCHOLOGIST

## 2019-05-02 PROCEDURE — 20610 DRAIN/INJ JOINT/BURSA W/O US: CPT | Mod: HCNC,LT,S$GLB, | Performed by: ORTHOPAEDIC SURGERY

## 2019-05-02 PROCEDURE — 99204 PR OFFICE/OUTPT VISIT, NEW, LEVL IV, 45-59 MIN: ICD-10-PCS | Mod: 25,HCNC,S$GLB, | Performed by: ORTHOPAEDIC SURGERY

## 2019-05-02 PROCEDURE — 99999 PR PBB SHADOW E&M-EST. PATIENT-LVL III: CPT | Mod: PBBFAC,HCNC,, | Performed by: ORTHOPAEDIC SURGERY

## 2019-05-02 PROCEDURE — 90792 PSYCH DIAG EVAL W/MED SRVCS: CPT | Mod: HCNC,S$GLB,, | Performed by: PSYCHOLOGIST

## 2019-05-02 PROCEDURE — 1101F PR PT FALLS ASSESS DOC 0-1 FALLS W/OUT INJ PAST YR: ICD-10-PCS | Mod: HCNC,CPTII,S$GLB, | Performed by: ORTHOPAEDIC SURGERY

## 2019-05-02 PROCEDURE — 99999 PR PBB SHADOW E&M-EST. PATIENT-LVL III: CPT | Mod: PBBFAC,HCNC,, | Performed by: PSYCHOLOGIST

## 2019-05-02 PROCEDURE — 1101F PT FALLS ASSESS-DOCD LE1/YR: CPT | Mod: HCNC,CPTII,S$GLB, | Performed by: ORTHOPAEDIC SURGERY

## 2019-05-02 PROCEDURE — 99999 PR PBB SHADOW E&M-EST. PATIENT-LVL III: ICD-10-PCS | Mod: PBBFAC,HCNC,, | Performed by: ORTHOPAEDIC SURGERY

## 2019-05-02 RX ORDER — MELOXICAM 7.5 MG/1
7.5 TABLET ORAL DAILY
Qty: 30 TABLET | Refills: 3 | Status: SHIPPED | OUTPATIENT
Start: 2019-05-02 | End: 2019-05-28 | Stop reason: SDUPTHER

## 2019-05-02 RX ORDER — GABAPENTIN 400 MG/1
CAPSULE ORAL
Qty: 90 CAPSULE | Refills: 1 | Status: SHIPPED | OUTPATIENT
Start: 2019-05-02 | End: 2019-05-24

## 2019-05-02 RX ORDER — METHYLPREDNISOLONE ACETATE 40 MG/ML
40 INJECTION, SUSPENSION INTRA-ARTICULAR; INTRALESIONAL; INTRAMUSCULAR; SOFT TISSUE
Status: DISCONTINUED | OUTPATIENT
Start: 2019-05-02 | End: 2019-05-02 | Stop reason: HOSPADM

## 2019-05-02 RX ORDER — DULOXETIN HYDROCHLORIDE 60 MG/1
CAPSULE, DELAYED RELEASE ORAL
Qty: 30 CAPSULE | Refills: 1 | Status: SHIPPED | OUTPATIENT
Start: 2019-05-02 | End: 2019-06-27 | Stop reason: SDUPTHER

## 2019-05-02 RX ADMIN — METHYLPREDNISOLONE ACETATE 40 MG: 40 INJECTION, SUSPENSION INTRA-ARTICULAR; INTRALESIONAL; INTRAMUSCULAR; SOFT TISSUE at 11:05

## 2019-05-02 NOTE — LETTER
May 2, 2019      Michelle Renteria PA-C  [address]  [facility] [address]           [address] [address]  Phone: [phone]          Patient: Erica Marsh   MR Number: [id number]   YOB: 1950   Date of Visit: 5/2/2019       Dear Michelle Renteria:    Thank you for referring Erica Marsh to me for evaluation. Attached you will find relevant portions of my assessment and plan of care.    If you have questions, please do not hesitate to call me. I look forward to following Erica Marsh along with you.    Sincerely,    Rl Aranda II, MD    Enclosure  CC:  No Recipients    If you would like to receive this communication electronically, please contact [email or web address] or [phone] to request more information on [employer] access.    For providers and/or their staff who would like to refer a patient to [facility], please contact us through our one-stop-shop provider referral line, [facility], at [phone].    If you feel you have received this communication in error or would no longer like to receive these types of communications, please e-mail [email or web address]

## 2019-05-02 NOTE — PATIENT INSTRUCTIONS
Dr. Dick Chicas *Back and Spine Specialist  [address] [place] [address] [address]  AN[place] Sy[place] [address]  Phone# [phone]    Cortisone tablets  What is this medicine?  CORTISONE (KOR ti sone) is a corticosteroid. It is commonly used to treat inflammation of the skin, joints, lungs, and other organs. Common conditions treated include asthma, allergies, and arthritis. It is also used for other conditions, such as blood disorders and diseases of the adrenal glands.  How should I use this medicine?  Take this medicine by mouth with a glass of water. Follow the directions on the prescription label. Take it with food or milk to avoid stomach upset. If you are taking this medicine once a day, take it in the morning. Do not take more medicine than you are told to take. Do not suddenly stop taking your medicine because you may develop a severe reaction. Your doctor will tell you how much medicine to take. If your doctor wants you to stop the medicine, the dose may be slowly lowered over time to avoid any side effects.  Talk to your pediatrician regarding the use of this medicine in children. While this drug may be prescribed for selected conditions, precautions do apply.  What side effects may I notice from receiving this medicine?  Side effects that you should report to your doctor or health care professional as soon as possible:  · allergic reactions like skin rash, itching or hives, swelling of the face, lips, or tongue  · black, tarry stools  · breathing problems  · bulging eyes  · changes in vision  · fever, sore throat, infection, sores that do not heal  · frequent passing of urine  · high blood pressure  · increased thirst  · pain in hips, back, ribs, arms, shoulders, or legs  · swelling of feet or lower legs  · unusually weak or tired  Side effects that usually do not require medical attention (report to your doctor or health care professional if they continue or are bothersome):  · confusion, excitement,  restlessness  · headache  · nausea, vomiting  · skin problems, acne, thin and shiny skin  · stomach upset  · trouble sleeping  · weight gain  What may interact with this medicine?  Do not take this medicine with any of the following medications:  · mifepristone, RU-486  · vaccines  This medicine may also interact with the following medications:  · antibiotics like clarithromycin, erythromycin, and troleandomycin  · aspirin and aspirin-like drugs  · barbiturates, like phenobarbital  · ketoconazole  · phenytoin  · rifampin  · warfarin  What if I miss a dose?  If you miss a dose, take it as soon as you can. If it is almost time for your next dose, talk to your doctor or health care professional. You may need to miss a dose or take an extra dose. Do not take double or extra doses without advice.  Where should I keep my medicine?  Keep out of the reach of children.  Store at room temperature between 20 and 25 degrees C (68 and 77 degrees F). Throw away any unused medicine after the expiration date.  What should I tell my health care provider before I take this medicine?  They need to know if you have any of these conditions:  · Cushing's syndrome  · diabetes  · glaucoma  · heart problems or disease  · high blood pressure  · infection like herpes, measles, tuberculosis, or chickenpox  · kidney disease  · liver disease  · mental problems  · myasthenia gravis  · osteoporosis  · previous heart attack  · seizures  · stomach, ulcer or intestine disease including colitis and diverticulitis  · thyroid problem  · an unusual or allergic reaction to cortisone, other corticosteroids, medicines, foods, dyes, or preservatives  · pregnant or trying to get pregnant  · breast-feeding  What should I watch for while using this medicine?  Visit your doctor or health care professional for regular checks on your progress. If you are taking this medicine over a prolonged period, carry an identification card with your name and address, the type  and dose of your medicine, and your doctor's name and address.  This medicine may increase your risk of getting an infection. Stay away from people who are sick. Tell your doctor or health care professional if you are around anyone with measles or chickenpox.  If you are going to have surgery, tell your doctor or health care professional that you have taken this medicine within the last twelve months.  Ask your doctor or health care professional about your diet. You may need to lower the amount of salt you eat.  The medicine can increase your blood sugar. If you are a diabetic check with your doctor if you need help adjusting the dose of your diabetic medicine.  NOTE:This sheet is a summary. It may not cover all possible information. If you have questions about this medicine, talk to your doctor, pharmacist, or health care provider. Copyright© 2017 [employer]

## 2019-05-02 NOTE — PROCEDURES
Large Joint Aspiration/Injection  Date/Time: 5/2/2019 11:38 AM  Performed by: Rl Aranda II, MD  Authorized by: Rl Aranda II, MD     Consent Done?:  Yes (Verbal)  Indications:  Pain  Timeout: Prior to procedure the correct patient, procedure, and site was verified    Ultrasonic Guidance for needle placement: No  Needle size:  22 G  Approach:  Anteromedial  Medications:  40 mg methylPREDNISolone acetate 40 mg/mL  Patient tolerance:  Patient tolerated the procedure well with no immediate complications

## 2019-05-02 NOTE — PATIENT INSTRUCTIONS
Decrease Clonazepam to .5mg every 12 hours (has qs of 1mg tablets)  Stop Celexa  Increase Cymbalta to 60mg each night  Take Gabapentin 400mg one tablet every 8 hours  Improve sleep hygiene  Taper off caffeine  Start a project and work on it 15-30 min/day only, build up slowly  F/U with Dr. Ornelas about Creon  See Dr. Locke in 2-3 weeks, call if problems

## 2019-05-02 NOTE — PROGRESS NOTES
CC:  Patient presents today complaining of left knee and right hip pain. The appointment was made for the left knee and we have x-rays available for that.  Unfortunately the patient did mention the right hip pain until the end of the examination so we do not have x-rays of the right hip today.    With regard to the left knee the patient has had an insidious onset of pain in the left knee over the last 3-4 months.  She states it is painful going up and down stairs.  She states the knee also dalila like it wants to give way on her.  Most of the pain is located over the medial aspect of the knee. She rates her pain as a 3 to 5/10.  She denies locking of the knee.    With regard to the right hip she has pain in and around the hip area and she has difficulty sleeping on that side.    ROS:    Constitution: Denies chills, fever, and sweats.  HENT: Denies headaches or blurry vision.  Cardiovascular: Denies chest pain or irregular heart beat.  Respiratory: Denies cough or shortness of breath.  Gastrointestinal: Denies abdominal pain, nausea, or vomiting.  Genitourinary:  Denies urinary incontinence, bladder and kidney issues  Musculoskeletal:  Denies muscle cramps.  Left knee and right hip pain  Neurological: Denies dizziness or focal weakness.  Psychiatric/Behavioral: Normal mental status.  Hematologic/Lymphatic: Denies bleeding problem or easy bruising/bleeding.  Skin: Denies rash or suspicious lesions.    Physical examination     Gen - No acute distress   Eyes - Extraoccular motions intact, pupils equally round and reactive to light and accommodation   ENT - normocephalic, atruamtic, oropharynx clear   Neck - Supple, no abnormal masses   Cardiovascular - regular rate and rhythm   Pulmonary - clear to auscultation bilaterally   Abdomen - soft, non-tender, non-distended, positive bowel sounds   Psych - The patient is alert and oriented x3 with normal mood and affect    Examination of the Left Lower Extremity:     Motor  function is intact distally EHL/FHL/TA/mary   +2 dorsalis pedis and posterior tibial pulses   Sensation to light touch intact distally dorsal, plantar, and first web space     Examination of the Left knee:    ROM 0 - 150   Effusion negative  Tenderness to palpation at the joint line positive, at the medial joint line  Pain during range of motion negative  Crepitation during range of motion negative   TTP over the right trochanteric bursa.     positive increased pain noted with flexion past 90   positive antalgic gait noted   negative Lachman's Test   negative Anterior Drawer Test   negative Posterior Drawer Test   positive McMurrays Test   positive Disco Test   negative Varus/Valgus instability    Examination of the Right Hip    C-Sign positive  Logroll negative  Stenchfield positive    Pain with ROM positive    ROM:    Flexion   150  Extension   10  Abduction   50  Adduction   10  External Rotation 90  Internal Rotation 10    Flexion contracture negative    FADIR positive  FADER positive    X-rays were examined and personally reviewed by me.  Four views of the left knee show slight loss of joint space medially and laterally. Otherwise, there are no acute fractures and there is normal bony anatomy.    Dx:  Diagnosis is internal derangement of left knee, likely medial meniscal pathology based on exam and history.  Also right hip pain trochanteric bursitis versus osteoarthritis.    Plan:  Today we recommended an injection in the left knee for pain relief.  The patient verbalized understanding and said she would proceed.  We injected the left knee with a mixture of 2 2 and 1 which the patient tolerated well. Once in there and anti-inflammatory for the pain in her right hip.  Were also going to do physical therapy on the left knee.  She will follow up in 2 weeks will get x-rays of her right hip on her next visit.

## 2019-05-02 NOTE — PROGRESS NOTES
Client:  Erica Marsh  : 1950  Michelle Renteria, TARYN  [id number]    Presenting complaint:/Past psychiatric treatment:  Eirca presented with a history of depression since Perla but she has had exacerbation of depression across the years especially with the death of of brother 3 years ago (has had numerous relatives die)  She also reported having chronic pain complaints (fibromyalgia).  Her only daughter is working in [employer] and she worries about her. Erica also sts she is very uncomfortable around people.[the daughter comes home in 2 weeks to visit]  PHQ9 score today:  17.  She denied SI/HI/delusions or hallucinations.  Stressors: daughter working overseas, pain    Family psychiatric history: maternal uncle had depression    Relevant lab reviewed   MCV and MChemaglobin elevated; TSH .218; T3 nl; Cholesterol elevated, UDS normal  Relevant EKG reviewed   QTc 433 SB low voltage    Review of patient's allergies indicates:   Allergen Reactions    Codeine     Sulfa (sulfonamide antibiotics)     Codeine Rash and Other (See Comments)    Plaquenil [hydroxychloroquine] Rash and Other (See Comments)     ONE BRAND OF MED    Sulfa (sulfonamide antibiotics) Rash and Other (See Comments)       Current Outpatient Medications:     ACCU-CHEK JORDAN PLUS TEST STRP Strp, TEST THREE TIMES DAILY BEFORE A MEAL AS DIRECTED, Disp: 500 strip, Rfl: 0    albuterol-ipratropium (DUO-NEB) 2.5 mg-0.5 mg/3 mL nebulizer solution, USE 1 VIAL(3MLS) VIA NEBULIZER EVERY 6 HOURS AS NEEDED FOR WHEEZING, Disp: 990 mL, Rfl: 2    baclofen (LIORESAL) 20 MG tablet, Take 1 tablet (20 mg total) by mouth 3 (three) times daily., Disp: 270 tablet, Rfl: 2    benzonatate (TESSALON PERLES) 100 MG capsule, Take 1 capsule (100 mg total) by mouth 3 (three) times daily as needed for Cough., Disp: 30 capsule, Rfl: 1    blood sugar diagnostic Strp, To check BG 4 times daily, to use with insurance preferred meter, Disp: 360 each, Rfl: 0     blood-glucose meter kit, To check BG 4 times daily, to use with insurance preferred meter, Disp: 1 each, Rfl: 0    cholecalciferol, vitamin D3, (VITAMIN D3) 1,000 unit capsule, Take 1 capsule (1,000 Units total) by mouth 2 (two) times daily., Disp: 180 capsule, Rfl: 3    citalopram (CELEXA) 40 MG tablet, Take 1 tablet (40 mg total) by mouth once daily., Disp: 90 tablet, Rfl: 3    clonazePAM (KLONOPIN) 1 MG tablet, Take 1 tablet (1 mg total) by mouth 3 (three) times daily., Disp: 90 tablet, Rfl: 3    cyanocobalamin, vitamin B-12, 5,000 mcg TbDL, Take 1 tablet by mouth once daily., Disp: 90 tablet, Rfl: 3    DULoxetine 40 mg CpDR, Take 40 mg by mouth once daily., Disp: 90 capsule, Rfl: 4    fluticasone (FLONASE) 50 mcg/actuation nasal spray, ADMINISTER 1 SPRAY IN EACH NARE 2 TIMES DAILY., Disp: 48 g, Rfl: 3    folic acid (FOLVITE) 1 MG tablet, Take 1 tablet (1 mg total) by mouth once daily., Disp: 90 tablet, Rfl: 4    gabapentin (NEURONTIN) 800 MG tablet, Take 1 tablet (800 mg total) by mouth 3 (three) times daily., Disp: 270 tablet, Rfl: 3    glucagon, human recombinant, (GLUCAGON EMERGENCY KIT, HUMAN,) 1 mg SolR, Inject 1 mg into the muscle as needed., Disp: 1 each, Rfl: 3    hydroxychloroquine (PLAQUENIL) 200 mg tablet, Take 1 tablet (200 mg total) by mouth 2 (two) times daily., Disp: 180 tablet, Rfl: 0    lancets (ACCU-CHEK SOFTCLIX LANCETS) Misc, Use to test blood sugar three times a day   DX: E08.649, Disp: 300 each, Rfl: 3    lancets Misc, To check BG 4 times daily, to use with insurance preferred meter, Disp: 360 each, Rfl: 0    levothyroxine (SYNTHROID) 125 MCG tablet, Take 1 tablet (125 mcg total) by mouth once daily., Disp: 90 tablet, Rfl: 0    lidocaine HCL 2% (XYLOCAINE) 2 % jelly, Apply topically as needed. Apply topically once nightly to affected area both heels., Disp: 30 mL, Rfl: 2    ranitidine (ZANTAC) 150 MG tablet, Take 1 tablet (150 mg total) by mouth 2 (two) times daily., Disp:  180 tablet, Rfl: 3    traMADol (ULTRAM) 50 mg tablet, Take 1 tablet (50 mg total) by mouth every 6 (six) hours as needed for Pain., Disp: 180 tablet, Rfl: 1    valACYclovir (VALTREX) 500 MG tablet, Take 1 tablet (500 mg total) by mouth once daily., Disp: 180 tablet, Rfl: 9    vitamin E 1000 UNIT capsule, Take 1,000 Units by mouth once daily., Disp: , Rfl:   Past Medical History:   Diagnosis Date    Arthritis     Depression     Diabetes mellitus     NO MED SINCE GASTRIC BYPASS has hypoglycemia    Diabetes mellitus type II     GERD (gastroesophageal reflux disease)     Hypertension     NO MED SINCE GASTRIC BYPASS    Kidney disease     Lupus erythematosus     Shingles     Thyroid disease     Trigger finger     RIGHT LONG       Clinical presentation:  Appearance:  The client presented in casual attire evidencing good grooming and hygiene    Neuro:  the client was alert, oriented x 4 and evidenced good judgement and insight.      Attention/concentration:  Was good in session    Memory:  The client had no subjective memory complaints but she was able to recall information discussed in session accurately.  Her c/o memory problems include forgetting what she wanted to get, needing lists to remember things etc.    Judgement:  behavior is adequate to the situation    Fund of knowledge:  intact and appropriate to age and level of education    Gait/station:  was normal    Heart: the patients heartbeat was regular in rate and rhythm.  There were nor murmurs, gallops or rubs.    Lung: clear bilaterally    Skin warm and dry. Nailbeds were pink and refill was good    Extremities: were warm and did not evidence any edema    Mood:  was mildly dysthymic. No reported or suspected anxiety.  No SI/HI/delusions or hallucinations reported or suspected.     Affect: was normal range    Speech:  normal rate and tone, o pressured speech noted     Sleep: the client sts she lays down at 10-11 PM but wakes up at 2am and has trouble  going back to bed.  She sts she does not waking up feeling well rested.  No daytime sleepiness was reported    Appetite: was reported as good, occ skips meals.    Pain complaints:  Erica sts she always hurts but it varies (muscle/joint pain) rated pain at 4/10 today    ETOH/Substance use history:  The client had no reported ETOH/or other substance use problems by her report.    Psychosocial history:  The client currently lives with her .  She reported having no substantive peer support and she states is not engaged in leisure activies or hobbies. She reports she does not even go to [facility] much because of her discomfort around people.   She will go to [facility] 1-2 tmes/month but only when it is empty to pray. She does not even feel comfortable talking to her .  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     Education/Occ history:  Medical tecnologist (lab) for about 35 yrs, retired 10 years ago.      Education/session discussion:  Erica talked about her feelings openly but she is reluctant to make lifestyle changes.  She is motivated to take medication and  May be helped with therapy (will order later if she is compliant with meds and treatment recommendations)  · Discussed general issues about treatment of depression/anxiety including utility of medication and therapy. Discussed the risks/benefits/alternatives of medication prescribed and rationale for changes made today with the client.  Reviewed typical side effects and potential adverse reactions of an antidepressant medication including but not limited to teratogenicity, orthostatic changes, increased risk of SI, risk of mood swings, risk of electrolyte disturbance and increased risk of bleeding.  The client was given handouts about her medication for home reference (Cymbalta, Gabapentin, clonazepam). The client appeared to understand the information shared based on their questions and responses made in  session.    · Discussed the need for regular, restful sleep and strategies that help promote good sleep.  Reviewed the negative impact of alcohol, smoking, and caffeine on sleep with the client.  The client was given educations information about sleep/insomnia and sleep hygiene for home reference.    Impression:  Erica has moderate recurrent depression.  Medication changes were made and her response will be monitored across treatment.  Prognosis good.    Plan:  Decrease Clonazepam to .5mg every 12 hours (has qs of 1mg tablets)  Stop Celexa (streamlining meds)  Increase Cymbalta to 60mg each night  Take Gabapentin 400mg one tablet every 8 hours  Improve sleep hygiene  Taper off caffeine  Start a project and work on it 15-30 min/day only, build up slowly  RTC 4 weeks, call if any problems with medications  Recommended therapy    Session:   [id number]

## 2019-05-20 RX ORDER — LIDOCAINE HYDROCHLORIDE 20 MG/ML
JELLY TOPICAL
Refills: 0 | OUTPATIENT
Start: 2019-05-20

## 2019-05-24 ENCOUNTER — LAB VISIT (OUTPATIENT)
Dept: LAB | Facility: HOSPITAL | Age: 69
End: 2019-05-24
Attending: FAMILY MEDICINE
Payer: MEDICARE

## 2019-05-24 ENCOUNTER — OFFICE VISIT (OUTPATIENT)
Dept: FAMILY MEDICINE | Facility: CLINIC | Age: 69
End: 2019-05-24
Payer: MEDICARE

## 2019-05-24 VITALS
TEMPERATURE: 98 F | BODY MASS INDEX: 21.04 KG/M2 | RESPIRATION RATE: 16 BRPM | HEART RATE: 60 BPM | DIASTOLIC BLOOD PRESSURE: 80 MMHG | SYSTOLIC BLOOD PRESSURE: 136 MMHG | WEIGHT: 134.06 LBS | OXYGEN SATURATION: 98 % | HEIGHT: 67 IN

## 2019-05-24 DIAGNOSIS — R25.2 MUSCLE CRAMPS: ICD-10-CM

## 2019-05-24 DIAGNOSIS — G47.00 INSOMNIA, UNSPECIFIED TYPE: ICD-10-CM

## 2019-05-24 DIAGNOSIS — Z79.899 HIGH RISK MEDICATION USE: ICD-10-CM

## 2019-05-24 DIAGNOSIS — F41.8 DEPRESSION WITH ANXIETY: Primary | Chronic | ICD-10-CM

## 2019-05-24 DIAGNOSIS — F33.9 RECURRENT MAJOR DEPRESSION RESISTANT TO TREATMENT: ICD-10-CM

## 2019-05-24 DIAGNOSIS — M79.7 FIBROMYALGIA: ICD-10-CM

## 2019-05-24 DIAGNOSIS — R79.89 LOW TSH LEVEL: ICD-10-CM

## 2019-05-24 DIAGNOSIS — E16.2 HYPOGLYCEMIA: ICD-10-CM

## 2019-05-24 DIAGNOSIS — F41.8 DEPRESSION WITH ANXIETY: Chronic | ICD-10-CM

## 2019-05-24 LAB
ALBUMIN SERPL BCP-MCNC: 3.9 G/DL (ref 3.5–5.2)
ALP SERPL-CCNC: 85 U/L (ref 55–135)
ALT SERPL W/O P-5'-P-CCNC: 28 U/L (ref 10–44)
ANION GAP SERPL CALC-SCNC: 9 MMOL/L (ref 8–16)
AST SERPL-CCNC: 29 U/L (ref 10–40)
BILIRUB SERPL-MCNC: 0.5 MG/DL (ref 0.1–1)
BUN SERPL-MCNC: 22 MG/DL (ref 8–23)
CALCIUM SERPL-MCNC: 9.7 MG/DL (ref 8.7–10.5)
CHLORIDE SERPL-SCNC: 103 MMOL/L (ref 95–110)
CO2 SERPL-SCNC: 28 MMOL/L (ref 23–29)
CREAT SERPL-MCNC: 0.7 MG/DL (ref 0.5–1.4)
EST. GFR  (AFRICAN AMERICAN): >60 ML/MIN/1.73 M^2
EST. GFR  (NON AFRICAN AMERICAN): >60 ML/MIN/1.73 M^2
GLUCOSE SERPL-MCNC: 97 MG/DL (ref 70–110)
MAGNESIUM SERPL-MCNC: 2.3 MG/DL (ref 1.6–2.6)
POTASSIUM SERPL-SCNC: 4.2 MMOL/L (ref 3.5–5.1)
PROT SERPL-MCNC: 7.1 G/DL (ref 6–8.4)
SODIUM SERPL-SCNC: 140 MMOL/L (ref 136–145)
T4 FREE SERPL-MCNC: 0.89 NG/DL (ref 0.71–1.51)
TSH SERPL DL<=0.005 MIU/L-ACNC: 5.07 UIU/ML (ref 0.4–4)

## 2019-05-24 PROCEDURE — 93010 EKG 12-LEAD: ICD-10-PCS | Mod: HCNC,S$GLB,, | Performed by: INTERNAL MEDICINE

## 2019-05-24 PROCEDURE — 83735 ASSAY OF MAGNESIUM: CPT | Mod: HCNC

## 2019-05-24 PROCEDURE — 99214 PR OFFICE/OUTPT VISIT, EST, LEVL IV, 30-39 MIN: ICD-10-PCS | Mod: HCNC,S$GLB,, | Performed by: PHYSICIAN ASSISTANT

## 2019-05-24 PROCEDURE — 93005 EKG 12-LEAD: ICD-10-PCS | Mod: HCNC,S$GLB,, | Performed by: PHYSICIAN ASSISTANT

## 2019-05-24 PROCEDURE — 99999 PR PBB SHADOW E&M-EST. PATIENT-LVL V: CPT | Mod: PBBFAC,HCNC,, | Performed by: PHYSICIAN ASSISTANT

## 2019-05-24 PROCEDURE — 99999 PR PBB SHADOW E&M-EST. PATIENT-LVL V: ICD-10-PCS | Mod: PBBFAC,HCNC,, | Performed by: PHYSICIAN ASSISTANT

## 2019-05-24 PROCEDURE — 36415 COLL VENOUS BLD VENIPUNCTURE: CPT | Mod: HCNC,PO

## 2019-05-24 PROCEDURE — 84439 ASSAY OF FREE THYROXINE: CPT | Mod: HCNC

## 2019-05-24 PROCEDURE — 93005 ELECTROCARDIOGRAM TRACING: CPT | Mod: HCNC,S$GLB,, | Performed by: PHYSICIAN ASSISTANT

## 2019-05-24 PROCEDURE — 99214 OFFICE O/P EST MOD 30 MIN: CPT | Mod: HCNC,S$GLB,, | Performed by: PHYSICIAN ASSISTANT

## 2019-05-24 PROCEDURE — 1101F PR PT FALLS ASSESS DOC 0-1 FALLS W/OUT INJ PAST YR: ICD-10-PCS | Mod: HCNC,CPTII,S$GLB, | Performed by: PHYSICIAN ASSISTANT

## 2019-05-24 PROCEDURE — 80053 COMPREHEN METABOLIC PANEL: CPT | Mod: HCNC

## 2019-05-24 PROCEDURE — 84443 ASSAY THYROID STIM HORMONE: CPT | Mod: HCNC

## 2019-05-24 PROCEDURE — 93010 ELECTROCARDIOGRAM REPORT: CPT | Mod: HCNC,S$GLB,, | Performed by: INTERNAL MEDICINE

## 2019-05-24 PROCEDURE — 1101F PT FALLS ASSESS-DOCD LE1/YR: CPT | Mod: HCNC,CPTII,S$GLB, | Performed by: PHYSICIAN ASSISTANT

## 2019-05-24 RX ORDER — CLONAZEPAM 1 MG/1
TABLET ORAL
Qty: 45 TABLET | Refills: 2 | Status: SHIPPED | OUTPATIENT
Start: 2019-06-09 | End: 2019-07-26

## 2019-05-24 RX ORDER — GABAPENTIN 600 MG/1
600 TABLET ORAL 3 TIMES DAILY
Qty: 90 TABLET | Refills: 0 | Status: SHIPPED | OUTPATIENT
Start: 2019-05-24 | End: 2019-06-07 | Stop reason: SDUPTHER

## 2019-05-24 RX ORDER — INSULIN PUMP SYRINGE, 3 ML
EACH MISCELLANEOUS
Qty: 1 EACH | Refills: 0 | Status: SHIPPED | OUTPATIENT
Start: 2019-05-24 | End: 2019-06-07 | Stop reason: SDUPTHER

## 2019-05-24 RX ORDER — ZIPRASIDONE HYDROCHLORIDE 20 MG/1
CAPSULE ORAL
Qty: 60 CAPSULE | Refills: 1 | Status: SHIPPED | OUTPATIENT
Start: 2019-05-24 | End: 2019-05-24 | Stop reason: SDUPTHER

## 2019-05-24 NOTE — PROGRESS NOTES
"Subjective:       Patient ID: Erica Marsh is a 68 y.o. female.    Chief Complaint: Dizziness; Fatigue; Headache; and Nausea    Ms. Marsh comes to clinic today for 3 week follow up after seeing Dr. Locke. The patient's medication was changed. She has not responded well to this. She reports anxiety, increased heart rate, sweats, insomnia, and muscle cramps. The patient reports she has been "miserable" since these changes. She has a long standing history of depression and anxiety.    Review of Systems   Constitutional: Positive for activity change and diaphoresis. Negative for appetite change and fever.   HENT: Negative for postnasal drip, rhinorrhea and sinus pressure.    Eyes: Negative for visual disturbance.   Respiratory: Negative for cough and shortness of breath.    Cardiovascular: Negative for chest pain.   Gastrointestinal: Negative for abdominal distention and abdominal pain.   Genitourinary: Negative for difficulty urinating and dysuria.   Musculoskeletal: Positive for arthralgias and myalgias.   Neurological: Negative for headaches.   Hematological: Negative for adenopathy.   Psychiatric/Behavioral: Positive for dysphoric mood and sleep disturbance. The patient is nervous/anxious.        Objective:      Physical Exam   Constitutional: She is oriented to person, place, and time.   Cardiovascular: Normal rate and regular rhythm.   Pulmonary/Chest: Effort normal and breath sounds normal. She has no wheezes.   Abdominal: Soft. Bowel sounds are normal. There is no tenderness.   Musculoskeletal: She exhibits no edema.   Neurological: She is alert and oriented to person, place, and time.   Skin: No erythema.   Psychiatric: Her behavior is normal.   Patient appears anxious       Assessment:       1. Depression with anxiety    2. Fibromyalgia    3. Recurrent major depression resistant to treatment    4. Low TSH level    5. Insomnia, unspecified type    6. Muscle cramps    7. Hypoglycemia    8. High risk " medication use        Plan:       Erica was seen today for dizziness, fatigue, headache and nausea.    Diagnoses and all orders for this visit:    Depression with anxiety  -     TSH; Future  -     ziprasidone (GEODON) 20 MG Cap; 1 tab at night for 10 days then 1 tab bid.  -     EKG 12-lead  Continue cymbalta  Clonazepam 1mg- 1/2 tab in the morning and 1tab in the evening.     Fibromyalgia  -     gabapentin (NEURONTIN) 600 MG tablet; Take 1 tablet (600 mg total) by mouth 3 (three) times daily.  -     ziprasidone (GEODON) 20 MG Cap; 1 tab at night for 10 days then 1 tab bid.  Continue cymbalta  Increase gabapentin 600mg tid  Recurrent major depression resistant to treatment  -     clonazePAM (KLONOPIN) 1 MG tablet; Take 1/2 tab PO in the morning. And take 1 tab PO nightly.  -     TSH; Future  -     ziprasidone (GEODON) 20 MG Cap; 1 tab at night for 10 days then 1 tab bid.  -     EKG 12-lead  Continue cymbalta    Low TSH level  -     TSH; Future    Insomnia, unspecified type  -     ziprasidone (GEODON) 20 MG Cap; 1 tab at night for 10 days then 1 tab bid.  -     EKG 12-lead  Clonazepam 1mg- 1/2 tab in the morning and 1tab in the evening.   Muscle cramps  -     Comprehensive metabolic panel; Future  -     Magnesium; Future    Hypoglycemia  -     blood-glucose meter kit; Use as instructed  -     blood sugar diagnostic Strp; 1 strip by Misc.(Non-Drug; Combo Route) route 3 (three) times daily.    High risk medication use  -     EKG 12-lead    Follow up in 2 weeks with me. Patient to call clinic sooner if needed.   Case reviewed with Dr. Ornelas. Dr. Ornelas agrees with medication changes.

## 2019-05-24 NOTE — PATIENT INSTRUCTIONS

## 2019-05-28 DIAGNOSIS — M23.8X2 OTHER INTERNAL DERANGEMENTS OF LEFT KNEE: ICD-10-CM

## 2019-05-28 DIAGNOSIS — G57.00 SCIATIC NEUROPATHY, UNSPECIFIED LATERALITY: ICD-10-CM

## 2019-05-28 RX ORDER — MELOXICAM 7.5 MG/1
7.5 TABLET ORAL DAILY
Qty: 30 TABLET | Refills: 3 | Status: SHIPPED | OUTPATIENT
Start: 2019-05-28 | End: 2019-06-27

## 2019-05-28 RX ORDER — FOLIC ACID 1 MG/1
1 TABLET ORAL DAILY
Qty: 90 TABLET | Refills: 3 | Status: SHIPPED | OUTPATIENT
Start: 2019-05-28 | End: 2020-02-01 | Stop reason: SDUPTHER

## 2019-05-28 RX ORDER — ZIPRASIDONE HYDROCHLORIDE 20 MG/1
CAPSULE ORAL
Qty: 180 CAPSULE | Refills: 0 | Status: SHIPPED | OUTPATIENT
Start: 2019-05-28 | End: 2019-06-07 | Stop reason: SDUPTHER

## 2019-06-07 ENCOUNTER — TELEPHONE (OUTPATIENT)
Dept: FAMILY MEDICINE | Facility: CLINIC | Age: 69
End: 2019-06-07

## 2019-06-07 DIAGNOSIS — M79.7 FIBROMYALGIA: ICD-10-CM

## 2019-06-07 DIAGNOSIS — E16.2 HYPOGLYCEMIA: ICD-10-CM

## 2019-06-07 DIAGNOSIS — F33.9 RECURRENT MAJOR DEPRESSION RESISTANT TO TREATMENT: ICD-10-CM

## 2019-06-07 DIAGNOSIS — R63.4 ABNORMAL WEIGHT LOSS: ICD-10-CM

## 2019-06-07 DIAGNOSIS — G47.00 INSOMNIA, UNSPECIFIED TYPE: ICD-10-CM

## 2019-06-07 DIAGNOSIS — J30.1 CHRONIC SEASONAL ALLERGIC RHINITIS DUE TO POLLEN: ICD-10-CM

## 2019-06-07 DIAGNOSIS — F41.8 DEPRESSION WITH ANXIETY: Chronic | ICD-10-CM

## 2019-06-07 DIAGNOSIS — E07.9 THYROID DISEASE: ICD-10-CM

## 2019-06-07 DIAGNOSIS — R00.1 BRADYCARDIA, SINUS: ICD-10-CM

## 2019-06-07 RX ORDER — HYDROXYCHLOROQUINE SULFATE 200 MG/1
200 TABLET, FILM COATED ORAL 2 TIMES DAILY
Qty: 180 TABLET | Refills: 0 | Status: SHIPPED | OUTPATIENT
Start: 2019-06-07 | End: 2019-06-27 | Stop reason: SDUPTHER

## 2019-06-07 RX ORDER — VIT C/E/ZN/COPPR/LUTEIN/ZEAXAN 250MG-90MG
1000 CAPSULE ORAL 2 TIMES DAILY
Qty: 180 CAPSULE | Refills: 3 | Status: SHIPPED | OUTPATIENT
Start: 2019-06-07 | End: 2020-02-01 | Stop reason: SDUPTHER

## 2019-06-07 RX ORDER — LEVOTHYROXINE SODIUM 125 UG/1
125 TABLET ORAL DAILY
Qty: 90 TABLET | Refills: 0 | Status: SHIPPED | OUTPATIENT
Start: 2019-06-07 | End: 2019-06-27 | Stop reason: SDUPTHER

## 2019-06-07 RX ORDER — LANCETS
EACH MISCELLANEOUS
Qty: 360 EACH | Refills: 0 | Status: SHIPPED | OUTPATIENT
Start: 2019-06-07 | End: 2021-12-30 | Stop reason: SDUPTHER

## 2019-06-07 RX ORDER — FLUTICASONE PROPIONATE 50 MCG
SPRAY, SUSPENSION (ML) NASAL
Qty: 48 G | Refills: 3 | Status: SHIPPED | OUTPATIENT
Start: 2019-06-07 | End: 2020-02-01 | Stop reason: SDUPTHER

## 2019-06-07 RX ORDER — ZIPRASIDONE HYDROCHLORIDE 20 MG/1
CAPSULE ORAL
Qty: 180 CAPSULE | Refills: 0 | Status: SHIPPED | OUTPATIENT
Start: 2019-06-07 | End: 2019-06-27 | Stop reason: SDUPTHER

## 2019-06-07 RX ORDER — GABAPENTIN 600 MG/1
600 TABLET ORAL 3 TIMES DAILY
Qty: 90 TABLET | Refills: 0 | Status: SHIPPED | OUTPATIENT
Start: 2019-06-07 | End: 2019-06-27 | Stop reason: SDUPTHER

## 2019-06-07 RX ORDER — INSULIN PUMP SYRINGE, 3 ML
EACH MISCELLANEOUS
Qty: 1 EACH | Refills: 0 | Status: SHIPPED | OUTPATIENT
Start: 2019-06-07 | End: 2020-06-06

## 2019-06-07 RX ORDER — INSULIN PUMP SYRINGE, 3 ML
EACH MISCELLANEOUS
Qty: 1 EACH | Refills: 0 | Status: SHIPPED | OUTPATIENT
Start: 2019-06-07 | End: 2023-03-27

## 2019-06-07 RX ORDER — LIDOCAINE HYDROCHLORIDE 20 MG/ML
JELLY TOPICAL
Qty: 30 ML | Refills: 2 | OUTPATIENT
Start: 2019-06-07

## 2019-06-07 RX ORDER — MAGNESIUM HYDROXIDE 400 MG/5ML
1 SUSPENSION, ORAL (FINAL DOSE FORM) ORAL DAILY
Qty: 90 TABLET | Refills: 3 | Status: SHIPPED | OUTPATIENT
Start: 2019-06-07 | End: 2021-06-21

## 2019-06-07 NOTE — TELEPHONE ENCOUNTER
Prescription for Hydroxychloroquine e-scribed to pharmacy on 4/15/19 for 90 day supply. New prescription e-scribed today to mail order as above. Call placed to patient to discuss. No answer received. Left message requesting return call to office.         ----- Message from Delfina Stokes sent at 6/7/2019 12:10 PM CDT -----  Contact: [phone]  Patient requesting a refill on hydroxychloroquine (PLAQUENIL) 200 mg tablet.  Patient is completely out and stated several request has been made.     Patient will be using   [facility] [address] [place] [address] [address] [place] [id number]  [place] [address]  Phone: [phone] Fax: [phone]    Please call patient at [phone].     Thanks!

## 2019-06-10 DIAGNOSIS — M19.90 ARTHRITIS: ICD-10-CM

## 2019-06-10 DIAGNOSIS — M32.19 OTHER SYSTEMIC LUPUS ERYTHEMATOSUS WITH OTHER ORGAN INVOLVEMENT: Primary | ICD-10-CM

## 2019-06-10 RX ORDER — TRAMADOL HYDROCHLORIDE 50 MG/1
50 TABLET ORAL EVERY 6 HOURS PRN
Qty: 180 TABLET | Refills: 1 | OUTPATIENT
Start: 2019-06-10

## 2019-06-19 ENCOUNTER — TELEPHONE (OUTPATIENT)
Dept: PHYSICAL MEDICINE AND REHAB | Facility: CLINIC | Age: 69
End: 2019-06-19

## 2019-06-19 NOTE — TELEPHONE ENCOUNTER
----- Message from Jah Tran sent at 6/19/2019 11:22 AM CDT -----  Type:  Patient Returning Call    Who Called:  Patient  Who Left Message for Patient:  MORTEZA  Does the patient know what this is regarding?:  MORTEZA  Best Call Back Number:  [phone]  Additional Information:

## 2019-06-27 ENCOUNTER — OFFICE VISIT (OUTPATIENT)
Dept: FAMILY MEDICINE | Facility: CLINIC | Age: 69
End: 2019-06-27
Payer: MEDICARE

## 2019-06-27 ENCOUNTER — LAB VISIT (OUTPATIENT)
Dept: LAB | Facility: HOSPITAL | Age: 69
End: 2019-06-27
Attending: PHYSICIAN ASSISTANT
Payer: MEDICARE

## 2019-06-27 VITALS
WEIGHT: 140 LBS | DIASTOLIC BLOOD PRESSURE: 62 MMHG | RESPIRATION RATE: 16 BRPM | TEMPERATURE: 98 F | OXYGEN SATURATION: 97 % | HEART RATE: 66 BPM | SYSTOLIC BLOOD PRESSURE: 118 MMHG | HEIGHT: 67 IN | BODY MASS INDEX: 21.97 KG/M2

## 2019-06-27 DIAGNOSIS — F41.8 DEPRESSION WITH ANXIETY: Chronic | ICD-10-CM

## 2019-06-27 DIAGNOSIS — M25.471 PAIN AND SWELLING OF ANKLE, RIGHT: Primary | ICD-10-CM

## 2019-06-27 DIAGNOSIS — G47.00 INSOMNIA, UNSPECIFIED TYPE: ICD-10-CM

## 2019-06-27 DIAGNOSIS — M79.7 FIBROMYALGIA: ICD-10-CM

## 2019-06-27 DIAGNOSIS — F33.9 RECURRENT MAJOR DEPRESSION RESISTANT TO TREATMENT: ICD-10-CM

## 2019-06-27 DIAGNOSIS — M54.16 LUMBAR BACK PAIN WITH RADICULOPATHY AFFECTING RIGHT LOWER EXTREMITY: ICD-10-CM

## 2019-06-27 DIAGNOSIS — M25.471 PAIN AND SWELLING OF ANKLE, RIGHT: ICD-10-CM

## 2019-06-27 DIAGNOSIS — M25.571 PAIN AND SWELLING OF ANKLE, RIGHT: ICD-10-CM

## 2019-06-27 DIAGNOSIS — M25.571 PAIN AND SWELLING OF ANKLE, RIGHT: Primary | ICD-10-CM

## 2019-06-27 DIAGNOSIS — E07.9 THYROID DISEASE: ICD-10-CM

## 2019-06-27 LAB
CREAT SERPL-MCNC: 0.8 MG/DL (ref 0.5–1.4)
EST. GFR  (AFRICAN AMERICAN): >60 ML/MIN/1.73 M^2
EST. GFR  (NON AFRICAN AMERICAN): >60 ML/MIN/1.73 M^2

## 2019-06-27 PROCEDURE — 99999 PR PBB SHADOW E&M-EST. PATIENT-LVL V: ICD-10-PCS | Mod: PBBFAC,HCNC,, | Performed by: PHYSICIAN ASSISTANT

## 2019-06-27 PROCEDURE — 36415 COLL VENOUS BLD VENIPUNCTURE: CPT | Mod: HCNC,PO

## 2019-06-27 PROCEDURE — 1101F PR PT FALLS ASSESS DOC 0-1 FALLS W/OUT INJ PAST YR: ICD-10-PCS | Mod: HCNC,CPTII,S$GLB, | Performed by: PHYSICIAN ASSISTANT

## 2019-06-27 PROCEDURE — 99213 PR OFFICE/OUTPT VISIT, EST, LEVL III, 20-29 MIN: ICD-10-PCS | Mod: HCNC,S$GLB,, | Performed by: PHYSICIAN ASSISTANT

## 2019-06-27 PROCEDURE — 82565 ASSAY OF CREATININE: CPT | Mod: HCNC

## 2019-06-27 PROCEDURE — 82565 ASSAY OF CREATININE: CPT | Mod: HCNC,PO

## 2019-06-27 PROCEDURE — 99213 OFFICE O/P EST LOW 20 MIN: CPT | Mod: HCNC,S$GLB,, | Performed by: PHYSICIAN ASSISTANT

## 2019-06-27 PROCEDURE — 1101F PT FALLS ASSESS-DOCD LE1/YR: CPT | Mod: HCNC,CPTII,S$GLB, | Performed by: PHYSICIAN ASSISTANT

## 2019-06-27 PROCEDURE — 99999 PR PBB SHADOW E&M-EST. PATIENT-LVL V: CPT | Mod: PBBFAC,HCNC,, | Performed by: PHYSICIAN ASSISTANT

## 2019-06-27 RX ORDER — LEVOTHYROXINE SODIUM 125 UG/1
125 TABLET ORAL DAILY
Qty: 90 TABLET | Refills: 1 | Status: SHIPPED | OUTPATIENT
Start: 2019-06-27 | End: 2020-02-01 | Stop reason: SDUPTHER

## 2019-06-27 RX ORDER — GABAPENTIN 600 MG/1
600 TABLET ORAL 3 TIMES DAILY
Qty: 270 TABLET | Refills: 1 | Status: SHIPPED | OUTPATIENT
Start: 2019-06-27 | End: 2020-02-01 | Stop reason: SDUPTHER

## 2019-06-27 RX ORDER — DULOXETIN HYDROCHLORIDE 60 MG/1
CAPSULE, DELAYED RELEASE ORAL
Qty: 90 CAPSULE | Refills: 1 | Status: SHIPPED | OUTPATIENT
Start: 2019-06-27 | End: 2020-02-01 | Stop reason: SDUPTHER

## 2019-06-27 RX ORDER — BACLOFEN 20 MG/1
20 TABLET ORAL 3 TIMES DAILY
Qty: 270 TABLET | Refills: 2 | Status: SHIPPED | OUTPATIENT
Start: 2019-06-27 | End: 2020-02-01 | Stop reason: SDUPTHER

## 2019-06-27 RX ORDER — ZIPRASIDONE HYDROCHLORIDE 20 MG/1
CAPSULE ORAL
Qty: 180 CAPSULE | Refills: 1 | Status: SHIPPED | OUTPATIENT
Start: 2019-06-27 | End: 2020-02-01 | Stop reason: SDUPTHER

## 2019-06-27 RX ORDER — HYDROXYCHLOROQUINE SULFATE 200 MG/1
200 TABLET, FILM COATED ORAL 2 TIMES DAILY
Qty: 180 TABLET | Refills: 1 | Status: SHIPPED | OUTPATIENT
Start: 2019-06-27 | End: 2020-02-01 | Stop reason: SDUPTHER

## 2019-06-27 NOTE — PROGRESS NOTES
"Subjective:       Patient ID: Erica Marsh is a 68 y.o. female.    Chief Complaint: Follow-up (2 wk f/u) and Ankle Pain (right)    Ms. Marsh comes to clinic to day for follow follow up after medication changes. She reports that she is feeling much better with the geodon and cymbalta. She reports they "work like magic."  She is feeling much better. The patient does complain of continued pain and swelling in the right ankle. She reports this has been present for over a year and has progressively gotten worse. The patient had a xray in the past that revealed no acute changes. She reports the swelling is persistent and there is pain in her foot.     Review of Systems   Constitutional: Negative for activity change, appetite change and fever.   HENT: Negative for postnasal drip, rhinorrhea and sinus pressure.    Eyes: Negative for visual disturbance.   Respiratory: Negative for cough and shortness of breath.    Cardiovascular: Negative for chest pain.   Gastrointestinal: Negative for abdominal distention and abdominal pain.   Genitourinary: Negative for difficulty urinating and dysuria.   Musculoskeletal: Positive for joint swelling. Negative for arthralgias and myalgias.   Neurological: Negative for headaches.   Hematological: Negative for adenopathy.   Psychiatric/Behavioral: The patient is not nervous/anxious.        Objective:      Physical Exam   Constitutional: She is oriented to person, place, and time.   HENT:   Mouth/Throat: Oropharynx is clear and moist. No oropharyngeal exudate.   Eyes: Pupils are equal, round, and reactive to light. Conjunctivae are normal.   Cardiovascular: Normal rate and regular rhythm.   Pulmonary/Chest: Effort normal and breath sounds normal. She has no wheezes.   Abdominal: Soft. Bowel sounds are normal. There is no tenderness.   Musculoskeletal: She exhibits no edema.   Right ankle swelling and TTP   Lymphadenopathy:     She has no cervical adenopathy.   Neurological: She is alert " and oriented to person, place, and time.   Skin: No erythema.   Psychiatric: Her behavior is normal.   Mood significantly improved since last office visit.        Assessment:       1. Pain and swelling of ankle, right    2. Depression with anxiety        Plan:     Erica was seen today for follow-up and ankle pain.    Diagnoses and all orders for this visit:    Pain and swelling of ankle, right  -     MRI Ankle W WO Contrast Right; Future  -     Creatinine, serum; Future    Depression with anxiety  Significant improvement  Continue current medication.

## 2019-07-05 ENCOUNTER — HOSPITAL ENCOUNTER (OUTPATIENT)
Dept: RADIOLOGY | Facility: HOSPITAL | Age: 69
Discharge: HOME | End: 2019-07-05
Attending: PHYSICIAN ASSISTANT
Payer: MEDICARE

## 2019-07-05 DIAGNOSIS — M25.571 PAIN AND SWELLING OF ANKLE, RIGHT: ICD-10-CM

## 2019-07-05 DIAGNOSIS — M25.471 PAIN AND SWELLING OF ANKLE, RIGHT: ICD-10-CM

## 2019-07-05 PROCEDURE — 73721 MRI JNT OF LWR EXTRE W/O DYE: CPT | Mod: 26,HCNC,RT, | Performed by: RADIOLOGY

## 2019-07-05 PROCEDURE — 73721 MRI JNT OF LWR EXTRE W/O DYE: CPT | Mod: TC,HCNC,RT

## 2019-07-05 PROCEDURE — 73721 MRI ANKLE WITHOUT CONTRAST RIGHT: ICD-10-PCS | Mod: 26,HCNC,RT, | Performed by: RADIOLOGY

## 2019-07-10 DIAGNOSIS — M25.473 PAIN AND SWELLING OF ANKLE, UNSPECIFIED LATERALITY: Primary | ICD-10-CM

## 2019-07-10 DIAGNOSIS — M25.579 PAIN AND SWELLING OF ANKLE, UNSPECIFIED LATERALITY: Primary | ICD-10-CM

## 2019-07-11 ENCOUNTER — TELEPHONE (OUTPATIENT)
Dept: ORTHOPEDICS | Facility: CLINIC | Age: 69
End: 2019-07-11

## 2019-07-15 ENCOUNTER — OFFICE VISIT (OUTPATIENT)
Dept: ORTHOPEDICS | Facility: CLINIC | Age: 69
End: 2019-07-15
Payer: MEDICARE

## 2019-07-15 VITALS
SYSTOLIC BLOOD PRESSURE: 128 MMHG | HEIGHT: 67 IN | RESPIRATION RATE: 13 BRPM | DIASTOLIC BLOOD PRESSURE: 59 MMHG | WEIGHT: 139 LBS | HEART RATE: 59 BPM | BODY MASS INDEX: 21.82 KG/M2

## 2019-07-15 DIAGNOSIS — M95.8 OSTEOCHONDRAL DEFECT OF TALUS: Primary | ICD-10-CM

## 2019-07-15 DIAGNOSIS — M79.671 RIGHT FOOT PAIN: Primary | ICD-10-CM

## 2019-07-15 PROCEDURE — 1101F PR PT FALLS ASSESS DOC 0-1 FALLS W/OUT INJ PAST YR: ICD-10-PCS | Mod: HCNC,CPTII,S$GLB, | Performed by: ORTHOPAEDIC SURGERY

## 2019-07-15 PROCEDURE — 1101F PT FALLS ASSESS-DOCD LE1/YR: CPT | Mod: HCNC,CPTII,S$GLB, | Performed by: ORTHOPAEDIC SURGERY

## 2019-07-15 PROCEDURE — 99214 PR OFFICE/OUTPT VISIT, EST, LEVL IV, 30-39 MIN: ICD-10-PCS | Mod: HCNC,S$GLB,, | Performed by: ORTHOPAEDIC SURGERY

## 2019-07-15 PROCEDURE — 99999 PR PBB SHADOW E&M-EST. PATIENT-LVL III: CPT | Mod: PBBFAC,HCNC,, | Performed by: ORTHOPAEDIC SURGERY

## 2019-07-15 PROCEDURE — 99999 PR PBB SHADOW E&M-EST. PATIENT-LVL III: ICD-10-PCS | Mod: PBBFAC,HCNC,, | Performed by: ORTHOPAEDIC SURGERY

## 2019-07-15 PROCEDURE — 99214 OFFICE O/P EST MOD 30 MIN: CPT | Mod: HCNC,S$GLB,, | Performed by: ORTHOPAEDIC SURGERY

## 2019-07-15 RX ORDER — MELOXICAM 7.5 MG/1
TABLET ORAL
COMMUNITY
Start: 2019-07-01 | End: 2020-02-03 | Stop reason: SDUPTHER

## 2019-07-15 NOTE — PROGRESS NOTES
CC:  61-year-old female presents today for evaluation of right ankle pain. She had a MRI that was ordered by her primary care provider and is available for review.    ROS:    Constitution: Denies chills, fever, and sweats.  HENT: Denies headaches or blurry vision.  Cardiovascular: Denies chest pain or irregular heart beat.  Respiratory: Denies cough or shortness of breath.  Gastrointestinal: Denies abdominal pain, nausea, or vomiting.  Genitourinary:  Denies urinary incontinence, bladder and kidney issues  Musculoskeletal:  Denies muscle cramps.  Positive for right ankle pain  Neurological: Denies dizziness or focal weakness.  Psychiatric/Behavioral: Normal mental status.  Hematologic/Lymphatic: Denies bleeding problem or easy bruising/bleeding.  Skin: Denies rash or suspicious lesions.    Physical examination     Gen - No acute distress   Eyes - Extraoccular motions intact, pupils equally round and reactive to light and accommodation   ENT - normocephalic, atruamtic, oropharynx clear   Neck - Supple, no abnormal masses   Cardiovascular - regular rate and rhythm   Pulmonary - clear to auscultation bilaterally   Abdomen - soft, non-tender, non-distended, positive bowel sounds   Psych - The patient is alert and oriented x3 with normal mood and affect    Right Lower Extremity Examination     Skin is intact throughout   Motor is intact distally EHL, FHL, TA, and gastroc   +2 dorsalis pedis and posterior tibial pulses   Sensation to light touch is intact distally dorsal, plantar, and first web space     Examination of the Right ankle and foot:   Hindfoot alignment in neutral   Good subtalar motion   Anterior drawer - negative  Posterior drawer - negative  Talar tilt - negative    Tenderness to palpation:   ATFL - negative  CFL - negative  Deltoid Ligament - negative  Tibialis anterior - negative  Tibialis posterior - negative  Peroneus longus - negative  Peroneus brevis - negative  Gastroc - negative  Plantar fascia -  negative  Subtalar joint - negative  Tibiotalar joint - positive, laterally  Midfoot - negative  MTP joints - negative  Hallux valgus present - negative  Hammertoes present -negative    MRI images were examined and personally reviewed by me.  There is an osteochondral lesion in the lateral talar dome.  She also has mild peroneal tenosynovitis.    Dx:  Osteochondral lesion in the lateral talar dome and peroneal tenosynovitis.    Plan:  I am going to refer her to our foot and ankle specialist Dr. Ruiz.  She can follow up with me p.r.n.

## 2019-07-15 NOTE — LETTER
July 15, 2019      Michelle Renteria PA-C  [address]  [facility] [address]           [address]  [facility] [address]  Phone: [phone]          Patient: Erica Marsh   MR Number: [id number]   YOB: 1950   Date of Visit: 7/15/2019       Dear Michelle Renteria:    Thank you for referring Erica Marsh to me for evaluation. Attached you will find relevant portions of my assessment and plan of care.    If you have questions, please do not hesitate to call me. I look forward to following Erica Marsh along with you.    Sincerely,    Rl Aranda II, MD    Enclosure  CC:  No Recipients    If you would like to receive this communication electronically, please contact [email or web address] or [phone] to request more information on [facility] access.    For providers and/or their staff who would like to refer a patient to [facility], please contact us through our one-stop-shop provider referral line, [facility], at [phone].    If you feel you have received this communication in error or would no longer like to receive these types of communications, please e-mail [email or web address]

## 2019-07-22 RX ORDER — DICLOFENAC SODIUM 50 MG/1
TABLET, DELAYED RELEASE ORAL
Qty: 270 TABLET | Refills: 2 | OUTPATIENT
Start: 2019-07-22

## 2019-07-26 ENCOUNTER — TELEPHONE (OUTPATIENT)
Dept: FAMILY MEDICINE | Facility: CLINIC | Age: 69
End: 2019-07-26

## 2019-07-26 DIAGNOSIS — F33.9 RECURRENT MAJOR DEPRESSION RESISTANT TO TREATMENT: ICD-10-CM

## 2019-07-26 RX ORDER — CLONAZEPAM 1 MG/1
TABLET ORAL
Qty: 60 TABLET | Refills: 2 | Status: SHIPPED | OUTPATIENT
Start: 2019-08-14 | End: 2020-02-17 | Stop reason: SDUPTHER

## 2019-07-26 NOTE — TELEPHONE ENCOUNTER
Patient has been taking clonazepam 1 mg.  Was reduced to 1 and a half tablets daily due to high anxiety.  Would like to be put back on 2 tablets daily. Please advise.

## 2019-07-26 NOTE — TELEPHONE ENCOUNTER
----- Message from Lyn Mariscal sent at 7/26/2019  4:03 PM CDT -----  Type: Needs Medical Advice    Who Called:  Patient     Pharmacy name and phone #:    [id number] - [place] - [address] AT [address]  [place] [address]  Phone: [phone] Fax: [phone]  Best Call Back Number: [phone]  Additional Information: pt needs to know if she should continue taking meloxicam - since she is no longer seeing garcia - then if scott wants her to continue he would have to write it to her

## 2019-07-29 ENCOUNTER — TELEPHONE (OUTPATIENT)
Dept: FAMILY MEDICINE | Facility: CLINIC | Age: 69
End: 2019-07-29

## 2019-07-29 NOTE — TELEPHONE ENCOUNTER
I spoke to patient.  She states she cannot come in because she is going out of the [place].  I explained to her that in order to have the clonopin refilled she will need an appointment.  She expressed understanding. We scheduled an appointment with her to be seen upon her return.   Patient is no longer taking Mobic but she is taking Geodon twice daily.

## 2019-08-19 DIAGNOSIS — Z12.39 BREAST CANCER SCREENING: ICD-10-CM

## 2019-09-06 ENCOUNTER — PATIENT OUTREACH (OUTPATIENT)
Dept: ADMINISTRATIVE | Facility: HOSPITAL | Age: 69
End: 2019-09-06

## 2019-11-01 ENCOUNTER — PATIENT OUTREACH (OUTPATIENT)
Dept: ADMINISTRATIVE | Facility: HOSPITAL | Age: 69
End: 2019-11-01

## 2019-11-01 NOTE — LETTER
November 1, 2019    Erica Marsh  [address] [place] [address]     [facility]  [address]  [facility] [address]  Phone: [phone] Dear Maria [facility] is committed to your overall health and would like to ensure that you are up to date on all of your health maintenance testing.  Our records indicate that you are overdue for your colorectal cancer screening.  After reviewing your chart, it has been documented that a FIT KIT (colorectal cancer screening kit) was given at a clinic visit or  mailed to you,  but the lab has yet to receive the kit so that we may update your chart.   This is a friendly reminder to complete this test (the instructions will tell you how) and then return your sample in the postage-paid return envelope within 24 hours of collection.  Please remember to put the collection date on your sample!    If your test results are negative, you won't need testing again for another year.  If results show you need more testing, we will call you with next steps.    Sincerely,      Jose Guadalupe Ornelas MD and your [facility] Team    Asaf Newman, Care Coordinator  [facility]  Phone: [phone]  Fax: [phone]

## 2020-01-04 DIAGNOSIS — G57.00 SCIATIC NEUROPATHY, UNSPECIFIED LATERALITY: ICD-10-CM

## 2020-01-04 DIAGNOSIS — F41.8 DEPRESSION WITH ANXIETY: Chronic | ICD-10-CM

## 2020-01-04 DIAGNOSIS — F33.9 RECURRENT MAJOR DEPRESSION RESISTANT TO TREATMENT: ICD-10-CM

## 2020-01-04 DIAGNOSIS — M79.7 FIBROMYALGIA: ICD-10-CM

## 2020-01-04 DIAGNOSIS — B02.29 POST HERPETIC NEURALGIA: ICD-10-CM

## 2020-01-04 DIAGNOSIS — E07.9 THYROID DISEASE: ICD-10-CM

## 2020-01-04 DIAGNOSIS — J30.1 CHRONIC SEASONAL ALLERGIC RHINITIS DUE TO POLLEN: ICD-10-CM

## 2020-01-04 DIAGNOSIS — G47.00 INSOMNIA, UNSPECIFIED TYPE: ICD-10-CM

## 2020-01-07 RX ORDER — CLONAZEPAM 1 MG/1
TABLET ORAL
Qty: 60 TABLET | Refills: 2 | OUTPATIENT
Start: 2020-01-07

## 2020-01-07 RX ORDER — ZIPRASIDONE HYDROCHLORIDE 20 MG/1
CAPSULE ORAL
Qty: 180 CAPSULE | Refills: 1 | OUTPATIENT
Start: 2020-01-07

## 2020-01-07 RX ORDER — FOLIC ACID 1 MG/1
1 TABLET ORAL DAILY
Qty: 90 TABLET | Refills: 3 | OUTPATIENT
Start: 2020-01-07 | End: 2021-01-06

## 2020-01-07 RX ORDER — VIT C/E/ZN/COPPR/LUTEIN/ZEAXAN 250MG-90MG
1000 CAPSULE ORAL 2 TIMES DAILY
Qty: 180 CAPSULE | Refills: 3 | OUTPATIENT
Start: 2020-01-07

## 2020-01-07 RX ORDER — VALACYCLOVIR HYDROCHLORIDE 500 MG/1
500 TABLET, FILM COATED ORAL DAILY
Qty: 180 TABLET | Refills: 9 | OUTPATIENT
Start: 2020-01-07

## 2020-01-07 RX ORDER — HYDROXYCHLOROQUINE SULFATE 200 MG/1
200 TABLET, FILM COATED ORAL 2 TIMES DAILY
Qty: 180 TABLET | Refills: 1 | OUTPATIENT
Start: 2020-01-07

## 2020-01-07 RX ORDER — GABAPENTIN 600 MG/1
600 TABLET ORAL 3 TIMES DAILY
Qty: 270 TABLET | Refills: 1 | OUTPATIENT
Start: 2020-01-07 | End: 2021-01-06

## 2020-01-07 RX ORDER — DULOXETIN HYDROCHLORIDE 60 MG/1
CAPSULE, DELAYED RELEASE ORAL
Qty: 90 CAPSULE | Refills: 1 | OUTPATIENT
Start: 2020-01-07

## 2020-01-07 RX ORDER — LEVOTHYROXINE SODIUM 125 UG/1
125 TABLET ORAL DAILY
Qty: 90 TABLET | Refills: 1 | OUTPATIENT
Start: 2020-01-07

## 2020-01-07 RX ORDER — FLUTICASONE PROPIONATE 50 MCG
SPRAY, SUSPENSION (ML) NASAL
Qty: 48 G | Refills: 3 | OUTPATIENT
Start: 2020-01-07

## 2020-01-08 ENCOUNTER — TELEPHONE (OUTPATIENT)
Dept: RHEUMATOLOGY | Facility: CLINIC | Age: 70
End: 2020-01-08

## 2020-01-08 DIAGNOSIS — M19.90 ARTHRITIS: Primary | ICD-10-CM

## 2020-01-08 RX ORDER — TRAMADOL HYDROCHLORIDE 50 MG/1
50 TABLET ORAL EVERY 6 HOURS PRN
Qty: 180 TABLET | Refills: 1 | OUTPATIENT
Start: 2020-01-08

## 2020-02-01 DIAGNOSIS — F33.9 RECURRENT MAJOR DEPRESSION RESISTANT TO TREATMENT: ICD-10-CM

## 2020-02-01 DIAGNOSIS — B02.29 POST HERPETIC NEURALGIA: ICD-10-CM

## 2020-02-01 DIAGNOSIS — G57.00 SCIATIC NEUROPATHY, UNSPECIFIED LATERALITY: ICD-10-CM

## 2020-02-01 DIAGNOSIS — M79.7 FIBROMYALGIA: ICD-10-CM

## 2020-02-01 DIAGNOSIS — F41.8 DEPRESSION WITH ANXIETY: Chronic | ICD-10-CM

## 2020-02-01 DIAGNOSIS — J30.1 CHRONIC SEASONAL ALLERGIC RHINITIS DUE TO POLLEN: ICD-10-CM

## 2020-02-01 DIAGNOSIS — E08.649: Chronic | ICD-10-CM

## 2020-02-01 DIAGNOSIS — G47.00 INSOMNIA, UNSPECIFIED TYPE: ICD-10-CM

## 2020-02-01 DIAGNOSIS — M54.16 LUMBAR BACK PAIN WITH RADICULOPATHY AFFECTING RIGHT LOWER EXTREMITY: ICD-10-CM

## 2020-02-01 DIAGNOSIS — R05.9 COUGH: ICD-10-CM

## 2020-02-01 DIAGNOSIS — E07.9 THYROID DISEASE: ICD-10-CM

## 2020-02-01 DIAGNOSIS — K21.9 GASTROESOPHAGEAL REFLUX DISEASE, ESOPHAGITIS PRESENCE NOT SPECIFIED: ICD-10-CM

## 2020-02-04 DIAGNOSIS — E08.649: Chronic | ICD-10-CM

## 2020-02-04 NOTE — TELEPHONE ENCOUNTER
Received fax from [facility] requesting refill of Accu-Chek Annabelle Plus Test Strips.  Please advise.     LOV--6-27-19  FOV--None Noted

## 2020-02-05 RX ORDER — CLONAZEPAM 1 MG/1
TABLET ORAL
Qty: 60 TABLET | Refills: 2 | Status: CANCELLED | OUTPATIENT
Start: 2020-02-05

## 2020-02-05 RX ORDER — FLUTICASONE PROPIONATE 50 MCG
SPRAY, SUSPENSION (ML) NASAL
Qty: 48 G | Refills: 0 | Status: SHIPPED | OUTPATIENT
Start: 2020-02-05 | End: 2020-09-21

## 2020-02-05 RX ORDER — BENZONATATE 100 MG/1
100 CAPSULE ORAL 3 TIMES DAILY PRN
Qty: 30 CAPSULE | Refills: 1 | Status: SHIPPED | OUTPATIENT
Start: 2020-02-05 | End: 2021-02-04

## 2020-02-05 RX ORDER — GABAPENTIN 600 MG/1
600 TABLET ORAL 3 TIMES DAILY
Qty: 270 TABLET | Refills: 0 | Status: SHIPPED | OUTPATIENT
Start: 2020-02-05 | End: 2020-05-11 | Stop reason: ALTCHOICE

## 2020-02-05 RX ORDER — ZIPRASIDONE HYDROCHLORIDE 20 MG/1
CAPSULE ORAL
Qty: 180 CAPSULE | Refills: 0 | Status: SHIPPED | OUTPATIENT
Start: 2020-02-05 | End: 2020-02-17 | Stop reason: SDUPTHER

## 2020-02-05 RX ORDER — VALACYCLOVIR HYDROCHLORIDE 500 MG/1
500 TABLET, FILM COATED ORAL DAILY
Qty: 90 TABLET | Refills: 0 | Status: SHIPPED | OUTPATIENT
Start: 2020-02-05 | End: 2020-05-11 | Stop reason: ALTCHOICE

## 2020-02-05 RX ORDER — FOLIC ACID 1 MG/1
1 TABLET ORAL DAILY
Qty: 90 TABLET | Refills: 0 | Status: SHIPPED | OUTPATIENT
Start: 2020-02-05 | End: 2020-09-10

## 2020-02-05 RX ORDER — BACLOFEN 20 MG/1
20 TABLET ORAL 3 TIMES DAILY
Qty: 270 TABLET | Refills: 0 | Status: SHIPPED | OUTPATIENT
Start: 2020-02-05 | End: 2020-05-11 | Stop reason: SINTOL

## 2020-02-05 RX ORDER — VIT C/E/ZN/COPPR/LUTEIN/ZEAXAN 250MG-90MG
1000 CAPSULE ORAL 2 TIMES DAILY
Qty: 180 CAPSULE | Refills: 0 | Status: SHIPPED | OUTPATIENT
Start: 2020-02-05 | End: 2020-11-05 | Stop reason: SDUPTHER

## 2020-02-05 RX ORDER — HYDROXYCHLOROQUINE SULFATE 200 MG/1
200 TABLET, FILM COATED ORAL 2 TIMES DAILY
Qty: 180 TABLET | Refills: 0 | Status: SHIPPED | OUTPATIENT
Start: 2020-02-05 | End: 2020-04-14 | Stop reason: SDUPTHER

## 2020-02-05 RX ORDER — LEVOTHYROXINE SODIUM 125 UG/1
125 TABLET ORAL DAILY
Qty: 90 TABLET | Refills: 0 | Status: SHIPPED | OUTPATIENT
Start: 2020-02-05 | End: 2020-09-09

## 2020-02-05 RX ORDER — DULOXETIN HYDROCHLORIDE 60 MG/1
CAPSULE, DELAYED RELEASE ORAL
Qty: 90 CAPSULE | Refills: 0 | Status: SHIPPED | OUTPATIENT
Start: 2020-02-05 | End: 2020-04-14 | Stop reason: SDUPTHER

## 2020-02-05 RX ORDER — MELOXICAM 7.5 MG/1
7.5 TABLET ORAL DAILY
Qty: 90 TABLET | Refills: 0 | Status: SHIPPED | OUTPATIENT
Start: 2020-02-05 | End: 2020-04-14 | Stop reason: SDUPTHER

## 2020-02-05 NOTE — TELEPHONE ENCOUNTER
Patient declined to schedule appointment. States she is out of the country and will not return until May 2020. Please be advised.

## 2020-02-11 ENCOUNTER — PATIENT OUTREACH (OUTPATIENT)
Dept: ADMINISTRATIVE | Facility: HOSPITAL | Age: 70
End: 2020-02-11

## 2020-02-11 DIAGNOSIS — Z78.0 ASYMPTOMATIC MENOPAUSAL STATE: Primary | ICD-10-CM

## 2020-02-11 NOTE — PROGRESS NOTES
Chart review completed 02/11/2020.  Care Everywhere updates requested and reviewed.  Immunizations reconciled. Media reviewed.       WOG orders placed. DEXA  Letter mailed.  YES (PORTAL MESSAGE)    Health Maintenance Due   Topic Date Due    Colonoscopy  06/29/2001    Shingles Vaccine (2 of 3) 04/12/2018    DEXA SCAN  08/04/2019    Mammogram  08/14/2019    Influenza Vaccine (1) 09/01/2019

## 2020-02-17 DIAGNOSIS — F41.8 DEPRESSION WITH ANXIETY: Chronic | ICD-10-CM

## 2020-02-17 DIAGNOSIS — M79.7 FIBROMYALGIA: ICD-10-CM

## 2020-02-17 DIAGNOSIS — F33.9 RECURRENT MAJOR DEPRESSION RESISTANT TO TREATMENT: ICD-10-CM

## 2020-02-17 DIAGNOSIS — G47.00 INSOMNIA, UNSPECIFIED TYPE: ICD-10-CM

## 2020-02-17 RX ORDER — ZIPRASIDONE HYDROCHLORIDE 20 MG/1
CAPSULE ORAL
Qty: 180 CAPSULE | Refills: 0 | Status: SHIPPED | OUTPATIENT
Start: 2020-02-17 | End: 2020-05-08 | Stop reason: ALTCHOICE

## 2020-02-17 RX ORDER — CLONAZEPAM 1 MG/1
TABLET ORAL
Qty: 60 TABLET | Refills: 0 | Status: SHIPPED | OUTPATIENT
Start: 2020-02-17 | End: 2020-04-14

## 2020-02-17 NOTE — TELEPHONE ENCOUNTER
Patient's daughter was seen today. Request refill for her mother until she returns from [facility]. Please review and refill if you feel appropriate.

## 2020-03-27 NOTE — TELEPHONE ENCOUNTER
Received fax from [facility] requesting refill of the following medication:    1. Folic Acid--LR--2/12/18  2. Meloxicam--LR--5/2/19 ([facility])    LOV--5/24/19 (DEIDRA Renteria)  FOV--6/7/19 (DEIDRA Renteria)     141

## 2020-03-30 DIAGNOSIS — Z12.11 COLON CANCER SCREENING: ICD-10-CM

## 2020-04-07 ENCOUNTER — TELEPHONE (OUTPATIENT)
Dept: FAMILY MEDICINE | Facility: CLINIC | Age: 70
End: 2020-04-07

## 2020-04-07 NOTE — TELEPHONE ENCOUNTER
Duplicate request. Fax received from pharmacy and faxed back with diagnosis of Lupus ICD-10 code M32.19. Fax confirmation received.

## 2020-04-07 NOTE — TELEPHONE ENCOUNTER
----- Message from Kathrine Chi sent at 4/7/2020  8:42 AM CDT -----  Type:  Pharmacy Calling to Clarify an RX    Name of Caller:  Freida  Pharmacy Name:    [facility] - [place], [place] - [address]  [address]  [facility] [address]  Phone: [phone] Fax: [phone]  Prescription Name:  hydroxychloroquine (PLAQUENIL) 200 mg tablet  What do they need to clarify?:  Need diagnosis code  Best Call Back Number:    Additional Information:

## 2020-04-14 ENCOUNTER — OFFICE VISIT (OUTPATIENT)
Dept: FAMILY MEDICINE | Facility: CLINIC | Age: 70
End: 2020-04-14
Payer: MEDICARE

## 2020-04-14 DIAGNOSIS — M54.42 CHRONIC LOW BACK PAIN WITH BILATERAL SCIATICA, UNSPECIFIED BACK PAIN LATERALITY: ICD-10-CM

## 2020-04-14 DIAGNOSIS — M54.41 CHRONIC LOW BACK PAIN WITH BILATERAL SCIATICA, UNSPECIFIED BACK PAIN LATERALITY: ICD-10-CM

## 2020-04-14 DIAGNOSIS — G57.00 SCIATIC NEUROPATHY, UNSPECIFIED LATERALITY: ICD-10-CM

## 2020-04-14 DIAGNOSIS — M79.7 CHRONIC FATIGUE SYNDROME WITH FIBROMYALGIA: ICD-10-CM

## 2020-04-14 DIAGNOSIS — E07.9 THYROID DISEASE: ICD-10-CM

## 2020-04-14 DIAGNOSIS — F34.1 LATE ONSET DYSTHYMIA: ICD-10-CM

## 2020-04-14 DIAGNOSIS — D51.3 VEGAN'S ANEMIA: ICD-10-CM

## 2020-04-14 DIAGNOSIS — G93.32 CHRONIC FATIGUE SYNDROME WITH FIBROMYALGIA: ICD-10-CM

## 2020-04-14 DIAGNOSIS — Z79.899 MEDICATION MANAGEMENT: ICD-10-CM

## 2020-04-14 DIAGNOSIS — G89.29 CHRONIC LOW BACK PAIN WITH BILATERAL SCIATICA, UNSPECIFIED BACK PAIN LATERALITY: ICD-10-CM

## 2020-04-14 DIAGNOSIS — M45.5 ANKYLOSING SPONDYLITIS OF THORACOLUMBAR REGION: ICD-10-CM

## 2020-04-14 DIAGNOSIS — M32.9 SYSTEMIC LUPUS ERYTHEMATOSUS, UNSPECIFIED SLE TYPE, UNSPECIFIED ORGAN INVOLVEMENT STATUS: Primary | ICD-10-CM

## 2020-04-14 DIAGNOSIS — E43 SEVERE PROTEIN-CALORIE MALNUTRITION: ICD-10-CM

## 2020-04-14 DIAGNOSIS — G89.29 OTHER CHRONIC PAIN: ICD-10-CM

## 2020-04-14 DIAGNOSIS — M43.26 ANKYLOSIS OF LUMBAR SPINE: ICD-10-CM

## 2020-04-14 DIAGNOSIS — F33.9 RECURRENT MAJOR DEPRESSION RESISTANT TO TREATMENT: ICD-10-CM

## 2020-04-14 PROCEDURE — 1101F PR PT FALLS ASSESS DOC 0-1 FALLS W/OUT INJ PAST YR: ICD-10-PCS | Mod: HCNC,CPTII,95, | Performed by: FAMILY MEDICINE

## 2020-04-14 PROCEDURE — 99213 PR OFFICE/OUTPT VISIT, EST, LEVL III, 20-29 MIN: ICD-10-PCS | Mod: HCNC,95,, | Performed by: FAMILY MEDICINE

## 2020-04-14 PROCEDURE — 99499 UNLISTED E&M SERVICE: CPT | Mod: HCNC,95,, | Performed by: FAMILY MEDICINE

## 2020-04-14 PROCEDURE — 1159F PR MEDICATION LIST DOCUMENTED IN MEDICAL RECORD: ICD-10-PCS | Mod: HCNC,95,, | Performed by: FAMILY MEDICINE

## 2020-04-14 PROCEDURE — 1101F PT FALLS ASSESS-DOCD LE1/YR: CPT | Mod: HCNC,CPTII,95, | Performed by: FAMILY MEDICINE

## 2020-04-14 PROCEDURE — 99499 RISK ADDL DX/OHS AUDIT: ICD-10-PCS | Mod: HCNC,95,, | Performed by: FAMILY MEDICINE

## 2020-04-14 PROCEDURE — 1159F MED LIST DOCD IN RCRD: CPT | Mod: HCNC,95,, | Performed by: FAMILY MEDICINE

## 2020-04-14 PROCEDURE — 99213 OFFICE O/P EST LOW 20 MIN: CPT | Mod: HCNC,95,, | Performed by: FAMILY MEDICINE

## 2020-04-14 RX ORDER — DULOXETIN HYDROCHLORIDE 60 MG/1
CAPSULE, DELAYED RELEASE ORAL
Qty: 90 CAPSULE | Refills: 3 | Status: SHIPPED | OUTPATIENT
Start: 2020-04-14 | End: 2020-05-11

## 2020-04-14 RX ORDER — PREDNISONE 20 MG/1
TABLET ORAL
Qty: 15 TABLET | Refills: 1 | Status: SHIPPED | OUTPATIENT
Start: 2020-04-14 | End: 2020-05-29

## 2020-04-14 RX ORDER — PREDNISONE 5 MG/1
5 TABLET ORAL DAILY
Qty: 90 TABLET | Refills: 1 | Status: SHIPPED | OUTPATIENT
Start: 2020-04-14 | End: 2020-05-29 | Stop reason: SDUPTHER

## 2020-04-14 RX ORDER — MELOXICAM 7.5 MG/1
7.5 TABLET ORAL DAILY
Qty: 90 TABLET | Refills: 3 | Status: SHIPPED | OUTPATIENT
Start: 2020-04-14 | End: 2020-05-29

## 2020-04-14 RX ORDER — HYDROXYCHLOROQUINE SULFATE 200 MG/1
200 TABLET, FILM COATED ORAL 2 TIMES DAILY
Qty: 180 TABLET | Refills: 3 | Status: SHIPPED | OUTPATIENT
Start: 2020-04-14 | End: 2020-04-23 | Stop reason: SDUPTHER

## 2020-04-14 RX ORDER — CLONAZEPAM 1 MG/1
TABLET ORAL
Qty: 60 TABLET | Refills: 2 | Status: SHIPPED | OUTPATIENT
Start: 2020-04-14 | End: 2020-05-11

## 2020-04-14 NOTE — PROGRESS NOTES
Subjective:       Patient ID: Erica Marsh is a 69 y.o. female.    Chief Complaint: No chief complaint on file.     fatigue/fibromyalgia; female chronic generalized lower back pain, neck, bilateral hips, move arms, and both feet.  She declines any new injury, symptoms of stiffness chills and decreased range of motion over 5 years.  Neural exam positive for memory loss, loss of pleasure, poor appetite, new weight loss, and poor motivation.  The there is history of recent traveling.  She denies cough no rashes no diarrhea no dysuria but an acute lower back pain.  She still has few cases of presyncope-hypoglycemia; continued to have poor eating habits.  She has good support (daughter)  She denies any toxic habits, except prescription Klonopin.    Arthritis   Presents for follow-up visit. She complains of pain, stiffness and joint swelling. The symptoms have been worsening. Affected locations include the neck, left MCP and right hip. Associated symptoms include fatigue.     Review of Systems   Constitutional: Positive for activity change, appetite change, chills, fatigue and unexpected weight change.   HENT: Positive for hearing loss and sore throat.    Respiratory: Positive for shortness of breath. Negative for cough, chest tightness and wheezing.    Cardiovascular: Positive for palpitations. Negative for chest pain and leg swelling.   Gastrointestinal: Positive for abdominal distention.   Genitourinary: Positive for flank pain, frequency and pelvic pain.   Musculoskeletal: Positive for arthralgias, arthritis, back pain, gait problem, joint swelling and stiffness.   Neurological: Positive for tremors and weakness.   Hematological: Bruises/bleeds easily.   Psychiatric/Behavioral: Positive for behavioral problems, decreased concentration and dysphoric mood.       Patient Active Problem List   Diagnosis    Lupus (systemic lupus erythematosus)    GERD (gastroesophageal reflux disease)    Arthritis    Thyroid disease     Trigger finger    Hypoglycemia    Abdominal pain, other specified site    Constipation    Change in bowel habits    Rectal bleeding    Hypoglycemia after GI (gastrointestinal) surgery    Head trauma    Depression with anxiety    Other spondylosis, lumbar region    Sinus bradycardia    DDD (degenerative disc disease), thoracic       Objective:      Physical Exam    Lab Results   Component Value Date    WBC 7.08 04/18/2019    HGB 13.7 04/18/2019    HCT 41.7 04/18/2019     04/18/2019    CHOL 190 04/18/2019    TRIG 57 04/18/2019    HDL 81 (H) 04/18/2019    ALT 28 05/24/2019    AST 29 05/24/2019     05/24/2019    K 4.2 05/24/2019     05/24/2019    CREATININE 0.8 06/27/2019    BUN 22 05/24/2019    CO2 28 05/24/2019    TSH 5.071 (H) 05/24/2019    HGBA1C 4.9 02/26/2018     The 10-year ASCVD risk score (Sarah CALIXTO Jr., et al., 2013) is: 6.7%    Values used to calculate the score:      Age: 69 years      Sex: Female      Is Non- : No      Diabetic: No      Tobacco smoker: No      Systolic Blood Pressure: 128 mmHg      Is BP treated: No      HDL Cholesterol: 81 mg/dL      Total Cholesterol: 190 mg/dL    Assessment:       1. Systemic lupus erythematosus, unspecified SLE type, unspecified organ involvement status    2. Recurrent major depression resistant to treatment    3. Sciatic neuropathy, unspecified laterality    4. Ankylosis of lumbar spine    5. Ankylosing spondylitis of thoracolumbar region    6. Late onset dysthymia    7. Chronic low back pain with bilateral sciatica, unspecified back pain laterality    8. Chronic fatigue syndrome with fibromyalgia    9. Thyroid disease    10. Medication management    11. Other chronic pain    12. Severe protein-calorie malnutrition    13. Vegan's anemia        Plan:       Systemic lupus erythematosus, unspecified SLE type, unspecified organ involvement status  -     hydroxychloroquine (PLAQUENIL) 200 mg tablet; Take 1 tablet (200  mg total) by mouth 2 (two) times daily.  Dispense: 180 tablet; Refill: 3  -     Basic metabolic panel; Future; Expected date: 04/14/2020  -     CBC auto differential; Future; Expected date: 04/14/2020  -     Sedimentation rate; Future; Expected date: 04/14/2020  -     C-reactive protein; Future; Expected date: 04/14/2020  -     clonazePAM (KLONOPIN) 1 MG tablet; Take 1 tab PO in the morning. And take 1with lunch and 1 tab bedtime.  Dispense: 60 tablet; Refill: 2  -     DULoxetine (CYMBALTA) 60 MG capsule; Take one tablet every night at bedtime  Dispense: 90 capsule; Refill: 3  -     meloxicam (MOBIC) 7.5 MG tablet; Take 1 tablet (7.5 mg total) by mouth once daily.  Dispense: 90 tablet; Refill: 3  -     predniSONE (DELTASONE) 20 MG tablet; One tablet twice a day x3 days.  One tablet once a day for 3 days.  Then half a tablet daily x3 days.  Continue prednisone 5 mg daily.  Dispense: 15 tablet; Refill: 1  -     predniSONE (DELTASONE) 5 MG tablet; Take 1 tablet (5 mg total) by mouth once daily. Ankylosis spondylitis/rheumatoid arthritis  Dispense: 90 tablet; Refill: 1  -     Iron and TIBC; Future; Expected date: 04/14/2020  -     Ferritin; Future; Expected date: 04/14/2020  -     FOLATE RBC; Future; Expected date: 04/14/2020  -     Methylmalonic Acid, Serum; Future; Expected date: 04/14/2020  -     Magnesium; Future; Expected date: 04/14/2020    Recurrent major depression resistant to treatment  -     clonazePAM (KLONOPIN) 1 MG tablet; Take 1 tab PO in the morning. And take 1with lunch and 1 tab bedtime.  Dispense: 60 tablet; Refill: 2  -     Iron and TIBC; Future; Expected date: 04/14/2020  -     FOLATE RBC; Future; Expected date: 04/14/2020  -     Methylmalonic Acid, Serum; Future; Expected date: 04/14/2020  -     Magnesium; Future; Expected date: 04/14/2020    Sciatic neuropathy, unspecified laterality  -     Sedimentation rate; Future; Expected date: 04/14/2020  -     C-reactive protein; Future; Expected date:  04/14/2020  -     URINALYSIS; Future; Expected date: 04/14/2020  -     clonazePAM (KLONOPIN) 1 MG tablet; Take 1 tab PO in the morning. And take 1with lunch and 1 tab bedtime.  Dispense: 60 tablet; Refill: 2  -     DULoxetine (CYMBALTA) 60 MG capsule; Take one tablet every night at bedtime  Dispense: 90 capsule; Refill: 3  -     meloxicam (MOBIC) 7.5 MG tablet; Take 1 tablet (7.5 mg total) by mouth once daily.  Dispense: 90 tablet; Refill: 3  -     predniSONE (DELTASONE) 20 MG tablet; One tablet twice a day x3 days.  One tablet once a day for 3 days.  Then half a tablet daily x3 days.  Continue prednisone 5 mg daily.  Dispense: 15 tablet; Refill: 1  -     predniSONE (DELTASONE) 5 MG tablet; Take 1 tablet (5 mg total) by mouth once daily. Ankylosis spondylitis/rheumatoid arthritis  Dispense: 90 tablet; Refill: 1    Ankylosis of lumbar spine  -     hydroxychloroquine (PLAQUENIL) 200 mg tablet; Take 1 tablet (200 mg total) by mouth 2 (two) times daily.  Dispense: 180 tablet; Refill: 3  -     Basic metabolic panel; Future; Expected date: 04/14/2020  -     CBC auto differential; Future; Expected date: 04/14/2020  -     Sedimentation rate; Future; Expected date: 04/14/2020  -     C-reactive protein; Future; Expected date: 04/14/2020  -     clonazePAM (KLONOPIN) 1 MG tablet; Take 1 tab PO in the morning. And take 1with lunch and 1 tab bedtime.  Dispense: 60 tablet; Refill: 2  -     DULoxetine (CYMBALTA) 60 MG capsule; Take one tablet every night at bedtime  Dispense: 90 capsule; Refill: 3  -     meloxicam (MOBIC) 7.5 MG tablet; Take 1 tablet (7.5 mg total) by mouth once daily.  Dispense: 90 tablet; Refill: 3  -     predniSONE (DELTASONE) 20 MG tablet; One tablet twice a day x3 days.  One tablet once a day for 3 days.  Then half a tablet daily x3 days.  Continue prednisone 5 mg daily.  Dispense: 15 tablet; Refill: 1  -     predniSONE (DELTASONE) 5 MG tablet; Take 1 tablet (5 mg total) by mouth once daily. Ankylosis  spondylitis/rheumatoid arthritis  Dispense: 90 tablet; Refill: 1    Ankylosing spondylitis of thoracolumbar region  -     hydroxychloroquine (PLAQUENIL) 200 mg tablet; Take 1 tablet (200 mg total) by mouth 2 (two) times daily.  Dispense: 180 tablet; Refill: 3  -     CBC auto differential; Future; Expected date: 04/14/2020  -     Sedimentation rate; Future; Expected date: 04/14/2020  -     C-reactive protein; Future; Expected date: 04/14/2020  -     clonazePAM (KLONOPIN) 1 MG tablet; Take 1 tab PO in the morning. And take 1with lunch and 1 tab bedtime.  Dispense: 60 tablet; Refill: 2  -     DULoxetine (CYMBALTA) 60 MG capsule; Take one tablet every night at bedtime  Dispense: 90 capsule; Refill: 3  -     meloxicam (MOBIC) 7.5 MG tablet; Take 1 tablet (7.5 mg total) by mouth once daily.  Dispense: 90 tablet; Refill: 3  -     predniSONE (DELTASONE) 20 MG tablet; One tablet twice a day x3 days.  One tablet once a day for 3 days.  Then half a tablet daily x3 days.  Continue prednisone 5 mg daily.  Dispense: 15 tablet; Refill: 1  -     predniSONE (DELTASONE) 5 MG tablet; Take 1 tablet (5 mg total) by mouth once daily. Ankylosis spondylitis/rheumatoid arthritis  Dispense: 90 tablet; Refill: 1    Late onset dysthymia    Chronic low back pain with bilateral sciatica, unspecified back pain laterality  -     hydroxychloroquine (PLAQUENIL) 200 mg tablet; Take 1 tablet (200 mg total) by mouth 2 (two) times daily.  Dispense: 180 tablet; Refill: 3  -     CBC auto differential; Future; Expected date: 04/14/2020  -     Sedimentation rate; Future; Expected date: 04/14/2020  -     C-reactive protein; Future; Expected date: 04/14/2020  -     URINALYSIS; Future; Expected date: 04/14/2020  -     clonazePAM (KLONOPIN) 1 MG tablet; Take 1 tab PO in the morning. And take 1with lunch and 1 tab bedtime.  Dispense: 60 tablet; Refill: 2  -     DULoxetine (CYMBALTA) 60 MG capsule; Take one tablet every night at bedtime  Dispense: 90 capsule;  Refill: 3  -     meloxicam (MOBIC) 7.5 MG tablet; Take 1 tablet (7.5 mg total) by mouth once daily.  Dispense: 90 tablet; Refill: 3  -     predniSONE (DELTASONE) 20 MG tablet; One tablet twice a day x3 days.  One tablet once a day for 3 days.  Then half a tablet daily x3 days.  Continue prednisone 5 mg daily.  Dispense: 15 tablet; Refill: 1  -     predniSONE (DELTASONE) 5 MG tablet; Take 1 tablet (5 mg total) by mouth once daily. Ankylosis spondylitis/rheumatoid arthritis  Dispense: 90 tablet; Refill: 1    Chronic fatigue syndrome with fibromyalgia  -     Iron and TIBC; Future; Expected date: 04/14/2020  -     Ferritin; Future; Expected date: 04/14/2020  -     FOLATE RBC; Future; Expected date: 04/14/2020  -     Methylmalonic Acid, Serum; Future; Expected date: 04/14/2020  -     Magnesium; Future; Expected date: 04/14/2020    Thyroid disease  -     T4; Future; Expected date: 04/14/2020  -     Thyroglobulin; Future; Expected date: 04/14/2020  -     TSH; Future; Expected date: 04/14/2020    Medication management    Other chronic pain    Severe protein-calorie malnutrition  -     Basic metabolic panel; Future; Expected date: 04/14/2020  -     CBC auto differential; Future; Expected date: 04/14/2020  -     Iron and TIBC; Future; Expected date: 04/14/2020  -     Ferritin; Future; Expected date: 04/14/2020  -     FOLATE RBC; Future; Expected date: 04/14/2020  -     Methylmalonic Acid, Serum; Future; Expected date: 04/14/2020  -     Magnesium; Future; Expected date: 04/14/2020    Vegan's anemia  -     Iron and TIBC; Future; Expected date: 04/14/2020  -     Ferritin; Future; Expected date: 04/14/2020  -     FOLATE RBC; Future; Expected date: 04/14/2020  -     Methylmalonic Acid, Serum; Future; Expected date: 04/14/2020  -     Magnesium; Future; Expected date: 04/14/2020      Patient readiness: nonacceptances and barriers:readiness, social stressors, economic and household issues    During the course of the visit the patient  was educated and counseled about the following:     Diabetes:  Discussed general issues about diabetes pathophysiology and management.  Hypertension:   Dietary sodium restriction.  Regular aerobic exercise.    Goals: Diabetes: Maintain Hemoglobin A1C below 7 and Hypertension: Reduce Blood Pressure    Did patient meet goals/outcomes: Yes    The following self management tools provided: blood pressure log  blood glucose log    Patient Instructions (the written plan) was given to the patient/family.     Time spent with patient: 30 minutes    Barriers to medications present (no )    Adverse reactions to current medications (yes)    Over the counter medications reviewed (Yes)        30-minute visit. 20 minutes spent counseling patient on diet, exercise, and weight loss.  This has been fully explained to the patient, who indicates understanding.    Discussed health maintenance guidelines appropriate for age.

## 2020-04-14 NOTE — PATIENT INSTRUCTIONS

## 2020-04-20 LAB
APPEARANCE UR: CLEAR
BASOPHILS # BLD AUTO: 79 CELLS/UL (ref 0–200)
BASOPHILS NFR BLD AUTO: 1.2 %
BILIRUB UR QL STRIP: NEGATIVE
BUN SERPL-MCNC: 18 MG/DL (ref 7–25)
BUN/CREAT SERPL: NORMAL (CALC) (ref 6–22)
CALCIUM SERPL-MCNC: 9.7 MG/DL (ref 8.6–10.4)
CHLORIDE SERPL-SCNC: 99 MMOL/L (ref 98–110)
CO2 SERPL-SCNC: 26 MMOL/L (ref 20–32)
COLOR UR: YELLOW
CREAT SERPL-MCNC: 0.97 MG/DL (ref 0.5–0.99)
CRP SERPL-MCNC: 0.3 MG/L
EOSINOPHIL # BLD AUTO: 310 CELLS/UL (ref 15–500)
EOSINOPHIL NFR BLD AUTO: 4.7 %
ERYTHROCYTE [DISTWIDTH] IN BLOOD BY AUTOMATED COUNT: 11.9 % (ref 11–15)
ERYTHROCYTE [SEDIMENTATION RATE] IN BLOOD BY WESTERGREN METHOD: 11 MM/H
FERRITIN SERPL-MCNC: 162 NG/ML (ref 16–288)
FOLATE RBC-MCNC: 879 NG/ML RBC
GFRSERPLBLD MDRD-ARVRAT: 60 ML/MIN/1.73M2
GLUCOSE SERPL-MCNC: 96 MG/DL (ref 65–99)
GLUCOSE UR QL STRIP: NEGATIVE
HCT VFR BLD AUTO: 40.6 % (ref 35–45)
HGB BLD-MCNC: 13.7 G/DL (ref 11.7–15.5)
HGB UR QL STRIP: NEGATIVE
IRON SATN MFR SERPL: 36 % (CALC) (ref 16–45)
IRON SERPL-MCNC: 127 MCG/DL (ref 45–160)
KETONES UR QL STRIP: ABNORMAL
LEUKOCYTE ESTERASE UR QL STRIP: ABNORMAL
LYMPHOCYTES # BLD AUTO: 2699 CELLS/UL (ref 850–3900)
LYMPHOCYTES NFR BLD AUTO: 40.9 %
MAGNESIUM SERPL-MCNC: 2 MG/DL (ref 1.5–2.5)
MCH RBC QN AUTO: 31.4 PG (ref 27–33)
MCHC RBC AUTO-ENTMCNC: 33.7 G/DL (ref 32–36)
MCV RBC AUTO: 93.1 FL (ref 80–100)
METHYLMALONATE SERPL-SCNC: 329 NMOL/L (ref 87–318)
MONOCYTES # BLD AUTO: 521 CELLS/UL (ref 200–950)
MONOCYTES NFR BLD AUTO: 7.9 %
NEUTROPHILS # BLD AUTO: 2990 CELLS/UL (ref 1500–7800)
NEUTROPHILS NFR BLD AUTO: 45.3 %
NITRITE UR QL STRIP: NEGATIVE
PH UR STRIP: 5.5 [PH] (ref 5–8)
PLATELET # BLD AUTO: 309 THOUSAND/UL (ref 140–400)
PMV BLD REES-ECKER: 11.1 FL (ref 7.5–12.5)
POTASSIUM SERPL-SCNC: 4.3 MMOL/L (ref 3.5–5.3)
PROT UR QL STRIP: NEGATIVE
RBC # BLD AUTO: 4.36 MILLION/UL (ref 3.8–5.1)
SODIUM SERPL-SCNC: 138 MMOL/L (ref 135–146)
SP GR UR STRIP: 1.02 (ref 1–1.03)
T4 SERPL-MCNC: 12.1 MCG/DL (ref 5.1–11.9)
TIBC SERPL-MCNC: 353 MCG/DL (CALC) (ref 250–450)
TSH SERPL-ACNC: 0.39 MIU/L (ref 0.4–4.5)
WBC # BLD AUTO: 6.6 THOUSAND/UL (ref 3.8–10.8)

## 2020-04-23 ENCOUNTER — OFFICE VISIT (OUTPATIENT)
Dept: RHEUMATOLOGY | Facility: CLINIC | Age: 70
End: 2020-04-23
Payer: MEDICARE

## 2020-04-23 DIAGNOSIS — M32.9 SYSTEMIC LUPUS ERYTHEMATOSUS, UNSPECIFIED SLE TYPE, UNSPECIFIED ORGAN INVOLVEMENT STATUS: ICD-10-CM

## 2020-04-23 DIAGNOSIS — G89.29 CHRONIC LOW BACK PAIN WITH BILATERAL SCIATICA, UNSPECIFIED BACK PAIN LATERALITY: Primary | ICD-10-CM

## 2020-04-23 DIAGNOSIS — G89.29 CHRONIC LOW BACK PAIN WITH BILATERAL SCIATICA, UNSPECIFIED BACK PAIN LATERALITY: ICD-10-CM

## 2020-04-23 DIAGNOSIS — M54.41 CHRONIC LOW BACK PAIN WITH BILATERAL SCIATICA, UNSPECIFIED BACK PAIN LATERALITY: ICD-10-CM

## 2020-04-23 DIAGNOSIS — M54.42 CHRONIC LOW BACK PAIN WITH BILATERAL SCIATICA, UNSPECIFIED BACK PAIN LATERALITY: ICD-10-CM

## 2020-04-23 DIAGNOSIS — M54.41 CHRONIC LOW BACK PAIN WITH BILATERAL SCIATICA, UNSPECIFIED BACK PAIN LATERALITY: Primary | ICD-10-CM

## 2020-04-23 DIAGNOSIS — M45.5 ANKYLOSING SPONDYLITIS OF THORACOLUMBAR REGION: ICD-10-CM

## 2020-04-23 DIAGNOSIS — M43.26 ANKYLOSIS OF LUMBAR SPINE: ICD-10-CM

## 2020-04-23 DIAGNOSIS — M54.42 CHRONIC LOW BACK PAIN WITH BILATERAL SCIATICA, UNSPECIFIED BACK PAIN LATERALITY: Primary | ICD-10-CM

## 2020-04-23 DIAGNOSIS — M19.90 ARTHRITIS: ICD-10-CM

## 2020-04-23 PROCEDURE — 99212 OFFICE O/P EST SF 10 MIN: CPT | Mod: 95,,, | Performed by: INTERNAL MEDICINE

## 2020-04-23 PROCEDURE — 99212 PR OFFICE/OUTPT VISIT, EST, LEVL II, 10-19 MIN: ICD-10-PCS | Mod: 95,,, | Performed by: INTERNAL MEDICINE

## 2020-04-23 PROCEDURE — 1101F PT FALLS ASSESS-DOCD LE1/YR: CPT | Mod: 95,,, | Performed by: INTERNAL MEDICINE

## 2020-04-23 PROCEDURE — 1159F PR MEDICATION LIST DOCUMENTED IN MEDICAL RECORD: ICD-10-PCS | Mod: 95,,, | Performed by: INTERNAL MEDICINE

## 2020-04-23 PROCEDURE — 1101F PR PT FALLS ASSESS DOC 0-1 FALLS W/OUT INJ PAST YR: ICD-10-PCS | Mod: 95,,, | Performed by: INTERNAL MEDICINE

## 2020-04-23 PROCEDURE — 1159F MED LIST DOCD IN RCRD: CPT | Mod: 95,,, | Performed by: INTERNAL MEDICINE

## 2020-04-23 RX ORDER — HYDROXYCHLOROQUINE SULFATE 200 MG/1
200 TABLET, FILM COATED ORAL 2 TIMES DAILY
Qty: 180 TABLET | Refills: 3 | Status: SHIPPED | OUTPATIENT
Start: 2020-04-23 | End: 2021-05-18

## 2020-04-23 RX ORDER — TRAMADOL HYDROCHLORIDE 50 MG/1
50 TABLET ORAL EVERY 6 HOURS PRN
Qty: 180 TABLET | Refills: 1 | Status: CANCELLED | OUTPATIENT
Start: 2020-04-23

## 2020-04-23 RX ORDER — TRAMADOL HYDROCHLORIDE 50 MG/1
50 TABLET ORAL EVERY 6 HOURS PRN
Qty: 90 TABLET | Refills: 2 | Status: CANCELLED | OUTPATIENT
Start: 2020-04-23

## 2020-04-23 RX ORDER — TRAMADOL HYDROCHLORIDE 50 MG/1
50 TABLET ORAL EVERY 6 HOURS PRN
Qty: 180 TABLET | Refills: 1 | Status: SHIPPED | OUTPATIENT
Start: 2020-04-23 | End: 2020-05-11 | Stop reason: SINTOL

## 2020-04-23 NOTE — PROGRESS NOTES
Subjective:        The chief complaint leading to consultation is:SLE,FM  The patient location is:  home  Visit type: Virtual visit with synchronous audio/video or audio only  Audio only    HPIplease see previous note from today    Past Surgical History:   Procedure Laterality Date    APPENDECTOMY      BREAST BIOPSY Left 1979    benign    CHOLECYSTECTOMY      EPIDURAL STEROID INJECTION INTO THORACIC SPINE N/A 6/22/2018    Procedure: Injection-steroid-epidural-thoracic;  Surgeon: Christoph Pratt MD;  Location: [facility] OR;  Service: Pain Management;  Laterality: N/A;  T10-11    EPIDURAL STEROID INJECTION INTO THORACIC SPINE N/A 8/6/2018    Procedure: Injection-steroid-epidural-thoracic;  Surgeon: Christoph Pratt MD;  Location: [facility] OR;  Service: Pain Management;  Laterality: N/A;  T10-11    GASTRIC BYPASS      HAND SURGERY      HYSTERECTOMY      TONSILLECTOMY       Past Medical History:   Diagnosis Date    Arthritis     Depression     Diabetes mellitus     NO MED SINCE GASTRIC BYPASS has hypoglycemia    Diabetes mellitus type II     GERD (gastroesophageal reflux disease)     Hypertension     NO MED SINCE GASTRIC BYPASS    Kidney disease     Lupus erythematosus     Shingles     Thyroid disease     Trigger finger     RIGHT LONG     No family history on file.     Social History:   Marital Status:   Alcohol History:  reports that she does not drink alcohol.  Tobacco History:  reports that she has never smoked. She has never used smokeless tobacco.  Drug History:  reports that she does not use drugs.    Review of patient's allergies indicates:   Allergen Reactions    Codeine     Sulfa (sulfonamide antibiotics)     Codeine Rash and Other (See Comments)    Plaquenil [hydroxychloroquine] Rash and Other (See Comments)     ONE BRAND OF MED    Sulfa (sulfonamide antibiotics) Rash and Other (See Comments)       Current Outpatient Medications   Medication Sig Dispense Refill    albuterol-ipratropium  (DUO-NEB) 2.5 mg-0.5 mg/3 mL nebulizer solution USE 1 VIAL(3MLS) VIA NEBULIZER EVERY 6 HOURS AS NEEDED FOR WHEEZING 990 mL 2    baclofen (LIORESAL) 20 MG tablet Take 1 tablet (20 mg total) by mouth 3 (three) times daily. 270 tablet 0    benzonatate (TESSALON PERLES) 100 MG capsule Take 1 capsule (100 mg total) by mouth 3 (three) times daily as needed for Cough. 30 capsule 1    blood sugar diagnostic (ACCU-CHEK JORDAN PLUS TEST STRP) Strp TEST THREE TIMES DAILY BEFORE A MEAL AS DIRECTED 500 strip 0    blood sugar diagnostic (ACCU-CHEK JORDAN PLUS TEST STRP) Strp TEST THREE TIMES DAILY BEFORE A MEAL AS DIRECTED 500 strip 0    blood sugar diagnostic Strp To check BG 4 times daily, to use with insurance preferred meter 360 each 0    blood sugar diagnostic Strp 1 strip by Misc.(Non-Drug; Combo Route) route 3 (three) times daily. 200 strip 2    blood-glucose meter kit To check BG 4 times daily, to use with insurance preferred meter 1 each 0    blood-glucose meter kit AC meals x2 1 each 0    cholecalciferol, vitamin D3, (VITAMIN D3) 25 mcg (1,000 unit) capsule Take 1 capsule (1,000 Units total) by mouth 2 (two) times daily. 180 capsule 0    clonazePAM (KLONOPIN) 1 MG tablet Take 1 tab PO in the morning. And take 1with lunch and 1 tab bedtime. 60 tablet 2    cyanocobalamin, vitamin B-12, 5,000 mcg TbDL Take 1 tablet by mouth once daily. 90 tablet 3    DULoxetine (CYMBALTA) 60 MG capsule Take one tablet every night at bedtime 90 capsule 3    fluticasone propionate (FLONASE) 50 mcg/actuation nasal spray ADMINISTER 1 SPRAY IN EACH NARE 2 TIMES DAILY. 48 g 0    folic acid (FOLVITE) 1 MG tablet Take 1 tablet (1 mg total) by mouth once daily. 90 tablet 0    gabapentin (NEURONTIN) 600 MG tablet Take 1 tablet (600 mg total) by mouth 3 (three) times daily. 270 tablet 0    glucagon, human recombinant, (GLUCAGON EMERGENCY KIT, HUMAN,) 1 mg SolR Inject 1 mg into the muscle as needed. 1 each 3    hydroxychloroquine  (PLAQUENIL) 200 mg tablet Take 1 tablet (200 mg total) by mouth 2 (two) times daily. 180 tablet 3    lancets (ACCU-CHEK SOFTCLIX LANCETS) Misc Use to test blood sugar three times a day   DX: E08.649 300 each 3    lancets Misc To check BG 4 times daily, to use with insurance preferred meter 360 each 0    levothyroxine (SYNTHROID) 125 MCG tablet Take 1 tablet (125 mcg total) by mouth once daily. 90 tablet 0    lidocaine HCL 2% (XYLOCAINE) 2 % jelly Apply topically as needed. Apply topically once nightly to affected area both heels. 30 mL 2    lipase-protease-amylase 24,000-76,000-120,000 units (CREON) 24,000-76,000 -120,000 unit capsule Take 1 capsule by mouth 3 (three) times daily with meals. 90 capsule 11    meloxicam (MOBIC) 7.5 MG tablet Take 1 tablet (7.5 mg total) by mouth once daily. 90 tablet 3    predniSONE (DELTASONE) 20 MG tablet One tablet twice a day x3 days.  One tablet once a day for 3 days.  Then half a tablet daily x3 days.  Continue prednisone 5 mg daily. 15 tablet 1    predniSONE (DELTASONE) 5 MG tablet Take 1 tablet (5 mg total) by mouth once daily. Ankylosis spondylitis/rheumatoid arthritis 90 tablet 1    ranitidine (ZANTAC) 150 MG tablet Take 1 tablet (150 mg total) by mouth 2 (two) times daily. 180 tablet 0    traMADol (ULTRAM) 50 mg tablet Take 1 tablet (50 mg total) by mouth every 6 (six) hours as needed for Pain. 180 tablet 1    valACYclovir (VALTREX) 500 MG tablet Take 1 tablet (500 mg total) by mouth once daily. 90 tablet 0    vitamin E 1000 UNIT capsule Take 1,000 Units by mouth once daily.      ziprasidone (GEODON) 20 MG Cap TAKE 1 CAPSULE BY MOUTH EVERY NIGHT AT BEDTIME FOR 10 DAYS THEN 1 CAPSULE BY MOUTH TWICE DAILY 180 capsule 0     No current facility-administered medications for this visit.        Review of Systems      Objective:        Physical Exam:   Physical Exam         Assessment:       SLE sound stable  FM and OA: more symptomatic  Plan:     FU in a few  months    Total time spent with patient:10 mins    Each patient to whom he or she provides medical services by telemedicine is:  (1) informed of the relationship between the physician and patient and the respective role of any other health care provider with respect to management of the patient; and (2) notified that he or she may decline to receive medical services by telemedicine and may withdraw from such care at any time.    This note was created using [employer] voice recognition software that occasionally misinterprets phrases or words.

## 2020-05-06 ENCOUNTER — PATIENT MESSAGE (OUTPATIENT)
Dept: FAMILY MEDICINE | Facility: CLINIC | Age: 70
End: 2020-05-06

## 2020-05-08 ENCOUNTER — OFFICE VISIT (OUTPATIENT)
Dept: FAMILY MEDICINE | Facility: CLINIC | Age: 70
End: 2020-05-08
Payer: MEDICARE

## 2020-05-08 ENCOUNTER — HOSPITAL ENCOUNTER (EMERGENCY)
Facility: HOSPITAL | Age: 70
Discharge: HOME | End: 2020-05-08
Attending: EMERGENCY MEDICINE
Payer: MEDICARE

## 2020-05-08 VITALS
OXYGEN SATURATION: 100 % | BODY MASS INDEX: 19.58 KG/M2 | WEIGHT: 125 LBS | TEMPERATURE: 99 F | RESPIRATION RATE: 16 BRPM | DIASTOLIC BLOOD PRESSURE: 70 MMHG | SYSTOLIC BLOOD PRESSURE: 153 MMHG | HEART RATE: 52 BPM

## 2020-05-08 DIAGNOSIS — S70.02XA CONTUSION OF LEFT HIP, INITIAL ENCOUNTER: ICD-10-CM

## 2020-05-08 DIAGNOSIS — T50.901D ACCIDENTAL DRUG INGESTION, SUBSEQUENT ENCOUNTER: ICD-10-CM

## 2020-05-08 DIAGNOSIS — R53.1 GENERALIZED WEAKNESS: Primary | ICD-10-CM

## 2020-05-08 DIAGNOSIS — K66.8 CALCIFIED MESENTERIC MASS: ICD-10-CM

## 2020-05-08 DIAGNOSIS — F05 DELIRIUM DUE TO ANOTHER MEDICAL CONDITION, ACUTE, MIXED LEVEL OF ACTIVITY: Primary | ICD-10-CM

## 2020-05-08 DIAGNOSIS — S16.1XXA CERVICAL STRAIN, ACUTE, INITIAL ENCOUNTER: ICD-10-CM
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
## 2020-05-08 DIAGNOSIS — R41.0 CONFUSION: ICD-10-CM

## 2020-05-08 DIAGNOSIS — E86.0 DEHYDRATION, SEVERE: ICD-10-CM

## 2020-05-08 DIAGNOSIS — S09.90XA CLOSED HEAD INJURY: ICD-10-CM

## 2020-05-08 DIAGNOSIS — S39.012A LUMBAR STRAIN, INITIAL ENCOUNTER: ICD-10-CM

## 2020-05-08 LAB
ALBUMIN SERPL BCP-MCNC: 4.1 G/DL (ref 3.5–5.2)
ALP SERPL-CCNC: 76 U/L (ref 55–135)
ALT SERPL W/O P-5'-P-CCNC: 26 U/L (ref 10–44)
AMORPH CRY URNS QL MICRO: NORMAL
ANION GAP SERPL CALC-SCNC: 12 MMOL/L (ref 8–16)
APTT BLDCRRT: 25.5 SEC (ref 21–32)
AST SERPL-CCNC: 19 U/L (ref 10–40)
BACTERIA #/AREA URNS HPF: NORMAL /HPF
BASOPHILS # BLD AUTO: 0.03 K/UL (ref 0–0.2)
BASOPHILS NFR BLD: 0.4 % (ref 0–1.9)
BILIRUB SERPL-MCNC: 0.6 MG/DL (ref 0.1–1)
BILIRUB UR QL STRIP: NEGATIVE
BUN SERPL-MCNC: 14 MG/DL (ref 8–23)
CALCIUM SERPL-MCNC: 9.5 MG/DL (ref 8.7–10.5)
CHLORIDE SERPL-SCNC: 105 MMOL/L (ref 95–110)
CLARITY UR: CLEAR
CO2 SERPL-SCNC: 26 MMOL/L (ref 23–29)
COLOR UR: ABNORMAL
CREAT SERPL-MCNC: 0.9 MG/DL (ref 0.5–1.4)
DIFFERENTIAL METHOD: ABNORMAL
EOSINOPHIL # BLD AUTO: 0.1 K/UL (ref 0–0.5)
EOSINOPHIL NFR BLD: 1.1 % (ref 0–8)
ERYTHROCYTE [DISTWIDTH] IN BLOOD BY AUTOMATED COUNT: 13.3 % (ref 11.5–14.5)
EST. GFR  (AFRICAN AMERICAN): >60 ML/MIN/1.73 M^2
EST. GFR  (NON AFRICAN AMERICAN): >60 ML/MIN/1.73 M^2
GLUCOSE SERPL-MCNC: 125 MG/DL (ref 70–110)
GLUCOSE UR QL STRIP: NEGATIVE
HCT VFR BLD AUTO: 42.9 % (ref 37–48.5)
HGB BLD-MCNC: 14.1 G/DL (ref 12–16)
HGB UR QL STRIP: NEGATIVE
HYALINE CASTS #/AREA URNS LPF: 0 /LPF
IMM GRANULOCYTES # BLD AUTO: 0.02 K/UL (ref 0–0.04)
IMM GRANULOCYTES NFR BLD AUTO: 0.2 % (ref 0–0.5)
INR PPP: 1 (ref 0.8–1.2)
KETONES UR QL STRIP: ABNORMAL
LACTATE SERPL-SCNC: 1.7 MMOL/L (ref 0.5–2.2)
LEUKOCYTE ESTERASE UR QL STRIP: NEGATIVE
LYMPHOCYTES # BLD AUTO: 1.7 K/UL (ref 1–4.8)
LYMPHOCYTES NFR BLD: 20.5 % (ref 18–48)
MAGNESIUM SERPL-MCNC: 2.1 MG/DL (ref 1.6–2.6)
MCH RBC QN AUTO: 31.7 PG (ref 27–31)
MCHC RBC AUTO-ENTMCNC: 32.9 G/DL (ref 32–36)
MCV RBC AUTO: 96 FL (ref 82–98)
MICROSCOPIC COMMENT: NORMAL
MONOCYTES # BLD AUTO: 0.4 K/UL (ref 0.3–1)
MONOCYTES NFR BLD: 5 % (ref 4–15)
NEUTROPHILS # BLD AUTO: 5.9 K/UL (ref 1.8–7.7)
NEUTROPHILS NFR BLD: 72.8 % (ref 38–73)
NITRITE UR QL STRIP: NEGATIVE
NRBC BLD-RTO: 0 /100 WBC
PH UR STRIP: 5 [PH] (ref 5–8)
PLATELET # BLD AUTO: 237 K/UL (ref 150–350)
PMV BLD AUTO: 10.6 FL (ref 9.2–12.9)
POTASSIUM SERPL-SCNC: 3.8 MMOL/L (ref 3.5–5.1)
PROT SERPL-MCNC: 7.4 G/DL (ref 6–8.4)
PROT UR QL STRIP: ABNORMAL
PROTHROMBIN TIME: 11.2 SEC (ref 9–12.5)
RBC # BLD AUTO: 4.45 M/UL (ref 4–5.4)
RBC #/AREA URNS HPF: 0 /HPF (ref 0–4)
SARS-COV-2 RDRP RESP QL NAA+PROBE: NEGATIVE
SODIUM SERPL-SCNC: 143 MMOL/L (ref 136–145)
SP GR UR STRIP: 1.02 (ref 1–1.03)
T4 FREE SERPL-MCNC: 1.53 NG/DL (ref 0.71–1.51)
TROPONIN I SERPL DL<=0.01 NG/ML-MCNC: <0.006 NG/ML (ref 0–0.03)
TSH SERPL DL<=0.005 MIU/L-ACNC: 0.06 UIU/ML (ref 0.4–4)
URN SPEC COLLECT METH UR: ABNORMAL
UROBILINOGEN UR STRIP-ACNC: ABNORMAL EU/DL
WBC # BLD AUTO: 8.08 K/UL (ref 3.9–12.7)
WBC #/AREA URNS HPF: 2 /HPF (ref 0–5)

## 2020-05-08 PROCEDURE — 1101F PT FALLS ASSESS-DOCD LE1/YR: CPT | Mod: HCNC,CPTII,95, | Performed by: PHYSICIAN ASSISTANT

## 2020-05-08 PROCEDURE — 84443 ASSAY THYROID STIM HORMONE: CPT | Mod: HCNC

## 2020-05-08 PROCEDURE — 1159F MED LIST DOCD IN RCRD: CPT | Mod: HCNC,95,, | Performed by: PHYSICIAN ASSISTANT

## 2020-05-08 PROCEDURE — 93005 ELECTROCARDIOGRAM TRACING: CPT | Mod: HCNC

## 2020-05-08 PROCEDURE — 93010 EKG 12-LEAD: ICD-10-PCS | Mod: HCNC,,, | Performed by: INTERNAL MEDICINE

## 2020-05-08 PROCEDURE — U0002 COVID-19 LAB TEST NON-CDC: HCPCS | Mod: HCNC

## 2020-05-08 PROCEDURE — 25000003 PHARM REV CODE 250: Mod: HCNC | Performed by: EMERGENCY MEDICINE

## 2020-05-08 PROCEDURE — 1159F PR MEDICATION LIST DOCUMENTED IN MEDICAL RECORD: ICD-10-PCS | Mod: HCNC,95,, | Performed by: PHYSICIAN ASSISTANT

## 2020-05-08 PROCEDURE — 99285 EMERGENCY DEPT VISIT HI MDM: CPT | Mod: 25,HCNC

## 2020-05-08 PROCEDURE — 99213 OFFICE O/P EST LOW 20 MIN: CPT | Mod: HCNC,95,, | Performed by: PHYSICIAN ASSISTANT

## 2020-05-08 PROCEDURE — 85610 PROTHROMBIN TIME: CPT | Mod: HCNC

## 2020-05-08 PROCEDURE — 99213 PR OFFICE/OUTPT VISIT, EST, LEVL III, 20-29 MIN: ICD-10-PCS | Mod: HCNC,95,, | Performed by: PHYSICIAN ASSISTANT

## 2020-05-08 PROCEDURE — 96360 HYDRATION IV INFUSION INIT: CPT | Mod: HCNC

## 2020-05-08 PROCEDURE — 93010 ELECTROCARDIOGRAM REPORT: CPT | Mod: HCNC,,, | Performed by: INTERNAL MEDICINE

## 2020-05-08 PROCEDURE — 84439 ASSAY OF FREE THYROXINE: CPT | Mod: HCNC

## 2020-05-08 PROCEDURE — 81000 URINALYSIS NONAUTO W/SCOPE: CPT | Mod: HCNC

## 2020-05-08 PROCEDURE — 83605 ASSAY OF LACTIC ACID: CPT | Mod: HCNC

## 2020-05-08 PROCEDURE — 1101F PR PT FALLS ASSESS DOC 0-1 FALLS W/OUT INJ PAST YR: ICD-10-PCS | Mod: HCNC,CPTII,95, | Performed by: PHYSICIAN ASSISTANT

## 2020-05-08 PROCEDURE — 85730 THROMBOPLASTIN TIME PARTIAL: CPT | Mod: HCNC

## 2020-05-08 PROCEDURE — 84484 ASSAY OF TROPONIN QUANT: CPT | Mod: HCNC

## 2020-05-08 PROCEDURE — 85025 COMPLETE CBC W/AUTO DIFF WBC: CPT | Mod: HCNC

## 2020-05-08 PROCEDURE — 83735 ASSAY OF MAGNESIUM: CPT | Mod: HCNC

## 2020-05-08 PROCEDURE — 80053 COMPREHEN METABOLIC PANEL: CPT | Mod: HCNC

## 2020-05-08 RX ORDER — BUTALBITAL, ACETAMINOPHEN AND CAFFEINE 50; 325; 40 MG/1; MG/1; MG/1
2 TABLET ORAL
Status: COMPLETED | OUTPATIENT
Start: 2020-05-08 | End: 2020-05-08

## 2020-05-08 RX ORDER — BUTALBITAL, ACETAMINOPHEN AND CAFFEINE 50; 325; 40 MG/1; MG/1; MG/1
1 TABLET ORAL EVERY 4 HOURS PRN
Qty: 20 TABLET | Refills: 0 | Status: SHIPPED | OUTPATIENT
Start: 2020-05-08 | End: 2020-05-11 | Stop reason: SINTOL

## 2020-05-08 RX ORDER — ONDANSETRON 4 MG/1
4 TABLET, FILM COATED ORAL EVERY 8 HOURS PRN
Qty: 12 TABLET | Refills: 0 | Status: SHIPPED | OUTPATIENT
Start: 2020-05-08 | End: 2020-07-08

## 2020-05-08 RX ADMIN — BUTALBITAL, ACETAMINOPHEN AND CAFFEINE 2 TABLET: 50; 325; 40 TABLET ORAL at 06:05

## 2020-05-08 RX ADMIN — SODIUM CHLORIDE 1000 ML: 0.9 INJECTION, SOLUTION INTRAVENOUS at 04:05

## 2020-05-08 NOTE — PROGRESS NOTES
Subjective:       Patient ID: Erica Marsh is a 69 y.o. female.    Chief Complaint: No chief complaint on file.    Dementia/delirium: Telemedicine evaluation and treatment of severe cognitive problems. She has been withdrawn for the last 3 days.patient sustained a recent fall. Since then has headaches, poor hydration, lethargic, and poor gait, and mental confused.She is accompanied by patient and daughter. Primary caregiver is patient. The family and the patient identify problems with day/night changes, getting disoriented outside of familiar environment and wandering. Family and patient report problems with delusional thinking and agitation. Family and patient are concerned about  wandering, however, they are not concerned about wandering and inability to maintain adequate nutrition. Medication administration: family monitors medication usage    Functional Assessment: S    The patient location is: [place]/ [place]  The chief complaint leading to consultation is: weakness/ confusion  Visit type: audiovisual  Total time spent with patient: 20 minutes  Each patient to whom he or she provides medical services by telemedicine is:  (1) informed of the relationship between the physician and patient and the respective role of any other health care provider with respect to management of the patient; and (2) notified that he or she may decline to receive medical services by telemedicine and may withdraw from such care at any time.    Patient presents via telemedicine accompanied by her daughter. The daughter reports that she has been having increased weakness recently. She has also had more frequent falls and confusion. The patient has had little appetite over the last 2 days and has had increased pain levels due to her lupus and fibromyalgia. The patient's daughter reports that with one of the patient's falls, she hit her head.     Review of Systems   Constitutional: Positive for appetite change, fatigue and unexpected  weight change. Negative for diaphoresis.   HENT: Negative for facial swelling.    Respiratory: Negative for chest tightness and shortness of breath.    Cardiovascular: Negative for chest pain, palpitations and leg swelling.   Gastrointestinal: Negative for abdominal pain.   Musculoskeletal: Positive for arthralgias, back pain and gait problem.   Neurological: Positive for speech difficulty, weakness and headaches. Negative for dizziness, syncope and light-headedness.   Psychiatric/Behavioral: Positive for confusion and decreased concentration.       Patient Active Problem List   Diagnosis    Lupus (systemic lupus erythematosus)    GERD (gastroesophageal reflux disease)    Arthritis    Thyroid disease    Trigger finger    Hypoglycemia    Abdominal pain, other specified site    Constipation    Change in bowel habits    Rectal bleeding    Hypoglycemia after GI (gastrointestinal) surgery    Head trauma    Depression with anxiety    Other spondylosis, lumbar region    Sinus bradycardia    DDD (degenerative disc disease), thoracic       Objective:      Physical Exam   Constitutional:   Patient lying in bed and appears ill   HENT:   Head: Normocephalic and atraumatic.   Mouth/Throat: No oropharyngeal exudate.   Pulmonary/Chest: Effort normal. No respiratory distress.   Neurological: No cranial nerve deficit.   Skin: No rash noted.   Psychiatric: She has a normal mood and affect. Her behavior is normal.   Patient seems withdrawn       Lab Results   Component Value Date    WBC 6.6 04/16/2020    HGB 13.7 04/16/2020    HCT 40.6 04/16/2020     04/16/2020    CHOL 190 04/18/2019    TRIG 57 04/18/2019    HDL 81 (H) 04/18/2019    ALT 28 05/24/2019    AST 29 05/24/2019     04/16/2020    K 4.3 04/16/2020    CL 99 04/16/2020    CREATININE 0.97 04/16/2020    BUN 18 04/16/2020    CO2 26 04/16/2020    TSH 0.39 (L) 04/16/2020    HGBA1C 4.9 02/26/2018     The 10-year ASCVD risk score (Sarah DURGA Jr., et al., 2013)  is: 6.7%    Values used to calculate the score:      Age: 69 years      Sex: Female      Is Non- : No      Diabetic: No      Tobacco smoker: No      Systolic Blood Pressure: 128 mmHg      Is BP treated: No      HDL Cholesterol: 81 mg/dL      Total Cholesterol: 190 mg/dL    Assessment:       1. Delirium due to another medical condition, acute, mixed level of activity    2. Fall, initial encounter    3. Accidental drug ingestion, subsequent encounter    4. Dehydration, severe        Plan:       Diagnoses and all orders for this visit:    Delirium due to another medical condition, acute, mixed level of activity    Fall, initial encounter    Accidental drug ingestion, subsequent encounter    Dehydration, severe    Patient evaluated with Dr. Ornelas. Patient and her daughter advised that the patient needed urgent evaluation at the emergency department.

## 2020-05-08 NOTE — ED PROVIDER NOTES
"Encounter Date: 5/8/2020    SCRIBE #1 NOTE: I, Rashmi Atwood, am scribing for, and in the presence of,  Christoph Brito MD. I have scribed the following portions of the note - Other sections scribed: HPI, ROS.       History     Chief Complaint   Patient presents with    Fatigue     body aches, headaches, increased weakness, nausea & 2 falls in the last 4 days     CC: Fall    HPI: This is a 69 y.o. F who has Arthritis, DM, GERD, HTN, Kidney disease, Lupus erythematous, and Thyroid disease who presents to the ED for emergent evaluation of falling two times 3 days ago. Pt reports feeling confused and having difficulty walking prior to falling on the patio. She states, "I did not know if it was day or night." Pt also reports right temporal headache, neck pain, left hip pain, and lower back pain since the fall. Pt has additional complaints of fatigue, vomiting, and diarrhea. She reports 4 episodes of vomiting, and 3 episodes of diarrhea within the last 24 hours. The pt's daughter was contacted and confirmed the pt's confusion, 2 episodes of falling, and weakness for the past 5 days. Pt's states that the pt fell once in the room and once on the patio. Pt's daughter also reports intermittent productive cough. Pt denies fever, chills, ear pain, sore throat, nasal congestion, runny nose, eye problem, SOB, CP, abdominal pain, dysuria, arm problem, rash, or Hx of gait problem.    The history is provided by the patient and a relative. No  was used.     Review of patient's allergies indicates:   Allergen Reactions    Codeine     Sulfa (sulfonamide antibiotics)     Codeine Rash and Other (See Comments)    Plaquenil [hydroxychloroquine] Rash and Other (See Comments)     ONE BRAND OF MED    Sulfa (sulfonamide antibiotics) Rash and Other (See Comments)     Past Medical History:   Diagnosis Date    Arthritis     Depression     Diabetes mellitus     NO MED SINCE GASTRIC BYPASS has hypoglycemia    Diabetes " mellitus type II     GERD (gastroesophageal reflux disease)     Hypertension     NO MED SINCE GASTRIC BYPASS    Kidney disease     Lupus erythematosus     Shingles     Thyroid disease     Trigger finger     RIGHT LONG     Past Surgical History:   Procedure Laterality Date    APPENDECTOMY      BREAST BIOPSY Left 1979    benign    CHOLECYSTECTOMY      EPIDURAL STEROID INJECTION INTO THORACIC SPINE N/A 6/22/2018    Procedure: Injection-steroid-epidural-thoracic;  Surgeon: Christoph Pratt MD;  Location: [facility] OR;  Service: Pain Management;  Laterality: N/A;  T10-11    EPIDURAL STEROID INJECTION INTO THORACIC SPINE N/A 8/6/2018    Procedure: Injection-steroid-epidural-thoracic;  Surgeon: Christoph Pratt MD;  Location: [facility] OR;  Service: Pain Management;  Laterality: N/A;  T10-11    GASTRIC BYPASS      HAND SURGERY      HYSTERECTOMY      TONSILLECTOMY       History reviewed. No pertinent family history.  Social History     Tobacco Use    Smoking status: Never Smoker    Smokeless tobacco: Never Used   Substance Use Topics    Alcohol use: No     Frequency: Monthly or less     Drinks per session: 1 or 2     Binge frequency: Never    Drug use: No     Review of Systems   Constitutional: Positive for fatigue. Negative for chills and fever.   HENT: Negative for congestion, ear pain, rhinorrhea and sore throat.    Eyes: Negative for pain.   Respiratory: Positive for cough. Negative for shortness of breath.    Cardiovascular: Negative for chest pain.   Gastrointestinal: Positive for diarrhea and vomiting. Negative for abdominal pain and nausea.   Genitourinary: Negative for dysuria.   Musculoskeletal: Positive for arthralgias (left hip), back pain (lumbar), gait problem and neck pain. Negative for myalgias.   Skin: Negative for rash.   Neurological: Positive for weakness (generalized) and headaches (right temporal).   Hematological: Does not bruise/bleed easily.   Psychiatric/Behavioral: Positive for confusion.        Physical Exam     Initial Vitals [05/08/20 1310]   BP Pulse Resp Temp SpO2   139/66 (!) 54 16 98.8 °F (37.1 °C) 98 %      MAP       --         Physical Exam  The patient was examined specifically for the following:   General:No significant distress, Good color, Warm and dry. Head and neck:Scalp atraumatic, Neck supple. Neurological:Appropriate conversation, Gross motor deficits. Eyes:Conjugate gaze, Clear corneas. ENT: No epistaxis. Cardiac: Regular rate and rhythm, Grossly normal heart tones. Pulmonary: Wheezing, Rales. Gastrointestinal: Abdominal tenderness, Abdominal distention. Musculoskeletal: Extremity deformity, Apparent pain with range of motion of the joints. Skin: Rash.   The findings on examination were normal except for the following:  Patient's heart rate is 54.  The patient complains of confusion but is normally conversational.  She remembered her daughter's phone number.  She did seems slightly pale.  She does move slowly.  She does appear to be weak.  She has some tenderness of the lumbar spine the left hip.  I see no abrasion or contusion of the right temporal head.  There is no significant tenderness or pain with range of motion of the upper extremities..  The patient does have some cervical tenderness.  ED Course   Procedures  Labs Reviewed   TSH - Abnormal; Notable for the following components:       Result Value    TSH 0.057 (*)     All other components within normal limits   COMPREHENSIVE METABOLIC PANEL - Abnormal; Notable for the following components:    Glucose 125 (*)     All other components within normal limits   CBC W/ AUTO DIFFERENTIAL - Abnormal; Notable for the following components:    Mean Corpuscular Hemoglobin 31.7 (*)     All other components within normal limits   URINALYSIS, REFLEX TO URINE CULTURE - Abnormal; Notable for the following components:    Protein, UA 1+ (*)     Ketones, UA Trace (*)     Urobilinogen, UA 4.0-6.0 (*)     All other components within normal limits     Narrative:     Preferred Collection Type->Urine, Clean Catch   T4, FREE - Abnormal; Notable for the following components:    Free T4 1.53 (*)     All other components within normal limits   APTT   PROTIME-INR   TROPONIN I   MAGNESIUM   LACTIC ACID, PLASMA   URINALYSIS MICROSCOPIC    Narrative:     Preferred Collection Type->Urine, Clean Catch   SARS-COV-2 RNA AMPLIFICATION, QUAL     EKG Readings: (Independently Interpreted)   This patient is in a sinus bradycardia with a heart rate of 55.  There are no significant ST segment or T-wave changes.  There is no evidence of acute myocardial infarction or malignant arrhythmia.  The axis is normal.  There are some low voltage QRS complexes.       Imaging Results          X-Ray Chest AP Portable (Final result)  Result time 05/08/20 16:22:11    Final result by Lynne Lovett MD (05/08/20 16:22:11)                 Impression:      No acute cardiopulmonary abnormality.      Electronically signed by: Lynne Lovett  Date:    05/08/2020  Time:    16:22             Narrative:    EXAMINATION:  XR CHEST AP PORTABLE    CLINICAL HISTORY:  chest pain;    TECHNIQUE:  Single view of the chest was obtained.    COMPARISON:  Multiple priors, most recent 06/29/2015    FINDINGS:  Normal cardiomediastinal contour. No focal consolidation, pleural effusion or pneumothorax.                               X-Ray Lumbar Spine Ap And Lateral (Final result)  Result time 05/08/20 16:28:51    Final result by Janice Thompson MD (05/08/20 16:28:51)                 Impression:      1. No traumatic sequela identified on this exam.  2. Stable advanced degenerative disc and joint disease throughout the lumbar spine.  3. 8 mm calcification in the left hemiabdomen.  This projects lower than the renal shadow however a ureteral stone is still in the differential.      Electronically signed by: Janice Thompson MD  Date:    05/08/2020  Time:    16:28             Narrative:    EXAMINATION:  XR LUMBAR SPINE AP  AND LATERAL    CLINICAL HISTORY:  T/L-spine trauma, minor-mod, low back pain;    TECHNIQUE:  AP, lateral and spot images were performed of the lumbar spine.    COMPARISON:  03/15/2018    FINDINGS:  There is loss of the normal lumbar lordosis.  Otherwise, the vertebral bodies maintain normal height and alignment without fracture or subluxation.  There is multilevel loss of disc height with vacuum disc phenomenon and anterior spondylosis.  Multilevel facet arthropathy is identified.  The pedicles are normal.  The surrounding osseous and soft tissue structures are significant only for a calcification overlying the left mid abdomen.                               X-Ray Hip 2 View Left (Final result)  Result time 05/08/20 16:25:58    Final result by Ezio Zuniga MD (05/08/20 16:25:58)                 Impression:      No acute radiographic abnormality      Electronically signed by: Ezio Zuniga  Date:    05/08/2020  Time:    16:25             Narrative:    EXAMINATION:  XR HIP 2 VIEW LEFT    CLINICAL HISTORY:  Pain in left hip    TECHNIQUE:  AP view of the pelvis and frog leg lateral view of the left hip were performed.    COMPARISON:  None    FINDINGS:  Femoral heads are normally positioned.    No acute fracture or traumatic subluxation.  Pubic rami and symphysis appear intact.    Mild degenerative changes.  No evidence of avascular necrosis.                               CT Head Without Contrast (Final result)  Result time 05/08/20 15:10:11    Final result by Elías Campbell MD (05/08/20 15:10:11)                 Impression:      No acute intracranial abnormality identified.      Electronically signed by: Elías Campbell MD  Date:    05/08/2020  Time:    15:10             Narrative:    EXAMINATION:  CT HEAD WITHOUT CONTRAST    CLINICAL HISTORY:  Confusion/delirium, altered LOC, unexplained;Confusion, head trauma; Unspecified injury of head, initial encounter    TECHNIQUE:  Low dose axial CT images obtained throughout  the head without intravenous contrast. Sagittal and coronal reconstructions were performed.    COMPARISON:  Head CT 10/29/2014    FINDINGS:  Intracranial compartment: Mild generalized cerebral volume loss.    Ventricles are midline and stable in size and configuration without distortion by mass effect or acute hydrocephalus.  No extra-axial blood or fluid collections.    Grossly stable distribution of supratentorial white matter minimal chronic small vessel ischemic change.  No definite new focal areas of abnormal parenchymal attenuation.  No parenchymal mass, hemorrhage, edema or major vascular distribution infarct.    Skull/extracranial contents (limited evaluation): No fracture. Mastoid air cells and paranasal sinuses are essentially clear.                               CT Cervical Spine Without Contrast (Final result)  Result time 05/08/20 15:24:18    Final result by Elías Campbell MD (05/08/20 15:24:18)                 Impression:      No CT evidence of cervical spine acute osseous traumatic injury.    Mild cervical spondylosis as above.      Electronically signed by: Elías Campbell MD  Date:    05/08/2020  Time:    15:24             Narrative:    EXAMINATION:  CT CERVICAL SPINE WITHOUT CONTRAST    CLINICAL HISTORY:  C-spine trauma, NEXUS/CCR positive, low risk;    TECHNIQUE:  Low dose axial images, sagittal and coronal reformations were performed though the cervical spine.  Contrast was not administered.    COMPARISON:  Cervical spine series 01/04/2017 and MRI cervical spine 09/10/2013    FINDINGS:  Mild levocurvature with straightening of the cervical lordosis which may be related to positioning or muscle strain.  Grossly similar degenerative related grade 1 retrolisthesis of C5 on 6 and to a lesser extent C6 on 7.  Vertebral body heights are unchanged.  No displaced fracture, dislocation or destructive osseous process.  No prevertebral soft tissue swelling.  No paraspinal mass or fluid collection.  No  subcutaneous emphysema or radiodense retained foreign body.  Mild degenerative change about the atlantodental interval.  Dens and lateral masses are otherwise well aligned and intact.    C2-3: No significant spinal canal stenosis or neural foraminal narrowing.    C3-4: Minimal right neural foraminal narrowing.  No significant spinal canal stenosis or left neural foraminal narrowing.    C4-5: Posterior disc osteophyte complex resulting in borderline acquired canal stenosis.  Mild right and minimal left neural foraminal narrowing.    C5-6: Posterior disc osteophyte complex resulting in borderline acquired canal stenosis.  Mild right neural foraminal narrowing.  No significant left neural foraminal narrowing.    C6-7: Posterior disc osteophyte complex resulting in borderline acquired canal stenosis.  Mild to moderate right and mild left neural foraminal narrowing.    C7-T1: No significant spinal canal stenosis or neural foraminal narrowing.    Included airway is midline and patent.  Minimal biapical pleuroparenchymal scarring without pneumothorax.                              Medical decision making:  Given the above, this patient had 2 falls 4 days ago.  She complains of being confused and feeling very weak.  Diagnostic evaluation fails to reveal a reason for that weakness.  I do not find the patient to be terribly confused.  She remembers her daughters 10 digit phone number.  There is no fever.  There is no evidence of infection.  CBC chemistries are within normal limits.  CT of the head x-rays of the left hip and lumbar spine failed to reveal significant abnormalities.  The patient has a low TSH in a slightly high T4.  I do not think these are causing symptoms.  I will discharge to follow-up with Internal Medicine and Neurology.  Because the history of lupus, I reviewed this case with Dr. Dick Peters, who agrees that the patient is suitable for discharge.  I will discharge the patient to outpatient follow-up.  I  will do COVID testing before she leaves.  Positive for negative, I do not think this will affect the disposition.  I find no evidence of stroke.                                     Clinical Impression:       ICD-10-CM ICD-9-CM   1. Generalized weakness R53.1 780.79   2. Closed head injury S09.90XA 959.01   3. Confusion R41.0 298.9   4. Contusion of left hip, initial encounter S70.02XA 924.01   5. Lumbar strain, initial encounter S39.012A 847.2   6. Cervical strain, acute, initial encounter S16.1XXA 847.0   7. Fall, initial encounter W19.XXXA E888.9   8. Calcified mesenteric mass K66.8 568.89             ED Disposition Condition    Discharge Stable        ED Prescriptions     Medication Sig Dispense Start Date End Date Auth. Provider    butalbital-acetaminophen-caffeine -40 mg (FIORICET, ESGIC) -40 mg per tablet Take 1 tablet by mouth every 4 (four) hours as needed for Pain. 20 tablet 5/8/2020 6/7/2020 Christoph Brito MD        Follow-up Information     Follow up With Specialties Details Why Contact Info    Jose Guadalupe Ornelas MD Family Medicine In 3 days  [address] [place] [address]  [phone]      Alvino Bower MD Neurology In 3 days  [address]  [address]  [facility] [id number] [phone]                          I personally performed the services described in this documentation.  All medical record  entries made by the scribe are at my direction and in my presence.  Signed, Dr. Alisha Brito MD  05/08/20 1737       Christoph Brito MD  05/08/20 1752

## 2020-05-08 NOTE — PATIENT INSTRUCTIONS

## 2020-05-08 NOTE — ED TRIAGE NOTES
Patient reports fatigue, and productive cough. Multiple falls in the past week, reports hitting right temporal side of head on last fall.

## 2020-05-08 NOTE — DISCHARGE INSTRUCTIONS
Please return immediately if you get worse or if new problems develop.  Please follow-up with your primary care doctor in the neurologist above this week.  Usual medicines.  Fioricet for head pain.  Rest.  Please have your primary care doctor review the findings from today's visit.

## 2020-05-09 ENCOUNTER — PATIENT MESSAGE (OUTPATIENT)
Dept: FAMILY MEDICINE | Facility: CLINIC | Age: 70
End: 2020-05-09

## 2020-05-11 ENCOUNTER — TELEPHONE (OUTPATIENT)
Dept: FAMILY MEDICINE | Facility: CLINIC | Age: 70
End: 2020-05-11

## 2020-05-11 DIAGNOSIS — I69.393 ATAXIA DUE TO RECENT STROKE: ICD-10-CM

## 2020-05-11 DIAGNOSIS — F16.921: Primary | ICD-10-CM

## 2020-05-11 DIAGNOSIS — G89.29 CHRONIC LOW BACK PAIN WITH BILATERAL SCIATICA, UNSPECIFIED BACK PAIN LATERALITY: ICD-10-CM

## 2020-05-11 DIAGNOSIS — M32.9 SYSTEMIC LUPUS ERYTHEMATOSUS, UNSPECIFIED SLE TYPE, UNSPECIFIED ORGAN INVOLVEMENT STATUS: ICD-10-CM

## 2020-05-11 DIAGNOSIS — F11.221 OPIOID DEPENDENCE WITH INTOXICATION DELIRIUM: ICD-10-CM

## 2020-05-11 DIAGNOSIS — R29.818 NEUROLOGICAL DEFICIT PRESENT: ICD-10-CM

## 2020-05-11 DIAGNOSIS — M43.26 ANKYLOSIS OF LUMBAR SPINE: ICD-10-CM

## 2020-05-11 DIAGNOSIS — M45.5 ANKYLOSING SPONDYLITIS OF THORACOLUMBAR REGION: ICD-10-CM

## 2020-05-11 DIAGNOSIS — G57.00 SCIATIC NEUROPATHY, UNSPECIFIED LATERALITY: ICD-10-CM

## 2020-05-11 DIAGNOSIS — M54.41 CHRONIC LOW BACK PAIN WITH BILATERAL SCIATICA, UNSPECIFIED BACK PAIN LATERALITY: ICD-10-CM

## 2020-05-11 DIAGNOSIS — M54.42 CHRONIC LOW BACK PAIN WITH BILATERAL SCIATICA, UNSPECIFIED BACK PAIN LATERALITY: ICD-10-CM

## 2020-05-11 DIAGNOSIS — E07.9 THYROID DISEASE: ICD-10-CM

## 2020-05-11 DIAGNOSIS — M19.90 ARTHRITIS: ICD-10-CM

## 2020-05-11 DIAGNOSIS — F33.9 RECURRENT MAJOR DEPRESSION RESISTANT TO TREATMENT: ICD-10-CM

## 2020-05-11 RX ORDER — GABAPENTIN 100 MG/1
100 CAPSULE ORAL NIGHTLY
Qty: 30 CAPSULE | Refills: 11
Start: 2020-05-11 | End: 2020-05-15

## 2020-05-11 RX ORDER — CLONAZEPAM 1 MG/1
0.5 TABLET ORAL 2 TIMES DAILY
Qty: 60 TABLET | Refills: 2
Start: 2020-05-11 | End: 2020-05-29

## 2020-05-11 NOTE — TELEPHONE ENCOUNTER
Mental status changes within the last 72 hr.  Patient was seen through pressure present on Friday referred to emergency room.  Patient had a neurological workup.  A workup role in multi substance intoxication.  Patient has polypharmacy with symptoms of headaches tremors mental fatigued as slurred speech poor gait cognitive impairment not her thinking as severe insomnia and anorexia.  Patient has been taking tramadol, clonazepam, Geodon, Seroquel, gabapentin, and baclofen.  She is taking prednisone 10 mg daily Plaquenil for her rheumatoid arthritis.  Patient is slowly improving since only Klonopin 0.5 mg at bedtime Plaquenil prednisone and thyroid medication have been resume.  Will consult PT OT and speech therapy to improve cognitive levels gait and strengthening.  Will consult  to this patient is needs at home.  Will require skills from home health care nurse to review other signs of the patient.  The patient might benefit some low doses neuroleptics and opiates.  Will recommend also MRI without contrast of the brain.  Patient will need also psychiatry consult.  Patient had low score on her mini-mental status suggesting acute delirium.  I discussed the care with her daughter Rosendo.  Patient knowledge acute present problems.  Surgery

## 2020-05-11 NOTE — TELEPHONE ENCOUNTER
Erica is back home with no changes. Her motor skills are not getting better,and she continues to have nausea, headaches, body pain, confused at times. Her hand also shake.  Please advise

## 2020-05-11 NOTE — TELEPHONE ENCOUNTER
Patient states she feels like she is going through withdrawals.  She is requesting a phone call from Dr Ornelas.

## 2020-05-12 ENCOUNTER — OUTPATIENT CASE MANAGEMENT (OUTPATIENT)
Dept: ADMINISTRATIVE | Facility: OTHER | Age: 70
End: 2020-05-12

## 2020-05-12 NOTE — PROGRESS NOTES
Attempt #:  1  This SW attempted to reach patient/caregiver to provide resource and left a message requesting a return call.      [facility] received a return call from patient daughter Dior     Outpatient Care Management   - Low Risk Patient Assessment    Patient: Erica Marsh  MRN:  [id number]  Date of Service:  5/12/2020  Completed by:  Catarina Graf LMSW  Referral Date: 05/11/2020  Program: OPCM Low Risk    Reason for Visit   Patient presents with    OPCM SW First Assessment Attempt     05/12/2020    Social Work Assessment - Low/Mod Risk    Case Closure       Patient Summary     OPCM Social Work Assessment (Low/Moderate Risk)    General  Level of Caregiver support:  Caregiver currently provides assistance  Have you had to make a decision between paying for any of the following in the last 2 months?:  Medical Care, Food  Transportation means:  Family, Self  Employment status:  Disabled  Current symptoms:  Sleep disturbances, Memory problems  Assessments  Was the PHQ Depression Screening completed this visit?:  No  Was the EDIL-7 Screening completed this visit?:  No      This SW received a referral on the above patient.   Reason for referral: needs in the home   Name of the community resource that was provided:[employer], [place] Financial Assistance, Advance Directive, [place] Help line,  Via Link 211, Alzheimer's 24/7 help line, [facility] Dementia Dylan, [employer] Over the Counter Benefit, Medicaid Community Choice Waiver, [employer]  Resource given to: Patient Daughter via Telephone and E-mail   This SW completed assessment with patient daughter Dior. Dior reports patient and spouse reside together. Dior reports she currently resides with patient until she can return to work. Dior reports she will be returning to [employer] for work. Dior reports before I/P stay patient was independent. Dior reports after last hospital visit  patient is unable to complete ADL's without assistance. Dior reports she and patient spouse assist with ADLs.. LMSW ask Dior if any other family members can assist once she returns to [employer] for work. Dior reports no.  The only one that can assist will be her spouse. Dior reports family is unable to hire a caregiver currently. Dior reports she assist patient with money as the patient and her spouse only received Social Security.  LMSW ask Dior will NH Placement be an option. Dior reports no. LMSW will refer patient to [facility] to assist with homemaker, respite and personal care services. LMSW also encourage Dior to apply for [employer]. [employer] may assist with additional in-home support. Dior denied patient needs assistance with medication and shelter but needs assistance affording food and medical. SW provided Dior with a financial assistance application to assist with outstanding bills. Dior reports family has applied for SNAP, however, has not received an approval at this time. LMSW provided daughter with food pantry resources. Dior reports patient was able to drive herself to and from appointments prior to this last hospital stay. Dior reports she and patient spouse will drive patient until she is able to return to driving. Dior unsure if patient has an MPOA on file. SW provided Dior with information on Long Term Planning. Dior denied HH has been in contact with family. LMSW will follow up with HH to determine when intake will take place. LMSW ask Dior has patient been diagnosed with a neurocognitive disorder. Dior reports no. Dior reports she has asked PCP to refer patient to neurology for a consult as patient has been experiencing memory loss. LMSW explained to daughter if patient has been diagnosed with a neurocognitive disorder patient may benefit from additional resources. SW provided Dior with those resources for future use.  Dior voiced understanding that  patient has to have a neurocognitive diagnosis in order to use some of the resources that were provided.  SW emailed all resources as requested to [email or web address]    LMSW contacted [facility] and spoke with Laura. Laura reports referral has been received and will follow up with patient regarding intake    LMSW followed up with daughter and advised her HH orders have been received an someone will be in contact with her regarding intake. Dior voiced understanding.               Complex Care Plan     Care plan was discussed and completed today with input from patient and/or caregiver.    Goals    None         Patient Instructions     No follow-ups on file.    Todays OPCM Self-Management Care Plan was developed with the patients/caregivers input and was based on identified barriers from todays assessment.  Goals were written today with the patient/caregiver and the patient has agreed to work towards these goals to improve his/her overall well-being. Patient verbalized understanding of the care plan, goals, and all of today's instructions. Encouraged patient/caregiver to communicate with his/her physician and health care team about health conditions and the treatment plan.  Provided my contact information today and encouraged patient/caregiver to call me with any questions as needed.

## 2020-05-14 ENCOUNTER — TELEPHONE (OUTPATIENT)
Dept: FAMILY MEDICINE | Facility: CLINIC | Age: 70
End: 2020-05-14

## 2020-05-14 DIAGNOSIS — I69.393 ATAXIA DUE TO RECENT STROKE: Primary | ICD-10-CM

## 2020-05-14 DIAGNOSIS — F16.921: ICD-10-CM

## 2020-05-14 NOTE — TELEPHONE ENCOUNTER
Erica was seen today for advice only.    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

## 2020-05-14 NOTE — TELEPHONE ENCOUNTER
----- Message from Kellie Hays MA sent at 5/14/2020  3:30 PM CDT -----  Contact: Phone :  [phone]  [facility], requesting orders for a rolling walker.     Phone :  [phone]  Fax: [phone]

## 2020-05-15 ENCOUNTER — PATIENT MESSAGE (OUTPATIENT)
Dept: FAMILY MEDICINE | Facility: CLINIC | Age: 70
End: 2020-05-15

## 2020-05-15 ENCOUNTER — TELEPHONE (OUTPATIENT)
Dept: RADIOLOGY | Facility: HOSPITAL | Age: 70
End: 2020-05-15

## 2020-05-15 ENCOUNTER — PATIENT OUTREACH (OUTPATIENT)
Dept: ADMINISTRATIVE | Facility: HOSPITAL | Age: 70
End: 2020-05-15

## 2020-05-15 DIAGNOSIS — F33.9 RECURRENT MAJOR DEPRESSION RESISTANT TO TREATMENT: ICD-10-CM

## 2020-05-15 DIAGNOSIS — F16.921: ICD-10-CM

## 2020-05-15 DIAGNOSIS — I69.393 ATAXIA DUE TO RECENT STROKE: Primary | ICD-10-CM

## 2020-05-15 DIAGNOSIS — R29.818 NEUROLOGICAL DEFICIT PRESENT: ICD-10-CM

## 2020-05-15 DIAGNOSIS — F11.221 OPIOID DEPENDENCE WITH INTOXICATION DELIRIUM: ICD-10-CM

## 2020-05-15 RX ORDER — GABAPENTIN 100 MG/1
100 CAPSULE ORAL 2 TIMES DAILY
Qty: 60 CAPSULE | Refills: 11
Start: 2020-05-15 | End: 2020-05-25 | Stop reason: SDUPTHER

## 2020-05-15 NOTE — PROGRESS NOTES
Chart review completed 05/15/2020.  Care Everywhere updates requested and reviewed.  Immunizations reconciled. Media reviewed.     DIS, Lab leticia, and quest reviewed    Spoke with pt's daughter in regards to scheduling mammogram and dexa scan. She was concerned about cost, since her mother has had a recent hospital stay and other radiology. She is going to contact [facility] about coverage and out of pocket cost, and get back to me about scheduling if affordable. Portal message reminder sent as well, per daughter's request.

## 2020-05-15 NOTE — TELEPHONE ENCOUNTER
No noted appointment in Epic with Neurology. Please advise if patient can take Gabapentin. Please see attached message from patient.

## 2020-05-15 NOTE — TELEPHONE ENCOUNTER
MRI brain appointment.? Gabapentin 100 mg to bid.Neuro juanpablo, patient to call them.  I need to give her the Psychiatry Phone #,

## 2020-05-16 ENCOUNTER — PATIENT MESSAGE (OUTPATIENT)
Dept: FAMILY MEDICINE | Facility: CLINIC | Age: 70
End: 2020-05-16

## 2020-05-18 ENCOUNTER — HOSPITAL ENCOUNTER (OUTPATIENT)
Dept: RADIOLOGY | Facility: HOSPITAL | Age: 70
Discharge: HOME | End: 2020-05-18
Attending: FAMILY MEDICINE
Payer: MEDICARE

## 2020-05-18 DIAGNOSIS — F11.221 OPIOID DEPENDENCE WITH INTOXICATION DELIRIUM: ICD-10-CM

## 2020-05-18 DIAGNOSIS — R29.818 NEUROLOGICAL DEFICIT PRESENT: ICD-10-CM

## 2020-05-18 DIAGNOSIS — I69.393 ATAXIA DUE TO RECENT STROKE: ICD-10-CM

## 2020-05-18 DIAGNOSIS — F16.921: ICD-10-CM

## 2020-05-18 PROCEDURE — 70551 MRI BRAIN STEM W/O DYE: CPT | Mod: 26,HCNC,, | Performed by: RADIOLOGY

## 2020-05-18 PROCEDURE — 70551 MRI BRAIN WITHOUT CONTRAST: ICD-10-PCS | Mod: 26,HCNC,, | Performed by: RADIOLOGY

## 2020-05-18 PROCEDURE — 70551 MRI BRAIN STEM W/O DYE: CPT | Mod: TC,HCNC

## 2020-05-18 RX ORDER — GABAPENTIN 100 MG/1
100 CAPSULE ORAL 2 TIMES DAILY
Qty: 60 CAPSULE | Refills: 11 | Status: CANCELLED
Start: 2020-05-18 | End: 2021-05-18

## 2020-05-18 NOTE — TELEPHONE ENCOUNTER
----- Message from Kathrine Chi sent at 5/18/2020  1:32 PM CDT -----  Type: Needs Medical Advice  Who Called:  karen / Greg LI  Best Call Back Number: [phone] ext [phone]  Additional Information: wants to clarify if Dr. Ornelas still wanted speech therapy and medical social worker. Please give call back.

## 2020-05-18 NOTE — TELEPHONE ENCOUNTER
----- Message from Abbey Adams sent at 5/18/2020  8:47 AM CDT -----  Type: Needs Medical Advice  Who Called:  Egan [facility]/Elijah Burris Call Back Number: [phone]  Additional Information: The OT evaluation was last week with 3 visits totally are planned. Functionally she is doing well but depression is the biggest issue. Please call if needed.

## 2020-05-25 ENCOUNTER — PATIENT MESSAGE (OUTPATIENT)
Dept: ADMINISTRATIVE | Facility: HOSPITAL | Age: 70
End: 2020-05-25

## 2020-05-25 ENCOUNTER — EXTERNAL HOME HEALTH (OUTPATIENT)
Dept: HOME HEALTH SERVICES | Facility: HOSPITAL | Age: 70
End: 2020-05-25

## 2020-05-25 DIAGNOSIS — F16.921: ICD-10-CM

## 2020-05-25 DIAGNOSIS — F11.221 OPIOID DEPENDENCE WITH INTOXICATION DELIRIUM: ICD-10-CM

## 2020-05-25 DIAGNOSIS — Z12.31 SCREENING MAMMOGRAM, ENCOUNTER FOR: Primary | ICD-10-CM

## 2020-05-25 DIAGNOSIS — R29.818 NEUROLOGICAL DEFICIT PRESENT: ICD-10-CM

## 2020-05-25 DIAGNOSIS — I69.393 ATAXIA DUE TO RECENT STROKE: ICD-10-CM

## 2020-05-26 RX ORDER — GABAPENTIN 100 MG/1
100 CAPSULE ORAL 2 TIMES DAILY
Qty: 60 CAPSULE | Refills: 11 | Status: SHIPPED | OUTPATIENT
Start: 2020-05-26 | End: 2020-05-29

## 2020-05-28 ENCOUNTER — DOCUMENT SCAN (OUTPATIENT)
Dept: HOME HEALTH SERVICES | Facility: HOSPITAL | Age: 70
End: 2020-05-28
Payer: MEDICARE

## 2020-05-29 ENCOUNTER — HOSPITAL ENCOUNTER (OUTPATIENT)
Dept: RADIOLOGY | Facility: CLINIC | Age: 70
Discharge: HOME | End: 2020-05-29
Attending: FAMILY MEDICINE
Payer: MEDICARE

## 2020-05-29 ENCOUNTER — OFFICE VISIT (OUTPATIENT)
Dept: FAMILY MEDICINE | Facility: CLINIC | Age: 70
End: 2020-05-29
Payer: MEDICARE

## 2020-05-29 ENCOUNTER — TELEPHONE (OUTPATIENT)
Dept: FAMILY MEDICINE | Facility: CLINIC | Age: 70
End: 2020-05-29

## 2020-05-29 VITALS
HEART RATE: 59 BPM | BODY MASS INDEX: 19.73 KG/M2 | SYSTOLIC BLOOD PRESSURE: 112 MMHG | TEMPERATURE: 98 F | OXYGEN SATURATION: 98 % | RESPIRATION RATE: 16 BRPM | WEIGHT: 125.69 LBS | HEIGHT: 67 IN | DIASTOLIC BLOOD PRESSURE: 60 MMHG

## 2020-05-29 DIAGNOSIS — F16.921: ICD-10-CM

## 2020-05-29 DIAGNOSIS — M43.26 ANKYLOSIS OF LUMBAR SPINE: ICD-10-CM

## 2020-05-29 DIAGNOSIS — M54.42 CHRONIC LOW BACK PAIN WITH BILATERAL SCIATICA, UNSPECIFIED BACK PAIN LATERALITY: ICD-10-CM

## 2020-05-29 DIAGNOSIS — G89.29 CHRONIC LOW BACK PAIN WITH BILATERAL SCIATICA, UNSPECIFIED BACK PAIN LATERALITY: ICD-10-CM

## 2020-05-29 DIAGNOSIS — M45.5 ANKYLOSING SPONDYLITIS OF THORACOLUMBAR REGION: ICD-10-CM

## 2020-05-29 DIAGNOSIS — Z51.89 ENCOUNTER FOR PATIENT COMPLIANCE MONITORING IN DRUG TREATMENT PROGRAM: Primary | ICD-10-CM

## 2020-05-29 DIAGNOSIS — Z12.31 SCREENING MAMMOGRAM, ENCOUNTER FOR: ICD-10-CM

## 2020-05-29 DIAGNOSIS — R29.818 NEUROLOGICAL DEFICIT PRESENT: ICD-10-CM

## 2020-05-29 DIAGNOSIS — G57.00 SCIATIC NEUROPATHY, UNSPECIFIED LATERALITY: ICD-10-CM

## 2020-05-29 DIAGNOSIS — M54.41 CHRONIC LOW BACK PAIN WITH BILATERAL SCIATICA, UNSPECIFIED BACK PAIN LATERALITY: ICD-10-CM

## 2020-05-29 DIAGNOSIS — F33.9 RECURRENT MAJOR DEPRESSION RESISTANT TO TREATMENT: ICD-10-CM

## 2020-05-29 DIAGNOSIS — F11.221 OPIOID DEPENDENCE WITH INTOXICATION DELIRIUM: ICD-10-CM

## 2020-05-29 DIAGNOSIS — M32.9 SYSTEMIC LUPUS ERYTHEMATOSUS, UNSPECIFIED SLE TYPE, UNSPECIFIED ORGAN INVOLVEMENT STATUS: ICD-10-CM

## 2020-05-29 DIAGNOSIS — I69.393 ATAXIA DUE TO RECENT STROKE: ICD-10-CM

## 2020-05-29 DIAGNOSIS — Z78.0 ASYMPTOMATIC MENOPAUSAL STATE: ICD-10-CM

## 2020-05-29 PROCEDURE — 77063 BREAST TOMOSYNTHESIS BI: CPT | Mod: 26,HCNC,, | Performed by: RADIOLOGY

## 2020-05-29 PROCEDURE — 77067 MAMMO DIGITAL SCREENING BILAT WITH TOMOSYNTHESIS_CAD: ICD-10-PCS | Mod: 26,HCNC,, | Performed by: RADIOLOGY

## 2020-05-29 PROCEDURE — 1125F AMNT PAIN NOTED PAIN PRSNT: CPT | Mod: HCNC,S$GLB,, | Performed by: FAMILY MEDICINE

## 2020-05-29 PROCEDURE — 1101F PT FALLS ASSESS-DOCD LE1/YR: CPT | Mod: HCNC,CPTII,S$GLB, | Performed by: FAMILY MEDICINE

## 2020-05-29 PROCEDURE — 1159F MED LIST DOCD IN RCRD: CPT | Mod: HCNC,S$GLB,, | Performed by: FAMILY MEDICINE

## 2020-05-29 PROCEDURE — 99214 OFFICE O/P EST MOD 30 MIN: CPT | Mod: HCNC,S$GLB,, | Performed by: FAMILY MEDICINE

## 2020-05-29 PROCEDURE — 77080 DXA BONE DENSITY AXIAL: CPT | Mod: TC,HCNC,PO

## 2020-05-29 PROCEDURE — 99499 UNLISTED E&M SERVICE: CPT | Mod: HCNC,S$GLB,, | Performed by: FAMILY MEDICINE

## 2020-05-29 PROCEDURE — 1101F PR PT FALLS ASSESS DOC 0-1 FALLS W/OUT INJ PAST YR: ICD-10-PCS | Mod: HCNC,CPTII,S$GLB, | Performed by: FAMILY MEDICINE

## 2020-05-29 PROCEDURE — 1125F PR PAIN SEVERITY QUANTIFIED, PAIN PRESENT: ICD-10-PCS | Mod: HCNC,S$GLB,, | Performed by: FAMILY MEDICINE

## 2020-05-29 PROCEDURE — 99499 RISK ADDL DX/OHS AUDIT: ICD-10-PCS | Mod: HCNC,S$GLB,, | Performed by: FAMILY MEDICINE

## 2020-05-29 PROCEDURE — 77080 DXA BONE DENSITY AXIAL: CPT | Mod: 26,HCNC,, | Performed by: RADIOLOGY

## 2020-05-29 PROCEDURE — 77080 DEXA BONE DENSITY SPINE HIP: ICD-10-PCS | Mod: 26,HCNC,, | Performed by: RADIOLOGY

## 2020-05-29 PROCEDURE — 77067 SCR MAMMO BI INCL CAD: CPT | Mod: 26,HCNC,, | Performed by: RADIOLOGY

## 2020-05-29 PROCEDURE — 99999 PR PBB SHADOW E&M-EST. PATIENT-LVL IV: CPT | Mod: PBBFAC,HCNC,, | Performed by: FAMILY MEDICINE

## 2020-05-29 PROCEDURE — 77063 MAMMO DIGITAL SCREENING BILAT WITH TOMOSYNTHESIS_CAD: ICD-10-PCS | Mod: 26,HCNC,, | Performed by: RADIOLOGY

## 2020-05-29 PROCEDURE — 99999 PR PBB SHADOW E&M-EST. PATIENT-LVL IV: ICD-10-PCS | Mod: PBBFAC,HCNC,, | Performed by: FAMILY MEDICINE

## 2020-05-29 PROCEDURE — 77067 SCR MAMMO BI INCL CAD: CPT | Mod: TC,HCNC,PO

## 2020-05-29 PROCEDURE — 1159F PR MEDICATION LIST DOCUMENTED IN MEDICAL RECORD: ICD-10-PCS | Mod: HCNC,S$GLB,, | Performed by: FAMILY MEDICINE

## 2020-05-29 PROCEDURE — 80307 DRUG TEST PRSMV CHEM ANLYZR: CPT | Mod: HCNC

## 2020-05-29 PROCEDURE — 99214 PR OFFICE/OUTPT VISIT, EST, LEVL IV, 30-39 MIN: ICD-10-PCS | Mod: HCNC,S$GLB,, | Performed by: FAMILY MEDICINE

## 2020-05-29 RX ORDER — GABAPENTIN 100 MG/1
CAPSULE ORAL
Qty: 270 CAPSULE | Refills: 3 | Status: SHIPPED | OUTPATIENT
Start: 2020-05-29 | End: 2021-06-15

## 2020-05-29 RX ORDER — CLONAZEPAM 1 MG/1
1 TABLET ORAL 2 TIMES DAILY
Qty: 60 TABLET | Refills: 2 | Status: SHIPPED | OUTPATIENT
Start: 2020-05-29 | End: 2020-05-29

## 2020-05-29 RX ORDER — PREDNISONE 5 MG/1
7.5 TABLET ORAL DAILY
Qty: 135 TABLET | Refills: 1 | Status: SHIPPED | OUTPATIENT
Start: 2020-05-29 | End: 2020-08-21 | Stop reason: SDUPTHER

## 2020-05-29 RX ORDER — CLONAZEPAM 1 MG/1
1 TABLET ORAL 2 TIMES DAILY
Qty: 60 TABLET | Refills: 2 | Status: SHIPPED | OUTPATIENT
Start: 2020-05-29 | End: 2020-06-01 | Stop reason: SDUPTHER

## 2020-05-29 RX ORDER — MELOXICAM 15 MG/1
15 TABLET ORAL DAILY
Qty: 90 TABLET | Refills: 3 | Status: SHIPPED | OUTPATIENT
Start: 2020-05-29 | End: 2020-06-01 | Stop reason: SDUPTHER

## 2020-05-29 NOTE — PATIENT INSTRUCTIONS

## 2020-05-29 NOTE — TELEPHONE ENCOUNTER
----- Message from Che Banda sent at 5/29/2020  3:12 PM CDT -----  Contact: Christy with [facility]  Type:  Pharmacy Calling to Clarify an RX    Name of Caller:  Christy  Pharmacy Name:  [facility]  Prescription Name:  clonazePAM (KLONOPIN) 1 MG tablet  What do they need to clarify?:  directions  Best Call Back Number:    Additional Information:  Calling to clarify the directions on this medication.

## 2020-05-29 NOTE — TELEPHONE ENCOUNTER
Conflicting directions noted on Clonazepam prescription. Please send new prescription to pharmacy with corrected sig.

## 2020-05-29 NOTE — PROGRESS NOTES
.Dementia: She is here for evaluation and treatment of cognitive problems. She is accompanied by patient. Primary caregiver is patient and daughter. The family and the patient identify problems with changes in short and long term memory, getting disoriented outside of familiar environment and recalling words. Family and patient report problems with suspiciousness. Family and patient are concerned about  medication errors and inability to maintain adequate nutrition, however, they are not concerned about wandering. Medication administration: family monitors medication usage, family sets up medications and caregiver monitors the use of medications    Functional Assessment:   Activities of Daily Living (ADLs):    She is independent in the following: ambulation, bathing and hygiene, feeding, continence, grooming, toileting and dressing  Requires assistance with the following: N/a  Instrumental Activities of Daily Living (IADLs):    Mental Status: alert, oriented to person, place, and time, normal mood, behavior, speech, dress, motor activity, and thought processes, depressed mood.Delirium improved,also strength, and coordination. She still has chronic pain and fatigue due to fibromyalgia.  She wishes to also address some pain in the neck, shoulders, hips and knees at today's visit. These have been mild-to-moderate in nature, gradual in onset. Exam shows mild general joint tenderness, Heberden's nodes and reduced range of motion of shoulders and knees. These pains seem benign and are likely related to osteoarthritis, arthralgias and radiculopathy. OTC or prescription NSAID's are recommended for PRN use, side effects are discussed. Return for further discussion if these persist or worsen.    Goal to de prescribe more medication.  Encounter for patient compliance monitoring in drug treatment program  -     [facility] Drug Screen    Recurrent major depression resistant to treatment  -     Discontinue: clonazePAM (KLONOPIN)  1 MG tablet; Take 1 tablet (1 mg total) by mouth 2 (two) times daily. Take 1 tab PO in the morning. And take 1with lunch and 1 tab bedtime.  Dispense: 60 tablet; Refill: 2  -     Ambulatory referral/consult to Social Work; Future; Expected date: 06/05/2020    Systemic lupus erythematosus, unspecified SLE type, unspecified organ involvement status  -     Discontinue: clonazePAM (KLONOPIN) 1 MG tablet; Take 1 tablet (1 mg total) by mouth 2 (two) times daily. Take 1 tab PO in the morning. And take 1with lunch and 1 tab bedtime.  Dispense: 60 tablet; Refill: 2  -     predniSONE (DELTASONE) 5 MG tablet; Take 1.5 tablets (7.5 mg total) by mouth once daily. Ankylosis spondylitis/rheumatoid arthritis  Dispense: 135 tablet; Refill: 1  -     Discontinue: meloxicam (MOBIC) 15 MG tablet; Take 1 tablet (15 mg total) by mouth once daily.  Dispense: 90 tablet; Refill: 3  -     CBC auto differential; Future; Expected date: 05/29/2020  -     Comprehensive metabolic panel; Future; Expected date: 05/29/2020  -     Uric acid; Future; Expected date: 05/29/2020  -     C-reactive protein; Future; Expected date: 05/29/2020  -     Sedimentation rate; Future; Expected date: 05/29/2020    Sciatic neuropathy, unspecified laterality  -     Discontinue: clonazePAM (KLONOPIN) 1 MG tablet; Take 1 tablet (1 mg total) by mouth 2 (two) times daily. Take 1 tab PO in the morning. And take 1with lunch and 1 tab bedtime.  Dispense: 60 tablet; Refill: 2  -     predniSONE (DELTASONE) 5 MG tablet; Take 1.5 tablets (7.5 mg total) by mouth once daily. Ankylosis spondylitis/rheumatoid arthritis  Dispense: 135 tablet; Refill: 1    Ankylosis of lumbar spine  -     Discontinue: clonazePAM (KLONOPIN) 1 MG tablet; Take 1 tablet (1 mg total) by mouth 2 (two) times daily. Take 1 tab PO in the morning. And take 1with lunch and 1 tab bedtime.  Dispense: 60 tablet; Refill: 2  -     predniSONE (DELTASONE) 5 MG tablet; Take 1.5 tablets (7.5 mg total) by mouth once daily.  Ankylosis spondylitis/rheumatoid arthritis  Dispense: 135 tablet; Refill: 1    Ankylosing spondylitis of thoracolumbar region  -     Discontinue: clonazePAM (KLONOPIN) 1 MG tablet; Take 1 tablet (1 mg total) by mouth 2 (two) times daily. Take 1 tab PO in the morning. And take 1with lunch and 1 tab bedtime.  Dispense: 60 tablet; Refill: 2  -     predniSONE (DELTASONE) 5 MG tablet; Take 1.5 tablets (7.5 mg total) by mouth once daily. Ankylosis spondylitis/rheumatoid arthritis  Dispense: 135 tablet; Refill: 1    Chronic low back pain with bilateral sciatica, unspecified back pain laterality  -     Discontinue: clonazePAM (KLONOPIN) 1 MG tablet; Take 1 tablet (1 mg total) by mouth 2 (two) times daily. Take 1 tab PO in the morning. And take 1with lunch and 1 tab bedtime.  Dispense: 60 tablet; Refill: 2  -     predniSONE (DELTASONE) 5 MG tablet; Take 1.5 tablets (7.5 mg total) by mouth once daily. 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 Severity is described as mild. Course of her symptoms over time is resolved.  35-minute visit. 20 minutes spent counseling patient on diet, exercise, and weight .        Discussed health maintenance guidelines appropriate for age.

## 2020-05-30 ENCOUNTER — PATIENT MESSAGE (OUTPATIENT)
Dept: FAMILY MEDICINE | Facility: CLINIC | Age: 70
End: 2020-05-30

## 2020-05-31 ENCOUNTER — PATIENT MESSAGE (OUTPATIENT)
Dept: FAMILY MEDICINE | Facility: CLINIC | Age: 70
End: 2020-05-31

## 2020-05-31 DIAGNOSIS — E79.0 HYPERURICEMIA: Primary | ICD-10-CM

## 2020-05-31 DIAGNOSIS — M45.5 ANKYLOSING SPONDYLITIS OF THORACOLUMBAR REGION: ICD-10-CM

## 2020-05-31 RX ORDER — ALLOPURINOL 100 MG/1
100 TABLET ORAL DAILY
Qty: 90 TABLET | Refills: 3 | Status: SHIPPED | OUTPATIENT
Start: 2020-05-31 | End: 2021-05-18

## 2020-06-01 ENCOUNTER — PATIENT MESSAGE (OUTPATIENT)
Dept: FAMILY MEDICINE | Facility: CLINIC | Age: 70
End: 2020-06-01

## 2020-06-01 DIAGNOSIS — M54.41 CHRONIC LOW BACK PAIN WITH BILATERAL SCIATICA, UNSPECIFIED BACK PAIN LATERALITY: ICD-10-CM

## 2020-06-01 DIAGNOSIS — M32.9 SYSTEMIC LUPUS ERYTHEMATOSUS, UNSPECIFIED SLE TYPE, UNSPECIFIED ORGAN INVOLVEMENT STATUS: ICD-10-CM

## 2020-06-01 DIAGNOSIS — M45.5 ANKYLOSING SPONDYLITIS OF THORACOLUMBAR REGION: ICD-10-CM

## 2020-06-01 DIAGNOSIS — G89.29 CHRONIC LOW BACK PAIN WITH BILATERAL SCIATICA, UNSPECIFIED BACK PAIN LATERALITY: ICD-10-CM

## 2020-06-01 DIAGNOSIS — M43.26 ANKYLOSIS OF LUMBAR SPINE: ICD-10-CM

## 2020-06-01 DIAGNOSIS — F33.9 RECURRENT MAJOR DEPRESSION RESISTANT TO TREATMENT: ICD-10-CM

## 2020-06-01 DIAGNOSIS — R27.0 ATAXIA: ICD-10-CM

## 2020-06-01 DIAGNOSIS — R00.1 SINUS BRADYCARDIA: Primary | ICD-10-CM

## 2020-06-01 DIAGNOSIS — M54.42 CHRONIC LOW BACK PAIN WITH BILATERAL SCIATICA, UNSPECIFIED BACK PAIN LATERALITY: ICD-10-CM

## 2020-06-01 DIAGNOSIS — G57.00 SCIATIC NEUROPATHY, UNSPECIFIED LATERALITY: ICD-10-CM

## 2020-06-01 RX ORDER — CLONAZEPAM 1 MG/1
1 TABLET ORAL 2 TIMES DAILY
Qty: 60 TABLET | Refills: 2 | Status: SHIPPED | OUTPATIENT
Start: 2020-06-01 | End: 2020-10-08 | Stop reason: SDUPTHER

## 2020-06-01 RX ORDER — MELOXICAM 15 MG/1
15 TABLET ORAL DAILY
Qty: 90 TABLET | Refills: 0 | Status: SHIPPED | OUTPATIENT
Start: 2020-06-01 | End: 2020-09-21 | Stop reason: SDUPTHER

## 2020-06-01 NOTE — TELEPHONE ENCOUNTER
Prescription for Clonazepam was sent to the wrong pharmacy. Please see encounter dated 5-. [facility] requested clarification of Clonazepam Prescription. Prescription clarified and sent to mail order. Prescription was supposed to go to [facility]. Please advise.

## 2020-06-01 NOTE — TELEPHONE ENCOUNTER
----- Message from Delfina Stokes sent at 6/1/2020  2:42 PM CDT -----  Contact: patient daughter guera #[phone]  patient daughter guera #[phone] returning phone call.

## 2020-06-01 NOTE — PROGRESS NOTES
Left message for patient to contact the office regarding results and Dr. Ornelas suggestions. Waiting on CB (yvan)

## 2020-06-02 ENCOUNTER — PATIENT MESSAGE (OUTPATIENT)
Dept: FAMILY MEDICINE | Facility: CLINIC | Age: 70
End: 2020-06-02

## 2020-06-02 RX ORDER — NAPROXEN SODIUM 220 MG/1
81 TABLET, FILM COATED ORAL DAILY
Refills: 0
Start: 2020-06-02 | End: 2023-06-19

## 2020-06-03 LAB
6MAM UR QL: NOT DETECTED
7AMINOCLONAZEPAM UR QL: PRESENT
A-OH ALPRAZ UR QL: NOT DETECTED
ALPRAZ UR QL: NOT DETECTED
AMPHET UR QL SCN: NOT DETECTED
ANNOTATION COMMENT IMP: NORMAL
ANNOTATION COMMENT IMP: NORMAL
BARBITURATES UR QL: NOT DETECTED
BUPRENORPHINE UR QL: NOT DETECTED
BZE UR QL: NOT DETECTED
CARBOXYTHC UR QL: NOT DETECTED
CARISOPRODOL UR QL: NOT DETECTED
CLONAZEPAM UR QL: NOT DETECTED
CODEINE UR QL: NOT DETECTED
CREAT UR-MCNC: 125 MG/DL (ref 20–400)
DIAZEPAM UR QL: NOT DETECTED
ETHYL GLUCURONIDE UR QL: NOT DETECTED
FENTANYL UR QL: NOT DETECTED
HYDROCODONE UR QL: NOT DETECTED
HYDROMORPHONE UR QL: NOT DETECTED
LORAZEPAM UR QL: NOT DETECTED
MDA UR QL: NOT DETECTED
MDEA UR QL: NOT DETECTED
MDMA UR QL: NOT DETECTED
ME-PHENIDATE UR QL: NOT DETECTED
MEPERIDINE UR QL: NOT DETECTED
METHADONE UR QL: NOT DETECTED
METHAMPHET UR QL: NOT DETECTED
MIDAZOLAM UR QL SCN: NOT DETECTED
MORPHINE UR QL: NOT DETECTED
NORBUPRENORPHINE UR QL CFM: NOT DETECTED
NORDIAZEPAM UR QL: NOT DETECTED
NORFENTANYL UR QL: NOT DETECTED
NORHYDROCODONE UR QL CFM: NOT DETECTED
NOROXYCODONE UR QL CFM: NOT DETECTED
NOROXYMORPHONE: NOT DETECTED
OXAZEPAM UR QL: NOT DETECTED
OXYCODONE UR QL: NOT DETECTED
OXYMORPHONE UR QL: NOT DETECTED
PATHOLOGY STUDY: NORMAL
PCP UR QL: NOT DETECTED
PHENTERMINE UR QL: NOT DETECTED
PROPOXYPH UR QL: NOT DETECTED
SERVICE CMNT-IMP: NORMAL
TAPENTADOL UR QL SCN: NOT DETECTED
TAPENTADOL-O-SULF: NOT DETECTED
TEMAZEPAM UR QL: NOT DETECTED
TRAMADOL UR QL: NOT DETECTED
ZOLPIDEM UR QL: NOT DETECTED

## 2020-06-04 NOTE — PROGRESS NOTES
Spoke with patient daughter regarding results and Dr. Ornelas suggestions. Patient has started Vitamin D.

## 2020-06-05 ENCOUNTER — TELEPHONE (OUTPATIENT)
Dept: NEUROLOGY | Facility: CLINIC | Age: 70
End: 2020-06-05

## 2020-06-05 NOTE — TELEPHONE ENCOUNTER
----- Message from Maryan Banks MA sent at 6/5/2020  8:55 AM CDT -----  Dr. Tse is requesting pt be seen for confusion and memory loss.

## 2020-06-10 ENCOUNTER — OFFICE VISIT (OUTPATIENT)
Dept: CARDIOLOGY | Facility: CLINIC | Age: 70
End: 2020-06-10
Payer: MEDICARE

## 2020-06-10 ENCOUNTER — PATIENT OUTREACH (OUTPATIENT)
Dept: ADMINISTRATIVE | Facility: OTHER | Age: 70
End: 2020-06-10

## 2020-06-10 VITALS
HEIGHT: 67 IN | BODY MASS INDEX: 19.98 KG/M2 | OXYGEN SATURATION: 99 % | SYSTOLIC BLOOD PRESSURE: 94 MMHG | HEART RATE: 64 BPM | DIASTOLIC BLOOD PRESSURE: 61 MMHG

## 2020-06-10 DIAGNOSIS — R00.2 PALPITATIONS: ICD-10-CM

## 2020-06-10 DIAGNOSIS — M32.19 OTHER SYSTEMIC LUPUS ERYTHEMATOSUS WITH OTHER ORGAN INVOLVEMENT: ICD-10-CM

## 2020-06-10 DIAGNOSIS — R00.1 SINUS BRADYCARDIA: Primary | ICD-10-CM

## 2020-06-10 PROCEDURE — 99214 PR OFFICE/OUTPT VISIT, EST, LEVL IV, 30-39 MIN: ICD-10-PCS | Mod: HCNC,S$GLB,, | Performed by: INTERNAL MEDICINE

## 2020-06-10 PROCEDURE — 1101F PR PT FALLS ASSESS DOC 0-1 FALLS W/OUT INJ PAST YR: ICD-10-PCS | Mod: HCNC,CPTII,S$GLB, | Performed by: INTERNAL MEDICINE

## 2020-06-10 PROCEDURE — 1159F MED LIST DOCD IN RCRD: CPT | Mod: HCNC,S$GLB,, | Performed by: INTERNAL MEDICINE

## 2020-06-10 PROCEDURE — 99999 PR PBB SHADOW E&M-EST. PATIENT-LVL III: CPT | Mod: PBBFAC,HCNC,, | Performed by: INTERNAL MEDICINE

## 2020-06-10 PROCEDURE — 99999 PR PBB SHADOW E&M-EST. PATIENT-LVL III: ICD-10-PCS | Mod: PBBFAC,HCNC,, | Performed by: INTERNAL MEDICINE

## 2020-06-10 PROCEDURE — 1101F PT FALLS ASSESS-DOCD LE1/YR: CPT | Mod: HCNC,CPTII,S$GLB, | Performed by: INTERNAL MEDICINE

## 2020-06-10 PROCEDURE — 1159F PR MEDICATION LIST DOCUMENTED IN MEDICAL RECORD: ICD-10-PCS | Mod: HCNC,S$GLB,, | Performed by: INTERNAL MEDICINE

## 2020-06-10 PROCEDURE — 1125F PR PAIN SEVERITY QUANTIFIED, PAIN PRESENT: ICD-10-PCS | Mod: HCNC,S$GLB,, | Performed by: INTERNAL MEDICINE

## 2020-06-10 PROCEDURE — 1125F AMNT PAIN NOTED PAIN PRSNT: CPT | Mod: HCNC,S$GLB,, | Performed by: INTERNAL MEDICINE

## 2020-06-10 PROCEDURE — 99214 OFFICE O/P EST MOD 30 MIN: CPT | Mod: HCNC,S$GLB,, | Performed by: INTERNAL MEDICINE

## 2020-06-10 NOTE — LETTER
Joleen 10, 2020      Jose Guadalupe Ornelas MD  [address]  [place] [address]           [facility]  [address]  [place] [address]  Phone: [phone]          Patient: Erica Marsh   MR Number: [id number]   YOB: 1950   Date of Visit: 6/10/2020       Dear Dr. Jose Guadalupe Ornelas:    Thank you for referring Erica Marsh to me for evaluation. Attached you will find relevant portions of my assessment and plan of care.    If you have questions, please do not hesitate to call me. I look forward to following Erica Marsh along with you.    Sincerely,    Rudy Bedolla MD    Enclosure  CC:  No Recipients    If you would like to receive this communication electronically, please contact [email or web address] or [phone] to request more information on [facility] [employer] access.    For providers and/or their staff who would like to refer a patient to [facility], please contact us through our one-stop-shop provider referral line, [facility] , at [phone].    If you feel you have received this communication in error or would no longer like to receive these types of communications, please e-mail [email or web address]

## 2020-06-10 NOTE — PROGRESS NOTES
Subjective:    Patient ID:  Erica Marsh is a 69 y.o. female who presents for evaluation of Palpitations      HPI     Previously saw Dr Chavez 5/30/18  Patient ID: Erica Marsh is a 67 y.o. female with a past medical history of diabetes, hypertension  HPI  Patient is here to seek help with her bradycardia.  She is known to have bradycardia for a while.  She has been investigated with a Holter monitor.  Reports are dictated below.  Patient denies any episodes of presyncope or syncope  She complains of occasional chest pains and some heaviness in her neck.  This is not associated with shortness of breath.    Stress echo 6/14/18  · Tricuspid valve shows mild regurgitation.  · Mild regurgitation is present in the aortic valve..  · Left ventricle ejection fraction is normal at%  · Normal LV diastolic function.  · The left ventricle cavity is normal.  · RV systolic function is normal.  · Left atrium is mildly dilated.  · RA cavity size is normal.  · LV systolic function is normal  · No stress induced wall motion abnormality     Holter 8/21/17  1. Sinus arrhythmia with heart rates varying between 46 and 96 bpm with an average of 59 bpm.     VENTRICULAR ARRHYTHMIAS  1. There were rare mostly monomorphic PVCs totalling 172 and averaging 3 per hour.     2. There were no episodes of ventricular tachycardia.    SUPRA VENTRICULAR ARRHYTHMIAS  1. There were rare PACs totalling 239 and averaging 4 per hour.  There were 2 couplets.    2. There were no episodes of sustained supraventricular tachycardia.    SINUS NODE FUNCTION  1. There was no evidence of high grade SA dalia block.     AV CONDUCTION  1. There was no evidence of high grade AV block.     2. The longest RR interval was 1645 msec.     6/10/20 Reports recent worsening of palpitations and fatigue. Mild HUGHES. Denies CP  EKG 5/8/20 sinus bradycardia 55 otherwise ok  Reports possible recent TIA    Review of Systems   Constitution: Negative for decreased appetite.   HENT:  Negative for ear discharge.    Eyes: Negative for blurred vision.   Respiratory: Negative for hemoptysis.    Endocrine: Negative for polyphagia.   Hematologic/Lymphatic: Negative for adenopathy.   Skin: Negative for color change.   Musculoskeletal: Negative for joint swelling.   Genitourinary: Negative for bladder incontinence.   Neurological: Negative for brief paralysis.   Psychiatric/Behavioral: Negative for hallucinations.   Allergic/Immunologic: Negative for hives.        Objective:    Physical Exam   Constitutional: She is oriented to person, place, and time. She appears well-developed and well-nourished.   HENT:   Head: Normocephalic and atraumatic.   Eyes: Pupils are equal, round, and reactive to light. Conjunctivae are normal.   Neck: Normal range of motion. Neck supple.   Cardiovascular: Normal rate, normal heart sounds and intact distal pulses.   Pulmonary/Chest: Effort normal and breath sounds normal.   Abdominal: Soft. Bowel sounds are normal.   Musculoskeletal: Normal range of motion.   Neurological: She is alert and oriented to person, place, and time.   Skin: Skin is warm and dry.         Assessment:       1. Sinus bradycardia    2. Other systemic lupus erythematosus with other organ involvement    3. Palpitations         Plan:       Echo with bubble study for HUGHES and possible TIA  Holter for palpitations and bradycardia  If unremarkable and fatigue/hypotension continue consider midodrine

## 2020-06-17 ENCOUNTER — HOSPITAL ENCOUNTER (OUTPATIENT)
Dept: CARDIOLOGY | Facility: HOSPITAL | Age: 70
Discharge: HOME | End: 2020-06-17
Attending: INTERNAL MEDICINE
Payer: MEDICARE

## 2020-06-17 DIAGNOSIS — R00.2 PALPITATIONS: ICD-10-CM

## 2020-06-17 DIAGNOSIS — R00.1 SINUS BRADYCARDIA: ICD-10-CM

## 2020-06-17 DIAGNOSIS — M32.19 OTHER SYSTEMIC LUPUS ERYTHEMATOSUS WITH OTHER ORGAN INVOLVEMENT: ICD-10-CM

## 2020-06-17 LAB
AORTIC ROOT ANNULUS: 2.76 CM
AORTIC VALVE CUSP SEPERATION: 1.67 CM
AV INDEX (PROSTH): 0.81
AV MEAN GRADIENT: 3 MMHG
AV PEAK GRADIENT: 5 MMHG
AV VALVE AREA: 2.03 CM2
AV VELOCITY RATIO: 0.76
CV ECHO LV RWT: 0.59 CM
DOP CALC AO PEAK VEL: 1.13 M/S
DOP CALC AO VTI: 23.56 CM
DOP CALC LVOT AREA: 2.5 CM2
DOP CALC LVOT DIAMETER: 1.79 CM
DOP CALC LVOT PEAK VEL: 0.86 M/S
DOP CALC LVOT STROKE VOLUME: 47.79 CM3
DOP CALCLVOT PEAK VEL VTI: 19 CM
E WAVE DECELERATION TIME: 286.11 MSEC
E/A RATIO: 0.72
ECHO LV POSTERIOR WALL: 0.91 CM (ref 0.6–1.1)
FRACTIONAL SHORTENING: 43 % (ref 28–44)
INTERVENTRICULAR SEPTUM: 0.78 CM (ref 0.6–1.1)
IVRT: 117.65 MSEC
LA MAJOR: 4.79 CM
LA MINOR: 4.68 CM
LA WIDTH: 3 CM
LEFT ATRIUM SIZE: 3.61 CM
LEFT ATRIUM VOLUME: 43.58 CM3
LEFT INTERNAL DIMENSION IN SYSTOLE: 1.73 CM (ref 2.1–4)
LEFT VENTRICLE DIASTOLIC VOLUME: 36.86 ML
LEFT VENTRICLE SYSTOLIC VOLUME: 8.78 ML
LEFT VENTRICULAR INTERNAL DIMENSION IN DIASTOLE: 3.06 CM (ref 3.5–6)
LEFT VENTRICULAR MASS: 65.93 G
MV PEAK A VEL: 0.92 M/S
MV PEAK E VEL: 0.66 M/S
MV STENOSIS PRESSURE HALF TIME: 82.97 MS
MV VALVE AREA P 1/2 METHOD: 2.65 CM2
PISA TR MAX VEL: 2.17 M/S
PULM VEIN S/D RATIO: 1.5
PV PEAK D VEL: 0.44 M/S
PV PEAK S VEL: 0.66 M/S
PV PEAK VELOCITY: 0.75 CM/S
RA MAJOR: 4.74 CM
RA PRESSURE: 3 MMHG
RA WIDTH: 2.08 CM
RIGHT VENTRICULAR END-DIASTOLIC DIMENSION: 2.81 CM
RV TISSUE DOPPLER FREE WALL SYSTOLIC VELOCITY 1 (APICAL 4 CHAMBER VIEW): 9.94 CM/S
SINUS: 3.44 CM
STJ: 2.1 CM
TR MAX PG: 19 MMHG
TRICUSPID ANNULAR PLANE SYSTOLIC EXCURSION: 2.08 CM
TV REST PULMONARY ARTERY PRESSURE: 22 MMHG

## 2020-06-17 PROCEDURE — 93306 TTE W/DOPPLER COMPLETE: CPT | Mod: 26,HCNC,, | Performed by: INTERNAL MEDICINE

## 2020-06-17 PROCEDURE — C8929 TTE W OR WO FOL WCON,DOPPLER: HCPCS | Mod: HCNC

## 2020-06-17 PROCEDURE — 93227 HOLTER MONITOR - 24 HOUR (CUPID ONLY): ICD-10-PCS | Mod: HCNC,,, | Performed by: INTERNAL MEDICINE

## 2020-06-17 PROCEDURE — 93306 ECHO (CUPID ONLY): ICD-10-PCS | Mod: 26,HCNC,, | Performed by: INTERNAL MEDICINE

## 2020-06-17 PROCEDURE — 93227 XTRNL ECG REC<48 HR R&I: CPT | Mod: HCNC,,, | Performed by: INTERNAL MEDICINE

## 2020-06-17 PROCEDURE — 93225 XTRNL ECG REC<48 HRS REC: CPT | Mod: HCNC

## 2020-06-19 LAB
OHS CV EVENT MONITOR DAY: 1
OHS CV HOLTER LENGTH DECIMAL HOURS: 48
OHS CV HOLTER LENGTH HOURS: 24
OHS CV HOLTER LENGTH MINUTES: 0

## 2020-06-23 ENCOUNTER — PATIENT OUTREACH (OUTPATIENT)
Dept: ADMINISTRATIVE | Facility: OTHER | Age: 70
End: 2020-06-23

## 2020-06-25 ENCOUNTER — OFFICE VISIT (OUTPATIENT)
Dept: CARDIOLOGY | Facility: CLINIC | Age: 70
End: 2020-06-25
Payer: MEDICARE

## 2020-06-25 VITALS
OXYGEN SATURATION: 98 % | HEART RATE: 71 BPM | WEIGHT: 125.69 LBS | DIASTOLIC BLOOD PRESSURE: 58 MMHG | BODY MASS INDEX: 19.73 KG/M2 | HEIGHT: 67 IN | SYSTOLIC BLOOD PRESSURE: 89 MMHG

## 2020-06-25 DIAGNOSIS — R00.2 PALPITATIONS: ICD-10-CM

## 2020-06-25 DIAGNOSIS — R00.1 SINUS BRADYCARDIA: Primary | ICD-10-CM

## 2020-06-25 DIAGNOSIS — M32.13 OTHER SYSTEMIC LUPUS ERYTHEMATOSUS WITH LUNG INVOLVEMENT: ICD-10-CM

## 2020-06-25 PROCEDURE — 1159F PR MEDICATION LIST DOCUMENTED IN MEDICAL RECORD: ICD-10-PCS | Mod: HCNC,S$GLB,, | Performed by: INTERNAL MEDICINE

## 2020-06-25 PROCEDURE — 1101F PT FALLS ASSESS-DOCD LE1/YR: CPT | Mod: HCNC,CPTII,S$GLB, | Performed by: INTERNAL MEDICINE

## 2020-06-25 PROCEDURE — 1159F MED LIST DOCD IN RCRD: CPT | Mod: HCNC,S$GLB,, | Performed by: INTERNAL MEDICINE

## 2020-06-25 PROCEDURE — 1101F PR PT FALLS ASSESS DOC 0-1 FALLS W/OUT INJ PAST YR: ICD-10-PCS | Mod: HCNC,CPTII,S$GLB, | Performed by: INTERNAL MEDICINE

## 2020-06-25 PROCEDURE — 99499 UNLISTED E&M SERVICE: CPT | Mod: HCNC,S$GLB,, | Performed by: INTERNAL MEDICINE

## 2020-06-25 PROCEDURE — 99999 PR PBB SHADOW E&M-EST. PATIENT-LVL III: CPT | Mod: PBBFAC,HCNC,, | Performed by: INTERNAL MEDICINE

## 2020-06-25 PROCEDURE — 99213 OFFICE O/P EST LOW 20 MIN: CPT | Mod: HCNC,S$GLB,, | Performed by: INTERNAL MEDICINE

## 2020-06-25 PROCEDURE — 99499 RISK ADDL DX/OHS AUDIT: ICD-10-PCS | Mod: HCNC,S$GLB,, | Performed by: INTERNAL MEDICINE

## 2020-06-25 PROCEDURE — 1126F PR PAIN SEVERITY QUANTIFIED, NO PAIN PRESENT: ICD-10-PCS | Mod: HCNC,S$GLB,, | Performed by: INTERNAL MEDICINE

## 2020-06-25 PROCEDURE — 99999 PR PBB SHADOW E&M-EST. PATIENT-LVL III: ICD-10-PCS | Mod: PBBFAC,HCNC,, | Performed by: INTERNAL MEDICINE

## 2020-06-25 PROCEDURE — 1126F AMNT PAIN NOTED NONE PRSNT: CPT | Mod: HCNC,S$GLB,, | Performed by: INTERNAL MEDICINE

## 2020-06-25 PROCEDURE — 99213 PR OFFICE/OUTPT VISIT, EST, LEVL III, 20-29 MIN: ICD-10-PCS | Mod: HCNC,S$GLB,, | Performed by: INTERNAL MEDICINE

## 2020-06-25 RX ORDER — MIDODRINE HYDROCHLORIDE 5 MG/1
5 TABLET ORAL 2 TIMES DAILY WITH MEALS
Qty: 60 TABLET | Refills: 11 | Status: SHIPPED | OUTPATIENT
Start: 2020-06-25 | End: 2020-08-21

## 2020-06-25 NOTE — PROGRESS NOTES
Subjective:    Patient ID:  Erica Marsh is a 69 y.o. female who presents for follow-up of Palpitations      HPI     Previously saw Dr Chavez 5/30/18  Patient ID: Erica Marsh is a 67 y.o. female with a past medical history of diabetes, hypertension  HPI  Patient is here to seek help with her bradycardia.  She is known to have bradycardia for a while.  She has been investigated with a Holter monitor.  Reports are dictated below.  Patient denies any episodes of presyncope or syncope  She complains of occasional chest pains and some heaviness in her neck.  This is not associated with shortness of breath.    Echo 6/17/20  · Normal left ventricular systolic function. The estimated ejection fraction is 70%.  · Normal LV diastolic function.  · Normal right ventricular systolic function.  · Mild mitral regurgitation.  · The estimated PA systolic pressure is 22 mmHg.  · There is no evidence of intracardiac shunting by bubble study    Stress echo 6/14/18  · Tricuspid valve shows mild regurgitation.  · Mild regurgitation is present in the aortic valve..  · Left ventricle ejection fraction is normal at%  · Normal LV diastolic function.  · The left ventricle cavity is normal.  · RV systolic function is normal.  · Left atrium is mildly dilated.  · RA cavity size is normal.  · LV systolic function is normal  · No stress induced wall motion abnormality     Holter 6/17/20  · Sinus arrhythmia with heart rates varying between 48 and 105 bpm with an average of 68 bpm.  · There were rare PVCs totalling 157 and averaging 3.27 per hour.  · There were rare PACs totalling 149 and averaging 3.1 per hour.  · There were occasional runs of non-sustained SVT and lasting for 3 to 6 beats.  · The diary was properly completed. Pt complained of palps during holter, but these did not correlate directly with the short runs of SVT noted above.    6/10/20 Reports recent worsening of palpitations and fatigue. Mild HUGHES. Denies CP  EKG 5/8/20 sinus  bradycardia 55 otherwise ok  Reports possible recent TIA  Echo with bubble study for HUGHES and possible TIA  Holter for palpitations and bradycardia  If unremarkable and fatigue/hypotension continue consider midodrine      6/25/20 Still with fatigue and hyoptension       Review of Systems   Constitution: Negative for decreased appetite.   HENT: Negative for ear discharge.    Eyes: Negative for blurred vision.   Respiratory: Negative for hemoptysis.    Endocrine: Negative for polyphagia.   Hematologic/Lymphatic: Negative for adenopathy.   Skin: Negative for color change.   Musculoskeletal: Negative for joint swelling.   Genitourinary: Negative for bladder incontinence.   Neurological: Negative for brief paralysis.   Psychiatric/Behavioral: Negative for hallucinations.   Allergic/Immunologic: Negative for hives.        Objective:    Physical Exam   Constitutional: She is oriented to person, place, and time. She appears well-developed and well-nourished.   HENT:   Head: Normocephalic and atraumatic.   Eyes: Pupils are equal, round, and reactive to light. Conjunctivae are normal.   Neck: Normal range of motion. Neck supple.   Cardiovascular: Normal rate, normal heart sounds and intact distal pulses.   Pulmonary/Chest: Effort normal and breath sounds normal.   Abdominal: Soft. Bowel sounds are normal.   Musculoskeletal: Normal range of motion.   Neurological: She is alert and oriented to person, place, and time.   Skin: Skin is warm and dry.         Assessment:       1. Sinus bradycardia    2. Palpitations    3. Other systemic lupus erythematosus with lung involvement         Plan:       Add midodrine 5 bid  OV 1 month

## 2020-07-01 ENCOUNTER — PATIENT MESSAGE (OUTPATIENT)
Dept: FAMILY MEDICINE | Facility: CLINIC | Age: 70
End: 2020-07-01

## 2020-07-08 ENCOUNTER — OFFICE VISIT (OUTPATIENT)
Dept: RHEUMATOLOGY | Facility: CLINIC | Age: 70
End: 2020-07-08
Payer: MEDICARE

## 2020-07-08 VITALS
BODY MASS INDEX: 20.7 KG/M2 | WEIGHT: 130.19 LBS | DIASTOLIC BLOOD PRESSURE: 76 MMHG | TEMPERATURE: 98 F | SYSTOLIC BLOOD PRESSURE: 115 MMHG

## 2020-07-08 DIAGNOSIS — M32.9 SYSTEMIC LUPUS ERYTHEMATOSUS, UNSPECIFIED SLE TYPE, UNSPECIFIED ORGAN INVOLVEMENT STATUS: Primary | ICD-10-CM

## 2020-07-08 PROCEDURE — 3008F BODY MASS INDEX DOCD: CPT | Mod: S$GLB,,, | Performed by: INTERNAL MEDICINE

## 2020-07-08 PROCEDURE — 99213 PR OFFICE/OUTPT VISIT, EST, LEVL III, 20-29 MIN: ICD-10-PCS | Mod: S$GLB,,, | Performed by: INTERNAL MEDICINE

## 2020-07-08 PROCEDURE — 1101F PR PT FALLS ASSESS DOC 0-1 FALLS W/OUT INJ PAST YR: ICD-10-PCS | Mod: S$GLB,,, | Performed by: INTERNAL MEDICINE

## 2020-07-08 PROCEDURE — 99213 OFFICE O/P EST LOW 20 MIN: CPT | Mod: S$GLB,,, | Performed by: INTERNAL MEDICINE

## 2020-07-08 PROCEDURE — 1159F PR MEDICATION LIST DOCUMENTED IN MEDICAL RECORD: ICD-10-PCS | Mod: S$GLB,,, | Performed by: INTERNAL MEDICINE

## 2020-07-08 PROCEDURE — 1125F PR PAIN SEVERITY QUANTIFIED, PAIN PRESENT: ICD-10-PCS | Mod: S$GLB,,, | Performed by: INTERNAL MEDICINE

## 2020-07-08 PROCEDURE — 1125F AMNT PAIN NOTED PAIN PRSNT: CPT | Mod: S$GLB,,, | Performed by: INTERNAL MEDICINE

## 2020-07-08 PROCEDURE — 3008F PR BODY MASS INDEX (BMI) DOCUMENTED: ICD-10-PCS | Mod: S$GLB,,, | Performed by: INTERNAL MEDICINE

## 2020-07-08 PROCEDURE — 1101F PT FALLS ASSESS-DOCD LE1/YR: CPT | Mod: S$GLB,,, | Performed by: INTERNAL MEDICINE

## 2020-07-08 PROCEDURE — 1159F MED LIST DOCD IN RCRD: CPT | Mod: S$GLB,,, | Performed by: INTERNAL MEDICINE

## 2020-07-08 NOTE — PROGRESS NOTES
[facility] RHEUMATOLOGY            PROGRESS NOTE      Subjective:       Patient ID:   NAME: Erica Marsh : 1950     70 y.o. female    Referring Doc: No ref. provider found  Other Physicians:    Chief Complaint:  Lupus      History of Present Illness:     Patient returns today for a regularly scheduled follow-up visit for SLE      The patient states she suffered  stroke recently.  She  has some right lower extremity weakness.  No fevers, cough shortness of breath.  No rashes.  She has fatigue.  No chest pains.  No joint swelling.  No mucosal ulcerations.  She has some depression as well.          ROS:   GEN:  No  fever, night sweats . weight is stable   + fatigue  SKIN: no rashes, no bruising, no ulcerations, no Raynaud's  HEENT: no HA's, No visual changes, no mucosal ulcers, no sicca symptoms,  CV:   no CP, SOB, PND, HUGHES, no orthopnea, no palpitations  PULM: normal with no SOB, cough, hemoptysis, sputum or pleuritic pain  GI:  no abdominal pain, nausea, vomiting, constipation, diarrhea, melanotic stools, BRBPR, hematemesis, no dysphagia  :   no dysuria  NEURO: no paresthesias, headaches, visual disturbances, muscle weakness  MUSCULOSKELETAL:no joint swelling, prolonged AM stiffness, no back pain, + muscle pain  Allergies:  Review of patient's allergies indicates:   Allergen Reactions    Codeine     Sulfa (sulfonamide antibiotics)     Codeine Rash and Other (See Comments)    Plaquenil [hydroxychloroquine] Rash and Other (See Comments)     ONE BRAND OF MED    Sulfa (sulfonamide antibiotics) Rash and Other (See Comments)       Medications:    Current Outpatient Medications:     albuterol-ipratropium (DUO-NEB) 2.5 mg-0.5 mg/3 mL nebulizer solution, USE 1 VIAL(3MLS) VIA NEBULIZER EVERY 6 HOURS AS NEEDED FOR WHEEZING, Disp: 990 mL, Rfl: 2    allopurinoL (ZYLOPRIM) 100 MG tablet, Take 1 tablet (100 mg total) by mouth once daily., Disp: 90 tablet, Rfl: 3    aspirin 81 MG Chew, Take 1 tablet (81 mg  total) by mouth once daily., Disp: , Rfl: 0    benzonatate (TESSALON PERLES) 100 MG capsule, Take 1 capsule (100 mg total) by mouth 3 (three) times daily as needed for Cough., Disp: 30 capsule, Rfl: 1    blood sugar diagnostic (ACCU-CHEK JORDAN PLUS TEST STRP) Strp, TEST THREE TIMES DAILY BEFORE A MEAL AS DIRECTED, Disp: 500 strip, Rfl: 0    blood sugar diagnostic (ACCU-CHEK JORDAN PLUS TEST STRP) Strp, TEST THREE TIMES DAILY BEFORE A MEAL AS DIRECTED, Disp: 500 strip, Rfl: 0    blood sugar diagnostic Strp, To check BG 4 times daily, to use with insurance preferred meter, Disp: 360 each, Rfl: 0    blood sugar diagnostic Strp, 1 strip by Misc.(Non-Drug; Combo Route) route 3 (three) times daily., Disp: 200 strip, Rfl: 2    cholecalciferol, vitamin D3, (VITAMIN D3) 25 mcg (1,000 unit) capsule, Take 1 capsule (1,000 Units total) by mouth 2 (two) times daily., Disp: 180 capsule, Rfl: 0    clonazePAM (KLONOPIN) 1 MG tablet, Take 1 tablet (1 mg total) by mouth 2 (two) times daily. Anxiety disorder, Disp: 60 tablet, Rfl: 2    cyanocobalamin, vitamin B-12, 5,000 mcg TbDL, Take 1 tablet by mouth once daily., Disp: 90 tablet, Rfl: 3    fluticasone propionate (FLONASE) 50 mcg/actuation nasal spray, ADMINISTER 1 SPRAY IN EACH NARE 2 TIMES DAILY., Disp: 48 g, Rfl: 0    folic acid (FOLVITE) 1 MG tablet, Take 1 tablet (1 mg total) by mouth once daily., Disp: 90 tablet, Rfl: 0    gabapentin (NEURONTIN) 100 MG capsule, 1 AM and 2 PM, Disp: 270 capsule, Rfl: 3    hydroxychloroquine (PLAQUENIL) 200 mg tablet, Take 1 tablet (200 mg total) by mouth 2 (two) times daily., Disp: 180 tablet, Rfl: 3    lancets (ACCU-CHEK SOFTCLIX LANCETS) Misc, Use to test blood sugar three times a day   DX: E08.649, Disp: 300 each, Rfl: 3    lancets Misc, To check BG 4 times daily, to use with insurance preferred meter, Disp: 360 each, Rfl: 0    levothyroxine (SYNTHROID) 125 MCG tablet, Take 1 tablet (125 mcg total) by mouth once daily., Disp:  90 tablet, Rfl: 0    lidocaine HCL 2% (XYLOCAINE) 2 % jelly, Apply topically as needed. Apply topically once nightly to affected area both heels., Disp: 30 mL, Rfl: 2    meloxicam (MOBIC) 15 MG tablet, Take 1 tablet (15 mg total) by mouth once daily., Disp: 90 tablet, Rfl: 0    midodrine (PROAMATINE) 5 MG Tab, Take 1 tablet (5 mg total) by mouth 2 (two) times daily with meals., Disp: 60 tablet, Rfl: 11    predniSONE (DELTASONE) 5 MG tablet, Take 1.5 tablets (7.5 mg total) by mouth once daily. Ankylosis spondylitis/rheumatoid arthritis, Disp: 135 tablet, Rfl: 1    vitamin E 1000 UNIT capsule, Take 1,000 Units by mouth once daily., Disp: , Rfl:     blood-glucose meter kit, AC meals x2, Disp: 1 each, Rfl: 0    glucagon, human recombinant, (GLUCAGON EMERGENCY KIT, HUMAN,) 1 mg SolR, Inject 1 mg into the muscle as needed., Disp: 1 each, Rfl: 3    lipase-protease-amylase 24,000-76,000-120,000 units (CREON) 24,000-76,000 -120,000 unit capsule, Take 1 capsule by mouth 3 (three) times daily with meals., Disp: 90 capsule, Rfl: 11    PMHx/PSHx Updates:      Last Eye Exam UTD      Objective:     Vitals:  Blood pressure 115/76, temperature 97.5 °F (36.4 °C), weight 59.1 kg (130 lb 3.2 oz).    Physical Examination:   GEN: no apparent distress, comfortable; AAOx3  SKIN: no rashes,no ulceration, no Raynaud's, no petechiae, no SQ nodules,  HEAD: normal  EYES: no pallor, no icterus,  NECK: no masses, thyroid normal, trachea midline, no LAD/LN's, supple  CV: RRR with no murmur; l S1 and S2 reg. ,no gallop no rubs,   CHEST: Normal respiratory effort; CTAB; normal breath sounds; no wheeze or crackles  MUSC/Skeletal: ROM normal; no crepitus; joints without synovitis,  no deformities  No joint swelling or tenderness of PIP, MCP, wrist, elbow, shoulder, or knee joints  EXTREM: no clubbing, cyanosis, no edema,normal  pulses   NEURO: grossly intact; motor WNL; AAOx3; , DTRs symmetric  PSYCH: normal mood, affect and behavior  LYMPH:  normal cervical, supraclavicular          Labs:   Lab Results   Component Value Date    WBC 8.65 05/29/2020    HGB 14.1 05/29/2020    HCT 45.5 05/29/2020     (H) 05/29/2020     05/29/2020    CMP  @LASTLAB(NA,K,CL,CO2,GLU,BUN,Creatinine,Calcium,PROT,Albumin,Bilitot,Alkphos,AST,ALT,CRP,ESR,RF,CCP,CARLENE,SSA,CPK,uric acid) )@  I have reviewed all available lab results and radiology reports.    Radiology/Diagnostic Studies:        Assessment/Plan:   (1) 70 y.o. female with diagnosis of history of systemic lupus.  This appears stable  History of osteoarthritis and fibromyalgia.  History of recent stroke.  Reviewed recent report of MRI of the brain;    Appears okay.    Labs now on and in 2 months  Again advised her to decrease Plaquenil to only 200 mg a day.  She was instructed to do this a few months ago but she did not do it.    Discussion:     I have explained all of the above in detail and the patient understands all of the current recommendation(s). I have answered all questions to the best of my ability and to their complete satisfaction.       The patient is to continue with the current management plan         RTC in   4 months      Electronically signed by Mingo Alvarenga MD          Answers for HPI/ROS submitted by the patient on 7/6/2020   fever: No  eye redness: No  headaches: Yes  shortness of breath: No  chest pain: No  trouble swallowing: No  diarrhea: No  constipation: No  unexpected weight change: No  genital sore: No  dysuria: No  During the last 3 days, have you had a skin rash?: No  Bruises or bleeds easily: No  cough: No

## 2020-07-10 ENCOUNTER — TELEPHONE (OUTPATIENT)
Dept: FAMILY MEDICINE | Facility: CLINIC | Age: 70
End: 2020-07-10

## 2020-07-10 DIAGNOSIS — M54.42 CHRONIC LOW BACK PAIN WITH BILATERAL SCIATICA, UNSPECIFIED BACK PAIN LATERALITY: ICD-10-CM

## 2020-07-10 DIAGNOSIS — M54.41 CHRONIC LOW BACK PAIN WITH BILATERAL SCIATICA, UNSPECIFIED BACK PAIN LATERALITY: ICD-10-CM

## 2020-07-10 DIAGNOSIS — G89.29 CHRONIC LOW BACK PAIN WITH BILATERAL SCIATICA, UNSPECIFIED BACK PAIN LATERALITY: ICD-10-CM

## 2020-07-10 DIAGNOSIS — I69.393 ATAXIA DUE TO RECENT STROKE: ICD-10-CM

## 2020-07-10 DIAGNOSIS — G57.00 SCIATIC NEUROPATHY, UNSPECIFIED LATERALITY: Primary | ICD-10-CM

## 2020-07-10 NOTE — TELEPHONE ENCOUNTER
----- Message from Adali Anderson sent at 7/9/2020  4:18 PM CDT -----  Regarding: advice  Contact: home health  Type: Needs Medical Advice  Who Called:  [facility]   Symptoms (please be specific):    How long has patient had these symptoms:    Pharmacy name and phone #:    Best Call Back Number: [phone]  Additional Information: The nurse requesting to speak with the office regarding an order for rolling walker for the patient.

## 2020-07-11 LAB
ALBUMIN SERPL-MCNC: 4.1 G/DL (ref 3.6–5.1)
ALBUMIN/GLOB SERPL: 1.6 (CALC) (ref 1–2.5)
ALP SERPL-CCNC: 58 U/L (ref 37–153)
ALT SERPL-CCNC: 17 U/L (ref 6–29)
ANA SER QL IF: NEGATIVE
APPEARANCE UR: CLEAR
AST SERPL-CCNC: 21 U/L (ref 10–35)
B2 GLYCOPROT1 IGA SER-ACNC: <9 SAU
B2 GLYCOPROT1 IGG SER-ACNC: <9 SGU
B2 GLYCOPROT1 IGM SER-ACNC: <9 SMU
BASOPHILS # BLD AUTO: 94 CELLS/UL (ref 0–200)
BASOPHILS NFR BLD AUTO: 1 %
BILIRUB SERPL-MCNC: 0.5 MG/DL (ref 0.2–1.2)
BILIRUB UR QL STRIP: NEGATIVE
BUN SERPL-MCNC: 21 MG/DL (ref 7–25)
BUN/CREAT SERPL: ABNORMAL (CALC) (ref 6–22)
C3 SERPL-MCNC: 90 MG/DL (ref 83–193)
C4 SERPL-MCNC: 13 MG/DL (ref 15–57)
CALCIUM SERPL-MCNC: 9.9 MG/DL (ref 8.6–10.4)
CARDIOLIPIN IGA SER IA-ACNC: <11 APL
CARDIOLIPIN IGG SER IA-ACNC: <14 GPL
CARDIOLIPIN IGM SER IA-ACNC: <12 MPL
CHLORIDE SERPL-SCNC: 102 MMOL/L (ref 98–110)
CO2 SERPL-SCNC: 32 MMOL/L (ref 20–32)
COLOR UR: YELLOW
CREAT SERPL-MCNC: 0.99 MG/DL (ref 0.5–0.99)
EOSINOPHIL # BLD AUTO: 169 CELLS/UL (ref 15–500)
EOSINOPHIL NFR BLD AUTO: 1.8 %
ERYTHROCYTE [DISTWIDTH] IN BLOOD BY AUTOMATED COUNT: 12.2 % (ref 11–15)
GFRSERPLBLD MDRD-ARVRAT: 58 ML/MIN/1.73M2
GLOBULIN SER CALC-MCNC: 2.5 G/DL (CALC) (ref 1.9–3.7)
GLUCOSE SERPL-MCNC: 117 MG/DL (ref 65–99)
GLUCOSE UR QL STRIP: NEGATIVE
HCT VFR BLD AUTO: 40.6 % (ref 35–45)
HGB BLD-MCNC: 13.3 G/DL (ref 11.7–15.5)
HGB UR QL STRIP: NEGATIVE
KETONES UR QL STRIP: NEGATIVE
LEUKOCYTE ESTERASE UR QL STRIP: ABNORMAL
LYMPHOCYTES # BLD AUTO: 2096 CELLS/UL (ref 850–3900)
LYMPHOCYTES NFR BLD AUTO: 22.3 %
MCH RBC QN AUTO: 32 PG (ref 27–33)
MCHC RBC AUTO-ENTMCNC: 32.8 G/DL (ref 32–36)
MCV RBC AUTO: 97.6 FL (ref 80–100)
MONOCYTES # BLD AUTO: 442 CELLS/UL (ref 200–950)
MONOCYTES NFR BLD AUTO: 4.7 %
NEUTROPHILS # BLD AUTO: 6599 CELLS/UL (ref 1500–7800)
NEUTROPHILS NFR BLD AUTO: 70.2 %
NITRITE UR QL STRIP: NEGATIVE
PH UR STRIP: ABNORMAL [PH] (ref 5–8)
PLATELET # BLD AUTO: 296 THOUSAND/UL (ref 140–400)
PMV BLD REES-ECKER: 10.3 FL (ref 7.5–12.5)
POTASSIUM SERPL-SCNC: 5.2 MMOL/L (ref 3.5–5.3)
PROT SERPL-MCNC: 6.6 G/DL (ref 6.1–8.1)
PROT UR QL STRIP: NEGATIVE
PS IGA SER-ACNC: <20 U/ML
PS IGG SER-ACNC: <10 U/ML
PS IGM SER-ACNC: <25 U/ML
RBC # BLD AUTO: 4.16 MILLION/UL (ref 3.8–5.1)
SERVICE CMNT-IMP: NORMAL
SODIUM SERPL-SCNC: 140 MMOL/L (ref 135–146)
SP GR UR STRIP: 1.01 (ref 1–1.03)
WBC # BLD AUTO: 9.4 THOUSAND/UL (ref 3.8–10.8)

## 2020-07-16 NOTE — TELEPHONE ENCOUNTER
Heather with [place] notified order for Rollator Walker placed and faxed to [facility] at [phone]. Verbalized understanding.

## 2020-07-20 ENCOUNTER — PATIENT OUTREACH (OUTPATIENT)
Dept: ADMINISTRATIVE | Facility: OTHER | Age: 70
End: 2020-07-20

## 2020-07-21 ENCOUNTER — OFFICE VISIT (OUTPATIENT)
Dept: NEUROLOGY | Facility: CLINIC | Age: 70
End: 2020-07-21
Payer: MEDICARE

## 2020-07-21 VITALS
WEIGHT: 131.31 LBS | BODY MASS INDEX: 21.1 KG/M2 | DIASTOLIC BLOOD PRESSURE: 73 MMHG | SYSTOLIC BLOOD PRESSURE: 112 MMHG | HEART RATE: 69 BPM | HEIGHT: 66 IN

## 2020-07-21 DIAGNOSIS — I69.393 ATAXIA DUE TO RECENT STROKE: ICD-10-CM

## 2020-07-21 DIAGNOSIS — M54.41 CHRONIC LOW BACK PAIN WITH BILATERAL SCIATICA, UNSPECIFIED BACK PAIN LATERALITY: ICD-10-CM

## 2020-07-21 DIAGNOSIS — M43.26 ANKYLOSIS OF LUMBAR SPINE: ICD-10-CM

## 2020-07-21 DIAGNOSIS — R29.818 NEUROLOGICAL DEFICIT PRESENT: ICD-10-CM

## 2020-07-21 DIAGNOSIS — R41.9 COGNITIVE COMPLAINTS: ICD-10-CM

## 2020-07-21 DIAGNOSIS — F11.221 OPIOID DEPENDENCE WITH INTOXICATION DELIRIUM: ICD-10-CM

## 2020-07-21 DIAGNOSIS — G89.29 CHRONIC LOW BACK PAIN WITH BILATERAL SCIATICA, UNSPECIFIED BACK PAIN LATERALITY: ICD-10-CM

## 2020-07-21 DIAGNOSIS — M32.9 SYSTEMIC LUPUS ERYTHEMATOSUS, UNSPECIFIED SLE TYPE, UNSPECIFIED ORGAN INVOLVEMENT STATUS: ICD-10-CM

## 2020-07-21 DIAGNOSIS — F16.921: ICD-10-CM

## 2020-07-21 DIAGNOSIS — M45.5 ANKYLOSING SPONDYLITIS OF THORACOLUMBAR REGION: ICD-10-CM

## 2020-07-21 DIAGNOSIS — M54.42 CHRONIC LOW BACK PAIN WITH BILATERAL SCIATICA, UNSPECIFIED BACK PAIN LATERALITY: ICD-10-CM

## 2020-07-21 DIAGNOSIS — F33.9 RECURRENT MAJOR DEPRESSION RESISTANT TO TREATMENT: ICD-10-CM

## 2020-07-21 DIAGNOSIS — E55.9 VITAMIN D DEFICIENCY: ICD-10-CM

## 2020-07-21 DIAGNOSIS — G57.00 SCIATIC NEUROPATHY, UNSPECIFIED LATERALITY: ICD-10-CM

## 2020-07-21 DIAGNOSIS — R47.89 SPELL OF CHANGE IN SPEECH: Primary | ICD-10-CM

## 2020-07-21 DIAGNOSIS — M19.90 ARTHRITIS: ICD-10-CM

## 2020-07-21 DIAGNOSIS — E07.9 THYROID DISEASE: ICD-10-CM

## 2020-07-21 DIAGNOSIS — G31.84 MINIMAL COGNITIVE IMPAIRMENT: ICD-10-CM

## 2020-07-21 PROCEDURE — 3008F PR BODY MASS INDEX (BMI) DOCUMENTED: ICD-10-PCS | Mod: HCNC,CPTII,GC,S$GLB | Performed by: STUDENT IN AN ORGANIZED HEALTH CARE EDUCATION/TRAINING PROGRAM

## 2020-07-21 PROCEDURE — 99204 OFFICE O/P NEW MOD 45 MIN: CPT | Mod: HCNC,GC,S$GLB, | Performed by: STUDENT IN AN ORGANIZED HEALTH CARE EDUCATION/TRAINING PROGRAM

## 2020-07-21 PROCEDURE — 1125F AMNT PAIN NOTED PAIN PRSNT: CPT | Mod: HCNC,GC,S$GLB, | Performed by: STUDENT IN AN ORGANIZED HEALTH CARE EDUCATION/TRAINING PROGRAM

## 2020-07-21 PROCEDURE — 1125F PR PAIN SEVERITY QUANTIFIED, PAIN PRESENT: ICD-10-PCS | Mod: HCNC,GC,S$GLB, | Performed by: STUDENT IN AN ORGANIZED HEALTH CARE EDUCATION/TRAINING PROGRAM

## 2020-07-21 PROCEDURE — 99204 PR OFFICE/OUTPT VISIT, NEW, LEVL IV, 45-59 MIN: ICD-10-PCS | Mod: HCNC,GC,S$GLB, | Performed by: STUDENT IN AN ORGANIZED HEALTH CARE EDUCATION/TRAINING PROGRAM

## 2020-07-21 PROCEDURE — 99999 PR PBB SHADOW E&M-EST. PATIENT-LVL III: CPT | Mod: PBBFAC,HCNC,GC, | Performed by: STUDENT IN AN ORGANIZED HEALTH CARE EDUCATION/TRAINING PROGRAM

## 2020-07-21 PROCEDURE — 1101F PT FALLS ASSESS-DOCD LE1/YR: CPT | Mod: HCNC,CPTII,GC,S$GLB | Performed by: STUDENT IN AN ORGANIZED HEALTH CARE EDUCATION/TRAINING PROGRAM

## 2020-07-21 PROCEDURE — 99999 PR PBB SHADOW E&M-EST. PATIENT-LVL III: ICD-10-PCS | Mod: PBBFAC,HCNC,GC, | Performed by: STUDENT IN AN ORGANIZED HEALTH CARE EDUCATION/TRAINING PROGRAM

## 2020-07-21 PROCEDURE — 1159F PR MEDICATION LIST DOCUMENTED IN MEDICAL RECORD: ICD-10-PCS | Mod: HCNC,GC,S$GLB, | Performed by: STUDENT IN AN ORGANIZED HEALTH CARE EDUCATION/TRAINING PROGRAM

## 2020-07-21 PROCEDURE — 1159F MED LIST DOCD IN RCRD: CPT | Mod: HCNC,GC,S$GLB, | Performed by: STUDENT IN AN ORGANIZED HEALTH CARE EDUCATION/TRAINING PROGRAM

## 2020-07-21 PROCEDURE — 1101F PR PT FALLS ASSESS DOC 0-1 FALLS W/OUT INJ PAST YR: ICD-10-PCS | Mod: HCNC,CPTII,GC,S$GLB | Performed by: STUDENT IN AN ORGANIZED HEALTH CARE EDUCATION/TRAINING PROGRAM

## 2020-07-21 PROCEDURE — 3008F BODY MASS INDEX DOCD: CPT | Mod: HCNC,CPTII,GC,S$GLB | Performed by: STUDENT IN AN ORGANIZED HEALTH CARE EDUCATION/TRAINING PROGRAM

## 2020-07-21 NOTE — PROGRESS NOTES
Neurology Clinic  Initial Consult    Patient Name: Erica Marsh  MRN: [id number]    CC: B/L leg weakness    HPI: Erica Marsh is a 70 y.o. female w/ PMH significant for SLE and anxiety (on Clonezapam) presenting as referral from Dr Tse for evaluation of b/l Leg weakness, leg pain and leg numbness. She reports that these symptoms started in 05/2020 along with a R arm tremor, with associated aphasia (to words and people's names), and ALCALA. She was worked up for possible stroke at that time with MRI revealing only minimal chronic small vessel ischemic disease. At that time, it was additionally thought that polypharmacy may have been contributing to her symptoms as well as she had been taking tramadol, clonazepam, Geodon, Seroquel, gabapentin, and baclofen. At the time of her current office visit, she is only taking clonazepam, gabapentin and mobic.     Today, she is complaining of continued b/l leg weakness with difficulty ambulating along with some associated pain and numbness. She notes that her symptoms have been improving with PT/OT and that her R hand tremor and aphasia have improved significantly, almost back to baseline. She is alert and oriented x3 without any aphasia. She does however have a postural b/l tremor and mild facial tremor when asked to raise eyebrows, smile, or protrude her tongue.     Review of Systems:  General: No fevers, chills or acute distress  Eyes: No acute changes in vision  ENT: No changes in hearing or tinnitus   Respiratory: No SOB or cough  CV: No chest pain, palpitations  GI: No diarrhea, blood in stool  Urinary: No dysuria, hematuria  Skin: No rashes  Neurological: No new confusion or aphasia. B/l leg weakness with pain with mild numbness. No r hand tremor. Mild facial tremor when asked to raise eyebrows, smile, or protrude her tongue.   Psychiatric: No auditory nor visual hallucinations      Past Medical History  Past Medical History:   Diagnosis Date    Arthritis     Depression      Diabetes mellitus     NO MED SINCE GASTRIC BYPASS has hypoglycemia    Diabetes mellitus type II     GERD (gastroesophageal reflux disease)     Hypertension     NO MED SINCE GASTRIC BYPASS    Kidney disease     Lupus erythematosus     Shingles     Thyroid disease     Trigger finger     RIGHT LONG       Medications    Current Outpatient Medications:     albuterol-ipratropium (DUO-NEB) 2.5 mg-0.5 mg/3 mL nebulizer solution, USE 1 VIAL(3MLS) VIA NEBULIZER EVERY 6 HOURS AS NEEDED FOR WHEEZING, Disp: 990 mL, Rfl: 2    allopurinoL (ZYLOPRIM) 100 MG tablet, Take 1 tablet (100 mg total) by mouth once daily., Disp: 90 tablet, Rfl: 3    aspirin 81 MG Chew, Take 1 tablet (81 mg total) by mouth once daily., Disp: , Rfl: 0    benzonatate (TESSALON PERLES) 100 MG capsule, Take 1 capsule (100 mg total) by mouth 3 (three) times daily as needed for Cough., Disp: 30 capsule, Rfl: 1    blood sugar diagnostic (ACCU-CHEK JORDAN PLUS TEST STRP) Strp, TEST THREE TIMES DAILY BEFORE A MEAL AS DIRECTED, Disp: 500 strip, Rfl: 0    blood sugar diagnostic (ACCU-CHEK JORDAN PLUS TEST STRP) Strp, TEST THREE TIMES DAILY BEFORE A MEAL AS DIRECTED, Disp: 500 strip, Rfl: 0    blood sugar diagnostic Strp, To check BG 4 times daily, to use with insurance preferred meter, Disp: 360 each, Rfl: 0    blood sugar diagnostic Strp, 1 strip by Misc.(Non-Drug; Combo Route) route 3 (three) times daily., Disp: 200 strip, Rfl: 2    cholecalciferol, vitamin D3, (VITAMIN D3) 25 mcg (1,000 unit) capsule, Take 1 capsule (1,000 Units total) by mouth 2 (two) times daily., Disp: 180 capsule, Rfl: 0    clonazePAM (KLONOPIN) 1 MG tablet, Take 1 tablet (1 mg total) by mouth 2 (two) times daily. Anxiety disorder, Disp: 60 tablet, Rfl: 2    cyanocobalamin, vitamin B-12, 5,000 mcg TbDL, Take 1 tablet by mouth once daily., Disp: 90 tablet, Rfl: 3    fluticasone propionate (FLONASE) 50 mcg/actuation nasal spray, ADMINISTER 1 SPRAY IN EACH NARE 2 TIMES  DAILY., Disp: 48 g, Rfl: 0    folic acid (FOLVITE) 1 MG tablet, Take 1 tablet (1 mg total) by mouth once daily., Disp: 90 tablet, Rfl: 0    gabapentin (NEURONTIN) 100 MG capsule, 1 AM and 2 PM, Disp: 270 capsule, Rfl: 3    hydroxychloroquine (PLAQUENIL) 200 mg tablet, Take 1 tablet (200 mg total) by mouth 2 (two) times daily., Disp: 180 tablet, Rfl: 3    lancets (ACCU-CHEK SOFTCLIX LANCETS) Misc, Use to test blood sugar three times a day   DX: E08.649, Disp: 300 each, Rfl: 3    lancets Misc, To check BG 4 times daily, to use with insurance preferred meter, Disp: 360 each, Rfl: 0    levothyroxine (SYNTHROID) 125 MCG tablet, Take 1 tablet (125 mcg total) by mouth once daily., Disp: 90 tablet, Rfl: 0    lidocaine HCL 2% (XYLOCAINE) 2 % jelly, Apply topically as needed. Apply topically once nightly to affected area both heels., Disp: 30 mL, Rfl: 2    meloxicam (MOBIC) 15 MG tablet, Take 1 tablet (15 mg total) by mouth once daily., Disp: 90 tablet, Rfl: 0    midodrine (PROAMATINE) 5 MG Tab, Take 1 tablet (5 mg total) by mouth 2 (two) times daily with meals., Disp: 60 tablet, Rfl: 11    predniSONE (DELTASONE) 5 MG tablet, Take 1.5 tablets (7.5 mg total) by mouth once daily. Ankylosis spondylitis/rheumatoid arthritis, Disp: 135 tablet, Rfl: 1    vitamin E 1000 UNIT capsule, Take 1,000 Units by mouth once daily., Disp: , Rfl:     blood-glucose meter kit, AC meals x2, Disp: 1 each, Rfl: 0    glucagon, human recombinant, (GLUCAGON EMERGENCY KIT, HUMAN,) 1 mg SolR, Inject 1 mg into the muscle as needed., Disp: 1 each, Rfl: 3    lipase-protease-amylase 24,000-76,000-120,000 units (CREON) 24,000-76,000 -120,000 unit capsule, Take 1 capsule by mouth 3 (three) times daily with meals., Disp: 90 capsule, Rfl: 11  Any other notable medications as documented in HPI    Allergies  Review of patient's allergies indicates:   Allergen Reactions    Codeine     Sulfa (sulfonamide antibiotics)     Codeine Rash and Other (See  "Comments)    Plaquenil [hydroxychloroquine] Rash and Other (See Comments)     ONE BRAND OF MED    Sulfa (sulfonamide antibiotics) Rash and Other (See Comments)       Social History  Social History     Socioeconomic History    Marital status:      Spouse name: Not on file    Number of children: Not on file    Years of education: Not on file    Highest education level: Not on file   Occupational History    Not on file   Social Needs    Financial resource strain: Somewhat hard    Food insecurity     Worry: Never true     Inability: Never true    Transportation needs     Medical: No     Non-medical: No   Tobacco Use    Smoking status: Never Smoker    Smokeless tobacco: Never Used   Substance and Sexual Activity    Alcohol use: No     Frequency: Monthly or less     Drinks per session: 1 or 2     Binge frequency: Never    Drug use: No    Sexual activity: Never   Lifestyle    Physical activity     Days per week: 0 days     Minutes per session: 0 min    Stress: Very much   Relationships    Social connections     Talks on phone: Twice a week     Gets together: More than three times a week     Attends Congregation service: Not on file     Active member of club or organization: No     Attends meetings of clubs or organizations: Never     Relationship status:    Other Topics Concern    Not on file   Social History Narrative    Not on file     Any other notable Social History as documented in HPI.    Family History  No family history on file.  Any other notable FMH as documented in HPI.    Physical Exam  /73   Pulse 69   Ht 5' 6" (1.676 m)   Wt 59.5 kg (131 lb 4.5 oz)   BMI 21.19 kg/m²     General: Well-developed, well-groomed. No apparent distress  HENT: Normocephalic, atraumatic.  Cardiovascular: Regular rate and rhythm with no murmurs, rubs or gallops.    Chest: Lungs clear to auscultation bilaterally.  No wheezes, stridor, ronchi appreciated.  Abdomen: Normoactive bowel sounds " present.  Soft, nontender to palpation.  Musculoskeletal: No peripheral edema, deformity or point tenderness    Neurologic Exam: The patient is awake, alert and oriented. Language is fluent but mildly stuttered. Fund of knowledge is appropriate.     Cranial nerves:   Pupils are round and reactive to light and accommodation.  Visual fields are full to confrontation.    Ocular motility is full in all cardinal positions of gaze.   Facial sensation is normal to light touch.   Facial activation is symmetric.   Hearing is symmetric bilaterally.   Palate elevates symmetrically.   Shoulder elevation is symmetric and full strength bilaterally.   Tongue is midline and neck rotation strength is normal bilaterally.      Motor examination of all extremities demonstrates normal bulk and tone in all four limbs. There are no atrophy or fasciculations in any extremity but mildly present in face as described above. Strength is 5/5 in the upper and lower extremities bilaterally.    Sensory examination   Diminished b/l sensation to light tough per patient  Mildly unsteady on romberg.  Sensation to temperature: intact in BUE and BLE  Sensation to vibration: intact in BUE and BLE  Sensation to pinprick: intact in BUE and BLE  Proprioception: intact in BUE and BLE    Deep tendon reflexes are 2+ and symmetric in the upper and lower extremities bilaterally.  No clonus, downgoing toes b/l.    Gait: Unstable on tandem gait. Cautious normal gait.    Coordination: Finger to nose and heel to shin testing is normal in both upper and lower extremities.    Lab and Test Results    WBC   Date Value Ref Range Status   07/08/2020 9.4 3.8 - 10.8 Thousand/uL Final   05/29/2020 8.65 3.90 - 12.70 K/uL Final   05/08/2020 8.08 3.90 - 12.70 K/uL Final     Hemoglobin   Date Value Ref Range Status   07/08/2020 13.3 11.7 - 15.5 g/dL Final   05/29/2020 14.1 12.0 - 16.0 g/dL Final   05/08/2020 14.1 12.0 - 16.0 g/dL Final     Hematocrit   Date Value Ref Range  Status   07/08/2020 40.6 35.0 - 45.0 % Final   05/29/2020 45.5 37.0 - 48.5 % Final   05/08/2020 42.9 37.0 - 48.5 % Final     Platelets   Date Value Ref Range Status   07/08/2020 296 140 - 400 Thousand/uL Final   05/29/2020 306 150 - 350 K/uL Final   05/08/2020 237 150 - 350 K/uL Final     Glucose   Date Value Ref Range Status   07/08/2020 117 (H) 65 - 99 mg/dL Final     Comment:                   Fasting reference interval     For someone without known diabetes, a glucose value  between 100 and 125 mg/dL is consistent with  prediabetes and should be confirmed with a  follow-up test.        05/29/2020 108 70 - 110 mg/dL Final   05/08/2020 125 (H) 70 - 110 mg/dL Final   07/19/2017 82 70 - 99 mg/dL    12/29/2016 77 70 - 99 mg/dL      Sodium   Date Value Ref Range Status   07/08/2020 140 135 - 146 mmol/L Final   05/29/2020 139 136 - 145 mmol/L Final   05/08/2020 143 136 - 145 mmol/L Final   07/19/2017 140 134 - 144 mmol/L    12/29/2016 141 134 - 144 mmol/L      Potassium   Date Value Ref Range Status   07/08/2020 5.2 3.5 - 5.3 mmol/L Final   05/29/2020 4.1 3.5 - 5.1 mmol/L Final   05/08/2020 3.8 3.5 - 5.1 mmol/L Final     Chloride   Date Value Ref Range Status   07/08/2020 102 98 - 110 mmol/L Final   05/29/2020 103 95 - 110 mmol/L Final   05/08/2020 105 95 - 110 mmol/L Final   07/19/2017 103 98 - 110 mmol/L    12/29/2016 104 98 - 110 mmol/L      CO2   Date Value Ref Range Status   07/08/2020 32 20 - 32 mmol/L Final   05/29/2020 24 23 - 29 mmol/L Final   05/08/2020 26 23 - 29 mmol/L Final     BUN, Bld   Date Value Ref Range Status   07/08/2020 21 7 - 25 mg/dL Final   05/29/2020 25 (H) 8 - 23 mg/dL Final   05/08/2020 14 8 - 23 mg/dL Final     Creatinine   Date Value Ref Range Status   07/08/2020 0.99 0.50 - 0.99 mg/dL Final     Comment:     For patients >49 years of age, the reference limit  for Creatinine is approximately 13% higher for people  identified as -American.        05/29/2020 1.0 0.5 - 1.4 mg/dL Final    05/08/2020 0.9 0.5 - 1.4 mg/dL Final   07/19/2017 0.78 0.60 - 1.40 mg/dL    12/29/2016 0.71 0.60 - 1.40 mg/dL      Calcium   Date Value Ref Range Status   07/08/2020 9.9 8.6 - 10.4 mg/dL Final   05/29/2020 9.9 8.7 - 10.5 mg/dL Final   05/08/2020 9.5 8.7 - 10.5 mg/dL Final     Magnesium   Date Value Ref Range Status   05/08/2020 2.1 1.6 - 2.6 mg/dL Final   04/16/2020 2.0 1.5 - 2.5 mg/dL Final   05/24/2019 2.3 1.6 - 2.6 mg/dL Final     Alkaline Phosphatase   Date Value Ref Range Status   07/08/2020 58 37 - 153 U/L Final   05/29/2020 72 55 - 135 U/L Final   05/08/2020 76 55 - 135 U/L Final     ALT   Date Value Ref Range Status   07/08/2020 17 6 - 29 U/L Final   05/29/2020 16 10 - 44 U/L Final   05/08/2020 26 10 - 44 U/L Final     AST   Date Value Ref Range Status   07/08/2020 21 10 - 35 U/L Final   05/29/2020 20 10 - 40 U/L Final   05/08/2020 19 10 - 40 U/L Final         Images:  All pertinent previous imaging reviewed.      Assessment and Plan    Patient presented to clinic with b/l subjective LE weakness with some associated numbness (mild) and noted pain in anterior and lateral legs (b/l). States this is causing difficulty with ambulation. Notable difficulty with ambulation on tandem walking and unstable with romberg. She notes that her symptoms have been improving with PT/OT and that her R hand tremor and aphasia have improved significantly, almost back to baseline. Diagnosis is a spell of unclear etiology but improving with PT/OT. She reports that her OP PT/OT has been completed but is open to more sessions given its success.     Plan:  -- Paraneoplastic panel given mild tremors in setting of weakness  -- Ordered reversible causes of dementia labs including B1, B6, HIV, VitD, Paraneoplastic Ab  -- Ambulatory referral to PT/OT for continued therapy  -- Specified additional order for assessment for walker so that patient may have help with ambulation  -- Will follow up in 3 months     Problem List Items Addressed  This Visit        Immunology/Multi System    Lupus (systemic lupus erythematosus)       Endocrine    Thyroid disease       Orthopedic    Arthritis      Other Visit Diagnoses     Spell of change in speech    -  Primary    Relevant Orders    VITAMIN B1    VITAMIN B6    RAPID HIV    VITAMIN D    Paraneoplastic Autoantibody Evaulation, Serum    Ambulatory referral/consult to Physical/Occupational Therapy    Ataxia due to recent stroke        Relevant Orders    VITAMIN B12    RPR    Neurological deficit present        Delirium due to dissociative drug        Opioid dependence with intoxication delirium        Recurrent major depression resistant to treatment        Sciatic neuropathy, unspecified laterality        Ankylosis of lumbar spine        Ankylosing spondylitis of thoracolumbar region        Chronic low back pain with bilateral sciatica, unspecified back pain laterality        Cognitive complaints         Relevant Orders    VITAMIN B12    Minimal cognitive impairment         Relevant Orders    RAPID HIV    Vitamin D deficiency         Relevant Orders    VITAMIN D            Martínez Cano  Neurology Resident   [facility]  [address] [place] [address]

## 2020-07-23 ENCOUNTER — OFFICE VISIT (OUTPATIENT)
Dept: CARDIOLOGY | Facility: CLINIC | Age: 70
End: 2020-07-23
Payer: MEDICARE

## 2020-07-23 VITALS
DIASTOLIC BLOOD PRESSURE: 72 MMHG | HEART RATE: 66 BPM | OXYGEN SATURATION: 97 % | BODY MASS INDEX: 20.9 KG/M2 | HEIGHT: 66 IN | SYSTOLIC BLOOD PRESSURE: 108 MMHG | WEIGHT: 130.06 LBS

## 2020-07-23 DIAGNOSIS — R00.2 PALPITATIONS: Primary | ICD-10-CM

## 2020-07-23 DIAGNOSIS — R00.1 SINUS BRADYCARDIA: ICD-10-CM

## 2020-07-23 PROCEDURE — 1101F PT FALLS ASSESS-DOCD LE1/YR: CPT | Mod: HCNC,CPTII,S$GLB, | Performed by: INTERNAL MEDICINE

## 2020-07-23 PROCEDURE — 99999 PR PBB SHADOW E&M-EST. PATIENT-LVL III: CPT | Mod: PBBFAC,HCNC,, | Performed by: INTERNAL MEDICINE

## 2020-07-23 PROCEDURE — 1159F PR MEDICATION LIST DOCUMENTED IN MEDICAL RECORD: ICD-10-PCS | Mod: HCNC,S$GLB,, | Performed by: INTERNAL MEDICINE

## 2020-07-23 PROCEDURE — 3008F PR BODY MASS INDEX (BMI) DOCUMENTED: ICD-10-PCS | Mod: HCNC,CPTII,S$GLB, | Performed by: INTERNAL MEDICINE

## 2020-07-23 PROCEDURE — 1125F AMNT PAIN NOTED PAIN PRSNT: CPT | Mod: HCNC,S$GLB,, | Performed by: INTERNAL MEDICINE

## 2020-07-23 PROCEDURE — 1159F MED LIST DOCD IN RCRD: CPT | Mod: HCNC,S$GLB,, | Performed by: INTERNAL MEDICINE

## 2020-07-23 PROCEDURE — 99213 OFFICE O/P EST LOW 20 MIN: CPT | Mod: HCNC,S$GLB,, | Performed by: INTERNAL MEDICINE

## 2020-07-23 PROCEDURE — 1125F PR PAIN SEVERITY QUANTIFIED, PAIN PRESENT: ICD-10-PCS | Mod: HCNC,S$GLB,, | Performed by: INTERNAL MEDICINE

## 2020-07-23 PROCEDURE — 1101F PR PT FALLS ASSESS DOC 0-1 FALLS W/OUT INJ PAST YR: ICD-10-PCS | Mod: HCNC,CPTII,S$GLB, | Performed by: INTERNAL MEDICINE

## 2020-07-23 PROCEDURE — 99999 PR PBB SHADOW E&M-EST. PATIENT-LVL III: ICD-10-PCS | Mod: PBBFAC,HCNC,, | Performed by: INTERNAL MEDICINE

## 2020-07-23 PROCEDURE — 3008F BODY MASS INDEX DOCD: CPT | Mod: HCNC,CPTII,S$GLB, | Performed by: INTERNAL MEDICINE

## 2020-07-23 PROCEDURE — 99213 PR OFFICE/OUTPT VISIT, EST, LEVL III, 20-29 MIN: ICD-10-PCS | Mod: HCNC,S$GLB,, | Performed by: INTERNAL MEDICINE

## 2020-07-23 NOTE — PROGRESS NOTES
Subjective:    Patient ID:  Erica Marsh is a 70 y.o. female who presents for follow-up of Bradycardia      HPI     Previously saw Dr Chavez 5/30/18  Patient ID: Erica Marsh is a 67 y.o. female with a past medical history of diabetes, hypertension  HPI  Patient is here to seek help with her bradycardia.  She is known to have bradycardia for a while.  She has been investigated with a Holter monitor.  Reports are dictated below.  Patient denies any episodes of presyncope or syncope  She complains of occasional chest pains and some heaviness in her neck.  This is not associated with shortness of breath.     Echo 6/17/20  · Normal left ventricular systolic function. The estimated ejection fraction is 70%.  · Normal LV diastolic function.  · Normal right ventricular systolic function.  · Mild mitral regurgitation.  · The estimated PA systolic pressure is 22 mmHg.  · There is no evidence of intracardiac shunting by bubble study    Stress echo 6/14/18  · Tricuspid valve shows mild regurgitation.  · Mild regurgitation is present in the aortic valve..  · Left ventricle ejection fraction is normal at%  · Normal LV diastolic function.  · The left ventricle cavity is normal.  · RV systolic function is normal.  · Left atrium is mildly dilated.  · RA cavity size is normal.  · LV systolic function is normal  · No stress induced wall motion abnormality     Holter 6/17/20  · Sinus arrhythmia with heart rates varying between 48 and 105 bpm with an average of 68 bpm.  · There were rare PVCs totalling 157 and averaging 3.27 per hour.  · There were rare PACs totalling 149 and averaging 3.1 per hour.  · There were occasional runs of non-sustained SVT and lasting for 3 to 6 beats.  · The diary was properly completed. Pt complained of palps during holter, but these did not correlate directly with the short runs of SVT noted above.     6/10/20 Reports recent worsening of palpitations and fatigue. Mild HUGHES. Denies CP  EKG 5/8/20 sinus  bradycardia 55 otherwise ok  Reports possible recent TIA  Echo with bubble study for HUGHES and possible TIA  Holter for palpitations and bradycardia  If unremarkable and fatigue/hypotension continue consider midodrine      6/25/20 Still with fatigue and hypotension  Add midodrine 5 bid  OV 1 month       7/23/20 Feels close to baseline after taking midorine  Denies CP or SOB  BP improved         Review of Systems   Constitution: Negative for decreased appetite.   HENT: Negative for ear discharge.    Eyes: Negative for blurred vision.   Respiratory: Negative for hemoptysis.    Endocrine: Negative for polyphagia.   Hematologic/Lymphatic: Negative for adenopathy.   Skin: Negative for color change.   Musculoskeletal: Negative for joint swelling.   Genitourinary: Negative for bladder incontinence.   Neurological: Negative for brief paralysis.   Psychiatric/Behavioral: Negative for hallucinations.   Allergic/Immunologic: Negative for hives.        Objective:    Physical Exam   Constitutional: She is oriented to person, place, and time. She appears well-developed and well-nourished.   HENT:   Head: Normocephalic and atraumatic.   Eyes: Pupils are equal, round, and reactive to light. Conjunctivae are normal.   Neck: Normal range of motion. Neck supple.   Cardiovascular: Normal rate, normal heart sounds and intact distal pulses.   Pulmonary/Chest: Effort normal and breath sounds normal.   Abdominal: Soft. Bowel sounds are normal.   Musculoskeletal: Normal range of motion.   Neurological: She is alert and oriented to person, place, and time.   Skin: Skin is warm and dry.         Assessment:       1. Palpitations    2. Sinus bradycardia         Plan:       Cardiac stable  If hypotension worsens could titrate midodrine  OV 6 months

## 2020-07-24 NOTE — PROGRESS NOTES
I have reviewed the history and physical, assessments, and plan, I concur with her/his documentation of Erica Marsh. Patient was seen and examined with the resident.    Glo Gonzalez MD  General Neurology Staff  [facility]

## 2020-07-25 ENCOUNTER — LAB VISIT (OUTPATIENT)
Dept: LAB | Facility: HOSPITAL | Age: 70
End: 2020-07-25
Attending: FAMILY MEDICINE
Payer: MEDICARE

## 2020-07-25 DIAGNOSIS — Z12.11 COLON CANCER SCREENING: ICD-10-CM

## 2020-07-25 PROCEDURE — 82274 ASSAY TEST FOR BLOOD FECAL: CPT | Mod: HCNC

## 2020-07-30 ENCOUNTER — DOCUMENT SCAN (OUTPATIENT)
Dept: HOME HEALTH SERVICES | Facility: HOSPITAL | Age: 70
End: 2020-07-30
Payer: MEDICARE

## 2020-07-31 LAB — HEMOCCULT STL QL IA: NEGATIVE

## 2020-08-21 ENCOUNTER — PATIENT MESSAGE (OUTPATIENT)
Dept: FAMILY MEDICINE | Facility: CLINIC | Age: 70
End: 2020-08-21

## 2020-08-21 ENCOUNTER — OFFICE VISIT (OUTPATIENT)
Dept: FAMILY MEDICINE | Facility: CLINIC | Age: 70
End: 2020-08-21
Payer: MEDICARE

## 2020-08-21 DIAGNOSIS — M45.5 ANKYLOSING SPONDYLITIS OF THORACOLUMBAR REGION: ICD-10-CM

## 2020-08-21 DIAGNOSIS — M43.26 ANKYLOSIS OF LUMBAR SPINE: ICD-10-CM

## 2020-08-21 DIAGNOSIS — M54.41 CHRONIC LOW BACK PAIN WITH BILATERAL SCIATICA, UNSPECIFIED BACK PAIN LATERALITY: ICD-10-CM

## 2020-08-21 DIAGNOSIS — G89.29 CHRONIC LOW BACK PAIN WITH BILATERAL SCIATICA, UNSPECIFIED BACK PAIN LATERALITY: ICD-10-CM

## 2020-08-21 DIAGNOSIS — M54.42 CHRONIC LOW BACK PAIN WITH BILATERAL SCIATICA, UNSPECIFIED BACK PAIN LATERALITY: ICD-10-CM

## 2020-08-21 DIAGNOSIS — E27.40 ADRENAL INSUFFICIENCY: ICD-10-CM

## 2020-08-21 DIAGNOSIS — G57.00 SCIATIC NEUROPATHY, UNSPECIFIED LATERALITY: ICD-10-CM

## 2020-08-21 DIAGNOSIS — R00.1 SINUS BRADYCARDIA: Primary | ICD-10-CM

## 2020-08-21 DIAGNOSIS — M32.9 SYSTEMIC LUPUS ERYTHEMATOSUS, UNSPECIFIED SLE TYPE, UNSPECIFIED ORGAN INVOLVEMENT STATUS: ICD-10-CM

## 2020-08-21 PROCEDURE — 1159F MED LIST DOCD IN RCRD: CPT | Mod: HCNC,95,, | Performed by: FAMILY MEDICINE

## 2020-08-21 PROCEDURE — 99213 OFFICE O/P EST LOW 20 MIN: CPT | Mod: HCNC,95,, | Performed by: FAMILY MEDICINE

## 2020-08-21 PROCEDURE — 1101F PT FALLS ASSESS-DOCD LE1/YR: CPT | Mod: HCNC,CPTII,95, | Performed by: FAMILY MEDICINE

## 2020-08-21 PROCEDURE — 1101F PR PT FALLS ASSESS DOC 0-1 FALLS W/OUT INJ PAST YR: ICD-10-PCS | Mod: HCNC,CPTII,95, | Performed by: FAMILY MEDICINE

## 2020-08-21 PROCEDURE — 99213 PR OFFICE/OUTPT VISIT, EST, LEVL III, 20-29 MIN: ICD-10-PCS | Mod: HCNC,95,, | Performed by: FAMILY MEDICINE

## 2020-08-21 PROCEDURE — 1159F PR MEDICATION LIST DOCUMENTED IN MEDICAL RECORD: ICD-10-PCS | Mod: HCNC,95,, | Performed by: FAMILY MEDICINE

## 2020-08-21 RX ORDER — MIDODRINE HYDROCHLORIDE 5 MG/1
5 TABLET ORAL
Qty: 90 TABLET | Refills: 3 | Status: SHIPPED | OUTPATIENT
Start: 2020-08-21 | End: 2020-10-08

## 2020-08-21 RX ORDER — PREDNISONE 5 MG/1
TABLET ORAL
Qty: 135 TABLET | Refills: 1 | Status: SHIPPED | OUTPATIENT
Start: 2020-08-21 | End: 2020-09-21

## 2020-08-21 RX ORDER — CITALOPRAM 10 MG/1
TABLET ORAL
Qty: 60 TABLET | Refills: 11 | Status: SHIPPED | OUTPATIENT
Start: 2020-08-21 | End: 2021-05-19 | Stop reason: DRUGHIGH

## 2020-08-21 NOTE — PATIENT INSTRUCTIONS

## 2020-09-07 ENCOUNTER — PATIENT MESSAGE (OUTPATIENT)
Dept: FAMILY MEDICINE | Facility: CLINIC | Age: 70
End: 2020-09-07

## 2020-09-07 DIAGNOSIS — E07.9 THYROID DISEASE: ICD-10-CM

## 2020-09-09 RX ORDER — LEVOTHYROXINE SODIUM 125 UG/1
TABLET ORAL
Qty: 90 TABLET | Refills: 3 | Status: SHIPPED | OUTPATIENT
Start: 2020-09-09 | End: 2021-06-22

## 2020-09-21 ENCOUNTER — PATIENT MESSAGE (OUTPATIENT)
Dept: FAMILY MEDICINE | Facility: CLINIC | Age: 70
End: 2020-09-21

## 2020-09-21 DIAGNOSIS — M32.9 SYSTEMIC LUPUS ERYTHEMATOSUS, UNSPECIFIED SLE TYPE, UNSPECIFIED ORGAN INVOLVEMENT STATUS: ICD-10-CM

## 2020-09-21 RX ORDER — MELOXICAM 15 MG/1
15 TABLET ORAL DAILY
Qty: 90 TABLET | Refills: 0 | Status: SHIPPED | OUTPATIENT
Start: 2020-09-21 | End: 2020-12-22

## 2020-09-21 NOTE — PROGRESS NOTES
Subjective:       Patient ID: Erica Marsh is a 70 y.o. female.    Chief Complaint: No chief complaint on file.    The patient location is: home  The chief complaint leading to consultation is: thyroid/CVA    Visit type: audiovisual    Face to Face time with patient: 30  15 minutes of total time spent on the encounter, which includes face to face time and non-face to face time preparing to see the patient (eg, review of tests), Obtaining and/or reviewing separately obtained history, Documenting clinical information in the electronic or other health record, Independently interpreting results (not separately reported) and communicating results to the patient/family/caregiver, or Care coordination (not separately reported).         Each patient to whom he or she provides medical services by telemedicine is:  (1) informed of the relationship between the physician and patient and the respective role of any other health care provider with respect to management of the patient; and (2) notified that he or she may decline to receive medical services by telemedicine and may withdraw from such care at any time.    Notes:       Thyroid Problem  Presents for follow-up visit. Symptoms include anxiety, cold intolerance, depressed mood, fatigue, palpitations and tremors. Patient reports no menstrual problem, weight gain or weight loss. The symptoms have been improving.     Review of Systems   Constitutional: Positive for fatigue. Negative for unexpected weight change, weight gain and weight loss.   Respiratory: Negative for chest tightness and shortness of breath.    Cardiovascular: Positive for palpitations. Negative for chest pain.   Gastrointestinal: Negative for abdominal pain.   Endocrine: Positive for cold intolerance.   Genitourinary: Negative for menstrual problem.   Allergic/Immunologic: Positive for food allergies.   Neurological: Positive for tremors.   Psychiatric/Behavioral: Negative for dysphoric mood. The patient  is nervous/anxious.        Patient Active Problem List   Diagnosis    Lupus (systemic lupus erythematosus)    GERD (gastroesophageal reflux disease)    Arthritis    Thyroid disease    Trigger finger    Hypoglycemia    Abdominal pain, other specified site    Constipation    Change in bowel habits    Rectal bleeding    Hypoglycemia after GI (gastrointestinal) surgery    Head trauma    Depression with anxiety    Other spondylosis, lumbar region    Sinus bradycardia    DDD (degenerative disc disease), thoracic    Palpitations       Objective:      Physical Exam    Lab Results   Component Value Date    WBC 9.4 07/08/2020    HGB 13.3 07/08/2020    HCT 40.6 07/08/2020     07/08/2020    CHOL 190 04/18/2019    TRIG 57 04/18/2019    HDL 81 (H) 04/18/2019    ALT 17 07/08/2020    AST 21 07/08/2020     07/08/2020    K 5.2 07/08/2020     07/08/2020    CREATININE 0.99 07/08/2020    BUN 21 07/08/2020    CO2 32 07/08/2020    TSH 0.057 (L) 05/08/2020    INR 1.0 05/08/2020    HGBA1C 4.9 02/26/2018     The ASCVD Risk score (Sarah DURGA Jr., et al., 2013) failed to calculate for the following reasons:    The patient has a prior MI or stroke diagnosis    Assessment:       1. Sinus bradycardia    2. Adrenal insufficiency    3. Systemic lupus erythematosus, unspecified SLE type, unspecified organ involvement status    4. Sciatic neuropathy, unspecified laterality    5. Ankylosis of lumbar spine    6. Ankylosing spondylitis of thoracolumbar region    7. Chronic low back pain with bilateral sciatica, unspecified back pain laterality        Plan:       Sinus bradycardia    Adrenal insufficiency  -     midodrine (PROAMATINE) 5 MG Tab; Take 1 tablet (5 mg total) by mouth 3 (three) times daily with meals.  Dispense: 90 tablet; Refill: 3    Systemic lupus erythematosus, unspecified SLE type, unspecified organ involvement status  -     predniSONE (DELTASONE) 5 MG tablet; 1 tab 5 mg daily alternate 7.5 mg   Daily  Ankylosis spondylitis/rheumatoid arthritis  Dispense: 135 tablet; Refill: 1    Sciatic neuropathy, unspecified laterality  -     predniSONE (DELTASONE) 5 MG tablet; 1 tab 5 mg daily alternate 7.5 mg  Daily  Ankylosis spondylitis/rheumatoid arthritis  Dispense: 135 tablet; Refill: 1    Ankylosis of lumbar spine  -     predniSONE (DELTASONE) 5 MG tablet; 1 tab 5 mg daily alternate 7.5 mg  Daily  Ankylosis spondylitis/rheumatoid arthritis  Dispense: 135 tablet; Refill: 1    Ankylosing spondylitis of thoracolumbar region  -     predniSONE (DELTASONE) 5 MG tablet; 1 tab 5 mg daily alternate 7.5 mg  Daily  Ankylosis spondylitis/rheumatoid arthritis  Dispense: 135 tablet; Refill: 1    Chronic low back pain with bilateral sciatica, unspecified back pain laterality  -     predniSONE (DELTASONE) 5 MG tablet; 1 tab 5 mg daily alternate 7.5 mg  Daily  Ankylosis spondylitis/rheumatoid arthritis  Dispense: 135 tablet; Refill: 1    Other orders  -     citalopram (CELEXA) 10 MG tablet; 1 tab 10 mg daily for 10 days then 20 mg daily  Dispense: 60 tablet; Refill: 11

## 2020-10-08 ENCOUNTER — OFFICE VISIT (OUTPATIENT)
Dept: FAMILY MEDICINE | Facility: CLINIC | Age: 70
End: 2020-10-08
Payer: MEDICARE

## 2020-10-08 ENCOUNTER — TELEPHONE (OUTPATIENT)
Dept: FAMILY MEDICINE | Facility: CLINIC | Age: 70
End: 2020-10-08

## 2020-10-08 DIAGNOSIS — G57.00 SCIATIC NEUROPATHY, UNSPECIFIED LATERALITY: ICD-10-CM

## 2020-10-08 DIAGNOSIS — G89.29 CHRONIC LOW BACK PAIN WITH BILATERAL SCIATICA, UNSPECIFIED BACK PAIN LATERALITY: ICD-10-CM

## 2020-10-08 DIAGNOSIS — M45.5 ANKYLOSING SPONDYLITIS OF THORACOLUMBAR REGION: ICD-10-CM

## 2020-10-08 DIAGNOSIS — E27.40 ADRENAL INSUFFICIENCY: ICD-10-CM

## 2020-10-08 DIAGNOSIS — M32.9 SYSTEMIC LUPUS ERYTHEMATOSUS, UNSPECIFIED SLE TYPE, UNSPECIFIED ORGAN INVOLVEMENT STATUS: ICD-10-CM

## 2020-10-08 DIAGNOSIS — F33.9 RECURRENT MAJOR DEPRESSION RESISTANT TO TREATMENT: ICD-10-CM

## 2020-10-08 DIAGNOSIS — M54.41 CHRONIC LOW BACK PAIN WITH BILATERAL SCIATICA, UNSPECIFIED BACK PAIN LATERALITY: ICD-10-CM

## 2020-10-08 DIAGNOSIS — M54.42 CHRONIC LOW BACK PAIN WITH BILATERAL SCIATICA, UNSPECIFIED BACK PAIN LATERALITY: ICD-10-CM

## 2020-10-08 DIAGNOSIS — M43.26 ANKYLOSIS OF LUMBAR SPINE: ICD-10-CM

## 2020-10-08 PROCEDURE — 99213 OFFICE O/P EST LOW 20 MIN: CPT | Mod: HCNC,95,, | Performed by: FAMILY MEDICINE

## 2020-10-08 PROCEDURE — 1101F PR PT FALLS ASSESS DOC 0-1 FALLS W/OUT INJ PAST YR: ICD-10-PCS | Mod: HCNC,CPTII,95, | Performed by: FAMILY MEDICINE

## 2020-10-08 PROCEDURE — 1159F PR MEDICATION LIST DOCUMENTED IN MEDICAL RECORD: ICD-10-PCS | Mod: HCNC,95,, | Performed by: FAMILY MEDICINE

## 2020-10-08 PROCEDURE — 99213 PR OFFICE/OUTPT VISIT, EST, LEVL III, 20-29 MIN: ICD-10-PCS | Mod: HCNC,95,, | Performed by: FAMILY MEDICINE

## 2020-10-08 PROCEDURE — 1101F PT FALLS ASSESS-DOCD LE1/YR: CPT | Mod: HCNC,CPTII,95, | Performed by: FAMILY MEDICINE

## 2020-10-08 PROCEDURE — 1159F MED LIST DOCD IN RCRD: CPT | Mod: HCNC,95,, | Performed by: FAMILY MEDICINE

## 2020-10-08 RX ORDER — CLONAZEPAM 1 MG/1
1 TABLET ORAL 2 TIMES DAILY
Qty: 60 TABLET | Refills: 2 | Status: SHIPPED | OUTPATIENT
Start: 2020-10-08 | End: 2021-02-01

## 2020-10-08 RX ORDER — MIDODRINE HYDROCHLORIDE 10 MG/1
10 TABLET ORAL 2 TIMES DAILY WITH MEALS
Qty: 90 TABLET | Refills: 3 | Status: SHIPPED | OUTPATIENT
Start: 2020-10-08 | End: 2021-05-19 | Stop reason: DRUGHIGH

## 2020-10-08 NOTE — PATIENT INSTRUCTIONS

## 2020-10-08 NOTE — PROGRESS NOTES
Subjective:       Patient ID: Erica Marsh is a 70 y.o. female.    Chief Complaint: No chief complaint on file.    Patient has chronic pain involving the cervical spine, lumbar spine.  History of trauma? No.  Onset: Approximately several years ago.  Frequency: constant.  Progression since onset: improving.  Pain quality: 3/10.  Current treatment: Prednisone, Celexa, and Klonopin  Improvement on current treatment: no  Treatments tried: Mobic,Norco, Mobic (Meloxicam) and Celexa; Therapy:Physical Therapy; Injections: None - with moderate improvement  Associated symptoms: weakness  Previous imaging: CT scan  Drug screen? Yes  Pain contract? Yes  Psychiatric disorders: Depression, Anxiety / nervousness and Change in sleep patterns  Substance abuse history:  None  Social History: None  70-year-old female with pancreatic insufficiency, several years.  She also has some adrenal insufficiency with episodes of hypotension and tremors, orthostatic changes, chronic fatigue, anhedonic.  She has partial results with Midodrine.  She denies palpitations no headaches no dizziness no seizures.  Audio Only Telehealth Visit     The patient location is:  Home  The chief complaint leading to consultation is:  Polymyalgia rheumatica  Visit type: Virtual visit with audio only (telephone)  Total time spent with patient:  20     The reason for the audio only service rather than synchronous audio and video virtual visit was related to technical difficulties or patient preference/necessity.     Each patient to whom I provide medical services by telemedicine is:  (1) informed of the relationship between the physician and patient and the respective role of any other health care provider with respect to management of the patient; and (2) notified that they may decline to receive medical services by telemedicine and may withdraw from such care at any time. Patient verbally consented to receive this service via voice-only telephone call.        HPI:  Generalized aches and pain and stiffness     Assessment and plan:                          This service was not originating from a related E/M service provided within the previous 7 days nor will  to an E/M service or procedure within the next 24 hours or my soonest available appointment.  Prevailing standard of care was able to be met in this audio-only visit.            Review of Systems   Constitutional: Positive for fatigue. Negative for activity change, appetite change, chills and diaphoresis.   HENT: Negative.  Negative for facial swelling, hearing loss, nosebleeds, postnasal drip, sneezing and trouble swallowing.    Eyes: Negative for photophobia, pain, discharge, redness, itching and visual disturbance.   Respiratory: Negative for apnea, cough, chest tightness, shortness of breath and wheezing.    Cardiovascular: Negative.  Negative for chest pain, palpitations and leg swelling.   Gastrointestinal: Negative.  Negative for abdominal distention, abdominal pain, anal bleeding, blood in stool and diarrhea.   Endocrine: Negative.  Negative for cold intolerance, heat intolerance, polydipsia, polyphagia and polyuria.   Genitourinary: Negative.  Negative for difficulty urinating, dysuria, frequency, genital sores, hematuria, pelvic pain, urgency, vaginal bleeding and vaginal discharge.   Musculoskeletal: Positive for arthralgias and back pain. Negative for gait problem, neck pain and neck stiffness.   Skin: Negative.  Negative for color change, pallor and rash.   Allergic/Immunologic: Negative.  Negative for environmental allergies and food allergies.   Neurological: Positive for light-headedness. Negative for dizziness, tremors, seizures, syncope, speech difficulty, numbness and headaches.   Hematological: Negative for adenopathy.   Psychiatric/Behavioral: Positive for decreased concentration. Negative for agitation, behavioral problems, confusion, hallucinations, self-injury and sleep disturbance.  The patient is not nervous/anxious and is not hyperactive.        Patient Active Problem List   Diagnosis    Lupus (systemic lupus erythematosus)    GERD (gastroesophageal reflux disease)    Arthritis    Thyroid disease    Trigger finger    Hypoglycemia    Abdominal pain, other specified site    Constipation    Change in bowel habits    Rectal bleeding    Hypoglycemia after GI (gastrointestinal) surgery    Head trauma    Depression with anxiety    Other spondylosis, lumbar region    Sinus bradycardia    DDD (degenerative disc disease), thoracic    Palpitations       Objective:      Physical Exam  Vitals signs reviewed.   Constitutional:       Appearance: She is well-developed. She is not diaphoretic.   HENT:      Head: Normocephalic and atraumatic.      Right Ear: External ear normal.      Left Ear: External ear normal.      Nose: Nose normal.      Mouth/Throat:      Pharynx: No oropharyngeal exudate.   Eyes:      General: No scleral icterus.        Right eye: No discharge.         Left eye: No discharge.      Conjunctiva/sclera: Conjunctivae normal.      Pupils: Pupils are equal, round, and reactive to light.   Neck:      Musculoskeletal: Normal range of motion and neck supple.      Thyroid: No thyromegaly.      Vascular: No JVD.      Trachea: No tracheal deviation.   Cardiovascular:      Rate and Rhythm: Normal rate.      Heart sounds: Normal heart sounds. No murmur. No friction rub. No gallop.    Pulmonary:      Effort: Pulmonary effort is normal. No respiratory distress.      Breath sounds: No stridor. No wheezing or rales.   Chest:      Chest wall: No tenderness.   Abdominal:      General: Bowel sounds are normal. There is no distension.      Palpations: Abdomen is soft. There is no mass.      Tenderness: There is no abdominal tenderness. There is no guarding or rebound.   Musculoskeletal: Normal range of motion.   Lymphadenopathy:      Cervical: No cervical adenopathy.   Skin:     General:  Skin is dry.      Coloration: Skin is not pale.      Findings: No erythema or rash.   Neurological:      Mental Status: She is alert and oriented to person, place, and time.      Cranial Nerves: No cranial nerve deficit.      Motor: No abnormal muscle tone.      Coordination: Coordination normal.      Deep Tendon Reflexes: Reflexes normal.   Psychiatric:         Behavior: Behavior normal.         Thought Content: Thought content normal.         Judgment: Judgment normal.         Lab Results   Component Value Date    WBC 9.4 07/08/2020    HGB 13.3 07/08/2020    HCT 40.6 07/08/2020     07/08/2020    CHOL 190 04/18/2019    TRIG 57 04/18/2019    HDL 81 (H) 04/18/2019    ALT 17 07/08/2020    AST 21 07/08/2020     07/08/2020    K 5.2 07/08/2020     07/08/2020    CREATININE 0.99 07/08/2020    BUN 21 07/08/2020    CO2 32 07/08/2020    TSH 0.057 (L) 05/08/2020    INR 1.0 05/08/2020    HGBA1C 4.9 02/26/2018     The ASCVD Risk score (Sarah DURGA Jr., et al., 2013) failed to calculate for the following reasons:    The patient has a prior MI or stroke diagnosis    Assessment:       1. Recurrent major depression resistant to treatment    2. Systemic lupus erythematosus, unspecified SLE type, unspecified organ involvement status    3. Sciatic neuropathy, unspecified laterality    4. Ankylosis of lumbar spine    5. Ankylosing spondylitis of thoracolumbar region    6. Chronic low back pain with bilateral sciatica, unspecified back pain laterality    7. Adrenal insufficiency        Plan:       Recurrent major depression resistant to treatment  -     clonazePAM (KLONOPIN) 1 MG tablet; Take 1 tablet (1 mg total) by mouth 2 (two) times daily. Anxiety disorder  Dispense: 60 tablet; Refill: 2    Systemic lupus erythematosus, unspecified SLE type, unspecified organ involvement status  -     clonazePAM (KLONOPIN) 1 MG tablet; Take 1 tablet (1 mg total) by mouth 2 (two) times daily. Anxiety disorder  Dispense: 60 tablet;  Refill: 2    Sciatic neuropathy, unspecified laterality  -     clonazePAM (KLONOPIN) 1 MG tablet; Take 1 tablet (1 mg total) by mouth 2 (two) times daily. Anxiety disorder  Dispense: 60 tablet; Refill: 2    Ankylosis of lumbar spine  -     clonazePAM (KLONOPIN) 1 MG tablet; Take 1 tablet (1 mg total) by mouth 2 (two) times daily. Anxiety disorder  Dispense: 60 tablet; Refill: 2    Ankylosing spondylitis of thoracolumbar region  -     clonazePAM (KLONOPIN) 1 MG tablet; Take 1 tablet (1 mg total) by mouth 2 (two) times daily. Anxiety disorder  Dispense: 60 tablet; Refill: 2    Chronic low back pain with bilateral sciatica, unspecified back pain laterality  -     clonazePAM (KLONOPIN) 1 MG tablet; Take 1 tablet (1 mg total) by mouth 2 (two) times daily. Anxiety disorder  Dispense: 60 tablet; Refill: 2    Adrenal insufficiency  -     midodrine (PROAMATINE) 10 MG tablet; Take 1 tablet (10 mg total) by mouth 2 (two) times daily with meals.  Dispense: 90 tablet; Refill: 3      There have been some probable dietary compliance issues here. I have discussed with her the great importance of following the treatment plan exactly as directed in order to achieve a good medical outcome.

## 2020-10-15 DIAGNOSIS — G57.00 SCIATIC NEUROPATHY, UNSPECIFIED LATERALITY: ICD-10-CM

## 2020-10-15 DIAGNOSIS — G89.29 CHRONIC LOW BACK PAIN WITH BILATERAL SCIATICA, UNSPECIFIED BACK PAIN LATERALITY: ICD-10-CM

## 2020-10-15 DIAGNOSIS — M45.5 ANKYLOSING SPONDYLITIS OF THORACOLUMBAR REGION: ICD-10-CM

## 2020-10-15 DIAGNOSIS — M43.26 ANKYLOSIS OF LUMBAR SPINE: ICD-10-CM

## 2020-10-15 DIAGNOSIS — M54.42 CHRONIC LOW BACK PAIN WITH BILATERAL SCIATICA, UNSPECIFIED BACK PAIN LATERALITY: ICD-10-CM

## 2020-10-15 DIAGNOSIS — M54.41 CHRONIC LOW BACK PAIN WITH BILATERAL SCIATICA, UNSPECIFIED BACK PAIN LATERALITY: ICD-10-CM

## 2020-10-15 DIAGNOSIS — M32.9 SYSTEMIC LUPUS ERYTHEMATOSUS, UNSPECIFIED SLE TYPE, UNSPECIFIED ORGAN INVOLVEMENT STATUS: ICD-10-CM

## 2020-10-15 DIAGNOSIS — F33.9 RECURRENT MAJOR DEPRESSION RESISTANT TO TREATMENT: ICD-10-CM

## 2020-10-15 RX ORDER — CLONAZEPAM 1 MG/1
1 TABLET ORAL 2 TIMES DAILY
OUTPATIENT
Start: 2020-10-15

## 2020-10-15 NOTE — TELEPHONE ENCOUNTER
lov with Dr Ornelas 10-8-2020 filled [id number] to [facility].   This is a request from [facility]

## 2020-11-03 ENCOUNTER — PATIENT MESSAGE (OUTPATIENT)
Dept: FAMILY MEDICINE | Facility: CLINIC | Age: 70
End: 2020-11-03

## 2020-11-03 RX ORDER — VIT C/E/ZN/COPPR/LUTEIN/ZEAXAN 250MG-90MG
1000 CAPSULE ORAL 2 TIMES DAILY
Qty: 180 CAPSULE | Refills: 0 | Status: CANCELLED | OUTPATIENT
Start: 2020-11-03

## 2020-11-08 RX ORDER — VIT C/E/ZN/COPPR/LUTEIN/ZEAXAN 250MG-90MG
1000 CAPSULE ORAL 2 TIMES DAILY
Qty: 180 CAPSULE | Refills: 0 | Status: SHIPPED | OUTPATIENT
Start: 2020-11-08 | End: 2022-01-19 | Stop reason: SDUPTHER

## 2020-11-08 RX ORDER — VIT C/E/ZN/COPPR/LUTEIN/ZEAXAN 250MG-90MG
1000 CAPSULE ORAL 2 TIMES DAILY
Qty: 180 CAPSULE | Refills: 0 | OUTPATIENT
Start: 2020-11-08

## 2020-11-09 ENCOUNTER — PES CALL (OUTPATIENT)
Dept: ADMINISTRATIVE | Facility: CLINIC | Age: 70
End: 2020-11-09

## 2020-11-22 ENCOUNTER — PATIENT OUTREACH (OUTPATIENT)
Dept: ADMINISTRATIVE | Facility: OTHER | Age: 70
End: 2020-11-22

## 2020-12-21 DIAGNOSIS — M32.9 SYSTEMIC LUPUS ERYTHEMATOSUS, UNSPECIFIED SLE TYPE, UNSPECIFIED ORGAN INVOLVEMENT STATUS: ICD-10-CM

## 2020-12-22 RX ORDER — MELOXICAM 15 MG/1
15 TABLET ORAL DAILY
Qty: 90 TABLET | Refills: 0 | OUTPATIENT
Start: 2020-12-22

## 2020-12-25 DIAGNOSIS — E27.40 ADRENAL INSUFFICIENCY: ICD-10-CM

## 2020-12-25 RX ORDER — MIDODRINE HYDROCHLORIDE 5 MG/1
TABLET ORAL
Qty: 90 TABLET | Refills: 3 | Status: SHIPPED | OUTPATIENT
Start: 2020-12-25 | End: 2021-03-04 | Stop reason: ALTCHOICE

## 2021-01-07 ENCOUNTER — PATIENT OUTREACH (OUTPATIENT)
Dept: ADMINISTRATIVE | Facility: OTHER | Age: 71
End: 2021-01-07

## 2021-01-08 ENCOUNTER — OFFICE VISIT (OUTPATIENT)
Dept: CARDIOLOGY | Facility: CLINIC | Age: 71
End: 2021-01-08
Payer: MEDICARE

## 2021-01-08 VITALS
WEIGHT: 154.13 LBS | SYSTOLIC BLOOD PRESSURE: 150 MMHG | BODY MASS INDEX: 24.77 KG/M2 | DIASTOLIC BLOOD PRESSURE: 92 MMHG | HEART RATE: 88 BPM | HEIGHT: 66 IN | OXYGEN SATURATION: 98 %

## 2021-01-08 DIAGNOSIS — M32.13 OTHER SYSTEMIC LUPUS ERYTHEMATOSUS WITH LUNG INVOLVEMENT: ICD-10-CM

## 2021-01-08 DIAGNOSIS — R00.1 SINUS BRADYCARDIA: Primary | ICD-10-CM

## 2021-01-08 DIAGNOSIS — R06.09 DOE (DYSPNEA ON EXERTION): ICD-10-CM

## 2021-01-08 DIAGNOSIS — R00.2 PALPITATIONS: ICD-10-CM

## 2021-01-08 DIAGNOSIS — R07.89 CHEST PAIN, ATYPICAL: ICD-10-CM

## 2021-01-08 PROCEDURE — 1101F PR PT FALLS ASSESS DOC 0-1 FALLS W/OUT INJ PAST YR: ICD-10-PCS | Mod: CPTII,S$GLB,, | Performed by: INTERNAL MEDICINE

## 2021-01-08 PROCEDURE — 1159F PR MEDICATION LIST DOCUMENTED IN MEDICAL RECORD: ICD-10-PCS | Mod: S$GLB,,, | Performed by: INTERNAL MEDICINE

## 2021-01-08 PROCEDURE — 1125F AMNT PAIN NOTED PAIN PRSNT: CPT | Mod: S$GLB,,, | Performed by: INTERNAL MEDICINE

## 2021-01-08 PROCEDURE — 1101F PT FALLS ASSESS-DOCD LE1/YR: CPT | Mod: CPTII,S$GLB,, | Performed by: INTERNAL MEDICINE

## 2021-01-08 PROCEDURE — 99499 RISK ADDL DX/OHS AUDIT: ICD-10-PCS | Mod: S$GLB,,, | Performed by: INTERNAL MEDICINE

## 2021-01-08 PROCEDURE — 1159F MED LIST DOCD IN RCRD: CPT | Mod: S$GLB,,, | Performed by: INTERNAL MEDICINE

## 2021-01-08 PROCEDURE — 1125F PR PAIN SEVERITY QUANTIFIED, PAIN PRESENT: ICD-10-PCS | Mod: S$GLB,,, | Performed by: INTERNAL MEDICINE

## 2021-01-08 PROCEDURE — 99214 PR OFFICE/OUTPT VISIT, EST, LEVL IV, 30-39 MIN: ICD-10-PCS | Mod: S$GLB,,, | Performed by: INTERNAL MEDICINE

## 2021-01-08 PROCEDURE — 99499 UNLISTED E&M SERVICE: CPT | Mod: S$GLB,,, | Performed by: INTERNAL MEDICINE

## 2021-01-08 PROCEDURE — 99214 OFFICE O/P EST MOD 30 MIN: CPT | Mod: S$GLB,,, | Performed by: INTERNAL MEDICINE

## 2021-01-08 PROCEDURE — 3008F PR BODY MASS INDEX (BMI) DOCUMENTED: ICD-10-PCS | Mod: CPTII,S$GLB,, | Performed by: INTERNAL MEDICINE

## 2021-01-08 PROCEDURE — 99999 PR PBB SHADOW E&M-EST. PATIENT-LVL V: ICD-10-PCS | Mod: PBBFAC,HCNC,, | Performed by: INTERNAL MEDICINE

## 2021-01-08 PROCEDURE — 99999 PR PBB SHADOW E&M-EST. PATIENT-LVL V: CPT | Mod: PBBFAC,HCNC,, | Performed by: INTERNAL MEDICINE

## 2021-01-08 PROCEDURE — 3008F BODY MASS INDEX DOCD: CPT | Mod: CPTII,S$GLB,, | Performed by: INTERNAL MEDICINE

## 2021-01-08 PROCEDURE — 3288F FALL RISK ASSESSMENT DOCD: CPT | Mod: CPTII,S$GLB,, | Performed by: INTERNAL MEDICINE

## 2021-01-08 PROCEDURE — 3288F PR FALLS RISK ASSESSMENT DOCUMENTED: ICD-10-PCS | Mod: CPTII,S$GLB,, | Performed by: INTERNAL MEDICINE

## 2021-01-12 ENCOUNTER — HOSPITAL ENCOUNTER (OUTPATIENT)
Dept: RADIOLOGY | Facility: HOSPITAL | Age: 71
Discharge: HOME | End: 2021-01-12
Attending: INTERNAL MEDICINE
Payer: MEDICARE

## 2021-01-12 ENCOUNTER — TELEPHONE (OUTPATIENT)
Dept: CARDIOLOGY | Facility: HOSPITAL | Age: 71
End: 2021-01-12

## 2021-01-12 ENCOUNTER — HOSPITAL ENCOUNTER (OUTPATIENT)
Dept: CARDIOLOGY | Facility: HOSPITAL | Age: 71
Discharge: HOME | End: 2021-01-12
Attending: INTERNAL MEDICINE
Payer: MEDICARE

## 2021-01-12 DIAGNOSIS — R06.09 DOE (DYSPNEA ON EXERTION): ICD-10-CM

## 2021-01-12 DIAGNOSIS — R00.2 PALPITATIONS: ICD-10-CM

## 2021-01-12 DIAGNOSIS — R07.89 CHEST PAIN, ATYPICAL: ICD-10-CM

## 2021-01-12 DIAGNOSIS — R00.1 SINUS BRADYCARDIA: ICD-10-CM

## 2021-01-12 DIAGNOSIS — M32.13 OTHER SYSTEMIC LUPUS ERYTHEMATOSUS WITH LUNG INVOLVEMENT: ICD-10-CM

## 2021-01-12 LAB
CV STRESS BASE HR: 75 BPM
DIASTOLIC BLOOD PRESSURE: 73 MMHG
NUC REST EJECTION FRACTION: 87
OHS CV CPX 85 PERCENT MAX PREDICTED HEART RATE MALE: 123
OHS CV CPX MAX PREDICTED HEART RATE: 145
OHS CV CPX PATIENT IS FEMALE: 1
OHS CV CPX PATIENT IS MALE: 0
OHS CV CPX PEAK DIASTOLIC BLOOD PRESSURE: 67 MMHG
OHS CV CPX PEAK HEAR RATE: 84 BPM
OHS CV CPX PEAK RATE PRESSURE PRODUCT: NORMAL
OHS CV CPX PEAK SYSTOLIC BLOOD PRESSURE: 121 MMHG
OHS CV CPX PERCENT MAX PREDICTED HEART RATE ACHIEVED: 58
OHS CV CPX RATE PRESSURE PRODUCT PRESENTING: 9150
SYSTOLIC BLOOD PRESSURE: 122 MMHG

## 2021-01-12 PROCEDURE — 78452 STRESS TEST WITH MYOCARDIAL PERFUSION (CUPID ONLY): ICD-10-PCS | Mod: 26,,, | Performed by: INTERNAL MEDICINE

## 2021-01-12 PROCEDURE — 93018 STRESS TEST WITH MYOCARDIAL PERFUSION (CUPID ONLY): ICD-10-PCS | Mod: ,,, | Performed by: INTERNAL MEDICINE

## 2021-01-12 PROCEDURE — 93017 CV STRESS TEST TRACING ONLY: CPT

## 2021-01-12 PROCEDURE — A9502 TC99M TETROFOSMIN: HCPCS

## 2021-01-12 PROCEDURE — 93016 CV STRESS TEST SUPVJ ONLY: CPT | Mod: ,,, | Performed by: INTERNAL MEDICINE

## 2021-01-12 PROCEDURE — 78452 HT MUSCLE IMAGE SPECT MULT: CPT | Mod: 26,,, | Performed by: INTERNAL MEDICINE

## 2021-01-12 PROCEDURE — 93018 CV STRESS TEST I&R ONLY: CPT | Mod: ,,, | Performed by: INTERNAL MEDICINE

## 2021-01-12 PROCEDURE — 78452 HT MUSCLE IMAGE SPECT MULT: CPT

## 2021-01-12 PROCEDURE — 63600175 PHARM REV CODE 636 W HCPCS: Performed by: INTERNAL MEDICINE

## 2021-01-12 PROCEDURE — 93016 STRESS TEST WITH MYOCARDIAL PERFUSION (CUPID ONLY): ICD-10-PCS | Mod: ,,, | Performed by: INTERNAL MEDICINE

## 2021-01-12 RX ORDER — REGADENOSON 0.08 MG/ML
0.4 INJECTION, SOLUTION INTRAVENOUS ONCE
Status: COMPLETED | OUTPATIENT
Start: 2021-01-12 | End: 2021-01-12

## 2021-01-12 RX ADMIN — REGADENOSON 0.4 MG: 0.08 INJECTION, SOLUTION INTRAVENOUS at 09:01

## 2021-01-13 ENCOUNTER — CLINICAL SUPPORT (OUTPATIENT)
Dept: CARDIOLOGY | Facility: HOSPITAL | Age: 71
End: 2021-01-13
Attending: INTERNAL MEDICINE
Payer: MEDICARE

## 2021-01-13 ENCOUNTER — TELEPHONE (OUTPATIENT)
Dept: FAMILY MEDICINE | Facility: CLINIC | Age: 71
End: 2021-01-13

## 2021-01-13 ENCOUNTER — OFFICE VISIT (OUTPATIENT)
Dept: FAMILY MEDICINE | Facility: CLINIC | Age: 71
End: 2021-01-13
Payer: MEDICARE

## 2021-01-13 DIAGNOSIS — M79.7 CHRONIC FATIGUE SYNDROME WITH FIBROMYALGIA: ICD-10-CM

## 2021-01-13 DIAGNOSIS — M54.41 CHRONIC LOW BACK PAIN WITH BILATERAL SCIATICA, UNSPECIFIED BACK PAIN LATERALITY: ICD-10-CM

## 2021-01-13 DIAGNOSIS — M45.5 ANKYLOSING SPONDYLITIS OF THORACOLUMBAR REGION: ICD-10-CM

## 2021-01-13 DIAGNOSIS — R06.09 DOE (DYSPNEA ON EXERTION): ICD-10-CM

## 2021-01-13 DIAGNOSIS — M32.9 SYSTEMIC LUPUS ERYTHEMATOSUS, UNSPECIFIED SLE TYPE, UNSPECIFIED ORGAN INVOLVEMENT STATUS: Primary | ICD-10-CM

## 2021-01-13 DIAGNOSIS — R00.1 SINUS BRADYCARDIA: ICD-10-CM

## 2021-01-13 DIAGNOSIS — R73.9 HYPERGLYCEMIA, UNSPECIFIED: ICD-10-CM

## 2021-01-13 DIAGNOSIS — E07.9 THYROID DISEASE: ICD-10-CM

## 2021-01-13 DIAGNOSIS — F11.221 OPIOID DEPENDENCE WITH INTOXICATION DELIRIUM: ICD-10-CM

## 2021-01-13 DIAGNOSIS — M32.13 OTHER SYSTEMIC LUPUS ERYTHEMATOSUS WITH LUNG INVOLVEMENT: ICD-10-CM

## 2021-01-13 DIAGNOSIS — G89.29 CHRONIC LOW BACK PAIN WITH BILATERAL SCIATICA, UNSPECIFIED BACK PAIN LATERALITY: ICD-10-CM

## 2021-01-13 DIAGNOSIS — E43 SEVERE PROTEIN-CALORIE MALNUTRITION: ICD-10-CM

## 2021-01-13 DIAGNOSIS — M54.42 CHRONIC LOW BACK PAIN WITH BILATERAL SCIATICA, UNSPECIFIED BACK PAIN LATERALITY: ICD-10-CM

## 2021-01-13 DIAGNOSIS — E27.40 ADRENAL INSUFFICIENCY: ICD-10-CM

## 2021-01-13 DIAGNOSIS — R35.0 URINARY FREQUENCY: ICD-10-CM

## 2021-01-13 DIAGNOSIS — R07.89 CHEST PAIN, ATYPICAL: ICD-10-CM

## 2021-01-13 DIAGNOSIS — R00.2 PALPITATIONS: ICD-10-CM

## 2021-01-13 DIAGNOSIS — G93.32 CHRONIC FATIGUE SYNDROME WITH FIBROMYALGIA: ICD-10-CM

## 2021-01-13 DIAGNOSIS — F33.9 RECURRENT MAJOR DEPRESSION RESISTANT TO TREATMENT: ICD-10-CM

## 2021-01-13 PROCEDURE — 93272 ECG/REVIEW INTERPRET ONLY: CPT | Mod: ,,, | Performed by: INTERNAL MEDICINE

## 2021-01-13 PROCEDURE — 93271 ECG/MONITORING AND ANALYSIS: CPT

## 2021-01-13 PROCEDURE — 99213 PR OFFICE/OUTPT VISIT, EST, LEVL III, 20-29 MIN: ICD-10-PCS | Mod: 95,,, | Performed by: FAMILY MEDICINE

## 2021-01-13 PROCEDURE — 99499 UNLISTED E&M SERVICE: CPT | Mod: 95,,, | Performed by: FAMILY MEDICINE

## 2021-01-13 PROCEDURE — 1159F MED LIST DOCD IN RCRD: CPT | Mod: 95,,, | Performed by: FAMILY MEDICINE

## 2021-01-13 PROCEDURE — 93272 CARDIAC EVENT MONITOR (CUPID ONLY): ICD-10-PCS | Mod: ,,, | Performed by: INTERNAL MEDICINE

## 2021-01-13 PROCEDURE — 99213 OFFICE O/P EST LOW 20 MIN: CPT | Mod: 95,,, | Performed by: FAMILY MEDICINE

## 2021-01-13 PROCEDURE — 99499 RISK ADDL DX/OHS AUDIT: ICD-10-PCS | Mod: 95,,, | Performed by: FAMILY MEDICINE

## 2021-01-13 PROCEDURE — 1159F PR MEDICATION LIST DOCUMENTED IN MEDICAL RECORD: ICD-10-PCS | Mod: 95,,, | Performed by: FAMILY MEDICINE

## 2021-01-13 RX ORDER — ALBUTEROL SULFATE 1.25 MG/3ML
1.25 SOLUTION RESPIRATORY (INHALATION) EVERY 6 HOURS PRN
Qty: 1 BOX | Refills: 0 | Status: SHIPPED | OUTPATIENT
Start: 2021-01-13 | End: 2021-08-16

## 2021-01-13 RX ORDER — SULINDAC 150 MG/1
150 TABLET ORAL 2 TIMES DAILY
Qty: 60 TABLET | Refills: 3 | Status: SHIPPED | OUTPATIENT
Start: 2021-01-13 | End: 2021-04-01 | Stop reason: SDUPTHER

## 2021-02-02 VITALS — SYSTOLIC BLOOD PRESSURE: 110 MMHG | TEMPERATURE: 98 F | HEART RATE: 68 BPM | DIASTOLIC BLOOD PRESSURE: 75 MMHG

## 2021-02-10 RX ORDER — CHOLECALCIFEROL (VITAMIN D3) 25 MCG
TABLET ORAL
Qty: 180 TABLET | Refills: 11 | Status: SHIPPED | OUTPATIENT
Start: 2021-02-10 | End: 2021-12-30

## 2021-02-10 RX ORDER — BLOOD SUGAR DIAGNOSTIC
STRIP MISCELLANEOUS
Qty: 300 STRIP | Refills: 4 | Status: SHIPPED | OUTPATIENT
Start: 2021-02-10 | End: 2021-10-28 | Stop reason: SDUPTHER

## 2021-02-25 ENCOUNTER — LAB VISIT (OUTPATIENT)
Dept: LAB | Facility: HOSPITAL | Age: 71
End: 2021-02-25
Attending: FAMILY MEDICINE
Payer: MEDICARE

## 2021-02-25 DIAGNOSIS — R73.9 HYPERGLYCEMIA, UNSPECIFIED: ICD-10-CM

## 2021-02-25 DIAGNOSIS — M45.5 ANKYLOSING SPONDYLITIS OF THORACOLUMBAR REGION: ICD-10-CM

## 2021-02-25 DIAGNOSIS — E07.9 THYROID DISEASE: ICD-10-CM

## 2021-02-25 DIAGNOSIS — M32.9 SYSTEMIC LUPUS ERYTHEMATOSUS, UNSPECIFIED SLE TYPE, UNSPECIFIED ORGAN INVOLVEMENT STATUS: ICD-10-CM

## 2021-02-25 DIAGNOSIS — R35.0 URINARY FREQUENCY: ICD-10-CM

## 2021-02-25 DIAGNOSIS — E43 SEVERE PROTEIN-CALORIE MALNUTRITION: ICD-10-CM

## 2021-02-25 LAB
ALBUMIN SERPL BCP-MCNC: 3.8 G/DL (ref 3.5–5.2)
ALP SERPL-CCNC: 78 U/L (ref 55–135)
ALT SERPL W/O P-5'-P-CCNC: 15 U/L (ref 10–44)
ANION GAP SERPL CALC-SCNC: 11 MMOL/L (ref 8–16)
AST SERPL-CCNC: 20 U/L (ref 10–40)
BILIRUB SERPL-MCNC: 0.5 MG/DL (ref 0.1–1)
BUN SERPL-MCNC: 18 MG/DL (ref 8–23)
CALCIUM SERPL-MCNC: 9.1 MG/DL (ref 8.7–10.5)
CHLORIDE SERPL-SCNC: 106 MMOL/L (ref 95–110)
CO2 SERPL-SCNC: 26 MMOL/L (ref 23–29)
CREAT SERPL-MCNC: 1 MG/DL (ref 0.5–1.4)
CRP SERPL-MCNC: 0.4 MG/L (ref 0–8.2)
EST. GFR  (AFRICAN AMERICAN): >60 ML/MIN/1.73 M^2
EST. GFR  (NON AFRICAN AMERICAN): 58 ML/MIN/1.73 M^2
GLUCOSE SERPL-MCNC: 116 MG/DL (ref 70–110)
POTASSIUM SERPL-SCNC: 4.2 MMOL/L (ref 3.5–5.1)
PROT SERPL-MCNC: 7.1 G/DL (ref 6–8.4)
SODIUM SERPL-SCNC: 143 MMOL/L (ref 136–145)
T4 FREE SERPL-MCNC: 1.37 NG/DL (ref 0.71–1.51)
TSH SERPL DL<=0.005 MIU/L-ACNC: 0.02 UIU/ML (ref 0.4–4)
URATE SERPL-MCNC: 4 MG/DL (ref 2.4–5.7)

## 2021-02-25 PROCEDURE — 85025 COMPLETE CBC W/AUTO DIFF WBC: CPT

## 2021-02-25 PROCEDURE — 84443 ASSAY THYROID STIM HORMONE: CPT

## 2021-02-25 PROCEDURE — 84436 ASSAY OF TOTAL THYROXINE: CPT

## 2021-02-25 PROCEDURE — 36415 COLL VENOUS BLD VENIPUNCTURE: CPT | Mod: PO

## 2021-02-25 PROCEDURE — 84439 ASSAY OF FREE THYROXINE: CPT

## 2021-02-25 PROCEDURE — 87086 URINE CULTURE/COLONY COUNT: CPT

## 2021-02-25 PROCEDURE — 83036 HEMOGLOBIN GLYCOSYLATED A1C: CPT

## 2021-02-25 PROCEDURE — 85652 RBC SED RATE AUTOMATED: CPT

## 2021-02-25 PROCEDURE — 86140 C-REACTIVE PROTEIN: CPT

## 2021-02-25 PROCEDURE — 84550 ASSAY OF BLOOD/URIC ACID: CPT

## 2021-02-25 PROCEDURE — 80053 COMPREHEN METABOLIC PANEL: CPT

## 2021-02-26 LAB
BASOPHILS # BLD AUTO: 0.12 K/UL (ref 0–0.2)
BASOPHILS NFR BLD: 1.5 % (ref 0–1.9)
DIFFERENTIAL METHOD: ABNORMAL
EOSINOPHIL # BLD AUTO: 0.4 K/UL (ref 0–0.5)
EOSINOPHIL NFR BLD: 4.9 % (ref 0–8)
ERYTHROCYTE [DISTWIDTH] IN BLOOD BY AUTOMATED COUNT: 13.3 % (ref 11.5–14.5)
ERYTHROCYTE [SEDIMENTATION RATE] IN BLOOD BY WESTERGREN METHOD: 20 MM/HR (ref 0–36)
ESTIMATED AVG GLUCOSE: 114 MG/DL (ref 68–131)
HBA1C MFR BLD: 5.6 % (ref 4–5.6)
HCT VFR BLD AUTO: 44 % (ref 37–48.5)
HGB BLD-MCNC: 13.9 G/DL (ref 12–16)
IMM GRANULOCYTES # BLD AUTO: 0.05 K/UL (ref 0–0.04)
IMM GRANULOCYTES NFR BLD AUTO: 0.6 % (ref 0–0.5)
LYMPHOCYTES # BLD AUTO: 3 K/UL (ref 1–4.8)
LYMPHOCYTES NFR BLD: 37.5 % (ref 18–48)
MCH RBC QN AUTO: 30.4 PG (ref 27–31)
MCHC RBC AUTO-ENTMCNC: 31.6 G/DL (ref 32–36)
MCV RBC AUTO: 96 FL (ref 82–98)
MONOCYTES # BLD AUTO: 0.6 K/UL (ref 0.3–1)
MONOCYTES NFR BLD: 7.8 % (ref 4–15)
NEUTROPHILS # BLD AUTO: 3.8 K/UL (ref 1.8–7.7)
NEUTROPHILS NFR BLD: 47.7 % (ref 38–73)
NRBC BLD-RTO: 0 /100 WBC
PLATELET # BLD AUTO: 320 K/UL (ref 150–350)
PMV BLD AUTO: 10.4 FL (ref 9.2–12.9)
RBC # BLD AUTO: 4.57 M/UL (ref 4–5.4)
T4 SERPL-MCNC: 9.2 UG/DL (ref 4.5–11.5)
WBC # BLD AUTO: 7.98 K/UL (ref 3.9–12.7)

## 2021-02-27 LAB
BACTERIA UR CULT: NORMAL
BACTERIA UR CULT: NORMAL

## 2021-03-04 ENCOUNTER — OFFICE VISIT (OUTPATIENT)
Dept: FAMILY MEDICINE | Facility: CLINIC | Age: 71
End: 2021-03-04
Payer: MEDICARE

## 2021-03-04 VITALS
WEIGHT: 155.63 LBS | OXYGEN SATURATION: 97 % | TEMPERATURE: 97 F | BODY MASS INDEX: 25.01 KG/M2 | SYSTOLIC BLOOD PRESSURE: 134 MMHG | HEIGHT: 66 IN | DIASTOLIC BLOOD PRESSURE: 82 MMHG | RESPIRATION RATE: 16 BRPM | HEART RATE: 87 BPM

## 2021-03-04 DIAGNOSIS — E27.1 ADRENAL INSUFFICIENCY (ADDISON'S DISEASE): Primary | ICD-10-CM

## 2021-03-04 DIAGNOSIS — M45.5 ANKYLOSING SPONDYLITIS OF THORACOLUMBAR REGION: ICD-10-CM

## 2021-03-04 DIAGNOSIS — E27.40 ADRENAL INSUFFICIENCY: ICD-10-CM

## 2021-03-04 DIAGNOSIS — E53.8 VITAMIN B 12 DEFICIENCY: ICD-10-CM

## 2021-03-04 DIAGNOSIS — R35.0 URINARY FREQUENCY: ICD-10-CM

## 2021-03-04 DIAGNOSIS — N18.31 STAGE 3A CHRONIC KIDNEY DISEASE: ICD-10-CM

## 2021-03-04 DIAGNOSIS — M54.41 CHRONIC LOW BACK PAIN WITH BILATERAL SCIATICA, UNSPECIFIED BACK PAIN LATERALITY: ICD-10-CM

## 2021-03-04 DIAGNOSIS — G89.29 CHRONIC LOW BACK PAIN WITH BILATERAL SCIATICA, UNSPECIFIED BACK PAIN LATERALITY: ICD-10-CM

## 2021-03-04 DIAGNOSIS — M43.26 ANKYLOSIS OF LUMBAR SPINE: ICD-10-CM

## 2021-03-04 DIAGNOSIS — M32.9 SYSTEMIC LUPUS ERYTHEMATOSUS, UNSPECIFIED SLE TYPE, UNSPECIFIED ORGAN INVOLVEMENT STATUS: ICD-10-CM

## 2021-03-04 DIAGNOSIS — G57.00 SCIATIC NEUROPATHY, UNSPECIFIED LATERALITY: ICD-10-CM

## 2021-03-04 DIAGNOSIS — E07.9 THYROID DISEASE: ICD-10-CM

## 2021-03-04 DIAGNOSIS — M54.42 CHRONIC LOW BACK PAIN WITH BILATERAL SCIATICA, UNSPECIFIED BACK PAIN LATERALITY: ICD-10-CM

## 2021-03-04 PROCEDURE — 1126F PR PAIN SEVERITY QUANTIFIED, NO PAIN PRESENT: ICD-10-PCS | Mod: S$GLB,,, | Performed by: FAMILY MEDICINE

## 2021-03-04 PROCEDURE — 3008F BODY MASS INDEX DOCD: CPT | Mod: CPTII,S$GLB,, | Performed by: FAMILY MEDICINE

## 2021-03-04 PROCEDURE — 1101F PT FALLS ASSESS-DOCD LE1/YR: CPT | Mod: CPTII,S$GLB,, | Performed by: FAMILY MEDICINE

## 2021-03-04 PROCEDURE — 99214 PR OFFICE/OUTPT VISIT, EST, LEVL IV, 30-39 MIN: ICD-10-PCS | Mod: S$GLB,,, | Performed by: FAMILY MEDICINE

## 2021-03-04 PROCEDURE — 1159F PR MEDICATION LIST DOCUMENTED IN MEDICAL RECORD: ICD-10-PCS | Mod: S$GLB,,, | Performed by: FAMILY MEDICINE

## 2021-03-04 PROCEDURE — 99999 PR PBB SHADOW E&M-EST. PATIENT-LVL IV: CPT | Mod: PBBFAC,,, | Performed by: FAMILY MEDICINE

## 2021-03-04 PROCEDURE — 3288F PR FALLS RISK ASSESSMENT DOCUMENTED: ICD-10-PCS | Mod: CPTII,S$GLB,, | Performed by: FAMILY MEDICINE

## 2021-03-04 PROCEDURE — 3288F FALL RISK ASSESSMENT DOCD: CPT | Mod: CPTII,S$GLB,, | Performed by: FAMILY MEDICINE

## 2021-03-04 PROCEDURE — 99999 PR PBB SHADOW E&M-EST. PATIENT-LVL IV: ICD-10-PCS | Mod: PBBFAC,,, | Performed by: FAMILY MEDICINE

## 2021-03-04 PROCEDURE — 1101F PR PT FALLS ASSESS DOC 0-1 FALLS W/OUT INJ PAST YR: ICD-10-PCS | Mod: CPTII,S$GLB,, | Performed by: FAMILY MEDICINE

## 2021-03-04 PROCEDURE — 1126F AMNT PAIN NOTED NONE PRSNT: CPT | Mod: S$GLB,,, | Performed by: FAMILY MEDICINE

## 2021-03-04 PROCEDURE — 1159F MED LIST DOCD IN RCRD: CPT | Mod: S$GLB,,, | Performed by: FAMILY MEDICINE

## 2021-03-04 PROCEDURE — 99214 OFFICE O/P EST MOD 30 MIN: CPT | Mod: S$GLB,,, | Performed by: FAMILY MEDICINE

## 2021-03-04 PROCEDURE — 3008F PR BODY MASS INDEX (BMI) DOCUMENTED: ICD-10-PCS | Mod: CPTII,S$GLB,, | Performed by: FAMILY MEDICINE

## 2021-03-04 RX ORDER — PREDNISONE 5 MG/1
TABLET ORAL
Qty: 90 TABLET | Refills: 3 | Status: SHIPPED | OUTPATIENT
Start: 2021-03-04 | End: 2021-05-19

## 2021-03-05 ENCOUNTER — PATIENT MESSAGE (OUTPATIENT)
Dept: FAMILY MEDICINE | Facility: CLINIC | Age: 71
End: 2021-03-05

## 2021-03-07 ENCOUNTER — PATIENT MESSAGE (OUTPATIENT)
Dept: FAMILY MEDICINE | Facility: CLINIC | Age: 71
End: 2021-03-07

## 2021-03-19 ENCOUNTER — OFFICE VISIT (OUTPATIENT)
Dept: ENDOCRINOLOGY | Facility: CLINIC | Age: 71
End: 2021-03-19
Payer: MEDICARE

## 2021-03-19 VITALS
DIASTOLIC BLOOD PRESSURE: 86 MMHG | TEMPERATURE: 98 F | SYSTOLIC BLOOD PRESSURE: 139 MMHG | BODY MASS INDEX: 25.11 KG/M2 | HEART RATE: 84 BPM | WEIGHT: 155.63 LBS

## 2021-03-19 DIAGNOSIS — R53.83 FATIGUE, UNSPECIFIED TYPE: ICD-10-CM

## 2021-03-19 DIAGNOSIS — Z98.84 H/O GASTRIC BYPASS: ICD-10-CM

## 2021-03-19 DIAGNOSIS — E07.9 THYROID DISEASE: ICD-10-CM

## 2021-03-19 DIAGNOSIS — Z79.52 CURRENT CHRONIC USE OF SYSTEMIC STEROIDS: ICD-10-CM

## 2021-03-19 DIAGNOSIS — M32.19 OTHER SYSTEMIC LUPUS ERYTHEMATOSUS WITH OTHER ORGAN INVOLVEMENT: Primary | ICD-10-CM

## 2021-03-19 DIAGNOSIS — K91.2 HYPOGLYCEMIA FOLLOWING GASTROINTESTINAL SURGERY: ICD-10-CM

## 2021-03-19 DIAGNOSIS — E27.1 ADRENAL INSUFFICIENCY (ADDISON'S DISEASE): ICD-10-CM

## 2021-03-19 DIAGNOSIS — E16.2 HYPOGLYCEMIA: ICD-10-CM

## 2021-03-19 DIAGNOSIS — E03.9 ACQUIRED HYPOTHYROIDISM: ICD-10-CM

## 2021-03-19 PROBLEM — E27.40 ADRENAL INSUFFICIENCY: Status: RESOLVED | Noted: 2021-01-13 | Resolved: 2021-03-19

## 2021-03-19 PROCEDURE — 1125F AMNT PAIN NOTED PAIN PRSNT: CPT | Mod: S$GLB,,, | Performed by: HOSPITALIST

## 2021-03-19 PROCEDURE — 3008F PR BODY MASS INDEX (BMI) DOCUMENTED: ICD-10-PCS | Mod: CPTII,S$GLB,, | Performed by: HOSPITALIST

## 2021-03-19 PROCEDURE — 99999 PR PBB SHADOW E&M-EST. PATIENT-LVL IV: ICD-10-PCS | Mod: PBBFAC,,, | Performed by: HOSPITALIST

## 2021-03-19 PROCEDURE — 1125F PR PAIN SEVERITY QUANTIFIED, PAIN PRESENT: ICD-10-PCS | Mod: S$GLB,,, | Performed by: HOSPITALIST

## 2021-03-19 PROCEDURE — 99204 OFFICE O/P NEW MOD 45 MIN: CPT | Mod: S$GLB,,, | Performed by: HOSPITALIST

## 2021-03-19 PROCEDURE — 3008F BODY MASS INDEX DOCD: CPT | Mod: CPTII,S$GLB,, | Performed by: HOSPITALIST

## 2021-03-19 PROCEDURE — 1159F MED LIST DOCD IN RCRD: CPT | Mod: S$GLB,,, | Performed by: HOSPITALIST

## 2021-03-19 PROCEDURE — 99204 PR OFFICE/OUTPT VISIT, NEW, LEVL IV, 45-59 MIN: ICD-10-PCS | Mod: S$GLB,,, | Performed by: HOSPITALIST

## 2021-03-19 PROCEDURE — 1159F PR MEDICATION LIST DOCUMENTED IN MEDICAL RECORD: ICD-10-PCS | Mod: S$GLB,,, | Performed by: HOSPITALIST

## 2021-03-19 PROCEDURE — 99999 PR PBB SHADOW E&M-EST. PATIENT-LVL IV: CPT | Mod: PBBFAC,,, | Performed by: HOSPITALIST

## 2021-03-19 RX ORDER — GLUCAGON 1 MG
1 VIAL (EA) INJECTION
Qty: 1 EACH | Refills: 3 | Status: SHIPPED | OUTPATIENT
Start: 2021-03-19 | End: 2021-06-21 | Stop reason: SDUPTHER

## 2021-03-24 DIAGNOSIS — G57.00 SCIATIC NEUROPATHY, UNSPECIFIED LATERALITY: ICD-10-CM

## 2021-03-24 RX ORDER — FOLIC ACID 1 MG/1
1000 TABLET ORAL DAILY
Qty: 90 TABLET | Refills: 3 | Status: SHIPPED | OUTPATIENT
Start: 2021-03-24 | End: 2022-03-03

## 2021-03-25 ENCOUNTER — LAB VISIT (OUTPATIENT)
Dept: LAB | Facility: HOSPITAL | Age: 71
End: 2021-03-25
Attending: HOSPITALIST
Payer: MEDICARE

## 2021-03-25 DIAGNOSIS — E03.9 ACQUIRED HYPOTHYROIDISM: ICD-10-CM

## 2021-03-25 DIAGNOSIS — R53.83 FATIGUE, UNSPECIFIED TYPE: ICD-10-CM

## 2021-03-25 DIAGNOSIS — E27.1 ADRENAL INSUFFICIENCY (ADDISON'S DISEASE): ICD-10-CM

## 2021-03-25 DIAGNOSIS — M32.19 OTHER SYSTEMIC LUPUS ERYTHEMATOSUS WITH OTHER ORGAN INVOLVEMENT: ICD-10-CM

## 2021-03-25 DIAGNOSIS — E07.9 THYROID DISEASE: ICD-10-CM

## 2021-03-25 DIAGNOSIS — E16.2 HYPOGLYCEMIA: ICD-10-CM

## 2021-03-25 LAB
25(OH)D3+25(OH)D2 SERPL-MCNC: 40 NG/ML (ref 30–96)
ALBUMIN SERPL BCP-MCNC: 4 G/DL (ref 3.5–5.2)
ALP SERPL-CCNC: 95 U/L (ref 55–135)
ALT SERPL W/O P-5'-P-CCNC: 13 U/L (ref 10–44)
ANION GAP SERPL CALC-SCNC: 12 MMOL/L (ref 8–16)
AST SERPL-CCNC: 18 U/L (ref 10–40)
BASOPHILS # BLD AUTO: 0.1 K/UL (ref 0–0.2)
BASOPHILS NFR BLD: 1.3 % (ref 0–1.9)
BILIRUB SERPL-MCNC: 0.6 MG/DL (ref 0.1–1)
BUN SERPL-MCNC: 21 MG/DL (ref 8–23)
C PEPTIDE SERPL-MCNC: 1.97 NG/ML (ref 0.78–5.19)
CALCIUM SERPL-MCNC: 9.3 MG/DL (ref 8.7–10.5)
CHLORIDE SERPL-SCNC: 105 MMOL/L (ref 95–110)
CO2 SERPL-SCNC: 25 MMOL/L (ref 23–29)
CREAT SERPL-MCNC: 1.1 MG/DL (ref 0.5–1.4)
DIFFERENTIAL METHOD: ABNORMAL
EOSINOPHIL # BLD AUTO: 0.3 K/UL (ref 0–0.5)
EOSINOPHIL NFR BLD: 4.3 % (ref 0–8)
ERYTHROCYTE [DISTWIDTH] IN BLOOD BY AUTOMATED COUNT: 13.2 % (ref 11.5–14.5)
EST. GFR  (AFRICAN AMERICAN): 59 ML/MIN/1.73 M^2
EST. GFR  (NON AFRICAN AMERICAN): 51 ML/MIN/1.73 M^2
ESTIMATED AVG GLUCOSE: 111 MG/DL (ref 68–131)
GLUCOSE SERPL-MCNC: 108 MG/DL (ref 70–110)
HBA1C MFR BLD: 5.5 % (ref 4–5.6)
HCT VFR BLD AUTO: 46.3 % (ref 37–48.5)
HGB BLD-MCNC: 14.7 G/DL (ref 12–16)
IMM GRANULOCYTES # BLD AUTO: 0.03 K/UL (ref 0–0.04)
IMM GRANULOCYTES NFR BLD AUTO: 0.4 % (ref 0–0.5)
INSULIN SERPL-ACNC: 7.9 UU/ML
LYMPHOCYTES # BLD AUTO: 2.9 K/UL (ref 1–4.8)
LYMPHOCYTES NFR BLD: 36.3 % (ref 18–48)
MCH RBC QN AUTO: 30.1 PG (ref 27–31)
MCHC RBC AUTO-ENTMCNC: 31.7 G/DL (ref 32–36)
MCV RBC AUTO: 95 FL (ref 82–98)
MONOCYTES # BLD AUTO: 0.7 K/UL (ref 0.3–1)
MONOCYTES NFR BLD: 8.3 % (ref 4–15)
NEUTROPHILS # BLD AUTO: 3.9 K/UL (ref 1.8–7.7)
NEUTROPHILS NFR BLD: 49.4 % (ref 38–73)
NRBC BLD-RTO: 0 /100 WBC
PLATELET # BLD AUTO: 361 K/UL (ref 150–350)
PMV BLD AUTO: 10.4 FL (ref 9.2–12.9)
POTASSIUM SERPL-SCNC: 4.1 MMOL/L (ref 3.5–5.1)
PROT SERPL-MCNC: 7.1 G/DL (ref 6–8.4)
RBC # BLD AUTO: 4.89 M/UL (ref 4–5.4)
SODIUM SERPL-SCNC: 142 MMOL/L (ref 136–145)
T4 FREE SERPL-MCNC: 1.17 NG/DL (ref 0.71–1.51)
THYROPEROXIDASE IGG SERPL-ACNC: <6 IU/ML
TSH SERPL DL<=0.005 MIU/L-ACNC: 0.04 UIU/ML (ref 0.4–4)
WBC # BLD AUTO: 7.94 K/UL (ref 3.9–12.7)

## 2021-03-25 PROCEDURE — 82306 VITAMIN D 25 HYDROXY: CPT | Performed by: HOSPITALIST

## 2021-03-25 PROCEDURE — 83036 HEMOGLOBIN GLYCOSYLATED A1C: CPT | Performed by: HOSPITALIST

## 2021-03-25 PROCEDURE — 80053 COMPREHEN METABOLIC PANEL: CPT | Performed by: HOSPITALIST

## 2021-03-25 PROCEDURE — 36415 COLL VENOUS BLD VENIPUNCTURE: CPT | Mod: PO | Performed by: HOSPITALIST

## 2021-03-25 PROCEDURE — 84206 ASSAY OF PROINSULIN: CPT | Performed by: HOSPITALIST

## 2021-03-25 PROCEDURE — 83525 ASSAY OF INSULIN: CPT | Performed by: HOSPITALIST

## 2021-03-25 PROCEDURE — 84439 ASSAY OF FREE THYROXINE: CPT | Performed by: HOSPITALIST

## 2021-03-25 PROCEDURE — 84681 ASSAY OF C-PEPTIDE: CPT | Performed by: HOSPITALIST

## 2021-03-25 PROCEDURE — 86376 MICROSOMAL ANTIBODY EACH: CPT | Performed by: HOSPITALIST

## 2021-03-25 PROCEDURE — 85025 COMPLETE CBC W/AUTO DIFF WBC: CPT | Performed by: HOSPITALIST

## 2021-03-25 PROCEDURE — 84443 ASSAY THYROID STIM HORMONE: CPT | Performed by: HOSPITALIST

## 2021-03-26 ENCOUNTER — TELEPHONE (OUTPATIENT)
Dept: ENDOCRINOLOGY | Facility: CLINIC | Age: 71
End: 2021-03-26

## 2021-03-30 LAB — PROINSULIN SERPL-SCNC: 12 PMOL/L (ref 3.6–22)

## 2021-04-01 DIAGNOSIS — M45.5 ANKYLOSING SPONDYLITIS OF THORACOLUMBAR REGION: ICD-10-CM

## 2021-04-01 RX ORDER — SULINDAC 150 MG/1
150 TABLET ORAL 2 TIMES DAILY
Qty: 60 TABLET | Refills: 3 | Status: SHIPPED | OUTPATIENT
Start: 2021-04-01 | End: 2021-06-15

## 2021-04-06 ENCOUNTER — PATIENT OUTREACH (OUTPATIENT)
Dept: ADMINISTRATIVE | Facility: OTHER | Age: 71
End: 2021-04-06

## 2021-04-08 ENCOUNTER — OFFICE VISIT (OUTPATIENT)
Dept: CARDIOLOGY | Facility: CLINIC | Age: 71
End: 2021-04-08
Payer: MEDICARE

## 2021-04-08 VITALS
DIASTOLIC BLOOD PRESSURE: 60 MMHG | WEIGHT: 154 LBS | HEART RATE: 99 BPM | OXYGEN SATURATION: 95 % | BODY MASS INDEX: 24.75 KG/M2 | HEIGHT: 66 IN | SYSTOLIC BLOOD PRESSURE: 108 MMHG

## 2021-04-08 DIAGNOSIS — R00.1 SINUS BRADYCARDIA: Primary | ICD-10-CM

## 2021-04-08 DIAGNOSIS — R07.89 CHEST PAIN, ATYPICAL: ICD-10-CM

## 2021-04-08 DIAGNOSIS — R00.2 PALPITATIONS: ICD-10-CM

## 2021-04-08 DIAGNOSIS — R06.09 DOE (DYSPNEA ON EXERTION): ICD-10-CM

## 2021-04-08 DIAGNOSIS — M32.13 OTHER SYSTEMIC LUPUS ERYTHEMATOSUS WITH LUNG INVOLVEMENT: Chronic | ICD-10-CM

## 2021-04-08 PROCEDURE — 1126F AMNT PAIN NOTED NONE PRSNT: CPT | Mod: S$GLB,,, | Performed by: INTERNAL MEDICINE

## 2021-04-08 PROCEDURE — 1159F MED LIST DOCD IN RCRD: CPT | Mod: S$GLB,,, | Performed by: INTERNAL MEDICINE

## 2021-04-08 PROCEDURE — 99499 UNLISTED E&M SERVICE: CPT | Mod: S$GLB,,, | Performed by: INTERNAL MEDICINE

## 2021-04-08 PROCEDURE — 99999 PR PBB SHADOW E&M-EST. PATIENT-LVL V: CPT | Mod: PBBFAC,,, | Performed by: INTERNAL MEDICINE

## 2021-04-08 PROCEDURE — 99999 PR PBB SHADOW E&M-EST. PATIENT-LVL V: ICD-10-PCS | Mod: PBBFAC,,, | Performed by: INTERNAL MEDICINE

## 2021-04-08 PROCEDURE — 1101F PR PT FALLS ASSESS DOC 0-1 FALLS W/OUT INJ PAST YR: ICD-10-PCS | Mod: CPTII,S$GLB,, | Performed by: INTERNAL MEDICINE

## 2021-04-08 PROCEDURE — 3288F FALL RISK ASSESSMENT DOCD: CPT | Mod: CPTII,S$GLB,, | Performed by: INTERNAL MEDICINE

## 2021-04-08 PROCEDURE — 1101F PT FALLS ASSESS-DOCD LE1/YR: CPT | Mod: CPTII,S$GLB,, | Performed by: INTERNAL MEDICINE

## 2021-04-08 PROCEDURE — 1126F PR PAIN SEVERITY QUANTIFIED, NO PAIN PRESENT: ICD-10-PCS | Mod: S$GLB,,, | Performed by: INTERNAL MEDICINE

## 2021-04-08 PROCEDURE — 99499 RISK ADDL DX/OHS AUDIT: ICD-10-PCS | Mod: S$GLB,,, | Performed by: INTERNAL MEDICINE

## 2021-04-08 PROCEDURE — 3288F PR FALLS RISK ASSESSMENT DOCUMENTED: ICD-10-PCS | Mod: CPTII,S$GLB,, | Performed by: INTERNAL MEDICINE

## 2021-04-08 PROCEDURE — 99214 OFFICE O/P EST MOD 30 MIN: CPT | Mod: S$GLB,,, | Performed by: INTERNAL MEDICINE

## 2021-04-08 PROCEDURE — 99214 PR OFFICE/OUTPT VISIT, EST, LEVL IV, 30-39 MIN: ICD-10-PCS | Mod: S$GLB,,, | Performed by: INTERNAL MEDICINE

## 2021-04-08 PROCEDURE — 3008F BODY MASS INDEX DOCD: CPT | Mod: CPTII,S$GLB,, | Performed by: INTERNAL MEDICINE

## 2021-04-08 PROCEDURE — 93000 ELECTROCARDIOGRAM COMPLETE: CPT | Mod: S$GLB,,, | Performed by: INTERNAL MEDICINE

## 2021-04-08 PROCEDURE — 93000 EKG 12-LEAD: ICD-10-PCS | Mod: S$GLB,,, | Performed by: INTERNAL MEDICINE

## 2021-04-08 PROCEDURE — 3008F PR BODY MASS INDEX (BMI) DOCUMENTED: ICD-10-PCS | Mod: CPTII,S$GLB,, | Performed by: INTERNAL MEDICINE

## 2021-04-08 PROCEDURE — 1159F PR MEDICATION LIST DOCUMENTED IN MEDICAL RECORD: ICD-10-PCS | Mod: S$GLB,,, | Performed by: INTERNAL MEDICINE

## 2021-04-24 NOTE — TELEPHONE ENCOUNTER
She said eye doctor told her to stop it. She has damage to her eye he thinks is from the plaquenil.She has an appt on 5-2-18 she will discuss it with you  then. No

## 2021-05-02 ENCOUNTER — PATIENT MESSAGE (OUTPATIENT)
Dept: ADMINISTRATIVE | Facility: HOSPITAL | Age: 71
End: 2021-05-02

## 2021-05-10 ENCOUNTER — PATIENT MESSAGE (OUTPATIENT)
Dept: FAMILY MEDICINE | Facility: CLINIC | Age: 71
End: 2021-05-10

## 2021-05-10 DIAGNOSIS — G89.29 CHRONIC LOW BACK PAIN WITH BILATERAL SCIATICA, UNSPECIFIED BACK PAIN LATERALITY: ICD-10-CM

## 2021-05-10 DIAGNOSIS — F33.9 RECURRENT MAJOR DEPRESSION RESISTANT TO TREATMENT: ICD-10-CM

## 2021-05-10 DIAGNOSIS — M54.42 CHRONIC LOW BACK PAIN WITH BILATERAL SCIATICA, UNSPECIFIED BACK PAIN LATERALITY: ICD-10-CM

## 2021-05-10 DIAGNOSIS — G57.00 SCIATIC NEUROPATHY, UNSPECIFIED LATERALITY: ICD-10-CM

## 2021-05-10 DIAGNOSIS — M32.9 SYSTEMIC LUPUS ERYTHEMATOSUS, UNSPECIFIED SLE TYPE, UNSPECIFIED ORGAN INVOLVEMENT STATUS: ICD-10-CM

## 2021-05-10 DIAGNOSIS — M45.5 ANKYLOSING SPONDYLITIS OF THORACOLUMBAR REGION: ICD-10-CM

## 2021-05-10 DIAGNOSIS — M43.26 ANKYLOSIS OF LUMBAR SPINE: ICD-10-CM

## 2021-05-10 DIAGNOSIS — M54.41 CHRONIC LOW BACK PAIN WITH BILATERAL SCIATICA, UNSPECIFIED BACK PAIN LATERALITY: ICD-10-CM

## 2021-05-10 RX ORDER — CLONAZEPAM 1 MG/1
TABLET ORAL
Qty: 60 TABLET | Refills: 2 | Status: SHIPPED | OUTPATIENT
Start: 2021-05-10 | End: 2021-07-30 | Stop reason: SDUPTHER

## 2021-05-14 DIAGNOSIS — M54.42 CHRONIC LOW BACK PAIN WITH BILATERAL SCIATICA, UNSPECIFIED BACK PAIN LATERALITY: ICD-10-CM

## 2021-05-14 DIAGNOSIS — M43.26 ANKYLOSIS OF LUMBAR SPINE: ICD-10-CM

## 2021-05-14 DIAGNOSIS — M32.9 SYSTEMIC LUPUS ERYTHEMATOSUS, UNSPECIFIED SLE TYPE, UNSPECIFIED ORGAN INVOLVEMENT STATUS: ICD-10-CM

## 2021-05-14 DIAGNOSIS — M45.5 ANKYLOSING SPONDYLITIS OF THORACOLUMBAR REGION: ICD-10-CM

## 2021-05-14 DIAGNOSIS — G89.29 CHRONIC LOW BACK PAIN WITH BILATERAL SCIATICA, UNSPECIFIED BACK PAIN LATERALITY: ICD-10-CM

## 2021-05-14 DIAGNOSIS — E79.0 HYPERURICEMIA: ICD-10-CM

## 2021-05-14 DIAGNOSIS — M54.41 CHRONIC LOW BACK PAIN WITH BILATERAL SCIATICA, UNSPECIFIED BACK PAIN LATERALITY: ICD-10-CM

## 2021-05-18 RX ORDER — HYDROXYCHLOROQUINE SULFATE 200 MG/1
TABLET, FILM COATED ORAL
Qty: 60 TABLET | Refills: 3 | Status: SHIPPED | OUTPATIENT
Start: 2021-05-18 | End: 2021-07-30 | Stop reason: ALTCHOICE

## 2021-05-18 RX ORDER — ALLOPURINOL 100 MG/1
100 TABLET ORAL DAILY
Qty: 90 TABLET | Refills: 3 | Status: SHIPPED | OUTPATIENT
Start: 2021-05-18 | End: 2021-10-28 | Stop reason: SDUPTHER

## 2021-05-19 ENCOUNTER — TELEPHONE (OUTPATIENT)
Dept: RHEUMATOLOGY | Facility: CLINIC | Age: 71
End: 2021-05-19

## 2021-05-19 ENCOUNTER — HOSPITAL ENCOUNTER (OUTPATIENT)
Dept: RADIOLOGY | Facility: HOSPITAL | Age: 71
Discharge: HOME | End: 2021-05-19
Attending: INTERNAL MEDICINE
Payer: MEDICARE

## 2021-05-19 ENCOUNTER — OFFICE VISIT (OUTPATIENT)
Dept: RHEUMATOLOGY | Facility: CLINIC | Age: 71
End: 2021-05-19
Payer: MEDICARE

## 2021-05-19 VITALS
BODY MASS INDEX: 25.16 KG/M2 | DIASTOLIC BLOOD PRESSURE: 80 MMHG | SYSTOLIC BLOOD PRESSURE: 158 MMHG | WEIGHT: 155.88 LBS

## 2021-05-19 DIAGNOSIS — M32.9 SYSTEMIC LUPUS ERYTHEMATOSUS, UNSPECIFIED SLE TYPE, UNSPECIFIED ORGAN INVOLVEMENT STATUS: Primary | ICD-10-CM

## 2021-05-19 DIAGNOSIS — M54.9 DORSALGIA, UNSPECIFIED: ICD-10-CM

## 2021-05-19 PROCEDURE — 1159F MED LIST DOCD IN RCRD: CPT | Mod: S$GLB,,, | Performed by: INTERNAL MEDICINE

## 2021-05-19 PROCEDURE — 1159F PR MEDICATION LIST DOCUMENTED IN MEDICAL RECORD: ICD-10-PCS | Mod: S$GLB,,, | Performed by: INTERNAL MEDICINE

## 2021-05-19 PROCEDURE — 3288F PR FALLS RISK ASSESSMENT DOCUMENTED: ICD-10-PCS | Mod: S$GLB,,, | Performed by: INTERNAL MEDICINE

## 2021-05-19 PROCEDURE — 1101F PR PT FALLS ASSESS DOC 0-1 FALLS W/OUT INJ PAST YR: ICD-10-PCS | Mod: S$GLB,,, | Performed by: INTERNAL MEDICINE

## 2021-05-19 PROCEDURE — 73521 X-RAY EXAM HIPS BI 2 VIEWS: CPT | Mod: TC

## 2021-05-19 PROCEDURE — 3288F FALL RISK ASSESSMENT DOCD: CPT | Mod: S$GLB,,, | Performed by: INTERNAL MEDICINE

## 2021-05-19 PROCEDURE — 99214 PR OFFICE/OUTPT VISIT, EST, LEVL IV, 30-39 MIN: ICD-10-PCS | Mod: S$GLB,,, | Performed by: INTERNAL MEDICINE

## 2021-05-19 PROCEDURE — 3008F PR BODY MASS INDEX (BMI) DOCUMENTED: ICD-10-PCS | Mod: S$GLB,,, | Performed by: INTERNAL MEDICINE

## 2021-05-19 PROCEDURE — 72100 X-RAY EXAM L-S SPINE 2/3 VWS: CPT | Mod: TC

## 2021-05-19 PROCEDURE — 1101F PT FALLS ASSESS-DOCD LE1/YR: CPT | Mod: S$GLB,,, | Performed by: INTERNAL MEDICINE

## 2021-05-19 PROCEDURE — 1125F AMNT PAIN NOTED PAIN PRSNT: CPT | Mod: S$GLB,,, | Performed by: INTERNAL MEDICINE

## 2021-05-19 PROCEDURE — 3008F BODY MASS INDEX DOCD: CPT | Mod: S$GLB,,, | Performed by: INTERNAL MEDICINE

## 2021-05-19 PROCEDURE — 99214 OFFICE O/P EST MOD 30 MIN: CPT | Mod: S$GLB,,, | Performed by: INTERNAL MEDICINE

## 2021-05-19 PROCEDURE — 1125F PR PAIN SEVERITY QUANTIFIED, PAIN PRESENT: ICD-10-PCS | Mod: S$GLB,,, | Performed by: INTERNAL MEDICINE

## 2021-05-19 PROCEDURE — 71046 X-RAY EXAM CHEST 2 VIEWS: CPT | Mod: TC

## 2021-05-19 RX ORDER — MIDODRINE HYDROCHLORIDE 5 MG/1
TABLET ORAL 3 TIMES DAILY
COMMUNITY
Start: 2021-05-12 | End: 2021-06-09

## 2021-05-19 RX ORDER — PREDNISONE 1 MG/1
TABLET ORAL
Qty: 120 TABLET | Refills: 1 | Status: SHIPPED | OUTPATIENT
Start: 2021-05-19 | End: 2021-07-30 | Stop reason: SDUPTHER

## 2021-05-19 RX ORDER — CITALOPRAM 20 MG/1
TABLET, FILM COATED ORAL
COMMUNITY
Start: 2021-03-20 | End: 2021-06-14 | Stop reason: SDUPTHER

## 2021-05-19 RX ORDER — PREDNISONE 1 MG/1
TABLET ORAL
Qty: 120 TABLET | Refills: 0 | Status: SHIPPED | OUTPATIENT
Start: 2021-05-19 | End: 2021-10-28 | Stop reason: ALTCHOICE

## 2021-05-19 RX ORDER — DULOXETIN HYDROCHLORIDE 60 MG/1
CAPSULE, DELAYED RELEASE ORAL
COMMUNITY
Start: 2021-03-24 | End: 2021-06-15

## 2021-05-20 DIAGNOSIS — R80.9 PROTEINURIA, UNSPECIFIED TYPE: Primary | ICD-10-CM

## 2021-05-20 DIAGNOSIS — Z79.899 ENCOUNTER FOR LONG-TERM (CURRENT) USE OF MEDICATIONS: ICD-10-CM

## 2021-05-20 DIAGNOSIS — M32.9 SYSTEMIC LUPUS ERYTHEMATOSUS, UNSPECIFIED SLE TYPE, UNSPECIFIED ORGAN INVOLVEMENT STATUS: ICD-10-CM

## 2021-05-22 LAB — HEMOCCULT STL QL IA: NEGATIVE

## 2021-05-24 ENCOUNTER — LAB VISIT (OUTPATIENT)
Dept: LAB | Facility: HOSPITAL | Age: 71
End: 2021-05-24
Attending: FAMILY MEDICINE
Payer: MEDICARE

## 2021-05-24 DIAGNOSIS — R80.9 PROTEINURIA, UNSPECIFIED TYPE: ICD-10-CM

## 2021-05-24 DIAGNOSIS — Z79.899 ENCOUNTER FOR LONG-TERM (CURRENT) USE OF MEDICATIONS: ICD-10-CM

## 2021-05-24 DIAGNOSIS — M32.9 SYSTEMIC LUPUS ERYTHEMATOSUS, UNSPECIFIED SLE TYPE, UNSPECIFIED ORGAN INVOLVEMENT STATUS: ICD-10-CM

## 2021-05-24 PROCEDURE — 82575 CREATININE CLEARANCE TEST: CPT | Performed by: INTERNAL MEDICINE

## 2021-05-24 PROCEDURE — 84156 ASSAY OF PROTEIN URINE: CPT | Performed by: INTERNAL MEDICINE

## 2021-05-25 LAB
CREAT CL/1.73 SQ M 12H UR+SERPL-ARVRAT: 55 ML/MIN (ref 70–110)
CREAT SERPL-MCNC: 1 MG/DL (ref 0.5–1.4)
CREAT UR-MCNC: 69 MG/DL (ref 15–325)
CREATININE, URINE (MG/SPEC): 793.5 MG/SPEC
PROT 24H UR-MRATE: NORMAL MG/SPEC (ref 0–100)
PROT UR-MCNC: <7 MG/DL (ref 0–15)
URINE COLLECTION DURATION: 24 HR
URINE COLLECTION DURATION: 24 HR
URINE VOLUME: 1150 ML
URINE VOLUME: 1150 ML

## 2021-05-27 ENCOUNTER — PATIENT MESSAGE (OUTPATIENT)
Dept: FAMILY MEDICINE | Facility: CLINIC | Age: 71
End: 2021-05-27

## 2021-05-27 DIAGNOSIS — N30.90 RECURRENT BACTERIAL CYSTITIS: Primary | ICD-10-CM

## 2021-05-27 RX ORDER — NITROFURANTOIN 25; 75 MG/1; MG/1
100 CAPSULE ORAL 2 TIMES DAILY
Qty: 10 CAPSULE | Refills: 0 | Status: SHIPPED | OUTPATIENT
Start: 2021-05-27 | End: 2021-06-21 | Stop reason: ALTCHOICE

## 2021-06-14 DIAGNOSIS — M45.5 ANKYLOSING SPONDYLITIS OF THORACOLUMBAR REGION: ICD-10-CM

## 2021-06-14 DIAGNOSIS — F16.921: ICD-10-CM

## 2021-06-14 DIAGNOSIS — I69.393 ATAXIA DUE TO RECENT STROKE: ICD-10-CM

## 2021-06-14 DIAGNOSIS — F11.221 OPIOID DEPENDENCE WITH INTOXICATION DELIRIUM: ICD-10-CM

## 2021-06-14 DIAGNOSIS — R29.818 NEUROLOGICAL DEFICIT PRESENT: ICD-10-CM

## 2021-06-15 RX ORDER — DULOXETIN HYDROCHLORIDE 60 MG/1
CAPSULE, DELAYED RELEASE ORAL
Qty: 90 CAPSULE | Refills: 3 | Status: SHIPPED | OUTPATIENT
Start: 2021-06-15 | End: 2021-10-28 | Stop reason: SDUPTHER

## 2021-06-15 RX ORDER — SULINDAC 150 MG/1
TABLET ORAL
Qty: 180 TABLET | Refills: 3 | Status: SHIPPED | OUTPATIENT
Start: 2021-06-15 | End: 2021-10-28 | Stop reason: ALTCHOICE

## 2021-06-15 RX ORDER — GABAPENTIN 100 MG/1
CAPSULE ORAL
Qty: 270 CAPSULE | Refills: 3 | Status: SHIPPED | OUTPATIENT
Start: 2021-06-15 | End: 2021-07-30

## 2021-06-15 RX ORDER — CITALOPRAM 20 MG/1
20 TABLET, FILM COATED ORAL DAILY
Qty: 90 TABLET | Refills: 5 | Status: SHIPPED | OUTPATIENT
Start: 2021-06-15 | End: 2021-08-11

## 2021-06-21 ENCOUNTER — LAB VISIT (OUTPATIENT)
Dept: LAB | Facility: HOSPITAL | Age: 71
End: 2021-06-21
Attending: FAMILY MEDICINE
Payer: MEDICARE

## 2021-06-21 ENCOUNTER — OFFICE VISIT (OUTPATIENT)
Dept: FAMILY MEDICINE | Facility: CLINIC | Age: 71
End: 2021-06-21
Payer: MEDICARE

## 2021-06-21 VITALS
BODY MASS INDEX: 24.91 KG/M2 | TEMPERATURE: 98 F | OXYGEN SATURATION: 98 % | SYSTOLIC BLOOD PRESSURE: 118 MMHG | WEIGHT: 155 LBS | HEART RATE: 100 BPM | HEIGHT: 66 IN | DIASTOLIC BLOOD PRESSURE: 74 MMHG | RESPIRATION RATE: 20 BRPM

## 2021-06-21 DIAGNOSIS — E43 SEVERE PROTEIN-CALORIE MALNUTRITION: ICD-10-CM

## 2021-06-21 DIAGNOSIS — E27.1 ADRENAL INSUFFICIENCY (ADDISON'S DISEASE): Primary | ICD-10-CM

## 2021-06-21 DIAGNOSIS — F41.8 DEPRESSION WITH ANXIETY: ICD-10-CM

## 2021-06-21 DIAGNOSIS — N18.31 STAGE 3A CHRONIC KIDNEY DISEASE: ICD-10-CM

## 2021-06-21 DIAGNOSIS — M32.9 SYSTEMIC LUPUS ERYTHEMATOSUS, UNSPECIFIED SLE TYPE, UNSPECIFIED ORGAN INVOLVEMENT STATUS: ICD-10-CM

## 2021-06-21 DIAGNOSIS — E03.9 ACQUIRED HYPOTHYROIDISM: ICD-10-CM

## 2021-06-21 DIAGNOSIS — D50.8 IRON DEFICIENCY ANEMIA SECONDARY TO INADEQUATE DIETARY IRON INTAKE: ICD-10-CM

## 2021-06-21 DIAGNOSIS — R73.9 HYPERGLYCEMIA, UNSPECIFIED: ICD-10-CM

## 2021-06-21 DIAGNOSIS — E16.2 HYPOGLYCEMIA: ICD-10-CM

## 2021-06-21 DIAGNOSIS — E11.628 TYPE 2 DIABETES MELLITUS WITH OTHER SKIN COMPLICATIONS: ICD-10-CM

## 2021-06-21 LAB
ALBUMIN SERPL BCP-MCNC: 3.8 G/DL (ref 3.5–5.2)
ALP SERPL-CCNC: 85 U/L (ref 55–135)
ALT SERPL W/O P-5'-P-CCNC: 13 U/L (ref 10–44)
ANION GAP SERPL CALC-SCNC: 13 MMOL/L (ref 8–16)
AST SERPL-CCNC: 16 U/L (ref 10–40)
BASOPHILS # BLD AUTO: 0.11 K/UL (ref 0–0.2)
BASOPHILS NFR BLD: 0.8 % (ref 0–1.9)
BILIRUB SERPL-MCNC: 0.6 MG/DL (ref 0.1–1)
BUN SERPL-MCNC: 22 MG/DL (ref 8–23)
CALCIUM SERPL-MCNC: 9.8 MG/DL (ref 8.7–10.5)
CHLORIDE SERPL-SCNC: 106 MMOL/L (ref 95–110)
CO2 SERPL-SCNC: 24 MMOL/L (ref 23–29)
CREAT SERPL-MCNC: 0.9 MG/DL (ref 0.5–1.4)
DIFFERENTIAL METHOD: ABNORMAL
EOSINOPHIL # BLD AUTO: 0.2 K/UL (ref 0–0.5)
EOSINOPHIL NFR BLD: 1.6 % (ref 0–8)
ERYTHROCYTE [DISTWIDTH] IN BLOOD BY AUTOMATED COUNT: 13.1 % (ref 11.5–14.5)
EST. GFR  (AFRICAN AMERICAN): >60 ML/MIN/1.73 M^2
EST. GFR  (NON AFRICAN AMERICAN): >60 ML/MIN/1.73 M^2
FERRITIN SERPL-MCNC: 16 NG/ML (ref 20–300)
GLUCOSE SERPL-MCNC: 90 MG/DL (ref 70–110)
HCT VFR BLD AUTO: 42.2 % (ref 37–48.5)
HGB BLD-MCNC: 14 G/DL (ref 12–16)
IMM GRANULOCYTES # BLD AUTO: 0.08 K/UL (ref 0–0.04)
IMM GRANULOCYTES NFR BLD AUTO: 0.6 % (ref 0–0.5)
IRON SERPL-MCNC: 74 UG/DL (ref 30–160)
LYMPHOCYTES # BLD AUTO: 1.9 K/UL (ref 1–4.8)
LYMPHOCYTES NFR BLD: 14.8 % (ref 18–48)
MCH RBC QN AUTO: 31.5 PG (ref 27–31)
MCHC RBC AUTO-ENTMCNC: 33.2 G/DL (ref 32–36)
MCV RBC AUTO: 95 FL (ref 82–98)
MONOCYTES # BLD AUTO: 1 K/UL (ref 0.3–1)
MONOCYTES NFR BLD: 7.3 % (ref 4–15)
NEUTROPHILS # BLD AUTO: 9.8 K/UL (ref 1.8–7.7)
NEUTROPHILS NFR BLD: 74.9 % (ref 38–73)
NRBC BLD-RTO: 0 /100 WBC
PLATELET # BLD AUTO: 333 K/UL (ref 150–450)
PMV BLD AUTO: 9.5 FL (ref 9.2–12.9)
POTASSIUM SERPL-SCNC: 4.4 MMOL/L (ref 3.5–5.1)
PROT SERPL-MCNC: 7.1 G/DL (ref 6–8.4)
RBC # BLD AUTO: 4.44 M/UL (ref 4–5.4)
SATURATED IRON: 13 % (ref 20–50)
SODIUM SERPL-SCNC: 143 MMOL/L (ref 136–145)
T4 FREE SERPL-MCNC: 1.46 NG/DL (ref 0.71–1.51)
TOTAL IRON BINDING CAPACITY: 568 UG/DL (ref 250–450)
TRANSFERRIN SERPL-MCNC: 384 MG/DL (ref 200–375)
TSH SERPL DL<=0.005 MIU/L-ACNC: 0.01 UIU/ML (ref 0.4–4)
WBC # BLD AUTO: 13.13 K/UL (ref 3.9–12.7)

## 2021-06-21 PROCEDURE — 84439 ASSAY OF FREE THYROXINE: CPT | Performed by: FAMILY MEDICINE

## 2021-06-21 PROCEDURE — 99999 PR PBB SHADOW E&M-EST. PATIENT-LVL V: CPT | Mod: PBBFAC,,, | Performed by: FAMILY MEDICINE

## 2021-06-21 PROCEDURE — 1159F PR MEDICATION LIST DOCUMENTED IN MEDICAL RECORD: ICD-10-PCS | Mod: S$GLB,,, | Performed by: FAMILY MEDICINE

## 2021-06-21 PROCEDURE — 99499 RISK ADDL DX/OHS AUDIT: ICD-10-PCS | Mod: S$GLB,,, | Performed by: FAMILY MEDICINE

## 2021-06-21 PROCEDURE — 3288F FALL RISK ASSESSMENT DOCD: CPT | Mod: CPTII,S$GLB,, | Performed by: FAMILY MEDICINE

## 2021-06-21 PROCEDURE — 80053 COMPREHEN METABOLIC PANEL: CPT | Performed by: FAMILY MEDICINE

## 2021-06-21 PROCEDURE — 85025 COMPLETE CBC W/AUTO DIFF WBC: CPT | Mod: PO | Performed by: FAMILY MEDICINE

## 2021-06-21 PROCEDURE — 3288F PR FALLS RISK ASSESSMENT DOCUMENTED: ICD-10-PCS | Mod: CPTII,S$GLB,, | Performed by: FAMILY MEDICINE

## 2021-06-21 PROCEDURE — 82728 ASSAY OF FERRITIN: CPT | Performed by: FAMILY MEDICINE

## 2021-06-21 PROCEDURE — 99999 PR PBB SHADOW E&M-EST. PATIENT-LVL V: ICD-10-PCS | Mod: PBBFAC,,, | Performed by: FAMILY MEDICINE

## 2021-06-21 PROCEDURE — 3008F BODY MASS INDEX DOCD: CPT | Mod: CPTII,S$GLB,, | Performed by: FAMILY MEDICINE

## 2021-06-21 PROCEDURE — 83540 ASSAY OF IRON: CPT | Performed by: FAMILY MEDICINE

## 2021-06-21 PROCEDURE — 84443 ASSAY THYROID STIM HORMONE: CPT | Performed by: FAMILY MEDICINE

## 2021-06-21 PROCEDURE — 82962 GLUCOSE BLOOD TEST: CPT | Mod: S$GLB,,, | Performed by: FAMILY MEDICINE

## 2021-06-21 PROCEDURE — 1125F PR PAIN SEVERITY QUANTIFIED, PAIN PRESENT: ICD-10-PCS | Mod: S$GLB,,, | Performed by: FAMILY MEDICINE

## 2021-06-21 PROCEDURE — 1125F AMNT PAIN NOTED PAIN PRSNT: CPT | Mod: S$GLB,,, | Performed by: FAMILY MEDICINE

## 2021-06-21 PROCEDURE — 99214 OFFICE O/P EST MOD 30 MIN: CPT | Mod: S$GLB,,, | Performed by: FAMILY MEDICINE

## 2021-06-21 PROCEDURE — 1101F PT FALLS ASSESS-DOCD LE1/YR: CPT | Mod: CPTII,S$GLB,, | Performed by: FAMILY MEDICINE

## 2021-06-21 PROCEDURE — 1159F MED LIST DOCD IN RCRD: CPT | Mod: S$GLB,,, | Performed by: FAMILY MEDICINE

## 2021-06-21 PROCEDURE — 1101F PR PT FALLS ASSESS DOC 0-1 FALLS W/OUT INJ PAST YR: ICD-10-PCS | Mod: CPTII,S$GLB,, | Performed by: FAMILY MEDICINE

## 2021-06-21 PROCEDURE — 82962 POCT GLUCOSE, HAND-HELD DEVICE: ICD-10-PCS | Mod: S$GLB,,, | Performed by: FAMILY MEDICINE

## 2021-06-21 PROCEDURE — 99214 PR OFFICE/OUTPT VISIT, EST, LEVL IV, 30-39 MIN: ICD-10-PCS | Mod: S$GLB,,, | Performed by: FAMILY MEDICINE

## 2021-06-21 PROCEDURE — 99499 UNLISTED E&M SERVICE: CPT | Mod: S$GLB,,, | Performed by: FAMILY MEDICINE

## 2021-06-21 PROCEDURE — 3008F PR BODY MASS INDEX (BMI) DOCUMENTED: ICD-10-PCS | Mod: CPTII,S$GLB,, | Performed by: FAMILY MEDICINE

## 2021-06-21 PROCEDURE — 36415 COLL VENOUS BLD VENIPUNCTURE: CPT | Mod: PO | Performed by: FAMILY MEDICINE

## 2021-06-21 RX ORDER — CYANOCOBALAMIN 1000 UG/ML
1000 INJECTION, SOLUTION INTRAMUSCULAR; SUBCUTANEOUS
Qty: 10 ML | Refills: 11 | Status: SHIPPED | OUTPATIENT
Start: 2021-06-21 | End: 2021-10-28 | Stop reason: SDUPTHER

## 2021-06-21 RX ORDER — GLUCAGON 1 MG
1 VIAL (EA) INJECTION
Qty: 1 EACH | Refills: 3 | Status: SHIPPED | OUTPATIENT
Start: 2021-06-21 | End: 2023-06-19

## 2021-06-22 ENCOUNTER — TELEPHONE (OUTPATIENT)
Dept: DIABETES | Facility: CLINIC | Age: 71
End: 2021-06-22

## 2021-06-22 ENCOUNTER — OFFICE VISIT (OUTPATIENT)
Dept: RHEUMATOLOGY | Facility: CLINIC | Age: 71
End: 2021-06-22
Payer: MEDICARE

## 2021-06-22 VITALS — WEIGHT: 154.88 LBS | SYSTOLIC BLOOD PRESSURE: 121 MMHG | DIASTOLIC BLOOD PRESSURE: 81 MMHG | BODY MASS INDEX: 25 KG/M2

## 2021-06-22 DIAGNOSIS — D50.8 IRON DEFICIENCY ANEMIA SECONDARY TO INADEQUATE DIETARY IRON INTAKE: Primary | ICD-10-CM

## 2021-06-22 DIAGNOSIS — E07.9 THYROID DISEASE: ICD-10-CM

## 2021-06-22 DIAGNOSIS — M32.9 SYSTEMIC LUPUS ERYTHEMATOSUS, UNSPECIFIED SLE TYPE, UNSPECIFIED ORGAN INVOLVEMENT STATUS: Primary | ICD-10-CM

## 2021-06-22 LAB — GLUCOSE SERPL-MCNC: 102 MG/DL (ref 70–110)

## 2021-06-22 PROCEDURE — 3008F PR BODY MASS INDEX (BMI) DOCUMENTED: ICD-10-PCS | Mod: S$GLB,,, | Performed by: INTERNAL MEDICINE

## 2021-06-22 PROCEDURE — 3008F BODY MASS INDEX DOCD: CPT | Mod: S$GLB,,, | Performed by: INTERNAL MEDICINE

## 2021-06-22 PROCEDURE — 1159F PR MEDICATION LIST DOCUMENTED IN MEDICAL RECORD: ICD-10-PCS | Mod: S$GLB,,, | Performed by: INTERNAL MEDICINE

## 2021-06-22 PROCEDURE — 99213 PR OFFICE/OUTPT VISIT, EST, LEVL III, 20-29 MIN: ICD-10-PCS | Mod: S$GLB,,, | Performed by: INTERNAL MEDICINE

## 2021-06-22 PROCEDURE — 1159F MED LIST DOCD IN RCRD: CPT | Mod: S$GLB,,, | Performed by: INTERNAL MEDICINE

## 2021-06-22 PROCEDURE — 99213 OFFICE O/P EST LOW 20 MIN: CPT | Mod: S$GLB,,, | Performed by: INTERNAL MEDICINE

## 2021-06-22 RX ORDER — LEVOTHYROXINE SODIUM 100 UG/1
100 TABLET ORAL DAILY
Qty: 90 TABLET | Refills: 3 | Status: SHIPPED | OUTPATIENT
Start: 2021-06-22 | End: 2021-07-30 | Stop reason: SDUPTHER

## 2021-07-23 ENCOUNTER — LAB VISIT (OUTPATIENT)
Dept: LAB | Facility: HOSPITAL | Age: 71
End: 2021-07-23
Attending: FAMILY MEDICINE
Payer: MEDICARE

## 2021-07-23 DIAGNOSIS — E27.1 ADRENAL INSUFFICIENCY (ADDISON'S DISEASE): ICD-10-CM

## 2021-07-23 LAB
ALBUMIN SERPL BCP-MCNC: 3.6 G/DL (ref 3.5–5.2)
ALP SERPL-CCNC: 93 U/L (ref 55–135)
ALT SERPL W/O P-5'-P-CCNC: 15 U/L (ref 10–44)
ANION GAP SERPL CALC-SCNC: 11 MMOL/L (ref 8–16)
AST SERPL-CCNC: 20 U/L (ref 10–40)
BASOPHILS # BLD AUTO: 0.1 K/UL (ref 0–0.2)
BASOPHILS NFR BLD: 1.2 % (ref 0–1.9)
BILIRUB SERPL-MCNC: 0.4 MG/DL (ref 0.1–1)
BUN SERPL-MCNC: 17 MG/DL (ref 8–23)
CALCIUM SERPL-MCNC: 9.9 MG/DL (ref 8.7–10.5)
CHLORIDE SERPL-SCNC: 106 MMOL/L (ref 95–110)
CO2 SERPL-SCNC: 24 MMOL/L (ref 23–29)
CREAT SERPL-MCNC: 0.9 MG/DL (ref 0.5–1.4)
DIFFERENTIAL METHOD: ABNORMAL
EOSINOPHIL # BLD AUTO: 0.4 K/UL (ref 0–0.5)
EOSINOPHIL NFR BLD: 4.8 % (ref 0–8)
ERYTHROCYTE [DISTWIDTH] IN BLOOD BY AUTOMATED COUNT: 13.3 % (ref 11.5–14.5)
EST. GFR  (AFRICAN AMERICAN): >60 ML/MIN/1.73 M^2
EST. GFR  (NON AFRICAN AMERICAN): >60 ML/MIN/1.73 M^2
GLUCOSE SERPL-MCNC: 117 MG/DL (ref 70–110)
HCT VFR BLD AUTO: 44.3 % (ref 37–48.5)
HGB BLD-MCNC: 14.1 G/DL (ref 12–16)
IMM GRANULOCYTES # BLD AUTO: 0.04 K/UL (ref 0–0.04)
IMM GRANULOCYTES NFR BLD AUTO: 0.5 % (ref 0–0.5)
LYMPHOCYTES # BLD AUTO: 2.6 K/UL (ref 1–4.8)
LYMPHOCYTES NFR BLD: 31.2 % (ref 18–48)
MAGNESIUM SERPL-MCNC: 2.2 MG/DL (ref 1.6–2.6)
MCH RBC QN AUTO: 31.1 PG (ref 27–31)
MCHC RBC AUTO-ENTMCNC: 31.8 G/DL (ref 32–36)
MCV RBC AUTO: 98 FL (ref 82–98)
MONOCYTES # BLD AUTO: 0.7 K/UL (ref 0.3–1)
MONOCYTES NFR BLD: 8.4 % (ref 4–15)
NEUTROPHILS # BLD AUTO: 4.5 K/UL (ref 1.8–7.7)
NEUTROPHILS NFR BLD: 53.9 % (ref 38–73)
NRBC BLD-RTO: 0 /100 WBC
PLATELET # BLD AUTO: 353 K/UL (ref 150–450)
PMV BLD AUTO: 10.3 FL (ref 9.2–12.9)
POTASSIUM SERPL-SCNC: 4.5 MMOL/L (ref 3.5–5.1)
PROT SERPL-MCNC: 6.8 G/DL (ref 6–8.4)
RBC # BLD AUTO: 4.53 M/UL (ref 4–5.4)
SODIUM SERPL-SCNC: 141 MMOL/L (ref 136–145)
URATE SERPL-MCNC: 4.4 MG/DL (ref 2.4–5.7)
WBC # BLD AUTO: 8.29 K/UL (ref 3.9–12.7)

## 2021-07-23 PROCEDURE — 83735 ASSAY OF MAGNESIUM: CPT | Performed by: FAMILY MEDICINE

## 2021-07-23 PROCEDURE — 80053 COMPREHEN METABOLIC PANEL: CPT | Performed by: FAMILY MEDICINE

## 2021-07-23 PROCEDURE — 84550 ASSAY OF BLOOD/URIC ACID: CPT | Performed by: FAMILY MEDICINE

## 2021-07-23 PROCEDURE — 85025 COMPLETE CBC W/AUTO DIFF WBC: CPT | Performed by: FAMILY MEDICINE

## 2021-07-23 PROCEDURE — 36415 COLL VENOUS BLD VENIPUNCTURE: CPT | Mod: PO | Performed by: FAMILY MEDICINE

## 2021-07-30 ENCOUNTER — OFFICE VISIT (OUTPATIENT)
Dept: FAMILY MEDICINE | Facility: CLINIC | Age: 71
End: 2021-07-30
Payer: MEDICARE

## 2021-07-30 VITALS
HEART RATE: 95 BPM | WEIGHT: 155.88 LBS | DIASTOLIC BLOOD PRESSURE: 82 MMHG | SYSTOLIC BLOOD PRESSURE: 124 MMHG | RESPIRATION RATE: 20 BRPM | BODY MASS INDEX: 25.05 KG/M2 | OXYGEN SATURATION: 99 % | HEIGHT: 66 IN | TEMPERATURE: 98 F

## 2021-07-30 DIAGNOSIS — G89.29 CHRONIC LOW BACK PAIN WITH BILATERAL SCIATICA, UNSPECIFIED BACK PAIN LATERALITY: ICD-10-CM

## 2021-07-30 DIAGNOSIS — M45.5 ANKYLOSING SPONDYLITIS OF THORACOLUMBAR REGION: ICD-10-CM

## 2021-07-30 DIAGNOSIS — F33.9 RECURRENT MAJOR DEPRESSION RESISTANT TO TREATMENT: ICD-10-CM

## 2021-07-30 DIAGNOSIS — F11.221 OPIOID DEPENDENCE WITH INTOXICATION DELIRIUM: ICD-10-CM

## 2021-07-30 DIAGNOSIS — M54.42 CHRONIC LOW BACK PAIN WITH BILATERAL SCIATICA, UNSPECIFIED BACK PAIN LATERALITY: ICD-10-CM

## 2021-07-30 DIAGNOSIS — E07.9 THYROID DISEASE: Primary | ICD-10-CM

## 2021-07-30 DIAGNOSIS — M32.9 SYSTEMIC LUPUS ERYTHEMATOSUS, UNSPECIFIED SLE TYPE, UNSPECIFIED ORGAN INVOLVEMENT STATUS: ICD-10-CM

## 2021-07-30 DIAGNOSIS — R29.818 NEUROLOGICAL DEFICIT PRESENT: ICD-10-CM

## 2021-07-30 DIAGNOSIS — J30.1 CHRONIC SEASONAL ALLERGIC RHINITIS DUE TO POLLEN: ICD-10-CM

## 2021-07-30 DIAGNOSIS — G57.00 SCIATIC NEUROPATHY, UNSPECIFIED LATERALITY: ICD-10-CM

## 2021-07-30 DIAGNOSIS — I69.393 ATAXIA DUE TO RECENT STROKE: ICD-10-CM

## 2021-07-30 DIAGNOSIS — M54.41 CHRONIC LOW BACK PAIN WITH BILATERAL SCIATICA, UNSPECIFIED BACK PAIN LATERALITY: ICD-10-CM

## 2021-07-30 DIAGNOSIS — M43.26 ANKYLOSIS OF LUMBAR SPINE: ICD-10-CM

## 2021-07-30 DIAGNOSIS — D50.8 IRON DEFICIENCY ANEMIA FOLLOWING BARIATRIC SURGERY: ICD-10-CM

## 2021-07-30 DIAGNOSIS — Z23 NEED FOR ZOSTER VACCINE: ICD-10-CM

## 2021-07-30 DIAGNOSIS — Z12.31 OTHER SCREENING MAMMOGRAM: ICD-10-CM

## 2021-07-30 DIAGNOSIS — F16.921: ICD-10-CM

## 2021-07-30 DIAGNOSIS — K95.89 IRON DEFICIENCY ANEMIA FOLLOWING BARIATRIC SURGERY: ICD-10-CM

## 2021-07-30 PROCEDURE — 3079F DIAST BP 80-89 MM HG: CPT | Mod: CPTII,S$GLB,, | Performed by: FAMILY MEDICINE

## 2021-07-30 PROCEDURE — 3008F BODY MASS INDEX DOCD: CPT | Mod: CPTII,S$GLB,, | Performed by: FAMILY MEDICINE

## 2021-07-30 PROCEDURE — 1125F AMNT PAIN NOTED PAIN PRSNT: CPT | Mod: CPTII,S$GLB,, | Performed by: FAMILY MEDICINE

## 2021-07-30 PROCEDURE — 99999 PR PBB SHADOW E&M-EST. PATIENT-LVL V: ICD-10-PCS | Mod: PBBFAC,,, | Performed by: FAMILY MEDICINE

## 2021-07-30 PROCEDURE — 1101F PR PT FALLS ASSESS DOC 0-1 FALLS W/OUT INJ PAST YR: ICD-10-PCS | Mod: CPTII,S$GLB,, | Performed by: FAMILY MEDICINE

## 2021-07-30 PROCEDURE — 99499 RISK ADDL DX/OHS AUDIT: ICD-10-PCS | Mod: HCNC,S$GLB,, | Performed by: FAMILY MEDICINE

## 2021-07-30 PROCEDURE — 99214 OFFICE O/P EST MOD 30 MIN: CPT | Mod: S$GLB,,, | Performed by: FAMILY MEDICINE

## 2021-07-30 PROCEDURE — 99999 PR PBB SHADOW E&M-EST. PATIENT-LVL V: CPT | Mod: PBBFAC,,, | Performed by: FAMILY MEDICINE

## 2021-07-30 PROCEDURE — 99499 UNLISTED E&M SERVICE: CPT | Mod: HCNC,S$GLB,, | Performed by: FAMILY MEDICINE

## 2021-07-30 PROCEDURE — 3074F SYST BP LT 130 MM HG: CPT | Mod: CPTII,S$GLB,, | Performed by: FAMILY MEDICINE

## 2021-07-30 PROCEDURE — 1125F PR PAIN SEVERITY QUANTIFIED, PAIN PRESENT: ICD-10-PCS | Mod: CPTII,S$GLB,, | Performed by: FAMILY MEDICINE

## 2021-07-30 PROCEDURE — 3288F PR FALLS RISK ASSESSMENT DOCUMENTED: ICD-10-PCS | Mod: CPTII,S$GLB,, | Performed by: FAMILY MEDICINE

## 2021-07-30 PROCEDURE — 1159F MED LIST DOCD IN RCRD: CPT | Mod: CPTII,S$GLB,, | Performed by: FAMILY MEDICINE

## 2021-07-30 PROCEDURE — 1160F RVW MEDS BY RX/DR IN RCRD: CPT | Mod: CPTII,S$GLB,, | Performed by: FAMILY MEDICINE

## 2021-07-30 PROCEDURE — 1101F PT FALLS ASSESS-DOCD LE1/YR: CPT | Mod: CPTII,S$GLB,, | Performed by: FAMILY MEDICINE

## 2021-07-30 PROCEDURE — 3044F PR MOST RECENT HEMOGLOBIN A1C LEVEL <7.0%: ICD-10-PCS | Mod: CPTII,S$GLB,, | Performed by: FAMILY MEDICINE

## 2021-07-30 PROCEDURE — 1159F PR MEDICATION LIST DOCUMENTED IN MEDICAL RECORD: ICD-10-PCS | Mod: CPTII,S$GLB,, | Performed by: FAMILY MEDICINE

## 2021-07-30 PROCEDURE — 3079F PR MOST RECENT DIASTOLIC BLOOD PRESSURE 80-89 MM HG: ICD-10-PCS | Mod: CPTII,S$GLB,, | Performed by: FAMILY MEDICINE

## 2021-07-30 PROCEDURE — 3074F PR MOST RECENT SYSTOLIC BLOOD PRESSURE < 130 MM HG: ICD-10-PCS | Mod: CPTII,S$GLB,, | Performed by: FAMILY MEDICINE

## 2021-07-30 PROCEDURE — 3288F FALL RISK ASSESSMENT DOCD: CPT | Mod: CPTII,S$GLB,, | Performed by: FAMILY MEDICINE

## 2021-07-30 PROCEDURE — 3044F HG A1C LEVEL LT 7.0%: CPT | Mod: CPTII,S$GLB,, | Performed by: FAMILY MEDICINE

## 2021-07-30 PROCEDURE — 1160F PR REVIEW ALL MEDS BY PRESCRIBER/CLIN PHARMACIST DOCUMENTED: ICD-10-PCS | Mod: CPTII,S$GLB,, | Performed by: FAMILY MEDICINE

## 2021-07-30 PROCEDURE — 3008F PR BODY MASS INDEX (BMI) DOCUMENTED: ICD-10-PCS | Mod: CPTII,S$GLB,, | Performed by: FAMILY MEDICINE

## 2021-07-30 PROCEDURE — 99214 PR OFFICE/OUTPT VISIT, EST, LEVL IV, 30-39 MIN: ICD-10-PCS | Mod: S$GLB,,, | Performed by: FAMILY MEDICINE

## 2021-07-30 RX ORDER — FLUTICASONE PROPIONATE 50 MCG
SPRAY, SUSPENSION (ML) NASAL
Qty: 48 G | Refills: 0 | Status: SHIPPED | OUTPATIENT
Start: 2021-07-30 | End: 2021-12-27

## 2021-07-30 RX ORDER — MINOXIDIL 5 %
SOLUTION, NON-ORAL TOPICAL
Qty: 120 ML | Refills: 3 | Status: SHIPPED | OUTPATIENT
Start: 2021-07-30 | End: 2021-10-28 | Stop reason: ALTCHOICE

## 2021-07-30 RX ORDER — CLONAZEPAM 1 MG/1
TABLET ORAL
Qty: 60 TABLET | Refills: 3 | Status: SHIPPED | OUTPATIENT
Start: 2021-07-30 | End: 2022-01-19 | Stop reason: SDUPTHER

## 2021-07-30 RX ORDER — GABAPENTIN 300 MG/1
300 CAPSULE ORAL NIGHTLY
Qty: 90 CAPSULE | Refills: 3 | Status: SHIPPED | OUTPATIENT
Start: 2021-07-30 | End: 2021-10-22 | Stop reason: SDUPTHER

## 2021-07-30 RX ORDER — LIDOCAINE 50 MG/G
PATCH TOPICAL
Qty: 60 PATCH | Refills: 4 | Status: SHIPPED | OUTPATIENT
Start: 2021-07-30 | End: 2021-10-28 | Stop reason: ALTCHOICE

## 2021-07-30 RX ORDER — LEVOTHYROXINE SODIUM 100 UG/1
100 TABLET ORAL DAILY
Qty: 90 TABLET | Refills: 3 | Status: SHIPPED | OUTPATIENT
Start: 2021-07-30 | End: 2021-10-28 | Stop reason: SDUPTHER

## 2021-07-30 RX ORDER — ZOSTER VACCINE RECOMBINANT, ADJUVANTED 50 MCG/0.5
0.5 KIT INTRAMUSCULAR ONCE
Qty: 1 EACH | Refills: 1 | Status: SHIPPED | OUTPATIENT
Start: 2021-07-30 | End: 2021-07-30

## 2021-08-04 DIAGNOSIS — Z12.11 COLON CANCER SCREENING: ICD-10-CM

## 2021-08-10 ENCOUNTER — TELEPHONE (OUTPATIENT)
Dept: FAMILY MEDICINE | Facility: CLINIC | Age: 71
End: 2021-08-10

## 2021-08-11 RX ORDER — CITALOPRAM 20 MG/1
TABLET, FILM COATED ORAL
Qty: 30 TABLET | Refills: 11 | Status: SHIPPED | OUTPATIENT
Start: 2021-08-11 | End: 2021-10-28 | Stop reason: SDUPTHER

## 2021-08-16 DIAGNOSIS — G93.32 CHRONIC FATIGUE SYNDROME WITH FIBROMYALGIA: ICD-10-CM

## 2021-08-16 DIAGNOSIS — M79.7 CHRONIC FATIGUE SYNDROME WITH FIBROMYALGIA: ICD-10-CM

## 2021-08-16 RX ORDER — ALBUTEROL SULFATE 1.25 MG/3ML
SOLUTION RESPIRATORY (INHALATION)
Qty: 90 ML | Refills: 11 | Status: SHIPPED | OUTPATIENT
Start: 2021-08-16 | End: 2021-10-28 | Stop reason: SDUPTHER

## 2021-08-25 ENCOUNTER — PATIENT OUTREACH (OUTPATIENT)
Dept: ADMINISTRATIVE | Facility: HOSPITAL | Age: 71
End: 2021-08-25

## 2021-09-15 ENCOUNTER — TELEPHONE (OUTPATIENT)
Dept: ADMINISTRATIVE | Facility: HOSPITAL | Age: 71
End: 2021-09-15

## 2021-10-22 DIAGNOSIS — F16.921: ICD-10-CM

## 2021-10-22 DIAGNOSIS — R29.818 NEUROLOGICAL DEFICIT PRESENT: ICD-10-CM

## 2021-10-22 DIAGNOSIS — F11.221 OPIOID DEPENDENCE WITH INTOXICATION DELIRIUM: ICD-10-CM

## 2021-10-22 DIAGNOSIS — I69.393 ATAXIA DUE TO RECENT STROKE: ICD-10-CM

## 2021-10-22 RX ORDER — GABAPENTIN 300 MG/1
300 CAPSULE ORAL NIGHTLY
Qty: 90 CAPSULE | Refills: 3 | Status: SHIPPED | OUTPATIENT
Start: 2021-10-22 | End: 2021-10-28

## 2021-10-25 ENCOUNTER — LAB VISIT (OUTPATIENT)
Dept: LAB | Facility: HOSPITAL | Age: 71
End: 2021-10-25
Attending: FAMILY MEDICINE
Payer: MEDICARE

## 2021-10-25 DIAGNOSIS — E07.9 THYROID DISEASE: ICD-10-CM

## 2021-10-25 DIAGNOSIS — K95.89 IRON DEFICIENCY ANEMIA FOLLOWING BARIATRIC SURGERY: ICD-10-CM

## 2021-10-25 DIAGNOSIS — D50.8 IRON DEFICIENCY ANEMIA FOLLOWING BARIATRIC SURGERY: ICD-10-CM

## 2021-10-25 LAB
25(OH)D3+25(OH)D2 SERPL-MCNC: 42 NG/ML (ref 30–96)
FERRITIN SERPL-MCNC: 109 NG/ML (ref 20–300)
IRON SERPL-MCNC: 159 UG/DL (ref 30–160)
SATURATED IRON: 34 % (ref 20–50)
T4 SERPL-MCNC: 6.6 UG/DL (ref 4.5–11.5)
TOTAL IRON BINDING CAPACITY: 469 UG/DL (ref 250–450)
TRANSFERRIN SERPL-MCNC: 317 MG/DL (ref 200–375)
TSH SERPL DL<=0.005 MIU/L-ACNC: 0.59 UIU/ML (ref 0.4–4)

## 2021-10-25 PROCEDURE — 84436 ASSAY OF TOTAL THYROXINE: CPT | Mod: HCNC | Performed by: FAMILY MEDICINE

## 2021-10-25 PROCEDURE — 82306 VITAMIN D 25 HYDROXY: CPT | Mod: HCNC | Performed by: FAMILY MEDICINE

## 2021-10-25 PROCEDURE — 82728 ASSAY OF FERRITIN: CPT | Mod: HCNC | Performed by: FAMILY MEDICINE

## 2021-10-25 PROCEDURE — 36415 COLL VENOUS BLD VENIPUNCTURE: CPT | Mod: HCNC,PO | Performed by: FAMILY MEDICINE

## 2021-10-25 PROCEDURE — 84466 ASSAY OF TRANSFERRIN: CPT | Mod: HCNC | Performed by: FAMILY MEDICINE

## 2021-10-25 PROCEDURE — 84443 ASSAY THYROID STIM HORMONE: CPT | Mod: HCNC | Performed by: FAMILY MEDICINE

## 2021-10-28 ENCOUNTER — OFFICE VISIT (OUTPATIENT)
Dept: FAMILY MEDICINE | Facility: CLINIC | Age: 71
End: 2021-10-28
Payer: MEDICARE

## 2021-10-28 VITALS
DIASTOLIC BLOOD PRESSURE: 74 MMHG | TEMPERATURE: 98 F | WEIGHT: 156.75 LBS | OXYGEN SATURATION: 97 % | SYSTOLIC BLOOD PRESSURE: 130 MMHG | BODY MASS INDEX: 25.19 KG/M2 | RESPIRATION RATE: 20 BRPM | HEART RATE: 72 BPM | HEIGHT: 66 IN

## 2021-10-28 DIAGNOSIS — R29.818 NEUROLOGICAL DEFICIT PRESENT: ICD-10-CM

## 2021-10-28 DIAGNOSIS — M32.9 SYSTEMIC LUPUS ERYTHEMATOSUS, UNSPECIFIED SLE TYPE, UNSPECIFIED ORGAN INVOLVEMENT STATUS: Primary | ICD-10-CM

## 2021-10-28 DIAGNOSIS — E79.0 HYPERURICEMIA: ICD-10-CM

## 2021-10-28 DIAGNOSIS — Z23 NEED FOR STREPTOCOCCUS PNEUMONIAE AND INFLUENZA VACCINATION: ICD-10-CM

## 2021-10-28 DIAGNOSIS — M45.5 ANKYLOSING SPONDYLITIS OF THORACOLUMBAR REGION: ICD-10-CM

## 2021-10-28 DIAGNOSIS — F16.921: ICD-10-CM

## 2021-10-28 DIAGNOSIS — K21.9 GASTROESOPHAGEAL REFLUX DISEASE, UNSPECIFIED WHETHER ESOPHAGITIS PRESENT: ICD-10-CM

## 2021-10-28 DIAGNOSIS — Z79.52 CURRENT CHRONIC USE OF SYSTEMIC STEROIDS: ICD-10-CM

## 2021-10-28 DIAGNOSIS — I69.393 ATAXIA DUE TO RECENT STROKE: ICD-10-CM

## 2021-10-28 DIAGNOSIS — F11.221 OPIOID DEPENDENCE WITH INTOXICATION DELIRIUM: ICD-10-CM

## 2021-10-28 DIAGNOSIS — G93.32 CHRONIC FATIGUE SYNDROME WITH FIBROMYALGIA: ICD-10-CM

## 2021-10-28 DIAGNOSIS — M79.7 CHRONIC FATIGUE SYNDROME WITH FIBROMYALGIA: ICD-10-CM

## 2021-10-28 DIAGNOSIS — E07.9 THYROID DISEASE: ICD-10-CM

## 2021-10-28 DIAGNOSIS — E43 SEVERE PROTEIN-CALORIE MALNUTRITION: ICD-10-CM

## 2021-10-28 DIAGNOSIS — Z12.31 OTHER SCREENING MAMMOGRAM: ICD-10-CM

## 2021-10-28 DIAGNOSIS — N18.31 STAGE 3A CHRONIC KIDNEY DISEASE: ICD-10-CM

## 2021-10-28 PROCEDURE — G0008 ADMIN INFLUENZA VIRUS VAC: HCPCS | Mod: HCNC,S$GLB,, | Performed by: FAMILY MEDICINE

## 2021-10-28 PROCEDURE — 3008F BODY MASS INDEX DOCD: CPT | Mod: HCNC,CPTII,S$GLB, | Performed by: FAMILY MEDICINE

## 2021-10-28 PROCEDURE — 3044F PR MOST RECENT HEMOGLOBIN A1C LEVEL <7.0%: ICD-10-PCS | Mod: HCNC,CPTII,S$GLB, | Performed by: FAMILY MEDICINE

## 2021-10-28 PROCEDURE — 90732 PNEUMOCOCCAL POLYSACCHARIDE VACCINE 23-VALENT =>2YO SQ IM: ICD-10-PCS | Mod: HCNC,S$GLB,, | Performed by: FAMILY MEDICINE

## 2021-10-28 PROCEDURE — 3044F HG A1C LEVEL LT 7.0%: CPT | Mod: HCNC,CPTII,S$GLB, | Performed by: FAMILY MEDICINE

## 2021-10-28 PROCEDURE — 3288F PR FALLS RISK ASSESSMENT DOCUMENTED: ICD-10-PCS | Mod: HCNC,CPTII,S$GLB, | Performed by: FAMILY MEDICINE

## 2021-10-28 PROCEDURE — 1160F RVW MEDS BY RX/DR IN RCRD: CPT | Mod: HCNC,CPTII,S$GLB, | Performed by: FAMILY MEDICINE

## 2021-10-28 PROCEDURE — 1160F PR REVIEW ALL MEDS BY PRESCRIBER/CLIN PHARMACIST DOCUMENTED: ICD-10-PCS | Mod: HCNC,CPTII,S$GLB, | Performed by: FAMILY MEDICINE

## 2021-10-28 PROCEDURE — 99214 PR OFFICE/OUTPT VISIT, EST, LEVL IV, 30-39 MIN: ICD-10-PCS | Mod: HCNC,S$GLB,, | Performed by: FAMILY MEDICINE

## 2021-10-28 PROCEDURE — 1101F PT FALLS ASSESS-DOCD LE1/YR: CPT | Mod: HCNC,CPTII,S$GLB, | Performed by: FAMILY MEDICINE

## 2021-10-28 PROCEDURE — G0009 FLU VACCINE - QUADRIVALENT - ADJUVANTED: ICD-10-PCS | Mod: HCNC,S$GLB,, | Performed by: FAMILY MEDICINE

## 2021-10-28 PROCEDURE — 1101F PR PT FALLS ASSESS DOC 0-1 FALLS W/OUT INJ PAST YR: ICD-10-PCS | Mod: HCNC,CPTII,S$GLB, | Performed by: FAMILY MEDICINE

## 2021-10-28 PROCEDURE — 99214 OFFICE O/P EST MOD 30 MIN: CPT | Mod: HCNC,S$GLB,, | Performed by: FAMILY MEDICINE

## 2021-10-28 PROCEDURE — 3078F DIAST BP <80 MM HG: CPT | Mod: HCNC,CPTII,S$GLB, | Performed by: FAMILY MEDICINE

## 2021-10-28 PROCEDURE — G0008 PNEUMOCOCCAL POLYSACCHARIDE VACCINE 23-VALENT =>2YO SQ IM: ICD-10-PCS | Mod: HCNC,S$GLB,, | Performed by: FAMILY MEDICINE

## 2021-10-28 PROCEDURE — 1159F PR MEDICATION LIST DOCUMENTED IN MEDICAL RECORD: ICD-10-PCS | Mod: HCNC,CPTII,S$GLB, | Performed by: FAMILY MEDICINE

## 2021-10-28 PROCEDURE — 3075F SYST BP GE 130 - 139MM HG: CPT | Mod: HCNC,CPTII,S$GLB, | Performed by: FAMILY MEDICINE

## 2021-10-28 PROCEDURE — 99999 PR PBB SHADOW E&M-EST. PATIENT-LVL V: CPT | Mod: PBBFAC,HCNC,, | Performed by: FAMILY MEDICINE

## 2021-10-28 PROCEDURE — 3008F PR BODY MASS INDEX (BMI) DOCUMENTED: ICD-10-PCS | Mod: HCNC,CPTII,S$GLB, | Performed by: FAMILY MEDICINE

## 2021-10-28 PROCEDURE — G0009 ADMIN PNEUMOCOCCAL VACCINE: HCPCS | Mod: HCNC,S$GLB,, | Performed by: FAMILY MEDICINE

## 2021-10-28 PROCEDURE — 3078F PR MOST RECENT DIASTOLIC BLOOD PRESSURE < 80 MM HG: ICD-10-PCS | Mod: HCNC,CPTII,S$GLB, | Performed by: FAMILY MEDICINE

## 2021-10-28 PROCEDURE — 90694 FLU VACCINE - QUADRIVALENT - ADJUVANTED: ICD-10-PCS | Mod: HCNC,S$GLB,, | Performed by: FAMILY MEDICINE

## 2021-10-28 PROCEDURE — 3075F PR MOST RECENT SYSTOLIC BLOOD PRESS GE 130-139MM HG: ICD-10-PCS | Mod: HCNC,CPTII,S$GLB, | Performed by: FAMILY MEDICINE

## 2021-10-28 PROCEDURE — 90694 VACC AIIV4 NO PRSRV 0.5ML IM: CPT | Mod: HCNC,S$GLB,, | Performed by: FAMILY MEDICINE

## 2021-10-28 PROCEDURE — 99999 PR PBB SHADOW E&M-EST. PATIENT-LVL V: ICD-10-PCS | Mod: PBBFAC,HCNC,, | Performed by: FAMILY MEDICINE

## 2021-10-28 PROCEDURE — 3288F FALL RISK ASSESSMENT DOCD: CPT | Mod: HCNC,CPTII,S$GLB, | Performed by: FAMILY MEDICINE

## 2021-10-28 PROCEDURE — 1159F MED LIST DOCD IN RCRD: CPT | Mod: HCNC,CPTII,S$GLB, | Performed by: FAMILY MEDICINE

## 2021-10-28 PROCEDURE — 90732 PPSV23 VACC 2 YRS+ SUBQ/IM: CPT | Mod: HCNC,S$GLB,, | Performed by: FAMILY MEDICINE

## 2021-10-28 RX ORDER — ALLOPURINOL 100 MG/1
100 TABLET ORAL DAILY
Qty: 90 TABLET | Refills: 3 | Status: SHIPPED | OUTPATIENT
Start: 2021-10-28 | End: 2022-11-10

## 2021-10-28 RX ORDER — ALBUTEROL SULFATE 1.25 MG/3ML
SOLUTION RESPIRATORY (INHALATION)
Qty: 90 ML | Refills: 11 | Status: SHIPPED | OUTPATIENT
Start: 2021-10-28

## 2021-10-28 RX ORDER — CYANOCOBALAMIN 1000 UG/ML
1000 INJECTION, SOLUTION INTRAMUSCULAR; SUBCUTANEOUS
Qty: 10 ML | Refills: 11 | Status: SHIPPED | OUTPATIENT
Start: 2021-10-28 | End: 2022-12-20 | Stop reason: SDUPTHER

## 2021-10-28 RX ORDER — DULOXETIN HYDROCHLORIDE 60 MG/1
CAPSULE, DELAYED RELEASE ORAL
Qty: 90 CAPSULE | Refills: 3 | Status: SHIPPED | OUTPATIENT
Start: 2021-10-28 | End: 2022-01-12 | Stop reason: SDUPTHER

## 2021-10-28 RX ORDER — LEVOTHYROXINE SODIUM 100 UG/1
100 TABLET ORAL DAILY
Qty: 90 TABLET | Refills: 3 | Status: SHIPPED | OUTPATIENT
Start: 2021-10-28 | End: 2022-11-10

## 2021-10-28 RX ORDER — GABAPENTIN 400 MG/1
400 CAPSULE ORAL 3 TIMES DAILY
Qty: 270 CAPSULE | Refills: 4 | Status: SHIPPED | OUTPATIENT
Start: 2021-10-28 | End: 2022-01-03 | Stop reason: SDUPTHER

## 2021-10-28 RX ORDER — CITALOPRAM 20 MG/1
TABLET, FILM COATED ORAL
Qty: 30 TABLET | Refills: 11 | Status: SHIPPED | OUTPATIENT
Start: 2021-10-28 | End: 2022-07-13

## 2021-10-28 RX ORDER — LANCETS
EACH MISCELLANEOUS
Qty: 300 EACH | Refills: 3 | Status: SHIPPED | OUTPATIENT
Start: 2021-10-28 | End: 2023-02-22 | Stop reason: SDUPTHER

## 2021-11-11 ENCOUNTER — HOSPITAL ENCOUNTER (OUTPATIENT)
Dept: RADIOLOGY | Facility: HOSPITAL | Age: 71
Discharge: HOME | End: 2021-11-11
Attending: FAMILY MEDICINE
Payer: MEDICARE

## 2021-11-11 DIAGNOSIS — Z12.31 OTHER SCREENING MAMMOGRAM: ICD-10-CM

## 2021-11-11 PROCEDURE — 77067 SCR MAMMO BI INCL CAD: CPT | Mod: TC,HCNC,PO

## 2021-11-11 PROCEDURE — 77067 SCR MAMMO BI INCL CAD: CPT | Mod: 26,HCNC,, | Performed by: RADIOLOGY

## 2021-11-11 PROCEDURE — 77067 MAMMO DIGITAL SCREENING BILAT WITH TOMO: ICD-10-PCS | Mod: 26,HCNC,, | Performed by: RADIOLOGY

## 2021-11-11 PROCEDURE — 77063 MAMMO DIGITAL SCREENING BILAT WITH TOMO: ICD-10-PCS | Mod: 26,HCNC,, | Performed by: RADIOLOGY

## 2021-11-11 PROCEDURE — 77063 BREAST TOMOSYNTHESIS BI: CPT | Mod: 26,HCNC,, | Performed by: RADIOLOGY

## 2021-12-17 DIAGNOSIS — J30.1 CHRONIC SEASONAL ALLERGIC RHINITIS DUE TO POLLEN: ICD-10-CM

## 2021-12-27 ENCOUNTER — PATIENT OUTREACH (OUTPATIENT)
Dept: ADMINISTRATIVE | Facility: OTHER | Age: 71
End: 2021-12-27
Payer: MEDICARE

## 2021-12-27 RX ORDER — FLUTICASONE PROPIONATE 50 MCG
SPRAY, SUSPENSION (ML) NASAL
Qty: 48 G | Refills: 3 | Status: SHIPPED | OUTPATIENT
Start: 2021-12-27 | End: 2022-03-03 | Stop reason: SDUPTHER

## 2021-12-30 ENCOUNTER — OFFICE VISIT (OUTPATIENT)
Dept: RHEUMATOLOGY | Facility: CLINIC | Age: 71
End: 2021-12-30
Payer: MEDICARE

## 2021-12-30 ENCOUNTER — PATIENT MESSAGE (OUTPATIENT)
Dept: FAMILY MEDICINE | Facility: CLINIC | Age: 71
End: 2021-12-30
Payer: MEDICARE

## 2021-12-30 VITALS
SYSTOLIC BLOOD PRESSURE: 130 MMHG | BODY MASS INDEX: 26.08 KG/M2 | DIASTOLIC BLOOD PRESSURE: 80 MMHG | HEIGHT: 66 IN | WEIGHT: 162.25 LBS | HEART RATE: 66 BPM

## 2021-12-30 DIAGNOSIS — Z82.61 FAMILY HISTORY OF RHEUMATOID ARTHRITIS: ICD-10-CM

## 2021-12-30 DIAGNOSIS — Z55.9 EDUCATIONAL CIRCUMSTANCE: ICD-10-CM

## 2021-12-30 DIAGNOSIS — F32.A DEPRESSIVE DISORDER: ICD-10-CM

## 2021-12-30 DIAGNOSIS — M54.9 DORSALGIA, UNSPECIFIED: ICD-10-CM

## 2021-12-30 DIAGNOSIS — M25.50 PAIN IN JOINT INVOLVING MULTIPLE SITES: ICD-10-CM

## 2021-12-30 DIAGNOSIS — M79.7 FIBROMYALGIA: ICD-10-CM

## 2021-12-30 DIAGNOSIS — M32.9 SYSTEMIC LUPUS ERYTHEMATOSUS, UNSPECIFIED SLE TYPE, UNSPECIFIED ORGAN INVOLVEMENT STATUS: Primary | ICD-10-CM

## 2021-12-30 PROCEDURE — 1125F AMNT PAIN NOTED PAIN PRSNT: CPT | Mod: HCNC,CPTII,S$GLB, | Performed by: INTERNAL MEDICINE

## 2021-12-30 PROCEDURE — 3079F PR MOST RECENT DIASTOLIC BLOOD PRESSURE 80-89 MM HG: ICD-10-PCS | Mod: HCNC,CPTII,S$GLB, | Performed by: INTERNAL MEDICINE

## 2021-12-30 PROCEDURE — 3008F BODY MASS INDEX DOCD: CPT | Mod: HCNC,CPTII,S$GLB, | Performed by: INTERNAL MEDICINE

## 2021-12-30 PROCEDURE — 99999 PR PBB SHADOW E&M-EST. PATIENT-LVL III: ICD-10-PCS | Mod: PBBFAC,HCNC,, | Performed by: INTERNAL MEDICINE

## 2021-12-30 PROCEDURE — 1159F PR MEDICATION LIST DOCUMENTED IN MEDICAL RECORD: ICD-10-PCS | Mod: HCNC,CPTII,S$GLB, | Performed by: INTERNAL MEDICINE

## 2021-12-30 PROCEDURE — 99499 RISK ADDL DX/OHS AUDIT: ICD-10-PCS | Mod: S$GLB,,, | Performed by: INTERNAL MEDICINE

## 2021-12-30 PROCEDURE — 99999 PR PBB SHADOW E&M-EST. PATIENT-LVL III: CPT | Mod: PBBFAC,HCNC,, | Performed by: INTERNAL MEDICINE

## 2021-12-30 PROCEDURE — 1160F PR REVIEW ALL MEDS BY PRESCRIBER/CLIN PHARMACIST DOCUMENTED: ICD-10-PCS | Mod: HCNC,CPTII,S$GLB, | Performed by: INTERNAL MEDICINE

## 2021-12-30 PROCEDURE — 99205 OFFICE O/P NEW HI 60 MIN: CPT | Mod: HCNC,S$GLB,, | Performed by: INTERNAL MEDICINE

## 2021-12-30 PROCEDURE — 3008F PR BODY MASS INDEX (BMI) DOCUMENTED: ICD-10-PCS | Mod: HCNC,CPTII,S$GLB, | Performed by: INTERNAL MEDICINE

## 2021-12-30 PROCEDURE — 99499 UNLISTED E&M SERVICE: CPT | Mod: S$GLB,,, | Performed by: INTERNAL MEDICINE

## 2021-12-30 PROCEDURE — 3044F PR MOST RECENT HEMOGLOBIN A1C LEVEL <7.0%: ICD-10-PCS | Mod: HCNC,CPTII,S$GLB, | Performed by: INTERNAL MEDICINE

## 2021-12-30 PROCEDURE — 3044F HG A1C LEVEL LT 7.0%: CPT | Mod: HCNC,CPTII,S$GLB, | Performed by: INTERNAL MEDICINE

## 2021-12-30 PROCEDURE — 3075F SYST BP GE 130 - 139MM HG: CPT | Mod: HCNC,CPTII,S$GLB, | Performed by: INTERNAL MEDICINE

## 2021-12-30 PROCEDURE — 1159F MED LIST DOCD IN RCRD: CPT | Mod: HCNC,CPTII,S$GLB, | Performed by: INTERNAL MEDICINE

## 2021-12-30 PROCEDURE — 99205 PR OFFICE/OUTPT VISIT, NEW, LEVL V, 60-74 MIN: ICD-10-PCS | Mod: HCNC,S$GLB,, | Performed by: INTERNAL MEDICINE

## 2021-12-30 PROCEDURE — 1125F PR PAIN SEVERITY QUANTIFIED, PAIN PRESENT: ICD-10-PCS | Mod: HCNC,CPTII,S$GLB, | Performed by: INTERNAL MEDICINE

## 2021-12-30 PROCEDURE — 3075F PR MOST RECENT SYSTOLIC BLOOD PRESS GE 130-139MM HG: ICD-10-PCS | Mod: HCNC,CPTII,S$GLB, | Performed by: INTERNAL MEDICINE

## 2021-12-30 PROCEDURE — 1160F RVW MEDS BY RX/DR IN RCRD: CPT | Mod: HCNC,CPTII,S$GLB, | Performed by: INTERNAL MEDICINE

## 2021-12-30 PROCEDURE — 3079F DIAST BP 80-89 MM HG: CPT | Mod: HCNC,CPTII,S$GLB, | Performed by: INTERNAL MEDICINE

## 2021-12-30 SDOH — SOCIAL DETERMINANTS OF HEALTH (SDOH): PROBLEMS RELATED TO EDUCATION AND LITERACY, UNSPECIFIED: Z55.9

## 2021-12-30 ASSESSMENT — ROUTINE ASSESSMENT OF PATIENT INDEX DATA (RAPID3)
PAIN SCORE: 5.5
FATIGUE SCORE: 10
MDHAQ FUNCTION SCORE: 1.2
TOTAL RAPID3 SCORE: 6.17
PSYCHOLOGICAL DISTRESS SCORE: 3.3
WHEN YOU AWAKENED IN THE MORNING OVER THE LAST WEEK, PLEASE INDICATE THE AMOUNT OF TIME IT TAKES UNTIL YOU ARE AS LIMBER AS YOU WILL BE FOR THE DAY: 3 HOURS
AM STIFFNESS SCORE: 1, YES
PATIENT GLOBAL ASSESSMENT SCORE: 9

## 2021-12-30 ASSESSMENT — SYSTEMIC LUPUS ERYTHEMATOSUS DISEASE ACTIVITY INDEX (SLEDAI): TOTAL_SCORE: 0

## 2021-12-31 ENCOUNTER — PATIENT MESSAGE (OUTPATIENT)
Dept: FAMILY MEDICINE | Facility: CLINIC | Age: 71
End: 2021-12-31
Payer: MEDICARE

## 2021-12-31 DIAGNOSIS — I69.393 ATAXIA DUE TO RECENT STROKE: ICD-10-CM

## 2021-12-31 DIAGNOSIS — R29.818 NEUROLOGICAL DEFICIT PRESENT: ICD-10-CM

## 2021-12-31 DIAGNOSIS — F11.221 OPIOID DEPENDENCE WITH INTOXICATION DELIRIUM: ICD-10-CM

## 2021-12-31 DIAGNOSIS — F16.921: ICD-10-CM

## 2022-01-03 RX ORDER — GABAPENTIN 400 MG/1
400 CAPSULE ORAL 3 TIMES DAILY
Qty: 270 CAPSULE | Refills: 3 | Status: SHIPPED | OUTPATIENT
Start: 2022-01-03 | End: 2022-11-10

## 2022-01-12 ENCOUNTER — OFFICE VISIT (OUTPATIENT)
Dept: NEUROLOGY | Facility: CLINIC | Age: 72
End: 2022-01-12
Payer: MEDICARE

## 2022-01-12 VITALS
BODY MASS INDEX: 25.75 KG/M2 | WEIGHT: 160.25 LBS | DIASTOLIC BLOOD PRESSURE: 64 MMHG | HEIGHT: 66 IN | SYSTOLIC BLOOD PRESSURE: 113 MMHG | HEART RATE: 69 BPM

## 2022-01-12 DIAGNOSIS — M79.7 CHRONIC FATIGUE SYNDROME WITH FIBROMYALGIA: ICD-10-CM

## 2022-01-12 DIAGNOSIS — G93.32 CHRONIC FATIGUE SYNDROME WITH FIBROMYALGIA: ICD-10-CM

## 2022-01-12 PROCEDURE — 1101F PR PT FALLS ASSESS DOC 0-1 FALLS W/OUT INJ PAST YR: ICD-10-PCS | Mod: HCNC,CPTII,S$GLB, | Performed by: NEUROLOGICAL SURGERY

## 2022-01-12 PROCEDURE — 3008F BODY MASS INDEX DOCD: CPT | Mod: HCNC,CPTII,S$GLB, | Performed by: NEUROLOGICAL SURGERY

## 2022-01-12 PROCEDURE — 3288F PR FALLS RISK ASSESSMENT DOCUMENTED: ICD-10-PCS | Mod: HCNC,CPTII,S$GLB, | Performed by: NEUROLOGICAL SURGERY

## 2022-01-12 PROCEDURE — 99999 PR PBB SHADOW E&M-EST. PATIENT-LVL III: ICD-10-PCS | Mod: PBBFAC,HCNC,, | Performed by: NEUROLOGICAL SURGERY

## 2022-01-12 PROCEDURE — 1125F PR PAIN SEVERITY QUANTIFIED, PAIN PRESENT: ICD-10-PCS | Mod: HCNC,CPTII,S$GLB, | Performed by: NEUROLOGICAL SURGERY

## 2022-01-12 PROCEDURE — 3288F FALL RISK ASSESSMENT DOCD: CPT | Mod: HCNC,CPTII,S$GLB, | Performed by: NEUROLOGICAL SURGERY

## 2022-01-12 PROCEDURE — 1101F PT FALLS ASSESS-DOCD LE1/YR: CPT | Mod: HCNC,CPTII,S$GLB, | Performed by: NEUROLOGICAL SURGERY

## 2022-01-12 PROCEDURE — 3078F PR MOST RECENT DIASTOLIC BLOOD PRESSURE < 80 MM HG: ICD-10-PCS | Mod: HCNC,CPTII,S$GLB, | Performed by: NEUROLOGICAL SURGERY

## 2022-01-12 PROCEDURE — 99999 PR PBB SHADOW E&M-EST. PATIENT-LVL III: CPT | Mod: PBBFAC,HCNC,, | Performed by: NEUROLOGICAL SURGERY

## 2022-01-12 PROCEDURE — 3078F DIAST BP <80 MM HG: CPT | Mod: HCNC,CPTII,S$GLB, | Performed by: NEUROLOGICAL SURGERY

## 2022-01-12 PROCEDURE — 1125F AMNT PAIN NOTED PAIN PRSNT: CPT | Mod: HCNC,CPTII,S$GLB, | Performed by: NEUROLOGICAL SURGERY

## 2022-01-12 PROCEDURE — 3008F PR BODY MASS INDEX (BMI) DOCUMENTED: ICD-10-PCS | Mod: HCNC,CPTII,S$GLB, | Performed by: NEUROLOGICAL SURGERY

## 2022-01-12 PROCEDURE — 3074F PR MOST RECENT SYSTOLIC BLOOD PRESSURE < 130 MM HG: ICD-10-PCS | Mod: HCNC,CPTII,S$GLB, | Performed by: NEUROLOGICAL SURGERY

## 2022-01-12 PROCEDURE — 99204 OFFICE O/P NEW MOD 45 MIN: CPT | Mod: HCNC,S$GLB,, | Performed by: NEUROLOGICAL SURGERY

## 2022-01-12 PROCEDURE — 99204 PR OFFICE/OUTPT VISIT, NEW, LEVL IV, 45-59 MIN: ICD-10-PCS | Mod: HCNC,S$GLB,, | Performed by: NEUROLOGICAL SURGERY

## 2022-01-12 PROCEDURE — 3074F SYST BP LT 130 MM HG: CPT | Mod: HCNC,CPTII,S$GLB, | Performed by: NEUROLOGICAL SURGERY

## 2022-01-12 RX ORDER — DULOXETIN HYDROCHLORIDE 60 MG/1
60 CAPSULE, DELAYED RELEASE ORAL 2 TIMES DAILY
Qty: 180 CAPSULE | Refills: 3 | Status: SHIPPED | OUTPATIENT
Start: 2022-01-12 | End: 2022-01-19

## 2022-01-13 NOTE — PROGRESS NOTES
Chief Complaint   Patient presents with    Headache        Erica Marsh is a 71 y.o. female with a history of multiple medical diagnoses as listed below that presents for evaluation of multiple problems.  She said that she cannot dizziness, weakness, pain, and headaches.  She says that the symptoms have been progressively getting worse over time and have not had any symptomatic relief with the medications that she has used thus far.  She is worried about what could be underlying her symptoms.  Workup thus far have been essentially unremarkable for any central source to her symptoms.     PAST MEDICAL HISTORY:  Past Medical History:   Diagnosis Date    Arthritis     Depression     Diabetes mellitus     NO MED SINCE GASTRIC BYPASS has hypoglycemia    Diabetes mellitus type II     GERD (gastroesophageal reflux disease)     Hypertension     NO MED SINCE GASTRIC BYPASS    Kidney disease     Lupus erythematosus     Shingles     Thyroid disease     Trigger finger     RIGHT LONG       PAST SURGICAL HISTORY:  Past Surgical History:   Procedure Laterality Date    APPENDECTOMY      BREAST BIOPSY Left 1979    benign    CHOLECYSTECTOMY      EPIDURAL STEROID INJECTION INTO THORACIC SPINE N/A 6/22/2018    Procedure: Injection-steroid-epidural-thoracic;  Surgeon: Christoph Pratt MD;  Location: [facility];  Service: Pain Management;  Laterality: N/A;  T10-11    EPIDURAL STEROID INJECTION INTO THORACIC SPINE N/A 8/6/2018    Procedure: Injection-steroid-epidural-thoracic;  Surgeon: Christoph Pratt MD;  Location: [facility] OR;  Service: Pain Management;  Laterality: N/A;  T10-11    GASTRIC BYPASS      HAND SURGERY      HYSTERECTOMY      TONSILLECTOMY         SOCIAL HISTORY:  Social History     Socioeconomic History    Marital status:    Tobacco Use    Smoking status: Never Smoker    Smokeless tobacco: Never Used   Substance and Sexual Activity    Alcohol use: No    Drug use: No    Sexual activity: Never     Social  Determinants of Health     Financial Resource Strain: Medium Risk    Difficulty of Paying Living Expenses: Somewhat hard   Food Insecurity: Food Insecurity Present    Worried About Running Out of Food in the Last Year: Often true    Ran Out of Food in the Last Year: Sometimes true   Transportation Needs: Unmet Transportation Needs    Lack of Transportation (Medical): Yes   Physical Activity: Unknown    Days of Exercise per Week: 7 days   Social Connections: Unknown    Frequency of Social Gatherings with Friends and Family: Never    Attends Club or Organization Meetings: Patient refused       FAMILY HISTORY:  No family history on file.    ALLERGIES AND MEDICATIONS: updated and reviewed.  Review of patient's allergies indicates:   Allergen Reactions    Codeine     Sulfa (sulfonamide antibiotics)     Codeine Rash and Other (See Comments)    Plaquenil [hydroxychloroquine] Rash and Other (See Comments)     ONE BRAND OF MED    Sulfa (sulfonamide antibiotics) Rash and Other (See Comments)     Current Outpatient Medications   Medication Sig Dispense Refill    albuterol (ACCUNEB) 1.25 mg/3 mL Nebu INHALE THE CONTENTS OF 1 VIAL (1.25MG TOTAL) VIA NEBULIZER EVERY 6 HOURS AS NEEDED FOR RESCUE 90 mL 11    albuterol-ipratropium (DUO-NEB) 2.5 mg-0.5 mg/3 mL nebulizer solution USE 1 VIAL(3MLS) VIA NEBULIZER EVERY 6 HOURS AS NEEDED FOR WHEEZING 990 mL 2    allopurinoL (ZYLOPRIM) 100 MG tablet Take 1 tablet (100 mg total) by mouth once daily. 90 tablet 3    aspirin 81 MG Chew Take 1 tablet (81 mg total) by mouth once daily.  0    blood sugar diagnostic (ACCU-CHEK JORDAN PLUS TEST STRP) Strp TEST THREE TIMES DAILY BEFORE A MEAL AS DIRECTED 500 strip 0    blood sugar diagnostic Strp To check BG 4 times daily, to use with insurance preferred meter 360 each 0    blood-glucose meter kit AC meals x2 1 each 0    cholecalciferol, vitamin D3, (VITAMIN D3) 25 mcg (1,000 unit) capsule Take 1 capsule (1,000 Units total) by  mouth 2 (two) times daily. 180 capsule 0    citalopram (CELEXA) 20 MG tablet TAKE 1 TABLET BY MOUTH EVERY DAY(AFTER FINISHING 10 MG TABLETS) 30 tablet 11    clonazePAM (KLONOPIN) 1 MG tablet TAKE 1 TABLET BY MOUTH TWICE DAILY FOR ANXIETY 60 tablet 3    cyanocobalamin 1,000 mcg/mL injection Inject 1 mL (1,000 mcg total) into the muscle every 30 days. 10 mL 11    DULoxetine (CYMBALTA) 60 MG capsule Take 1 capsule (60 mg total) by mouth 2 (two) times daily. TAKE 1 CAPSULE EVERY NIGHT AT BEDTIME 180 capsule 3    fluticasone propionate (FLONASE) 50 mcg/actuation nasal spray USE 1 SPRAY IN EACH NOSTRIL TWICE DAILY 48 g 3    folic acid (FOLVITE) 1 MG tablet Take 1 tablet (1,000 mcg total) by mouth once daily. 90 tablet 3    gabapentin (NEURONTIN) 400 MG capsule Take 1 capsule (400 mg total) by mouth 3 (three) times daily. 270 capsule 3    glucagon (GLUCAGON EMERGENCY KIT, HUMAN,) 1 mg SolR Inject 1 mg into the muscle as needed (Use for severe low blood glucose). 1 each 3    lancets (ACCU-CHEK SOFTCLIX LANCETS) Misc Use to test blood sugar three times a day   DX: E08.649 300 each 3    levothyroxine (SYNTHROID) 100 MCG tablet Take 1 tablet (100 mcg total) by mouth once daily. 90 tablet 3    midodrine (PROAMATINE) 5 MG Tab TAKE 1 TABLET(5 MG) BY MOUTH THREE TIMES DAILY WITH MEALS 90 tablet 4    multivitamin-min-iron-FA-vit K 45 mg iron- 800 mcg-120 mcg Cap Take 1 capsule by mouth once daily. 30 capsule 11    nitrofurantoin, macrocrystal-monohydrate, (MACROBID) 100 MG capsule TAKE 1 CAPSULE TWICE DAILY 10 capsule 0    vitamin E 1000 UNIT capsule Take 1,000 Units by mouth once daily.       No current facility-administered medications for this visit.       Review of Systems   Constitutional: Negative for activity change, appetite change, fever and unexpected weight change.   HENT: Negative for trouble swallowing and voice change.    Eyes: Negative for photophobia and visual disturbance.   Respiratory: Negative for  apnea and shortness of breath.    Cardiovascular: Negative for chest pain and leg swelling.   Gastrointestinal: Negative for constipation and nausea.   Genitourinary: Negative for difficulty urinating.   Musculoskeletal: Negative for back pain, gait problem and neck pain.   Skin: Negative for color change and pallor.   Neurological: Negative for dizziness, seizures, syncope, weakness and numbness.   Hematological: Negative for adenopathy.   Psychiatric/Behavioral: Negative for agitation, confusion and decreased concentration.       Neurologic Exam     Mental Status   Oriented to person, place, and time.   Registration: recalls 3 of 3 objects.   Attention: normal. Concentration: normal.   Speech: speech is normal   Level of consciousness: alert  Knowledge: good.     Cranial Nerves     CN II   Right visual field deficit: none  Left visual field deficit: none     CN III, IV, VI   Extraocular motions are normal.   Right pupil: Size: 3 mm. Shape: regular.   Left pupil: Size: 3 mm. Shape: regular.   CN III: no CN III palsy  CN VI: no CN VI palsy  Nystagmus: none   Diplopia: none  Ophthalmoparesis: none  Upgaze: normal  Downgaze: normal  Conjugate gaze: present    CN VII   Facial expression full, symmetric.   Right facial weakness: none  Left facial weakness: none    CN VIII   CN VIII normal.     CN XI   CN XI normal.     CN XII   CN XII normal.   Tongue deviation: none    Motor Exam   Muscle bulk: normal  Overall muscle tone: normal  Right arm tone: normal  Left arm tone: normal  Right leg tone: normal  Left leg tone: normal    Strength   Strength 5/5 throughout.     Gait, Coordination, and Reflexes     Gait  Gait: normal    Coordination   Finger to nose coordination: normal    Tremor   Resting tremor: absent      Physical Exam  Vitals reviewed.   Constitutional:       Appearance: She is well-developed and well-nourished.   HENT:      Head: Normocephalic and atraumatic.   Eyes:      Extraocular Movements: EOM normal.  "  Pulmonary:      Effort: Pulmonary effort is normal. No respiratory distress or apnea.   Musculoskeletal:         General: Normal range of motion.      Cervical back: Normal range of motion.   Neurological:      Mental Status: She is alert and oriented to person, place, and time.      Coordination: Finger-Nose-Finger Test normal.      Gait: Gait is intact.      Deep Tendon Reflexes: Strength normal.   Psychiatric:         Mood and Affect: Mood and affect normal.         Speech: Speech normal.         Behavior: Behavior normal.         Thought Content: Thought content normal.         Vitals:    01/12/22 1336   BP: 113/64   Pulse: 69   Weight: 72.7 kg (160 lb 4.4 oz)   Height: 5' 6" (1.676 m)       Assessment & Plan:    Problem List Items Addressed This Visit    None     Visit Diagnoses     Chronic fatigue syndrome with fibromyalgia        Relevant Medications    DULoxetine (CYMBALTA) 60 MG capsule    Other Relevant Orders    Ambulatory referral/consult to Physical/Occupational Therapy          Follow-up: No follow-ups on file.    This note was done with the assistance of voice recognition software. Some errors may be present after proofreading.        "

## 2022-01-19 DIAGNOSIS — G57.00 SCIATIC NEUROPATHY, UNSPECIFIED LATERALITY: ICD-10-CM

## 2022-01-19 DIAGNOSIS — M45.5 ANKYLOSING SPONDYLITIS OF THORACOLUMBAR REGION: ICD-10-CM

## 2022-01-19 DIAGNOSIS — M32.9 SYSTEMIC LUPUS ERYTHEMATOSUS, UNSPECIFIED SLE TYPE, UNSPECIFIED ORGAN INVOLVEMENT STATUS: ICD-10-CM

## 2022-01-19 DIAGNOSIS — G89.29 CHRONIC LOW BACK PAIN WITH BILATERAL SCIATICA, UNSPECIFIED BACK PAIN LATERALITY: ICD-10-CM

## 2022-01-19 DIAGNOSIS — F33.9 RECURRENT MAJOR DEPRESSION RESISTANT TO TREATMENT: ICD-10-CM

## 2022-01-19 DIAGNOSIS — M43.26 ANKYLOSIS OF LUMBAR SPINE: ICD-10-CM

## 2022-01-19 DIAGNOSIS — M54.41 CHRONIC LOW BACK PAIN WITH BILATERAL SCIATICA, UNSPECIFIED BACK PAIN LATERALITY: ICD-10-CM

## 2022-01-19 DIAGNOSIS — M54.42 CHRONIC LOW BACK PAIN WITH BILATERAL SCIATICA, UNSPECIFIED BACK PAIN LATERALITY: ICD-10-CM

## 2022-01-19 RX ORDER — VIT C/E/ZN/COPPR/LUTEIN/ZEAXAN 250MG-90MG
1000 CAPSULE ORAL 2 TIMES DAILY
Qty: 180 CAPSULE | Refills: 3 | Status: SHIPPED | OUTPATIENT
Start: 2022-01-19 | End: 2022-07-13

## 2022-01-19 RX ORDER — CLONAZEPAM 1 MG/1
TABLET ORAL
Qty: 60 TABLET | Refills: 3 | Status: SHIPPED | OUTPATIENT
Start: 2022-01-19 | End: 2022-03-03 | Stop reason: SDUPTHER

## 2022-01-19 NOTE — TELEPHONE ENCOUNTER
No new care gaps identified.  Powered by [employer]. Reference number: [id number].   1/19/2022 10:38:36 AM CST

## 2022-02-22 ENCOUNTER — CLINICAL SUPPORT (OUTPATIENT)
Dept: REHABILITATION | Facility: HOSPITAL | Age: 72
End: 2022-02-22
Payer: MEDICARE

## 2022-02-22 DIAGNOSIS — M62.81 MUSCLE WEAKNESS: ICD-10-CM

## 2022-02-22 DIAGNOSIS — R26.2 DIFFICULTY WALKING: ICD-10-CM

## 2022-02-22 PROCEDURE — 97110 THERAPEUTIC EXERCISES: CPT | Mod: HCNC,PN

## 2022-02-22 PROCEDURE — 97161 PT EVAL LOW COMPLEX 20 MIN: CPT | Mod: HCNC,PN

## 2022-02-22 NOTE — PLAN OF CARE
[facility]  Physical Therapy Initial Evaluation    Name: Erica Marsh  Clinic Number: [id number]    Therapy Diagnosis:   Encounter Diagnoses   Name Primary?    Difficulty walking     Muscle weakness      Physician: Alvino Bower MD    Physician Orders: PT Eval and Treat   Medical Diagnosis from Referral: R53.82,M79.7 (ICD-10-CM) - Chronic fatigue syndrome with fibromyalgia  Evaluation Date: 2/22/2022  Plan of Care Expiration: 5/22/22    Authorization Period Expiration: 1/12/23  Visit # / Visits authorized: 1/ 1    Time In: 1515  Time Out: 1600    Total Billable Time: 45 minutes    Precautions: Standard    Subjective   Date of onset: one year    History of current condition:   Erica  is a 71 year old female presenting with c/o low back pain and bilateral lower extremity weakness.  She report she a possible CVA a year ago causing weakness and of her legs and arms.  She states some Doctors called it a stroke and some have not. She presents today ambulating with FWW.  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  She also c/o low back pain with prolonged sitting, standing and walking. She states she has not been able to participate in housework or cooking. She states she has trouble concentrating.  She reports 3 falls prior to the stroke but has not fallen over the past year.  Her goal is to possibly return to prior level of function.      Medical History:   Past Medical History:   Diagnosis Date    Arthritis     Depression     Diabetes mellitus     NO MED SINCE GASTRIC BYPASS has hypoglycemia    Diabetes mellitus type II     GERD (gastroesophageal reflux disease)     Hypertension     NO MED SINCE GASTRIC BYPASS    Kidney disease     Lupus erythematosus     Shingles     Thyroid disease     Trigger finger     RIGHT LONG       Surgical History:   Erica Marsh  has a past surgical history that includes Gastric bypass; Tonsillectomy; Hysterectomy; Appendectomy; Cholecystectomy; Hand surgery;  Epidural steroid injection into thoracic spine (N/A, 6/22/2018); Epidural steroid injection into thoracic spine (N/A, 8/6/2018); and Breast biopsy (Left, 1979).    Medications:   Erica has a current medication list which includes the following prescription(s): albuterol, albuterol-ipratropium, allopurinol, aspirin, blood sugar diagnostic, blood sugar diagnostic, blood-glucose meter, cholecalciferol (vitamin d3), citalopram, clonazepam, cyanocobalamin, duloxetine, fluticasone propionate, folic acid, gabapentin, glucagon emergency kit (human), lancets, levothyroxine, midodrine, multivitamin-min-iron-fa-vit k, nitrofurantoin (macrocrystal-monohydrate), vitamin e, and [DISCONTINUED] quetiapine.    Allergies:   Review of patient's allergies indicates:   Allergen Reactions    Codeine     Sulfa (sulfonamide antibiotics)     Codeine Rash and Other (See Comments)    Plaquenil [hydroxychloroquine] Rash and Other (See Comments)     ONE BRAND OF MED    Sulfa (sulfonamide antibiotics) Rash and Other (See Comments)        Prior Therapy:  Hand therapy years ago  Social History:  Lives with   Occupation:  Retired medical technologist  Prior Level of Function: independent  Current Level of Function: 4WW in the community    Pain:  Current 7/10, worst 10/10, best 7/10   Location: lumbar       Aggravating Factors: sitting, standing, walking, housework  Easing Factors: nothing    Pts goals:  Her goal is to possibly return to prior level of function.     Objective     Observation: pt presents ambulating with a FWW, slow gait speed    Range of Motion/Strength:      AROM: Bilateral LE: Grossly WFL  MMT:  Right LE: 3+   Left LE: 3+  Abdominal Strength: 3+/5    Mobility: L/S p/a grade 1+  Flexibility: hip flexor length: fair      Hamstring Length: fair    Bed Mobility:Independent  Transfers: Independent    Special Tests:   Tug test: 58 seconds with FWW    CMS Impairment/Limitation/Restriction for FOTO Muscle, Tendon + Soft  Tissue Disorders Survey  Status Limitation G-Code CMS Severity Modifier  Intake 33% 67% Current Status CL - At least 60 percent but less than 80 percent  Predicted 40% 60% Goal Status+ CL - At least 60 percent but less than 80 percent    TREATMENT     Treatment Time In: 1545  Treatment Time Out: 1600    Total Treatment time separate from Evaluation: 15 minutes    Erica received therapeutic exercises to develop strength, endurance, ROM, flexibility, posture and core stabilization for 10 minutes including:   -sit to stand x 5  -standing hip abd 3 x 10  -heel raises 2 x 10  -standing marching 2 x 10    Education provided:   - HEP compliance    Written Home Exercises Provided: yes.    Exercises were reviewed and Erica was able to demonstrate them prior to the end of the session.  Erica demonstrated good  understanding of the education provided.     See EMR under Patient Instructions for exercises provided 2/22/2022.    Assessment     Pt presents with signs and symptoms consistent with referring diagnosis. Evaluation has determined a decrease in functional status and subjective and objective deficits that can be addressed by physical therapy intervention. Pt demonstrates pain limiting functional activities. Decreased flexibility and strength limiting normal movement patterns. Decreased segmental motion. Decreased postural strength and awareness. Positive special testing. Decreased participation in functional and recreational activities. Subjective and objective measures are addressed by goals in the plan of care.  Patient/family are involved in the development of these goals. Patient/family are educated about current injury and treatment.       Plan of care was dicussed with patient. Pt will benefit from skilled outpatient Physical Therapy to address the deficits stated above and in the chart below, provide pt/family education, and to maximize pt's level of independence. Pt's spiritual, cultural and educational needs  considered and patient is agreeable to the plan of care and goals as stated below:     Pt prognosis is Good.  Anticipated Barriers for therapy: none    Medical Necessity is demonstrated by the following  History  Co-morbidities and personal factors that may impact the plan of care Co-morbidities:   Arthritis    Depression    Diabetes mellitus    NO MED SINCE GASTRIC BYPASS has hypoglycemia   Diabetes mellitus type II    GERD (gastroesophageal reflux disease)    Hypertension    NO MED SINCE GASTRIC BYPASS   Kidney disease    Lupus erythematosus    Shingles    Thyroid disease    Trigger finger    RIGHT LONG       Personal Factors:   no deficits     low   Examination  Body Structures and Functions, activity limitations and participation restrictions that may impact the plan of care Body Regions:   back  lower extremities    Body Systems:    gross symmetry  ROM  strength  balance  gait  transitions    Participation Restrictions:   Housework, cooking, ADL's    Activity limitations:   Learning and applying knowledge  no deficits    General Tasks and Commands  no deficits    Communication  no deficits    Mobility  lifting and carrying objects  walking    Self care  dressing    Domestic Life  shopping  cooking  doing house work (cleaning house, washing dishes, laundry)    Interactions/Relationships  no deficits    Life Areas  no deficits    Community and Social Life  community life         low   Clinical Presentation stable and uncomplicated low   Decision Making/ Complexity Score: low     Goals:    Short Term Goals (4 Weeks):     1.Pt to increase strength by a 1/2 grade of muscles test to allow for improvement in functional activities such as performing chores.  2.Pt to improve range of motion by 25% to allow for improved functional mobility to allow for improvement in IADLs.   3.Pt to report compliance with HEP and demonstrate proper exercise technique to PT to show competence with self management of  condition.  4.Decrease pain by 25% during functional activities.  5. Improve TUG time to 40 seconds with appropriate AD    Long Term Goals (12 Weeks):     1. Increase ROM to allow improved joint biomechanics during functional activities.   2.Increase trunk and lower extremity strength to within normal limits during functional activities.   3. Independent with home exercise program.   4. Full return to functional activities with manageable complaints.  5. Patient to demonstrate improved posture and body mechanics.  6. Decrease pain by 75% during functional activities.  7. Improve Tug Time to 25 seconds without an AD    Plan     Plan of care Certification: 2/22/2022 to 5/22/22.    Recommended Treatment Plan: 2 times per week for 12 weeks with treatments to consist of:  Neuromuscular and postural re-education,  training, therapeutic exercise, therapeutic activities,balance training, gait training, manual therapy, soft tissue mobilization, ROM exercises, Cardiovascular,  Postural stabilization, manual traction, spinal mobilization, moist heat, cryotherapy, electrical stimulation, ultrasound, home exercise education and planning.    Ralph Sanchez, PT

## 2022-02-23 DIAGNOSIS — D84.9 IMMUNOSUPPRESSED STATUS: ICD-10-CM

## 2022-02-24 ENCOUNTER — LAB VISIT (OUTPATIENT)
Dept: LAB | Facility: HOSPITAL | Age: 72
End: 2022-02-24
Attending: FAMILY MEDICINE
Payer: MEDICARE

## 2022-02-24 DIAGNOSIS — M32.9 SYSTEMIC LUPUS ERYTHEMATOSUS, UNSPECIFIED SLE TYPE, UNSPECIFIED ORGAN INVOLVEMENT STATUS: ICD-10-CM

## 2022-02-24 DIAGNOSIS — E07.9 THYROID DISEASE: ICD-10-CM

## 2022-02-24 LAB
ALBUMIN SERPL BCP-MCNC: 3.8 G/DL (ref 3.5–5.2)
ALP SERPL-CCNC: 86 U/L (ref 55–135)
ALT SERPL W/O P-5'-P-CCNC: 17 U/L (ref 10–44)
ANION GAP SERPL CALC-SCNC: 17 MMOL/L (ref 8–16)
AST SERPL-CCNC: 23 U/L (ref 10–40)
BASOPHILS # BLD AUTO: 0.09 K/UL (ref 0–0.2)
BASOPHILS NFR BLD: 0.9 % (ref 0–1.9)
BILIRUB SERPL-MCNC: 0.5 MG/DL (ref 0.1–1)
BUN SERPL-MCNC: 17 MG/DL (ref 8–23)
CALCIUM SERPL-MCNC: 9.7 MG/DL (ref 8.7–10.5)
CHLORIDE SERPL-SCNC: 106 MMOL/L (ref 95–110)
CO2 SERPL-SCNC: 19 MMOL/L (ref 23–29)
CREAT SERPL-MCNC: 1.2 MG/DL (ref 0.5–1.4)
CRP SERPL-MCNC: 0.8 MG/L (ref 0–8.2)
DIFFERENTIAL METHOD: ABNORMAL
EOSINOPHIL # BLD AUTO: 0.4 K/UL (ref 0–0.5)
EOSINOPHIL NFR BLD: 4.5 % (ref 0–8)
ERYTHROCYTE [DISTWIDTH] IN BLOOD BY AUTOMATED COUNT: 13.3 % (ref 11.5–14.5)
ERYTHROCYTE [SEDIMENTATION RATE] IN BLOOD BY WESTERGREN METHOD: 30 MM/HR (ref 0–36)
EST. GFR  (AFRICAN AMERICAN): 52.9 ML/MIN/1.73 M^2
EST. GFR  (NON AFRICAN AMERICAN): 45.9 ML/MIN/1.73 M^2
GLUCOSE SERPL-MCNC: 176 MG/DL (ref 70–110)
HCT VFR BLD AUTO: 46.8 % (ref 37–48.5)
HGB BLD-MCNC: 14.6 G/DL (ref 12–16)
IMM GRANULOCYTES # BLD AUTO: 0.04 K/UL (ref 0–0.04)
IMM GRANULOCYTES NFR BLD AUTO: 0.4 % (ref 0–0.5)
LYMPHOCYTES # BLD AUTO: 4.7 K/UL (ref 1–4.8)
LYMPHOCYTES NFR BLD: 49.1 % (ref 18–48)
MCH RBC QN AUTO: 31.7 PG (ref 27–31)
MCHC RBC AUTO-ENTMCNC: 31.2 G/DL (ref 32–36)
MCV RBC AUTO: 102 FL (ref 82–98)
MONOCYTES # BLD AUTO: 0.4 K/UL (ref 0.3–1)
MONOCYTES NFR BLD: 4.4 % (ref 4–15)
NEUTROPHILS # BLD AUTO: 3.9 K/UL (ref 1.8–7.7)
NEUTROPHILS NFR BLD: 40.7 % (ref 38–73)
NRBC BLD-RTO: 0 /100 WBC
PLATELET # BLD AUTO: 323 K/UL (ref 150–450)
PMV BLD AUTO: 9.9 FL (ref 9.2–12.9)
POTASSIUM SERPL-SCNC: 4.1 MMOL/L (ref 3.5–5.1)
PROT SERPL-MCNC: 7.3 G/DL (ref 6–8.4)
RBC # BLD AUTO: 4.61 M/UL (ref 4–5.4)
SODIUM SERPL-SCNC: 142 MMOL/L (ref 136–145)
T4 SERPL-MCNC: 5.7 UG/DL (ref 4.5–11.5)
TSH SERPL DL<=0.005 MIU/L-ACNC: 2.31 UIU/ML (ref 0.4–4)
WBC # BLD AUTO: 9.5 K/UL (ref 3.9–12.7)

## 2022-02-24 PROCEDURE — 84443 ASSAY THYROID STIM HORMONE: CPT | Mod: HCNC | Performed by: FAMILY MEDICINE

## 2022-02-24 PROCEDURE — 86140 C-REACTIVE PROTEIN: CPT | Mod: HCNC | Performed by: FAMILY MEDICINE

## 2022-02-24 PROCEDURE — 36415 COLL VENOUS BLD VENIPUNCTURE: CPT | Mod: HCNC,PO | Performed by: FAMILY MEDICINE

## 2022-02-24 PROCEDURE — 80053 COMPREHEN METABOLIC PANEL: CPT | Mod: HCNC | Performed by: FAMILY MEDICINE

## 2022-02-24 PROCEDURE — 84436 ASSAY OF TOTAL THYROXINE: CPT | Mod: HCNC | Performed by: FAMILY MEDICINE

## 2022-02-24 PROCEDURE — 85025 COMPLETE CBC W/AUTO DIFF WBC: CPT | Mod: HCNC | Performed by: FAMILY MEDICINE

## 2022-02-24 PROCEDURE — 85652 RBC SED RATE AUTOMATED: CPT | Mod: HCNC | Performed by: FAMILY MEDICINE

## 2022-03-03 ENCOUNTER — OFFICE VISIT (OUTPATIENT)
Dept: FAMILY MEDICINE | Facility: CLINIC | Age: 72
End: 2022-03-03
Payer: MEDICARE

## 2022-03-03 VITALS
HEART RATE: 65 BPM | DIASTOLIC BLOOD PRESSURE: 68 MMHG | OXYGEN SATURATION: 99 % | SYSTOLIC BLOOD PRESSURE: 120 MMHG | BODY MASS INDEX: 26.12 KG/M2 | HEIGHT: 66 IN | RESPIRATION RATE: 16 BRPM | WEIGHT: 162.5 LBS | TEMPERATURE: 98 F

## 2022-03-03 DIAGNOSIS — G89.29 CHRONIC LOW BACK PAIN WITH BILATERAL SCIATICA, UNSPECIFIED BACK PAIN LATERALITY: ICD-10-CM

## 2022-03-03 DIAGNOSIS — R73.9 HYPERGLYCEMIA: ICD-10-CM

## 2022-03-03 DIAGNOSIS — G57.00 SCIATIC NEUROPATHY, UNSPECIFIED LATERALITY: ICD-10-CM

## 2022-03-03 DIAGNOSIS — M43.26 ANKYLOSIS OF LUMBAR SPINE: ICD-10-CM

## 2022-03-03 DIAGNOSIS — N18.31 STAGE 3A CHRONIC KIDNEY DISEASE: ICD-10-CM

## 2022-03-03 DIAGNOSIS — E78.49 OTHER HYPERLIPIDEMIA: ICD-10-CM

## 2022-03-03 DIAGNOSIS — M54.41 CHRONIC LOW BACK PAIN WITH BILATERAL SCIATICA, UNSPECIFIED BACK PAIN LATERALITY: ICD-10-CM

## 2022-03-03 DIAGNOSIS — F33.9 RECURRENT MAJOR DEPRESSION RESISTANT TO TREATMENT: ICD-10-CM

## 2022-03-03 DIAGNOSIS — E27.40 ADRENAL INSUFFICIENCY: ICD-10-CM

## 2022-03-03 DIAGNOSIS — J45.990 ASTHMA, EXERCISE INDUCED: ICD-10-CM

## 2022-03-03 DIAGNOSIS — M32.9 SYSTEMIC LUPUS ERYTHEMATOSUS, UNSPECIFIED SLE TYPE, UNSPECIFIED ORGAN INVOLVEMENT STATUS: Primary | ICD-10-CM

## 2022-03-03 DIAGNOSIS — M54.42 CHRONIC LOW BACK PAIN WITH BILATERAL SCIATICA, UNSPECIFIED BACK PAIN LATERALITY: ICD-10-CM

## 2022-03-03 DIAGNOSIS — J30.1 CHRONIC SEASONAL ALLERGIC RHINITIS DUE TO POLLEN: ICD-10-CM

## 2022-03-03 DIAGNOSIS — M45.5 ANKYLOSING SPONDYLITIS OF THORACOLUMBAR REGION: ICD-10-CM

## 2022-03-03 PROCEDURE — 99999 PR PBB SHADOW E&M-EST. PATIENT-LVL V: CPT | Mod: PBBFAC,,, | Performed by: FAMILY MEDICINE

## 2022-03-03 PROCEDURE — 1101F PR PT FALLS ASSESS DOC 0-1 FALLS W/OUT INJ PAST YR: ICD-10-PCS | Mod: CPTII,S$GLB,, | Performed by: FAMILY MEDICINE

## 2022-03-03 PROCEDURE — 1159F MED LIST DOCD IN RCRD: CPT | Mod: CPTII,S$GLB,, | Performed by: FAMILY MEDICINE

## 2022-03-03 PROCEDURE — 99999 PR PBB SHADOW E&M-EST. PATIENT-LVL V: ICD-10-PCS | Mod: PBBFAC,,, | Performed by: FAMILY MEDICINE

## 2022-03-03 PROCEDURE — 3074F PR MOST RECENT SYSTOLIC BLOOD PRESSURE < 130 MM HG: ICD-10-PCS | Mod: CPTII,S$GLB,, | Performed by: FAMILY MEDICINE

## 2022-03-03 PROCEDURE — 1101F PT FALLS ASSESS-DOCD LE1/YR: CPT | Mod: CPTII,S$GLB,, | Performed by: FAMILY MEDICINE

## 2022-03-03 PROCEDURE — 3078F PR MOST RECENT DIASTOLIC BLOOD PRESSURE < 80 MM HG: ICD-10-PCS | Mod: CPTII,S$GLB,, | Performed by: FAMILY MEDICINE

## 2022-03-03 PROCEDURE — 3074F SYST BP LT 130 MM HG: CPT | Mod: CPTII,S$GLB,, | Performed by: FAMILY MEDICINE

## 2022-03-03 PROCEDURE — 1159F PR MEDICATION LIST DOCUMENTED IN MEDICAL RECORD: ICD-10-PCS | Mod: CPTII,S$GLB,, | Performed by: FAMILY MEDICINE

## 2022-03-03 PROCEDURE — 99214 OFFICE O/P EST MOD 30 MIN: CPT | Mod: S$GLB,,, | Performed by: FAMILY MEDICINE

## 2022-03-03 PROCEDURE — 1126F PR PAIN SEVERITY QUANTIFIED, NO PAIN PRESENT: ICD-10-PCS | Mod: CPTII,S$GLB,, | Performed by: FAMILY MEDICINE

## 2022-03-03 PROCEDURE — 3288F PR FALLS RISK ASSESSMENT DOCUMENTED: ICD-10-PCS | Mod: CPTII,S$GLB,, | Performed by: FAMILY MEDICINE

## 2022-03-03 PROCEDURE — 1160F PR REVIEW ALL MEDS BY PRESCRIBER/CLIN PHARMACIST DOCUMENTED: ICD-10-PCS | Mod: CPTII,S$GLB,, | Performed by: FAMILY MEDICINE

## 2022-03-03 PROCEDURE — 3008F BODY MASS INDEX DOCD: CPT | Mod: CPTII,S$GLB,, | Performed by: FAMILY MEDICINE

## 2022-03-03 PROCEDURE — 99214 PR OFFICE/OUTPT VISIT, EST, LEVL IV, 30-39 MIN: ICD-10-PCS | Mod: S$GLB,,, | Performed by: FAMILY MEDICINE

## 2022-03-03 PROCEDURE — 1160F RVW MEDS BY RX/DR IN RCRD: CPT | Mod: CPTII,S$GLB,, | Performed by: FAMILY MEDICINE

## 2022-03-03 PROCEDURE — 3078F DIAST BP <80 MM HG: CPT | Mod: CPTII,S$GLB,, | Performed by: FAMILY MEDICINE

## 2022-03-03 PROCEDURE — 3288F FALL RISK ASSESSMENT DOCD: CPT | Mod: CPTII,S$GLB,, | Performed by: FAMILY MEDICINE

## 2022-03-03 PROCEDURE — 3008F PR BODY MASS INDEX (BMI) DOCUMENTED: ICD-10-PCS | Mod: CPTII,S$GLB,, | Performed by: FAMILY MEDICINE

## 2022-03-03 PROCEDURE — 1126F AMNT PAIN NOTED NONE PRSNT: CPT | Mod: CPTII,S$GLB,, | Performed by: FAMILY MEDICINE

## 2022-03-03 RX ORDER — MIDODRINE HYDROCHLORIDE 5 MG/1
TABLET ORAL
Qty: 90 TABLET | Refills: 4 | Status: SHIPPED | OUTPATIENT
Start: 2022-03-03 | End: 2022-06-03 | Stop reason: SDUPTHER

## 2022-03-03 RX ORDER — METHOCARBAMOL 500 MG/1
500 TABLET, FILM COATED ORAL 2 TIMES DAILY PRN
Qty: 20 TABLET | Refills: 2 | Status: SHIPPED | OUTPATIENT
Start: 2022-03-03 | End: 2022-03-13

## 2022-03-03 RX ORDER — CLONAZEPAM 1 MG/1
TABLET ORAL
Qty: 60 TABLET | Refills: 3 | Status: SHIPPED | OUTPATIENT
Start: 2022-03-03 | End: 2022-07-13 | Stop reason: SDUPTHER

## 2022-03-03 RX ORDER — FLUTICASONE PROPIONATE 50 MCG
1 SPRAY, SUSPENSION (ML) NASAL 2 TIMES DAILY
Qty: 48 G | Refills: 3 | Status: SHIPPED | OUTPATIENT
Start: 2022-03-03 | End: 2022-12-20 | Stop reason: SDUPTHER

## 2022-03-04 ENCOUNTER — CLINICAL SUPPORT (OUTPATIENT)
Dept: REHABILITATION | Facility: HOSPITAL | Age: 72
End: 2022-03-04
Payer: MEDICARE

## 2022-03-04 DIAGNOSIS — R26.2 DIFFICULTY WALKING: Primary | ICD-10-CM

## 2022-03-04 DIAGNOSIS — M62.81 MUSCLE WEAKNESS: ICD-10-CM

## 2022-03-04 PROCEDURE — 97110 THERAPEUTIC EXERCISES: CPT | Mod: PN

## 2022-03-04 NOTE — PROGRESS NOTES
Subjective:       Patient ID: Erica Marsh is a 71 y.o. female.    Chief Complaint: Follow-up    She has fibromyalgia with DD of lumbar spine.  She has generalized chronic pain.  She has chronic Major depression with partial results with medications with poor response to medications.  She has nihilistic behavior, non suicidal.    frequent Hypoglycemia port Gastric bypass improved.  She has mildly elevated blood sugar on the recent chemistry..  He has gained approximately 5 lb within last 3 months.  BMI 26. She is sedentary.  She has poor social skills.   SLE under Rheumatology, needs eye and rheumatology follow up  Fibromyalgia under Cymabalta with partial improvement.    Follow-up  Associated symptoms include arthralgias, fatigue, myalgias and neck pain. Pertinent negatives include no abdominal pain, chest pain, chills, coughing, diaphoresis, headaches, numbness or rash.   Back Pain  This is a chronic problem. The problem occurs intermittently. The problem has been gradually worsening since onset. The pain is present in the thoracic spine. The pain is at a severity of 7/10. The pain is moderate. The pain is worse during the night. The symptoms are aggravated by lying down. Associated symptoms include leg pain. Pertinent negatives include no abdominal pain, bladder incontinence, bowel incontinence, chest pain, dysuria, headaches, numbness or pelvic pain.   Leg Pain   Pertinent negatives include no numbness.     Review of Systems   Constitutional: Positive for fatigue. Negative for activity change, appetite change, chills and diaphoresis.   HENT: Negative.  Negative for facial swelling, hearing loss, nosebleeds, postnasal drip, sneezing and trouble swallowing.    Eyes: Positive for visual disturbance. Negative for photophobia, pain, discharge, redness and itching.   Respiratory: Negative for apnea, cough, chest tightness, shortness of breath and wheezing.    Cardiovascular: Negative.  Negative for chest pain,  palpitations and leg swelling.   Gastrointestinal: Negative.  Negative for abdominal distention, abdominal pain, anal bleeding, blood in stool, bowel incontinence and diarrhea.   Endocrine: Negative.  Negative for cold intolerance, heat intolerance, polydipsia, polyphagia and polyuria.   Genitourinary: Positive for difficulty urinating and frequency. Negative for bladder incontinence, dysuria, genital sores, hematuria, pelvic pain, urgency, vaginal bleeding and vaginal discharge.   Musculoskeletal: Positive for arthralgias, back pain, myalgias and neck pain. Negative for gait problem and neck stiffness.   Skin: Negative.  Negative for color change, pallor and rash.   Allergic/Immunologic: Negative.  Negative for environmental allergies and food allergies.   Neurological: Positive for tremors and light-headedness. Negative for dizziness, seizures, syncope, speech difficulty, numbness and headaches.   Hematological: Negative for adenopathy.   Psychiatric/Behavioral: Positive for decreased concentration. Negative for agitation, behavioral problems, confusion, hallucinations, self-injury and sleep disturbance. The patient is nervous/anxious. The patient is not hyperactive.        Patient Active Problem List   Diagnosis    Lupus (systemic lupus erythematosus)    GERD (gastroesophageal reflux disease)    Arthritis    Trigger finger    Abdominal pain, other specified site    Constipation    Change in bowel habits    Rectal bleeding    Hypoglycemia following gastrointestinal surgery    Depression with anxiety    Other spondylosis, lumbar region    Sinus bradycardia    DDD (degenerative disc disease), thoracic    Palpitations    Chest pain, atypical    HUGHES (dyspnea on exertion)    Recurrent major depression resistant to treatment    Acquired hypothyroidism    Current chronic use of systemic steroids    H/O gastric bypass    Difficulty walking    Muscle weakness       Objective:      Physical  Exam  Vitals reviewed.   Constitutional:       Appearance: She is well-developed. She is ill-appearing. She is not diaphoretic.   HENT:      Head: Normocephalic and atraumatic.      Right Ear: External ear normal. Decreased hearing noted.      Left Ear: External ear normal. Decreased hearing noted.      Nose: Nose normal.      Mouth/Throat:      Pharynx: No oropharyngeal exudate.   Eyes:      General: No scleral icterus.        Right eye: No discharge.         Left eye: No discharge.      Conjunctiva/sclera: Conjunctivae normal.      Pupils: Pupils are equal, round, and reactive to light.   Neck:      Thyroid: No thyromegaly.      Vascular: No JVD.      Trachea: No tracheal deviation.   Cardiovascular:      Rate and Rhythm: Normal rate.      Heart sounds: Normal heart sounds. No murmur heard.    No friction rub. No gallop.   Pulmonary:      Effort: Pulmonary effort is normal. No respiratory distress.      Breath sounds: No stridor. No wheezing or rales.   Chest:      Chest wall: No tenderness.   Abdominal:      General: Bowel sounds are normal. There is no distension.      Palpations: Abdomen is soft. There is no mass.      Tenderness: There is no abdominal tenderness. There is no guarding or rebound.   Musculoskeletal:      Right shoulder: Tenderness present. Decreased range of motion.      Cervical back: Normal range of motion and neck supple.      Thoracic back: Tenderness and bony tenderness present. Decreased range of motion.      Lumbar back: Tenderness and bony tenderness present. Decreased range of motion.      Comments: She has progressive hyperesthesia, mild scoliosis, mild DDD, cervical and lumbar radiculopathy. Hx of Herpes. Poor anxiety controlled, poor exercise , poor family support.   Lymphadenopathy:      Cervical: No cervical adenopathy.   Skin:     General: Skin is dry.      Coloration: Skin is not pale.      Findings: No erythema or rash.   Neurological:      Mental Status: She is alert and  oriented to person, place, and time.      Cranial Nerves: No cranial nerve deficit.      Motor: No abnormal muscle tone.      Coordination: Coordination normal.      Deep Tendon Reflexes: Reflexes normal.   Psychiatric:         Attention and Perception: Perception normal. She is inattentive.         Mood and Affect: Mood is depressed. Affect is blunt and flat.         Speech: Speech normal.         Behavior: Behavior is slowed and withdrawn.         Thought Content: Thought content normal.         Cognition and Memory: Cognition normal.         Judgment: Judgment normal.         Lab Results   Component Value Date    WBC 9.50 02/24/2022    HGB 14.6 02/24/2022    HCT 46.8 02/24/2022     02/24/2022    CHOL 190 04/18/2019    TRIG 57 04/18/2019    HDL 81 (H) 04/18/2019    ALT 17 02/24/2022    AST 23 02/24/2022     02/24/2022    K 4.1 02/24/2022     02/24/2022    CREATININE 1.2 02/24/2022    BUN 17 02/24/2022    CO2 19 (L) 02/24/2022    TSH 2.314 02/24/2022    INR 1.0 05/08/2020    HGBA1C 5.5 03/25/2021     The 10-year ASCVD risk score (Sarahyesi CALIXTO Jr., et al., 2013) is: 7.7%    Values used to calculate the score:      Age: 71 years      Sex: Female      Is Non- : No      Diabetic: No      Tobacco smoker: No      Systolic Blood Pressure: 120 mmHg      Is BP treated: No      HDL Cholesterol: 81 mg/dL      Total Cholesterol: 190 mg/dL  Point of care blood sugar 110. Patient compliant with diet on monitoring her blood sugars.  Chronic prior myalgia pain does not qualify for opiates.   Assessment:       1. Systemic lupus erythematosus, unspecified SLE type, unspecified organ involvement status    2. Ankylosing spondylitis of thoracolumbar region    3. Stage 3a chronic kidney disease    4. Hyperglycemia    5. Other hyperlipidemia    6. Recurrent major depression resistant to treatment    7. Sciatic neuropathy, unspecified laterality    8. Ankylosis of lumbar spine    9. Chronic low back  pain with bilateral sciatica, unspecified back pain laterality    10. Chronic seasonal allergic rhinitis due to pollen    11. Asthma, exercise induced    12. Adrenal insufficiency        Plan:       Thyroid disease  -     TSH; Future; Expected date: 07/30/2021  -     T4; Future; Expected date: 07/30/2021  -     levothyroxine (SYNTHROID) 100 MCG tablet; Take 1 tablet (100 mcg total) by mouth once daily.  Dispense: 90 tablet; Refill: 3    Iron deficiency anemia following bariatric surgery  -     Ferritin; Future; Expected date: 07/30/2021  -     Iron and TIBC; Future; Expected date: 07/30/2021  -     Vitamin D; Future; Expected date: 07/30/2021  -     TSH; Future; Expected date: 07/30/2021  -     T4; Future; Expected date: 07/30/2021    Other screening mammogram  -     Mammo Digital Screening Bilat w/ [place]; Future; Expected date: 07/30/2021    Recurrent major depression resistant to treatment  -     clonazePAM (KLONOPIN) 1 MG tablet; TAKE 1 TABLET BY MOUTH TWICE DAILY FOR ANXIETY  Dispense: 60 tablet; Refill: 3    Systemic lupus erythematosus, unspecified SLE type, unspecified organ involvement status  -     clonazePAM (KLONOPIN) 1 MG tablet; TAKE 1 TABLET BY MOUTH TWICE DAILY FOR ANXIETY  Dispense: 60 tablet; Refill: 3    Sciatic neuropathy, unspecified laterality  -     clonazePAM (KLONOPIN) 1 MG tablet; TAKE 1 TABLET BY MOUTH TWICE DAILY FOR ANXIETY  Dispense: 60 tablet; Refill: 3    Ankylosis of lumbar spine  -     clonazePAM (KLONOPIN) 1 MG tablet; TAKE 1 TABLET BY MOUTH TWICE DAILY FOR ANXIETY  Dispense: 60 tablet; Refill: 3  -     LIDOcaine (LIDODERM) 5 %; Remove & Discard patch within 12 hours or as directed by MD  Dispense: 60 patch; Refill: 4    Ankylosing spondylitis of thoracolumbar region  -     clonazePAM (KLONOPIN) 1 MG tablet; TAKE 1 TABLET BY MOUTH TWICE DAILY FOR ANXIETY  Dispense: 60 tablet; Refill: 3    Chronic low back pain with bilateral sciatica, unspecified back pain laterality  -      clonazePAM (KLONOPIN) 1 MG tablet; TAKE 1 TABLET BY MOUTH TWICE DAILY FOR ANXIETY  Dispense: 60 tablet; Refill: 3    Ataxia due to recent stroke  -     gabapentin (NEURONTIN) 300 MG capsule; Take 1 capsule (300 mg total) by mouth every evening. TAKE 1 CAPSULE IN THE MORNING  AND TAKE 2 CAPSULES IN THE EVENING  Dispense: 90 capsule; Refill: 3    Neurological deficit present  -     gabapentin (NEURONTIN) 300 MG capsule; Take 1 capsule (300 mg total) by mouth every evening. TAKE 1 CAPSULE IN THE MORNING  AND TAKE 2 CAPSULES IN THE EVENING  Dispense: 90 capsule; Refill: 3    Delirium due to dissociative drug  -     gabapentin (NEURONTIN) 300 MG capsule; Take 1 capsule (300 mg total) by mouth every evening. TAKE 1 CAPSULE IN THE MORNING  AND TAKE 2 CAPSULES IN THE EVENING  Dispense: 90 capsule; Refill: 3    Opioid dependence with intoxication delirium  -     gabapentin (NEURONTIN) 300 MG capsule; Take 1 capsule (300 mg total) by mouth every evening. TAKE 1 CAPSULE IN THE MORNING  AND TAKE 2 CAPSULES IN THE EVENING  Dispense: 90 capsule; Refill: 3    Chronic seasonal allergic rhinitis due to pollen  -     fluticasone propionate (FLONASE) 50 mcg/actuation nasal spray; USE 1 SPRAY IN EACH NOSTRIL TWICE DAILY  Dispense: 48 g; Refill: 0    Body mass index (BMI) 31.0-31.9, adult   -     Vitamin D; Future; Expected date: 07/30/2021    Need for zoster vaccine  -     varicella-zoster [id number], PF, (SHINGRIX, PF,) 50 mcg/0.5 mL injection; Inject 0.5 mLs into the muscle once. for 1 dose  Dispense: 1 each; Refill: 1    Other orders/ alopecia/multifactorial/med and vitamin deficiency  -     minoxidiL 5 % Soln; Apply topically daily  Dispense: 120 mL; Refill: 3    There have been some probable medication, dietary and lifestyle compliance issues here. I have discussed with her the great importance of following the treatment plan exactly as directed in order to achieve a good medical outcome.    Patient readiness: acceptance and  barriers:poor sleep habits    During the course of the visit the patient was educated and counseled about the following:     Underweight:  Nutritional supplements    Goals: Underweight: Increase calorie intake and BMI      Did patient meet goals/outcomes: no  She needs more physical activities    Discussed health maintenance guidelines appropriate for age.

## 2022-03-04 NOTE — PROGRESS NOTES
[facility]   Physical Therapy Treatment Note     Name: Erica Marsh  [facility] Number: [id number]    Therapy Diagnosis:   Encounter Diagnoses   Name Primary?    Difficulty walking Yes    Muscle weakness      Physician: Alvino Bower MD    Visit Date: 3/4/2022    Physician Orders: PT Eval and Treat   Medical Diagnosis from Referral: R53.82,M79.7 (ICD-10-CM) - Chronic fatigue syndrome with fibromyalgia  Evaluation Date: 2/22/2022  Plan of Care Expiration: 5/22/22  Visit # / Visits authorized: 20/ 20     Progress Note Due:   FOTO: 1/ 1  PTA Visit #: 0/5     Precautions: Standard and Fall    Time In: 10:15 AM  Time Out: 11:00 AM  Total Billable Time: 45 minutes      SUBJECTIVE     Pt reports: Pt seen today after being transferred from ortho PT. She reports that she is having trouble walking due to weakness, fatigue, and balance. Occasionally when exerting herself, she experiences tremors throughout her whole body. She also reports some difficulty with her memory. She expresses feelings of anxiety related to the conditions affecting her and possible underlying causes on top of them. She wants to work in therapy on improving her gait safety and mobility.    She was compliant with home exercise program.  Response to previous treatment: no change noted  Functional change: no change noted    Pain: 8/10  Location:  back      OBJECTIVE     Objective Measures updated at progress report unless specified.     Initial vitals:   BP: 121/98   HR: 84 bpm    MCTSIB  NBOS, eyes open-  30s, mod sway  NBOS, eyes closed-  30s, mod sway  Foam, NBOS, eyes open- 30s, phuong sway  Foam, NBOS, eyes closed- 4s    5x Sit to Stand - 1:08    TREATMENT     Erica received the treatments listed below:      received therapeutic exercises to develop strength and ROM for 45  minutes including:    Testing performed above  NuStep x 8min  Low trunk rotations x2 minutes    Erica participated in neuromuscular re-education  activities to improve: Balance and Coordination for 0 minutes. The following activities were included:      Erica participated in gait training to improve functional mobility and safety for 00  minutes, including:          PATIENT EDUCATION AND HOME EXERCISES     Home Exercises Provided and Patient Education Provided     Education provided:   -Timeline for reaching different neurologic diagnoses  - Expectation for recovery timeline, exercise  - HEP - added walking program, pt instructed to walk for at least 10 minutes each day  - Safety using rollator and general gait safety practices    Written Home Exercises Provided: Patient instructed to cont prior HEP. Exercises were reviewed and Erica was able to demonstrate them prior to the end of the session.  Erica demonstrated good  understanding of the education provided. See EMR under Patient Instructions for exercises provided during therapy sessions    ASSESSMENT     Pt presents to therapy today following transfer from ortho PT. Objective testing reveals significant deficits in functional strength as measured by a 5 times sit to stand time of 1:08 minutes. Along with her TUG time of 58 seconds with FWW from her ortho evaluation, pt is considered a high fall risk. She has some limitations in her static balance as she is unable to complete condition 4 of MCTSIB, and has significant sway present on all previous conditions as well. Pt presents with many questions regarding her status and prognosis moving forward, so a significant amount of today's session was spent educating patient on the nature of fibromyalgia, lupus, and other effects her co-morbidities may have on her status. Due to her complaints of increased back pain during testing today, instructed pt on low trunk rotation exercise to allow for self management at home. Pt also demonstrated her stretching routine that she regularly performs to manage LBP at home. Pt HEP modified to include walking program of  walking at least 10 minutes every day in order to improve activity tolerance and reduce fatigue.   Pt also displaying poor safety with Rollator, using it for UE support during sitting and standing without first locking the brakes. Pt instructed to always lock brakes any time she intends to use the device as a weight bearing tool for her UE.  Future therapy sessions will assess pt's gait and perform an FGA. Pt will benefit from exercise to improve balance and general strength, as well as education for pain management and home management of symptoms.     Erica Is progressing well towards her goals.   Pt prognosis is Fair.     Pt will continue to benefit from skilled outpatient physical therapy to address the deficits listed in the problem list box on initial evaluation, provide pt/family education and to maximize pt's level of independence in the home and community environment.     Pt's spiritual, cultural and educational needs considered and pt agreeable to plan of care and goals.     Anticipated barriers to physical therapy: high number of co-morbidities, advanced age    Goals:    Short Term Goals (4 Weeks):      1.Pt to increase strength by a 1/2 grade of muscles test to allow for improvement in functional activities such as performing chores.  3.Pt to report compliance with HEP and demonstrate proper exercise technique to PT to show competence with self management of condition.  4.Decrease pain by 25% during functional activities.  5. Improve TUG time to 40 seconds with appropriate AD  NEW: Pt will demonstrate improved balance by achieving a time of >= 15 seconds on all 4 conditions of MCSTIB with minimal sway  NEW: Pt will demonstrate improved functional strength by achieving a time of <= 45 seconds on 5 times sit to stand       Long Term Goals (12 Weeks):      1. Increase ROM to allow improved joint biomechanics during functional activities.   2.Increase trunk and lower extremity strength to within normal limits  during functional activities.   3. Independent with home exercise program.   4. Full return to functional activities with manageable complaints.  5. Patient to demonstrate improved posture and body mechanics.  6. Decrease pain by 75% during functional activities.  7. Improve Tug Time to 25 seconds without an AD  NEW: Pt will demonstrate improved balance by achieving a time of >= 30 seconds on all 4 conditions of MCSTIB with minimal sway  NEW: Pt will demonstrate improved functional strength by achieving a time of <= 25 seconds on 5 times sit to stand    PLAN     Plan of care Certification: 2/22/2022 to 5/22/22    Vadim Sawant, SPT  3/4/2022    I certify that I was present in the room directing the student in service delivery and guiding them using my skilled judgment. As the co-signing therapist I have reviewed the students documentation and am responsible for the treatment, assessment, and plan.     Martina Ugalde, PT   3/4/2022

## 2022-03-09 ENCOUNTER — TELEPHONE (OUTPATIENT)
Dept: OPHTHALMOLOGY | Facility: CLINIC | Age: 72
End: 2022-03-09
Payer: MEDICARE

## 2022-03-11 ENCOUNTER — CLINICAL SUPPORT (OUTPATIENT)
Dept: REHABILITATION | Facility: HOSPITAL | Age: 72
End: 2022-03-11
Payer: MEDICARE

## 2022-03-11 DIAGNOSIS — R26.2 DIFFICULTY WALKING: Primary | ICD-10-CM

## 2022-03-11 DIAGNOSIS — M62.81 MUSCLE WEAKNESS: ICD-10-CM

## 2022-03-11 PROCEDURE — 97112 NEUROMUSCULAR REEDUCATION: CPT | Mod: PN

## 2022-03-11 PROCEDURE — 97110 THERAPEUTIC EXERCISES: CPT | Mod: PN

## 2022-03-11 NOTE — PROGRESS NOTES
[facility]   Physical Therapy Treatment Note     Name: Erica Marsh  [facility] Number: [id number]    Therapy Diagnosis:   Encounter Diagnoses   Name Primary?    Difficulty walking Yes    Muscle weakness      Physician: Alvino Bower MD    Visit Date: 3/11/2022    Physician Orders: PT Eval and Treat   Medical Diagnosis from Referral: R53.82,M79.7 (ICD-10-CM) - Chronic fatigue syndrome with fibromyalgia  Evaluation Date: 2/22/2022  Plan of Care Expiration: 5/22/22  Visit # / Visits authorized: 2/ 20 (+eval)    Progress Note Due: 3/22/2022  FOTO: 1/ 1     Precautions: Standard and Fall    Time In: 2:45 PM  Time Out: 3:30 PM  Total Billable Time: 45 minutes      SUBJECTIVE     Pt reports: She is doing ok overall today. No falls to report    She was compliant with home exercise program.  Response to previous treatment: no change noted  Functional change: no change noted    Pain: 8/10  Location:  back      OBJECTIVE     Objective Measures updated at progress report unless specified.     Initial vitals:   BP: 121/98   HR: 84 bpm    TREATMENT     Erica received the treatments listed below:      received therapeutic exercises to develop strength and ROM for 25 minutes including:    NuStep x 8 min     Low trunk rotations x2 minutes  TrA contraction 2x10, 5 sec holds  Bridges 2x10  Supine clamshells YTB 2x10    Sit to stands from 18 in mat 2x10     Erica participated in neuromuscular re-education activities to improve: Balance and Coordination for 20 minutes. The following activities were included:    NBOS, eyes open, firm 2x30 ea  NBOS, eyes closed, firm 2x30 ea  NBOS, eyes open, foam 2x30 ea  WBOS, eyes closed, foam 2x30 ea     PATIENT EDUCATION AND HOME EXERCISES     Home Exercises Provided and Patient Education Provided     Education provided:   -HEP, bridges, clamshells, sit to stands, walking program    Written Home Exercises Provided: Patient instructed to cont prior HEP. Exercises were  reviewed and Erica was able to demonstrate them prior to the end of the session.  Erica demonstrated good  understanding of the education provided. See EMR under Patient Instructions for exercises provided during therapy sessions    ASSESSMENT     Pt tolerated session well overall today with no adverse response noted. Addition of supine exercises for HEP in order to challenge LE strength. Pt with most difficulty completing sit to stands this session, intermittent UE support to complete safely. No outright LOB during activities in parallel bars, however, most sway noted with vision eliminated and compliant surfaces. Pt remains appropriate for therapy at this time.     Erica Is progressing well towards her goals.   Pt prognosis is Fair.     Pt will continue to benefit from skilled outpatient physical therapy to address the deficits listed in the problem list box on initial evaluation, provide pt/family education and to maximize pt's level of independence in the home and community environment.     Pt's spiritual, cultural and educational needs considered and pt agreeable to plan of care and goals.     Anticipated barriers to physical therapy: high number of co-morbidities, advanced age    Goals:    Short Term Goals (4 Weeks):      1.Pt to increase strength by a 1/2 grade of muscles test to allow for improvement in functional activities such as performing chores.  3.Pt to report compliance with HEP and demonstrate proper exercise technique to PT to show competence with self management of condition.  4.Decrease pain by 25% during functional activities.  5. Improve TUG time to 40 seconds with appropriate AD  NEW: Pt will demonstrate improved balance by achieving a time of >= 15 seconds on all 4 conditions of MCSTIB with minimal sway  NEW: Pt will demonstrate improved functional strength by achieving a time of <= 45 seconds on 5 times sit to stand       Long Term Goals (12 Weeks):      1. Increase ROM to allow improved  joint biomechanics during functional activities.   2.Increase trunk and lower extremity strength to within normal limits during functional activities.   3. Independent with home exercise program.   4. Full return to functional activities with manageable complaints.  5. Patient to demonstrate improved posture and body mechanics.  6. Decrease pain by 75% during functional activities.  7. Improve Tug Time to 25 seconds without an AD  NEW: Pt will demonstrate improved balance by achieving a time of >= 30 seconds on all 4 conditions of MCSTIB with minimal sway  NEW: Pt will demonstrate improved functional strength by achieving a time of <= 25 seconds on 5 times sit to stand    PLAN     Plan of care Certification: 2/22/2022 to 5/22/22    Martina Ugalde, PT   3/11/2022

## 2022-03-18 ENCOUNTER — CLINICAL SUPPORT (OUTPATIENT)
Dept: REHABILITATION | Facility: HOSPITAL | Age: 72
End: 2022-03-18
Payer: MEDICARE

## 2022-03-18 DIAGNOSIS — M62.81 MUSCLE WEAKNESS: ICD-10-CM

## 2022-03-18 DIAGNOSIS — R26.2 DIFFICULTY WALKING: Primary | ICD-10-CM

## 2022-03-18 PROCEDURE — 97112 NEUROMUSCULAR REEDUCATION: CPT | Mod: PN

## 2022-03-18 PROCEDURE — 97110 THERAPEUTIC EXERCISES: CPT | Mod: PN

## 2022-03-18 NOTE — PROGRESS NOTES
[facility]   Physical Therapy Treatment Note     Name: Erica Marsh  [facility] Number: [id number]    Therapy Diagnosis:   Encounter Diagnoses   Name Primary?    Difficulty walking Yes    Muscle weakness      Physician: Alvino Bower MD    Visit Date: 3/18/2022    Physician Orders: PT Eval and Treat   Medical Diagnosis from Referral: R53.82,M79.7 (ICD-10-CM) - Chronic fatigue syndrome with fibromyalgia  Evaluation Date: 2/22/2022  Plan of Care Expiration: 5/22/22  Visit # / Visits authorized: 3/ 20 (+eval)    Progress Note Due: 3/22/2022  FOTO: 1/ 1     Precautions: Standard and Fall    Time In: 2:45 PM  Time Out: 3:30 PM  Total Billable Time: 45 minutes      SUBJECTIVE     Pt reports: She is doing ok overall today. No falls to report. Less shaking noted today    She was compliant with home exercise program.  Response to previous treatment: no change noted  Functional change: no change noted    Pain: 8/10  Location:  back      OBJECTIVE     Objective Measures updated at progress report unless specified.     Initial vitals:   BP: 121/98   HR: 84 bpm    TREATMENT     Erica received the treatments listed below:      received therapeutic exercises to develop strength and ROM for 25 minutes including:    NuStep x 5 min    UBE x 5 min - forward    Low trunk rotations x2 minutes  TrA contraction 2x10, 5 sec holds  Bridges 2x10  Supine clamshells YTB 2x10    Sit to stands from 18 in mat 2x10     Erica participated in neuromuscular re-education activities to improve: Balance and Coordination for 20 minutes. The following activities were included:    NBOS, eyes open, firm 2x30 ea  NBOS, eyes closed, firm 2x30 ea  NBOS, eyes open, foam 2x30 ea  WBOS, eyes closed, foam 2x30 ea     PATIENT EDUCATION AND HOME EXERCISES     Home Exercises Provided and Patient Education Provided     Education provided:   -HEP, bridges, clamshells, sit to stands, walking program    Written Home Exercises Provided:  Patient instructed to cont prior HEP. Exercises were reviewed and Erica was able to demonstrate them prior to the end of the session.  Erica demonstrated good  understanding of the education provided. See EMR under Patient Instructions for exercises provided during therapy sessions    ASSESSMENT     Pt tolerated session well overall today with no adverse response noted. Pt with appropriate challenge noted to supine there-ex with minimal increase in pain reported. Pt with most difficulty completing sit to stands this session, intermittent UE support to complete safely. No outright LOB during activities in parallel bars, however, most sway noted with vision eliminated and compliant surfaces. Pt remains appropriate for therapy at this time.     Erica Is progressing well towards her goals.   Pt prognosis is Fair.     Pt will continue to benefit from skilled outpatient physical therapy to address the deficits listed in the problem list box on initial evaluation, provide pt/family education and to maximize pt's level of independence in the home and community environment.     Pt's spiritual, cultural and educational needs considered and pt agreeable to plan of care and goals.     Anticipated barriers to physical therapy: high number of co-morbidities, advanced age    Goals:    Short Term Goals (4 Weeks):      1.Pt to increase strength by a 1/2 grade of muscles test to allow for improvement in functional activities such as performing chores.  3.Pt to report compliance with HEP and demonstrate proper exercise technique to PT to show competence with self management of condition.  4.Decrease pain by 25% during functional activities.  5. Improve TUG time to 40 seconds with appropriate AD  NEW: Pt will demonstrate improved balance by achieving a time of >= 15 seconds on all 4 conditions of MCSTIB with minimal sway  NEW: Pt will demonstrate improved functional strength by achieving a time of <= 45 seconds on 5 times sit to  stand       Long Term Goals (12 Weeks):      1. Increase ROM to allow improved joint biomechanics during functional activities.   2.Increase trunk and lower extremity strength to within normal limits during functional activities.   3. Independent with home exercise program.   4. Full return to functional activities with manageable complaints.  5. Patient to demonstrate improved posture and body mechanics.  6. Decrease pain by 75% during functional activities.  7. Improve Tug Time to 25 seconds without an AD  NEW: Pt will demonstrate improved balance by achieving a time of >= 30 seconds on all 4 conditions of MCSTIB with minimal sway  NEW: Pt will demonstrate improved functional strength by achieving a time of <= 25 seconds on 5 times sit to stand    PLAN     Plan of care Certification: 2/22/2022 to 5/22/22    Martina Ugalde, PT   3/18/2022

## 2022-03-24 ENCOUNTER — CLINICAL SUPPORT (OUTPATIENT)
Dept: REHABILITATION | Facility: HOSPITAL | Age: 72
End: 2022-03-24
Payer: MEDICARE

## 2022-03-24 DIAGNOSIS — R26.2 DIFFICULTY WALKING: Primary | ICD-10-CM

## 2022-03-24 DIAGNOSIS — M62.81 MUSCLE WEAKNESS: ICD-10-CM

## 2022-03-24 PROCEDURE — 97112 NEUROMUSCULAR REEDUCATION: CPT | Mod: PN

## 2022-03-24 PROCEDURE — 97110 THERAPEUTIC EXERCISES: CPT | Mod: PN

## 2022-03-24 NOTE — PROGRESS NOTES
[facility]   Physical Therapy Treatment Note     Name: Erica Marsh  [facility] Number: [id number]    Therapy Diagnosis:   Encounter Diagnoses   Name Primary?    Difficulty walking Yes    Muscle weakness      Physician: Alvino Bower MD    Visit Date: 3/24/2022    Physician Orders: PT Eval and Treat   Medical Diagnosis from Referral: R53.82,M79.7 (ICD-10-CM) - Chronic fatigue syndrome with fibromyalgia  Evaluation Date: 2/22/2022  Plan of Care Expiration: 5/22/22  Visit # / Visits authorized: 4/ 20 (+eval)    Progress Note Due: 3/22/2022  FOTO: 1/ 1     Precautions: Standard and Fall    Time In: 2:05 PM  Time Out: 2:45 PM  Total Billable Time: 40 minutes      SUBJECTIVE     Pt reports: She is doing ok overall today. No falls to report.     She was compliant with home exercise program.  Response to previous treatment: no change noted  Functional change: no change noted    Pain: 8/10  Location:  back      OBJECTIVE     Objective Measures updated at progress report unless specified.     Initial vitals:   BP: 121/98   HR: 84 bpm    TREATMENT     Erica received the treatments listed below:      received therapeutic exercises to develop strength and ROM for 20 minutes including:    NuStep x 5 min    UBE x 5 min - forward    Low trunk rotations x2 minutes  TrA contraction 2x10, 5 sec holds  Bridges 2x10  Supine clamshells YTB 2x10    Sit to stands from 18 in mat 2x10     Erica participated in neuromuscular re-education activities to improve: Balance and Coordination for 20 minutes. The following activities were included:    NBOS, eyes open, firm 2x30 ea  NBOS, eyes closed, firm 2x30 ea  NBOS, eyes open, foam 2x30 ea  WBOS, eyes closed, foam 2x30 ea     PATIENT EDUCATION AND HOME EXERCISES     Home Exercises Provided and Patient Education Provided     Education provided:   -HEP, bridges, clamshells, sit to stands, walking program    Written Home Exercises Provided: Patient instructed to cont  prior HEP. Exercises were reviewed and Erica was able to demonstrate them prior to the end of the session.  Erica demonstrated good  understanding of the education provided. See EMR under Patient Instructions for exercises provided during therapy sessions    ASSESSMENT     Pt tolerated session well overall today with no adverse response noted. Pt with appropriate challenge noted to supine there-ex with minimal increase in pain reported. No outright LOB during activities in parallel bars, however, most sway noted with vision eliminated and compliant surfaces. Pt remains appropriate for therapy at this time.     Erica Is progressing well towards her goals.   Pt prognosis is Fair.     Pt will continue to benefit from skilled outpatient physical therapy to address the deficits listed in the problem list box on initial evaluation, provide pt/family education and to maximize pt's level of independence in the home and community environment.     Pt's spiritual, cultural and educational needs considered and pt agreeable to plan of care and goals.     Anticipated barriers to physical therapy: high number of co-morbidities, advanced age    Goals:    Short Term Goals (4 Weeks):      1.Pt to increase strength by a 1/2 grade of muscles test to allow for improvement in functional activities such as performing chores.  3.Pt to report compliance with HEP and demonstrate proper exercise technique to PT to show competence with self management of condition.  4.Decrease pain by 25% during functional activities.  5. Improve TUG time to 40 seconds with appropriate AD  NEW: Pt will demonstrate improved balance by achieving a time of >= 15 seconds on all 4 conditions of MCSTIB with minimal sway  NEW: Pt will demonstrate improved functional strength by achieving a time of <= 45 seconds on 5 times sit to stand       Long Term Goals (12 Weeks):      1. Increase ROM to allow improved joint biomechanics during functional activities.   2.Increase  trunk and lower extremity strength to within normal limits during functional activities.   3. Independent with home exercise program.   4. Full return to functional activities with manageable complaints.  5. Patient to demonstrate improved posture and body mechanics.  6. Decrease pain by 75% during functional activities.  7. Improve Tug Time to 25 seconds without an AD  NEW: Pt will demonstrate improved balance by achieving a time of >= 30 seconds on all 4 conditions of MCSTIB with minimal sway  NEW: Pt will demonstrate improved functional strength by achieving a time of <= 25 seconds on 5 times sit to stand    PLAN     Plan of care Certification: 2/22/2022 to 5/22/22    Continue PT per CLAUDE Ugalde, PT   3/24/2022

## 2022-04-08 ENCOUNTER — CLINICAL SUPPORT (OUTPATIENT)
Dept: REHABILITATION | Facility: HOSPITAL | Age: 72
End: 2022-04-08
Payer: MEDICARE

## 2022-04-08 DIAGNOSIS — R26.2 DIFFICULTY WALKING: Primary | ICD-10-CM

## 2022-04-08 DIAGNOSIS — M62.81 MUSCLE WEAKNESS: ICD-10-CM

## 2022-04-08 PROCEDURE — 97112 NEUROMUSCULAR REEDUCATION: CPT | Mod: PN

## 2022-04-08 PROCEDURE — 97110 THERAPEUTIC EXERCISES: CPT | Mod: PN

## 2022-04-08 NOTE — PROGRESS NOTES
[facility]   Physical Therapy Treatment Note     Name: Erica Marsh  [facility] Number: [id number]    Therapy Diagnosis:   Encounter Diagnoses   Name Primary?    Difficulty walking Yes    Muscle weakness      Physician: Alvino Bower MD    Visit Date: 4/8/2022    Physician Orders: PT Eval and Treat   Medical Diagnosis from Referral: R53.82,M79.7 (ICD-10-CM) - Chronic fatigue syndrome with fibromyalgia  Evaluation Date: 2/22/2022  Plan of Care Expiration: 5/22/22  Visit # / Visits authorized: 5/ 20 (+eval)    Progress Note Due: 3/22/2022  FOTO: 1/ 1     Precautions: Standard and Fall    Time In: 3:30 PM  Time Out: 4:15 PM  Total Billable Time: 30 minutes      SUBJECTIVE     Pt reports: Having pain in her back today, otherwise, doing ok     She was compliant with home exercise program.  Response to previous treatment: no change noted  Functional change: no change noted    Pain: 8/10  Location:  back      OBJECTIVE     Objective Measures updated at progress report unless specified.     TREATMENT     Erica received the treatments listed below:      received therapeutic exercises to develop strength and ROM for 25 minutes including:    NuStep x 5 min    UBE x 5 min - forward    Low trunk rotations x2 minutes  TrA contraction 2x10, 5 sec holds  Bridges 2x10  Supine clamshells YTB 2x10    Sit to stands from 18 in mat 2x10     Erica participated in neuromuscular re-education activities to improve: Balance and Coordination for 20 minutes. The following activities were included:    NBOS, eyes open, firm 2x30 ea  NBOS, eyes closed, firm 2x30 ea  NBOS, eyes open, foam 2x30 ea  WBOS, eyes closed, foam 2x30 ea     PATIENT EDUCATION AND HOME EXERCISES     Home Exercises Provided and Patient Education Provided     Education provided:   -HEP, bridges, clamshells, sit to stands, walking program    Written Home Exercises Provided: Patient instructed to cont prior HEP. Exercises were reviewed and Erica  was able to demonstrate them prior to the end of the session.  Erica demonstrated good  understanding of the education provided. See EMR under Patient Instructions for exercises provided during therapy sessions    ASSESSMENT     Pt tolerated session well overall today with no adverse response noted. Pt with appropriate challenge noted to supine there-ex with minimal increase in pain reported. No outright LOB during activities in parallel bars, however, most sway noted with vision eliminated and compliant surfaces. Plan to formally re-assess response to therapy next session. Pt remains appropriate for therapy at this time.     Erica Is progressing well towards her goals.   Pt prognosis is Fair.     Pt will continue to benefit from skilled outpatient physical therapy to address the deficits listed in the problem list box on initial evaluation, provide pt/family education and to maximize pt's level of independence in the home and community environment.     Pt's spiritual, cultural and educational needs considered and pt agreeable to plan of care and goals.     Anticipated barriers to physical therapy: high number of co-morbidities, advanced age    Goals:    Short Term Goals (4 Weeks):      1.Pt to increase strength by a 1/2 grade of muscles test to allow for improvement in functional activities such as performing chores.  3.Pt to report compliance with HEP and demonstrate proper exercise technique to PT to show competence with self management of condition.  4.Decrease pain by 25% during functional activities.  5. Improve TUG time to 40 seconds with appropriate AD  NEW: Pt will demonstrate improved balance by achieving a time of >= 15 seconds on all 4 conditions of MCSTIB with minimal sway  NEW: Pt will demonstrate improved functional strength by achieving a time of <= 45 seconds on 5 times sit to stand       Long Term Goals (12 Weeks):      1. Increase ROM to allow improved joint biomechanics during functional  activities.   2.Increase trunk and lower extremity strength to within normal limits during functional activities.   3. Independent with home exercise program.   4. Full return to functional activities with manageable complaints.  5. Patient to demonstrate improved posture and body mechanics.  6. Decrease pain by 75% during functional activities.  7. Improve Tug Time to 25 seconds without an AD  NEW: Pt will demonstrate improved balance by achieving a time of >= 30 seconds on all 4 conditions of MCSTIB with minimal sway  NEW: Pt will demonstrate improved functional strength by achieving a time of <= 25 seconds on 5 times sit to stand    PLAN     Plan of care Certification: 2/22/2022 to 5/22/22    Continue PT per CLAUDE Ugalde, PT   4/8/2022

## 2022-04-28 ENCOUNTER — CLINICAL SUPPORT (OUTPATIENT)
Dept: REHABILITATION | Facility: HOSPITAL | Age: 72
End: 2022-04-28
Payer: MEDICARE

## 2022-04-28 DIAGNOSIS — R26.2 DIFFICULTY WALKING: Primary | ICD-10-CM

## 2022-04-28 DIAGNOSIS — M62.81 MUSCLE WEAKNESS: ICD-10-CM

## 2022-04-28 PROCEDURE — 97110 THERAPEUTIC EXERCISES: CPT | Mod: PN

## 2022-04-28 NOTE — PROGRESS NOTES
"[facility]   Physical Therapy Treatment Note     Name: Erica Marsh  [facility] Number: [id number]    Therapy Diagnosis:   Encounter Diagnoses   Name Primary?    Difficulty walking Yes    Muscle weakness      Physician: Alvino Bower MD    Visit Date: 4/28/2022    Physician Orders: PT Eval and Treat   Medical Diagnosis from Referral: R53.82,M79.7 (ICD-10-CM) - Chronic fatigue syndrome with fibromyalgia  Evaluation Date: 2/22/2022  Plan of Care Expiration: 5/22/22  Visit # / Visits authorized: 5/ 20 (+eval)    Progress Note Due: 3/22/2022 - primary PT to reassess next session  FOTO: 1/ 1     Precautions: Standard and Fall    Time In: 2:45  Time Out: 3:30  Total Billable Time: 45 minutes    SUBJECTIVE     Pt reports: All exercises for home creating pain; pt hears "pop' sounds in back lately; patient felt better after heating pack after last session but was in a lot of pain after      She was compliant with home exercise program.  Response to previous treatment: pain  Functional change: pain limiting functional     Pain: 6/10  Location:  back      OBJECTIVE     Objective Measures updated at progress report unless specified.     TREATMENT     Erica received the treatments listed below:      received therapeutic exercises to develop strength and ROM for 45 minutes including:    NuStep x 5 min    UBE x 5 min - forward    Low trunk rotations x2 minutes (with palpation)   Pelvic tilt (supine) 10x2   TrA contraction 2x10, 5 sec holds  Bridges 10x2 (TrA)  Supine clamshells RTB 10x2 (TrA)   Supine march with 2# ankle weight (TrA)    Sit to stands from 18 in mat 2x10     Erica participated in neuromuscular re-education activities to improve: Balance and Coordination for 00 minutes. The following activities were included:    NBOS, eyes open, firm 2x30 ea  NBOS, eyes closed, firm 2x30 ea  NBOS, eyes open, foam 2x30 ea  WBOS, eyes closed, foam 2x30 ea     PATIENT EDUCATION AND HOME EXERCISES     Home " Exercises Provided and Patient Education Provided     Education provided:   -HEP, bridges, clamshells, sit to stands, walking program  -use of rollator    Written Home Exercises Provided: Patient instructed to cont prior HEP. Exercises were reviewed and Erica was able to demonstrate them prior to the end of the session.  Erica demonstrated good  understanding of the education provided. See EMR under Patient Instructions for exercises provided during therapy sessions    ASSESSMENT   Erica tolerating session well with increased education for TrA contraction for stability of midsection overall. Pt initially with 8/10 pain and very uncomfortable which improved to 5/10 pain and much more tolerant to seated and standing positions. Pt educated on use of rollator throughout session. Pt remains appropriate for therapy at this time. Pt will be reassessed with primary PT next session.     Erica Is progressing well towards her goals.   Pt prognosis is Fair.     Pt will continue to benefit from skilled outpatient physical therapy to address the deficits listed in the problem list box on initial evaluation, provide pt/family education and to maximize pt's level of independence in the home and community environment.     Pt's spiritual, cultural and educational needs considered and pt agreeable to plan of care and goals.     Anticipated barriers to physical therapy: high number of co-morbidities, advanced age    Goals:    Short Term Goals (4 Weeks):      1.Pt to increase strength by a 1/2 grade of muscles test to allow for improvement in functional activities such as performing chores.  3.Pt to report compliance with HEP and demonstrate proper exercise technique to PT to show competence with self management of condition.  4.Decrease pain by 25% during functional activities.  5. Improve TUG time to 40 seconds with appropriate AD  NEW: Pt will demonstrate improved balance by achieving a time of >= 15 seconds on all 4 conditions of  MCSTIB with minimal sway  NEW: Pt will demonstrate improved functional strength by achieving a time of <= 45 seconds on 5 times sit to stand       Long Term Goals (12 Weeks):      1. Increase ROM to allow improved joint biomechanics during functional activities.   2.Increase trunk and lower extremity strength to within normal limits during functional activities.   3. Independent with home exercise program.   4. Full return to functional activities with manageable complaints.  5. Patient to demonstrate improved posture and body mechanics.  6. Decrease pain by 75% during functional activities.  7. Improve Tug Time to 25 seconds without an AD  NEW: Pt will demonstrate improved balance by achieving a time of >= 30 seconds on all 4 conditions of MCSTIB with minimal sway  NEW: Pt will demonstrate improved functional strength by achieving a time of <= 25 seconds on 5 times sit to stand    PLAN   Plan of care Certification: 2/22/2022 to 5/22/22    Continue PT per POC.     Rosmery Hopkins, PT   4/28/2022

## 2022-05-05 ENCOUNTER — PATIENT MESSAGE (OUTPATIENT)
Dept: ADMINISTRATIVE | Facility: HOSPITAL | Age: 72
End: 2022-05-05
Payer: MEDICARE

## 2022-05-09 ENCOUNTER — TELEPHONE (OUTPATIENT)
Dept: FAMILY MEDICINE | Facility: CLINIC | Age: 72
End: 2022-05-09
Payer: MEDICARE

## 2022-05-31 ENCOUNTER — PATIENT MESSAGE (OUTPATIENT)
Dept: ADMINISTRATIVE | Facility: HOSPITAL | Age: 72
End: 2022-05-31
Payer: MEDICARE

## 2022-06-03 ENCOUNTER — PATIENT MESSAGE (OUTPATIENT)
Dept: FAMILY MEDICINE | Facility: CLINIC | Age: 72
End: 2022-06-03
Payer: MEDICARE

## 2022-06-08 ENCOUNTER — PATIENT MESSAGE (OUTPATIENT)
Dept: FAMILY MEDICINE | Facility: CLINIC | Age: 72
End: 2022-06-08
Payer: MEDICARE

## 2022-06-21 ENCOUNTER — PATIENT MESSAGE (OUTPATIENT)
Dept: FAMILY MEDICINE | Facility: CLINIC | Age: 72
End: 2022-06-21
Payer: MEDICARE

## 2022-07-07 ENCOUNTER — LAB VISIT (OUTPATIENT)
Dept: LAB | Facility: HOSPITAL | Age: 72
End: 2022-07-07
Attending: FAMILY MEDICINE
Payer: MEDICARE

## 2022-07-07 DIAGNOSIS — R73.9 HYPERGLYCEMIA: ICD-10-CM

## 2022-07-07 DIAGNOSIS — E78.49 OTHER HYPERLIPIDEMIA: ICD-10-CM

## 2022-07-07 DIAGNOSIS — M45.5 ANKYLOSING SPONDYLITIS OF THORACOLUMBAR REGION: ICD-10-CM

## 2022-07-07 LAB
ANION GAP SERPL CALC-SCNC: 13 MMOL/L (ref 8–16)
BUN SERPL-MCNC: 13 MG/DL (ref 8–23)
CALCIUM SERPL-MCNC: 10.1 MG/DL (ref 8.7–10.5)
CHLORIDE SERPL-SCNC: 102 MMOL/L (ref 95–110)
CHOLEST SERPL-MCNC: 241 MG/DL (ref 120–199)
CHOLEST/HDLC SERPL: 2.7 {RATIO} (ref 2–5)
CO2 SERPL-SCNC: 24 MMOL/L (ref 23–29)
CREAT SERPL-MCNC: 0.9 MG/DL (ref 0.5–1.4)
EST. GFR  (AFRICAN AMERICAN): >60 ML/MIN/1.73 M^2
EST. GFR  (NON AFRICAN AMERICAN): >60 ML/MIN/1.73 M^2
ESTIMATED AVG GLUCOSE: 117 MG/DL (ref 68–131)
GLUCOSE SERPL-MCNC: 120 MG/DL (ref 70–110)
HBA1C MFR BLD: 5.7 % (ref 4–5.6)
HDLC SERPL-MCNC: 90 MG/DL (ref 40–75)
HDLC SERPL: 37.3 % (ref 20–50)
LDLC SERPL CALC-MCNC: 118.6 MG/DL (ref 63–159)
NONHDLC SERPL-MCNC: 151 MG/DL
POTASSIUM SERPL-SCNC: 4.6 MMOL/L (ref 3.5–5.1)
SODIUM SERPL-SCNC: 139 MMOL/L (ref 136–145)
TRIGL SERPL-MCNC: 162 MG/DL (ref 30–150)

## 2022-07-07 PROCEDURE — 36415 COLL VENOUS BLD VENIPUNCTURE: CPT | Mod: PO | Performed by: FAMILY MEDICINE

## 2022-07-07 PROCEDURE — 80048 BASIC METABOLIC PNL TOTAL CA: CPT | Performed by: FAMILY MEDICINE

## 2022-07-07 PROCEDURE — 83036 HEMOGLOBIN GLYCOSYLATED A1C: CPT | Performed by: FAMILY MEDICINE

## 2022-07-07 PROCEDURE — 80061 LIPID PANEL: CPT | Performed by: FAMILY MEDICINE

## 2022-07-13 ENCOUNTER — OFFICE VISIT (OUTPATIENT)
Dept: FAMILY MEDICINE | Facility: CLINIC | Age: 72
End: 2022-07-13
Payer: MEDICARE

## 2022-07-13 VITALS
BODY MASS INDEX: 25.66 KG/M2 | SYSTOLIC BLOOD PRESSURE: 108 MMHG | HEART RATE: 87 BPM | OXYGEN SATURATION: 97 % | HEIGHT: 66 IN | WEIGHT: 159.63 LBS | DIASTOLIC BLOOD PRESSURE: 74 MMHG | TEMPERATURE: 98 F

## 2022-07-13 DIAGNOSIS — M54.42 CHRONIC LOW BACK PAIN WITH BILATERAL SCIATICA, UNSPECIFIED BACK PAIN LATERALITY: ICD-10-CM

## 2022-07-13 DIAGNOSIS — K05.10 GINGIVITIS: ICD-10-CM

## 2022-07-13 DIAGNOSIS — I95.1 ORTHOSTATIC HYPOTENSION: ICD-10-CM

## 2022-07-13 DIAGNOSIS — G57.00 SCIATIC NEUROPATHY, UNSPECIFIED LATERALITY: ICD-10-CM

## 2022-07-13 DIAGNOSIS — G89.29 CHRONIC LOW BACK PAIN WITH BILATERAL SCIATICA, UNSPECIFIED BACK PAIN LATERALITY: ICD-10-CM

## 2022-07-13 DIAGNOSIS — E55.9 VITAMIN D INSUFFICIENCY: ICD-10-CM

## 2022-07-13 DIAGNOSIS — M32.13 OTHER SYSTEMIC LUPUS ERYTHEMATOSUS WITH LUNG INVOLVEMENT: ICD-10-CM

## 2022-07-13 DIAGNOSIS — E11.628 TYPE 2 DIABETES MELLITUS WITH OTHER SKIN COMPLICATIONS: Primary | ICD-10-CM

## 2022-07-13 DIAGNOSIS — M45.5 ANKYLOSING SPONDYLITIS OF THORACOLUMBAR REGION: ICD-10-CM

## 2022-07-13 DIAGNOSIS — F33.9 RECURRENT MAJOR DEPRESSION RESISTANT TO TREATMENT: ICD-10-CM

## 2022-07-13 DIAGNOSIS — M43.26 ANKYLOSIS OF LUMBAR SPINE: ICD-10-CM

## 2022-07-13 DIAGNOSIS — M54.41 CHRONIC LOW BACK PAIN WITH BILATERAL SCIATICA, UNSPECIFIED BACK PAIN LATERALITY: ICD-10-CM

## 2022-07-13 DIAGNOSIS — M32.9 SYSTEMIC LUPUS ERYTHEMATOSUS, UNSPECIFIED SLE TYPE, UNSPECIFIED ORGAN INVOLVEMENT STATUS: ICD-10-CM

## 2022-07-13 PROCEDURE — 3074F SYST BP LT 130 MM HG: CPT | Mod: CPTII,S$GLB,, | Performed by: FAMILY MEDICINE

## 2022-07-13 PROCEDURE — 3008F BODY MASS INDEX DOCD: CPT | Mod: CPTII,S$GLB,, | Performed by: FAMILY MEDICINE

## 2022-07-13 PROCEDURE — 1126F PR PAIN SEVERITY QUANTIFIED, NO PAIN PRESENT: ICD-10-PCS | Mod: CPTII,S$GLB,, | Performed by: FAMILY MEDICINE

## 2022-07-13 PROCEDURE — 99499 RISK ADDL DX/OHS AUDIT: ICD-10-PCS | Mod: S$GLB,,, | Performed by: FAMILY MEDICINE

## 2022-07-13 PROCEDURE — 1160F RVW MEDS BY RX/DR IN RCRD: CPT | Mod: CPTII,S$GLB,, | Performed by: FAMILY MEDICINE

## 2022-07-13 PROCEDURE — 1126F AMNT PAIN NOTED NONE PRSNT: CPT | Mod: CPTII,S$GLB,, | Performed by: FAMILY MEDICINE

## 2022-07-13 PROCEDURE — 3078F DIAST BP <80 MM HG: CPT | Mod: CPTII,S$GLB,, | Performed by: FAMILY MEDICINE

## 2022-07-13 PROCEDURE — 1101F PT FALLS ASSESS-DOCD LE1/YR: CPT | Mod: CPTII,S$GLB,, | Performed by: FAMILY MEDICINE

## 2022-07-13 PROCEDURE — 1159F PR MEDICATION LIST DOCUMENTED IN MEDICAL RECORD: ICD-10-PCS | Mod: CPTII,S$GLB,, | Performed by: FAMILY MEDICINE

## 2022-07-13 PROCEDURE — 3288F PR FALLS RISK ASSESSMENT DOCUMENTED: ICD-10-PCS | Mod: CPTII,S$GLB,, | Performed by: FAMILY MEDICINE

## 2022-07-13 PROCEDURE — 1101F PR PT FALLS ASSESS DOC 0-1 FALLS W/OUT INJ PAST YR: ICD-10-PCS | Mod: CPTII,S$GLB,, | Performed by: FAMILY MEDICINE

## 2022-07-13 PROCEDURE — 99499 UNLISTED E&M SERVICE: CPT | Mod: S$GLB,,, | Performed by: FAMILY MEDICINE

## 2022-07-13 PROCEDURE — 3008F PR BODY MASS INDEX (BMI) DOCUMENTED: ICD-10-PCS | Mod: CPTII,S$GLB,, | Performed by: FAMILY MEDICINE

## 2022-07-13 PROCEDURE — 99214 OFFICE O/P EST MOD 30 MIN: CPT | Mod: S$GLB,,, | Performed by: FAMILY MEDICINE

## 2022-07-13 PROCEDURE — 1160F PR REVIEW ALL MEDS BY PRESCRIBER/CLIN PHARMACIST DOCUMENTED: ICD-10-PCS | Mod: CPTII,S$GLB,, | Performed by: FAMILY MEDICINE

## 2022-07-13 PROCEDURE — 3074F PR MOST RECENT SYSTOLIC BLOOD PRESSURE < 130 MM HG: ICD-10-PCS | Mod: CPTII,S$GLB,, | Performed by: FAMILY MEDICINE

## 2022-07-13 PROCEDURE — 3044F HG A1C LEVEL LT 7.0%: CPT | Mod: CPTII,S$GLB,, | Performed by: FAMILY MEDICINE

## 2022-07-13 PROCEDURE — 3078F PR MOST RECENT DIASTOLIC BLOOD PRESSURE < 80 MM HG: ICD-10-PCS | Mod: CPTII,S$GLB,, | Performed by: FAMILY MEDICINE

## 2022-07-13 PROCEDURE — 99999 PR PBB SHADOW E&M-EST. PATIENT-LVL III: CPT | Mod: PBBFAC,,, | Performed by: FAMILY MEDICINE

## 2022-07-13 PROCEDURE — 99214 PR OFFICE/OUTPT VISIT, EST, LEVL IV, 30-39 MIN: ICD-10-PCS | Mod: S$GLB,,, | Performed by: FAMILY MEDICINE

## 2022-07-13 PROCEDURE — 99999 PR PBB SHADOW E&M-EST. PATIENT-LVL III: ICD-10-PCS | Mod: PBBFAC,,, | Performed by: FAMILY MEDICINE

## 2022-07-13 PROCEDURE — 3044F PR MOST RECENT HEMOGLOBIN A1C LEVEL <7.0%: ICD-10-PCS | Mod: CPTII,S$GLB,, | Performed by: FAMILY MEDICINE

## 2022-07-13 PROCEDURE — 3288F FALL RISK ASSESSMENT DOCD: CPT | Mod: CPTII,S$GLB,, | Performed by: FAMILY MEDICINE

## 2022-07-13 PROCEDURE — 1159F MED LIST DOCD IN RCRD: CPT | Mod: CPTII,S$GLB,, | Performed by: FAMILY MEDICINE

## 2022-07-13 RX ORDER — VIT C/E/ZN/COPPR/LUTEIN/ZEAXAN 250MG-90MG
1000 CAPSULE ORAL DAILY
Qty: 180 CAPSULE | Refills: 3
Start: 2022-07-13 | End: 2023-05-10 | Stop reason: SDUPTHER

## 2022-07-13 RX ORDER — AMOXICILLIN AND CLAVULANATE POTASSIUM 875; 125 MG/1; MG/1
1 TABLET, FILM COATED ORAL 2 TIMES DAILY
Qty: 14 TABLET | Refills: 0 | Status: SHIPPED | OUTPATIENT
Start: 2022-07-13 | End: 2023-03-27 | Stop reason: ALTCHOICE

## 2022-07-13 RX ORDER — AMOXICILLIN AND CLAVULANATE POTASSIUM 875; 125 MG/1; MG/1
1 TABLET, FILM COATED ORAL 2 TIMES DAILY
Qty: 14 TABLET | Refills: 0 | Status: SHIPPED | OUTPATIENT
Start: 2022-07-13 | End: 2022-07-13 | Stop reason: SDUPTHER

## 2022-07-13 RX ORDER — FLUDROCORTISONE ACETATE 0.1 MG/1
100 TABLET ORAL 2 TIMES DAILY
Qty: 60 TABLET | Refills: 3 | Status: SHIPPED | OUTPATIENT
Start: 2022-07-13 | End: 2022-12-20 | Stop reason: SDUPTHER

## 2022-07-13 RX ORDER — CLONAZEPAM 1 MG/1
TABLET ORAL
Qty: 60 TABLET | Refills: 3 | Status: SHIPPED | OUTPATIENT
Start: 2022-07-13 | End: 2023-03-27 | Stop reason: SDUPTHER

## 2022-07-15 ENCOUNTER — TELEPHONE (OUTPATIENT)
Dept: FAMILY MEDICINE | Facility: CLINIC | Age: 72
End: 2022-07-15
Payer: MEDICARE

## 2022-07-15 NOTE — TELEPHONE ENCOUNTER
Pt last seen 7/13/2022. Pt's citalopram stopped at this visit. Pt has questions in regards to why this was stopped. Please advise.

## 2022-07-15 NOTE — TELEPHONE ENCOUNTER
----- Message from Fior Lopez sent at 7/15/2022  9:44 AM CDT -----  Contact: PAtient  Type:  Needs Medical Advice    Who Called:  Patient       Would the patient rather a call back or a response via [facility]?  Call    Best Call Back Number:  [phone]    Additional Information:  Patient was seen on Wednesday and she needs to speak to the nurse because her after visit summary states to stop taking a medication that she has been on for years     Please call to advise

## 2022-07-18 NOTE — PROGRESS NOTES
Subjective:       Patient ID: Erica Marsh is a 72 y.o. female.    Chief Complaint: Follow-up (4month)    She has fibromyalgia with DD of lumbar spine.  She has generalized chronic pain.  She has chronic Major depression with partial results with medications .  She has improve will continue to have general fatigue.       Hypoglycemia post Gastric bypass, much improved.  She has controlled blood blood pressure, the blood sugar has improved.  He has gained approximately 5 lb within last 3 months.  BMI 26. SLE under Rheumatology, needs eye and rheumatology follow up  Fibromyalgia under Cymabalta with partial improvement.  New OB intake, healthy patient, no unusual issues, see progress notes and intake smartforms.   Active Diagnosis Review (HCC)     Chronic             HCC 18 - Diabetes with Chronic Complications     Type 2 diabetes mellitus with other skin complications (E11.628)     HCC 23 - Other Significant Endocrine and Metabolic Disorders     Adrenal insufficiency (E27.40)     HCC 40 - Rheumatoid Arthritis and Inflammatory Connective Tissue Disease       Ankylosing spondylitis of thoracolumbar region (M45.5)     Lupus (systemic lupus erythematosus) (M32.9)     Other systemic lupus erythematosus with lung involvement (M32.13)     HCC 59 - Major Depressive, Bipolar, and Paranoid Disorders     Recurrent major depression resistant to treatment (F33.9)      - Fibrosis of Lung and Other Chronic Lung Disorders     Other systemic lupus erythematosus with lung involvement (M32.13)      - Chronic Kidney Disease, Moderate (Stage 3)     Stage 3a chronic kidney disease (N18.31)          Acute             HCC 55 - Substance Use Disorder, Moderate/Severe, or Substance Use with   Complications     Opioid dependence, uncomplicated (F11.20)                  Follow-up  Associated symptoms include arthralgias, fatigue, a fever, myalgias and neck pain. Pertinent negatives include no abdominal pain, chest pain, chills,  coughing, diaphoresis, headaches, numbness or rash.   Back Pain  This is a chronic problem. The problem occurs intermittently. The problem has been gradually worsening since onset. The pain is present in the thoracic spine. The pain is at a severity of 7/10. The pain is moderate. The pain is worse during the night. The symptoms are aggravated by lying down. Associated symptoms include a fever and leg pain. Pertinent negatives include no abdominal pain, bladder incontinence, bowel incontinence, chest pain, dysuria, headaches, numbness or pelvic pain.   Leg Pain   Pertinent negatives include no numbness.     Review of Systems   Constitutional: Positive for fatigue and fever. Negative for activity change, appetite change, chills and diaphoresis.   HENT: Negative.  Negative for facial swelling, hearing loss, nosebleeds, postnasal drip, sneezing and trouble swallowing.         Painful right mandibular gum, recurrent gum infections it multiple episodes of losing teeth.   Eyes: Positive for visual disturbance. Negative for photophobia, pain, discharge, redness and itching.   Respiratory: Negative for apnea, cough, chest tightness, shortness of breath and wheezing.    Cardiovascular: Negative.  Negative for chest pain, palpitations and leg swelling.   Gastrointestinal: Negative.  Negative for abdominal distention, abdominal pain, anal bleeding, blood in stool, bowel incontinence and diarrhea.   Endocrine: Negative.  Negative for cold intolerance, heat intolerance, polydipsia, polyphagia and polyuria.   Genitourinary: Positive for difficulty urinating and frequency. Negative for bladder incontinence, dysuria, genital sores, hematuria, pelvic pain, urgency, vaginal bleeding and vaginal discharge.   Musculoskeletal: Positive for arthralgias, back pain, myalgias and neck pain. Negative for gait problem and neck stiffness.   Skin: Negative.  Negative for color change, pallor and rash.   Allergic/Immunologic: Negative.  Negative  for environmental allergies and food allergies.   Neurological: Positive for tremors and light-headedness. Negative for dizziness, seizures, syncope, speech difficulty, numbness and headaches.   Hematological: Negative for adenopathy.   Psychiatric/Behavioral: Positive for decreased concentration. Negative for agitation, behavioral problems, confusion, hallucinations, self-injury and sleep disturbance. The patient is nervous/anxious. The patient is not hyperactive.        Patient Active Problem List   Diagnosis    Lupus (systemic lupus erythematosus)    GERD (gastroesophageal reflux disease)    Arthritis    Trigger finger    Abdominal pain, other specified site    Constipation    Change in bowel habits    Rectal bleeding    Hypoglycemia following gastrointestinal surgery    Depression with anxiety    Other spondylosis, lumbar region    Sinus bradycardia    DDD (degenerative disc disease), thoracic    Palpitations    Chest pain, atypical    HUGHES (dyspnea on exertion)    Recurrent major depression resistant to treatment    Acquired hypothyroidism    Current chronic use of systemic steroids    H/O gastric bypass    Difficulty walking    Muscle weakness    Type 2 diabetes mellitus with other skin complications    Other systemic lupus erythematosus with lung involvement       Objective:      Physical Exam  Vitals reviewed.   Constitutional:       Appearance: She is well-developed. She is ill-appearing. She is not diaphoretic.   HENT:      Head: Normocephalic and atraumatic.      Right Ear: External ear normal. Decreased hearing noted.      Left Ear: External ear normal. Decreased hearing noted.      Nose: Nose normal.      Mouth/Throat:      Pharynx: No oropharyngeal exudate.   Eyes:      General: No scleral icterus.        Right eye: No discharge.         Left eye: No discharge.      Conjunctiva/sclera: Conjunctivae normal.      Pupils: Pupils are equal, round, and reactive to light.   Neck:       Thyroid: No thyromegaly.      Vascular: No JVD.      Trachea: No tracheal deviation.   Cardiovascular:      Rate and Rhythm: Normal rate.      Heart sounds: Normal heart sounds. No murmur heard.    No friction rub. No gallop.   Pulmonary:      Effort: Pulmonary effort is normal. No respiratory distress.      Breath sounds: No stridor. No wheezing or rales.   Chest:      Chest wall: No tenderness.   Abdominal:      General: Bowel sounds are normal. There is no distension.      Palpations: Abdomen is soft. There is no mass.      Tenderness: There is no abdominal tenderness. There is no guarding or rebound.   Musculoskeletal:      Right shoulder: Tenderness present. Decreased range of motion.      Cervical back: Normal range of motion and neck supple.      Thoracic back: Tenderness and bony tenderness present. Decreased range of motion.      Lumbar back: Tenderness and bony tenderness present. Decreased range of motion.      Comments: She has progressive hyperesthesia, mild scoliosis, mild DDD, cervical and lumbar radiculopathy. Hx of Herpes. Poor anxiety controlled, poor exercise , poor family support.   Lymphadenopathy:      Cervical: No cervical adenopathy.   Skin:     General: Skin is dry.      Coloration: Skin is not pale.      Findings: No erythema or rash.   Neurological:      Mental Status: She is alert and oriented to person, place, and time.      Cranial Nerves: No cranial nerve deficit.      Motor: No abnormal muscle tone.      Coordination: Coordination normal.      Deep Tendon Reflexes: Reflexes normal.   Psychiatric:         Attention and Perception: Perception normal. She is attentive.         Mood and Affect: Mood is not depressed or elated. Affect is flat. Affect is not blunt.         Speech: Speech normal.         Behavior: Behavior is slowed and withdrawn.         Thought Content: Thought content normal.         Cognition and Memory: Cognition normal.         Judgment: Judgment normal.         Lab  Results   Component Value Date    WBC 9.50 02/24/2022    HGB 14.6 02/24/2022    HCT 46.8 02/24/2022     02/24/2022    CHOL 241 (H) 07/07/2022    TRIG 162 (H) 07/07/2022    HDL 90 (H) 07/07/2022    ALT 17 02/24/2022    AST 23 02/24/2022     07/07/2022    K 4.6 07/07/2022     07/07/2022    CREATININE 0.9 07/07/2022    BUN 13 07/07/2022    CO2 24 07/07/2022    TSH 2.314 02/24/2022    INR 1.0 05/08/2020    HGBA1C 5.7 (H) 07/07/2022     The 10-year ASCVD risk score (Sarah CALIXTO Jr., et al., 2013) is: 14%    Values used to calculate the score:      Age: 72 years      Sex: Female      Is Non- : No      Diabetic: Yes      Tobacco smoker: No      Systolic Blood Pressure: 108 mmHg      Is BP treated: No      HDL Cholesterol: 90 mg/dL      Total Cholesterol: 241 mg/dL  Point of care blood sugar 110. Patient compliant with diet on monitoring her blood sugars.  Chronic prior myalgia pain does not qualify for opiates.   Assessment:       1. Type 2 diabetes mellitus with other skin complications    2. Other systemic lupus erythematosus with lung involvement    3. Gingivitis    4. Systemic lupus erythematosus, unspecified SLE type, unspecified organ involvement status    5. Ankylosing spondylitis of thoracolumbar region    6. Recurrent major depression resistant to treatment    7. Sciatic neuropathy, unspecified laterality    8. Ankylosis of lumbar spine    9. Chronic low back pain with bilateral sciatica, unspecified back pain laterality    10. Orthostatic hypotension    11. Vitamin D insufficiency        Plan:       Thyroid disease  -  Iron deficiency anemia  Erica was seen today for follow-up.    Diagnoses and all orders for this visit:    Type 2 diabetes mellitus with other skin complications  -     Comprehensive metabolic panel; Future    Other systemic lupus erythematosus with lung involvement    Gingivitis  -     Discontinue: amoxicillin-clavulanate 875-125mg (AUGMENTIN) 875-125 mg per  tablet; Take 1 tablet by mouth 2 (two) times daily.  -     amoxicillin-clavulanate 875-125mg (AUGMENTIN) 875-125 mg per tablet; Take 1 tablet by mouth 2 (two) times daily.    Systemic lupus erythematosus, unspecified SLE type, unspecified organ involvement status  -     clonazePAM (KLONOPIN) 1 MG tablet; TAKE 1 TABLET BY MOUTH TWICE DAILY FOR ANXIETY    Ankylosing spondylitis of thoracolumbar region  -     clonazePAM (KLONOPIN) 1 MG tablet; TAKE 1 TABLET BY MOUTH TWICE DAILY FOR ANXIETY    Recurrent major depression resistant to treatment  -     clonazePAM (KLONOPIN) 1 MG tablet; TAKE 1 TABLET BY MOUTH TWICE DAILY FOR ANXIETY    Sciatic neuropathy, unspecified laterality  -     clonazePAM (KLONOPIN) 1 MG tablet; TAKE 1 TABLET BY MOUTH TWICE DAILY FOR ANXIETY    Ankylosis of lumbar spine  -     clonazePAM (KLONOPIN) 1 MG tablet; TAKE 1 TABLET BY MOUTH TWICE DAILY FOR ANXIETY    Chronic low back pain with bilateral sciatica, unspecified back pain laterality  -     clonazePAM (KLONOPIN) 1 MG tablet; TAKE 1 TABLET BY MOUTH TWICE DAILY FOR ANXIETY    Orthostatic hypotension  -     fludrocortisone (FLORINEF) 0.1 mg Tab; Take 1 tablet (100 mcg total) by mouth 2 (two) times daily.    Vitamin D insufficiency  -     cholecalciferol, vitamin D3, (VITAMIN D3) 25 mcg (1,000 unit) capsule; Take 1 capsule (1,000 Units total) by mouth once daily.        Ankylosis of lumbar spine  -     clonazePAM (KLONOPIN) 1 MG tablet; TAKE 1 TABLET BY MOUTH TWICE DAILY FOR ANXIETY  Dispense: 60 tablet; Refill: 3  -     LIDOcaine (LIDODERM) 5 %; Remove & Discard patch within 12 hours or as directed by MD  Dispense: 60 patch; Refill: 4    Review plate method  Review foods in home  Discuss food labels  Refer to exercise program  Assist with medical visit preparation  Review patient results  Review patient education about results  Review chronic disease interventions (see specific disease intervention)  Assess knowledge level  Provide and discuss  information/education material  Encourage communication with physician  Monitor compliance  Educate on risk of non-compliance  Give food and resource list  Review patient education material (see patient instructions)  Review patient education material (see patient instructions)  Refer to exercise program  Review stress management techniques  Review patient education material (see patient instructions)  Assess support system  Discuss stress management techniques  Review normal ranges for labs    Ataxia due to recent stroke  -     gabapentin (NEURONTIN) 300 MG capsule; Take 1 capsule (300 mg total) by mouth every evening. TAKE 1 CAPSULE IN THE MORNING  AND TAKE 2 CAPSULES IN THE EVENING  Dispense: 90 capsule; Refill: 3    Neurological deficit present  -     gabapentin (NEURONTIN) 300 MG capsule; Take 1 capsule (300 mg total) by mouth every evening. TAKE 1 CAPSULE IN THE MORNING  AND TAKE 2 CAPSULES IN THE EVENING  Dispense: 90 capsule; Refill: 3    Delirium due to dissociative drug  -     gabapentin (NEURONTIN) 300 MG capsule; Take 1 capsule (300 mg total) by mouth every evening. TAKE 1 CAPSULE IN THE MORNING  AND TAKE 2 CAPSULES IN THE EVENING  Dispense: 90 capsule; Refill: 3    Opioid dependence with intoxication delirium  -     gabapentin (NEURONTIN) 300 MG capsule; Take 1 capsule (300 mg total) by mouth every evening. TAKE 1 CAPSULE IN THE MORNING  AND TAKE 2 CAPSULES IN THE EVENING  Dispense: 90 capsule; Refill: 3    Chronic seasonal allergic rhinitis due to pollen  -     fluticasone propionate (FLONASE) 50 mcg/actuation nasal spray; USE 1 SPRAY IN EACH NOSTRIL TWICE DAILY  Dispense: 48 g; Refill: 0    Body mass index (BMI) 31.0-31.9, adult   -     Vitamin D; Future; Expected date: 07/30/2021    Discontinue Celexa due to high risk of serotonin, fatigue, poor concentration, and decreased alertness.    There have been some probable medication, dietary and lifestyle compliance issues here. I have discussed with her  the great importance of following the treatment plan exactly as directed in order to achieve a good medical outcome.    Patient readiness: acceptance and barriers:poor sleep habits    During the course of the visit the patient was educated and counseled about the following:     Underweight:  Nutritional supplements    Goals: Underweight: Increase calorie intake and BMI      Did patient meet goals/outcomes: no  She needs more physical activities    Discussed health maintenance guidelines appropriate for age.  Discussed health maintenance guidelines appropriate for age.

## 2022-07-19 ENCOUNTER — PATIENT MESSAGE (OUTPATIENT)
Dept: RESEARCH | Facility: CLINIC | Age: 72
End: 2022-07-19
Payer: MEDICARE

## 2022-07-20 DIAGNOSIS — E11.9 TYPE 2 DIABETES MELLITUS WITHOUT COMPLICATION: ICD-10-CM

## 2022-07-25 ENCOUNTER — PATIENT MESSAGE (OUTPATIENT)
Dept: ADMINISTRATIVE | Facility: HOSPITAL | Age: 72
End: 2022-07-25
Payer: MEDICARE

## 2022-08-10 DIAGNOSIS — Z12.11 COLON CANCER SCREENING: ICD-10-CM

## 2022-08-24 ENCOUNTER — PATIENT MESSAGE (OUTPATIENT)
Dept: ADMINISTRATIVE | Facility: HOSPITAL | Age: 72
End: 2022-08-24
Payer: MEDICARE

## 2022-08-31 ENCOUNTER — OFFICE VISIT (OUTPATIENT)
Dept: RHEUMATOLOGY | Facility: CLINIC | Age: 72
End: 2022-08-31
Payer: MEDICARE

## 2022-08-31 ENCOUNTER — LAB VISIT (OUTPATIENT)
Dept: LAB | Facility: HOSPITAL | Age: 72
End: 2022-08-31
Attending: INTERNAL MEDICINE
Payer: MEDICARE

## 2022-08-31 VITALS
BODY MASS INDEX: 26.53 KG/M2 | DIASTOLIC BLOOD PRESSURE: 78 MMHG | SYSTOLIC BLOOD PRESSURE: 109 MMHG | WEIGHT: 164.38 LBS

## 2022-08-31 DIAGNOSIS — M32.9 SYSTEMIC LUPUS ERYTHEMATOSUS, UNSPECIFIED SLE TYPE, UNSPECIFIED ORGAN INVOLVEMENT STATUS: Primary | ICD-10-CM

## 2022-08-31 DIAGNOSIS — M32.9 SYSTEMIC LUPUS ERYTHEMATOSUS, UNSPECIFIED SLE TYPE, UNSPECIFIED ORGAN INVOLVEMENT STATUS: ICD-10-CM

## 2022-08-31 LAB
ALBUMIN SERPL BCP-MCNC: 4.2 G/DL (ref 3.5–5.2)
ALP SERPL-CCNC: 99 U/L (ref 55–135)
ALT SERPL W/O P-5'-P-CCNC: 22 U/L (ref 10–44)
ANION GAP SERPL CALC-SCNC: 11 MMOL/L (ref 8–16)
AST SERPL-CCNC: 25 U/L (ref 10–40)
BACTERIA #/AREA URNS HPF: NEGATIVE /HPF
BASOPHILS # BLD AUTO: 0.1 K/UL (ref 0–0.2)
BASOPHILS NFR BLD: 1 % (ref 0–1.9)
BILIRUB SERPL-MCNC: 0.7 MG/DL (ref 0.1–1)
BILIRUB UR QL STRIP: NEGATIVE
BUN SERPL-MCNC: 15 MG/DL (ref 8–23)
CALCIUM SERPL-MCNC: 9.1 MG/DL (ref 8.7–10.5)
CHLORIDE SERPL-SCNC: 99 MMOL/L (ref 95–110)
CK SERPL-CCNC: 57 U/L (ref 20–180)
CLARITY UR: ABNORMAL
CO2 SERPL-SCNC: 28 MMOL/L (ref 23–29)
COLOR UR: YELLOW
CREAT SERPL-MCNC: 0.8 MG/DL (ref 0.5–1.4)
DIFFERENTIAL METHOD: ABNORMAL
EOSINOPHIL # BLD AUTO: 0.4 K/UL (ref 0–0.5)
EOSINOPHIL NFR BLD: 3.5 % (ref 0–8)
ERYTHROCYTE [DISTWIDTH] IN BLOOD BY AUTOMATED COUNT: 13.2 % (ref 11.5–14.5)
ERYTHROCYTE [SEDIMENTATION RATE] IN BLOOD BY WESTERGREN METHOD: 21 MM/HR (ref 0–20)
EST. GFR  (NO RACE VARIABLE): >60 ML/MIN/1.73 M^2
GLUCOSE SERPL-MCNC: 111 MG/DL (ref 70–110)
GLUCOSE UR QL STRIP: NEGATIVE
HCT VFR BLD AUTO: 43.5 % (ref 37–48.5)
HGB BLD-MCNC: 14.1 G/DL (ref 12–16)
HGB UR QL STRIP: NEGATIVE
HYALINE CASTS #/AREA URNS LPF: 34 /LPF
IMM GRANULOCYTES # BLD AUTO: 0.03 K/UL (ref 0–0.04)
IMM GRANULOCYTES NFR BLD AUTO: 0.3 % (ref 0–0.5)
KETONES UR QL STRIP: ABNORMAL
LEUKOCYTE ESTERASE UR QL STRIP: ABNORMAL
LYMPHOCYTES # BLD AUTO: 3.8 K/UL (ref 1–4.8)
LYMPHOCYTES NFR BLD: 38.3 % (ref 18–48)
MAGNESIUM SERPL-MCNC: 2.3 MG/DL (ref 1.6–2.6)
MCH RBC QN AUTO: 31.5 PG (ref 27–31)
MCHC RBC AUTO-ENTMCNC: 32.4 G/DL (ref 32–36)
MCV RBC AUTO: 97 FL (ref 82–98)
MICROSCOPIC COMMENT: ABNORMAL
MONOCYTES # BLD AUTO: 1 K/UL (ref 0.3–1)
MONOCYTES NFR BLD: 9.5 % (ref 4–15)
NEUTROPHILS # BLD AUTO: 4.7 K/UL (ref 1.8–7.7)
NEUTROPHILS NFR BLD: 47.4 % (ref 38–73)
NITRITE UR QL STRIP: NEGATIVE
NRBC BLD-RTO: 0 /100 WBC
PH UR STRIP: 6 [PH] (ref 5–8)
PLATELET # BLD AUTO: 337 K/UL (ref 150–450)
PMV BLD AUTO: 9.7 FL (ref 9.2–12.9)
POTASSIUM SERPL-SCNC: 4.3 MMOL/L (ref 3.5–5.1)
PROT SERPL-MCNC: 7.9 G/DL (ref 6–8.4)
PROT UR QL STRIP: ABNORMAL
RBC # BLD AUTO: 4.48 M/UL (ref 4–5.4)
RBC #/AREA URNS HPF: 2 /HPF (ref 0–4)
SODIUM SERPL-SCNC: 138 MMOL/L (ref 136–145)
SP GR UR STRIP: 1.02 (ref 1–1.03)
SQUAMOUS #/AREA URNS HPF: 6 /HPF
URN SPEC COLLECT METH UR: ABNORMAL
UROBILINOGEN UR STRIP-ACNC: ABNORMAL EU/DL
WBC # BLD AUTO: 9.99 K/UL (ref 3.9–12.7)
WBC #/AREA URNS HPF: 22 /HPF (ref 0–5)

## 2022-08-31 PROCEDURE — 80053 COMPREHEN METABOLIC PANEL: CPT | Performed by: INTERNAL MEDICINE

## 2022-08-31 PROCEDURE — 86160 COMPLEMENT ANTIGEN: CPT | Performed by: INTERNAL MEDICINE

## 2022-08-31 PROCEDURE — 1101F PT FALLS ASSESS-DOCD LE1/YR: CPT | Mod: CPTII,S$GLB,, | Performed by: INTERNAL MEDICINE

## 2022-08-31 PROCEDURE — 1125F PR PAIN SEVERITY QUANTIFIED, PAIN PRESENT: ICD-10-PCS | Mod: CPTII,S$GLB,, | Performed by: INTERNAL MEDICINE

## 2022-08-31 PROCEDURE — 36415 COLL VENOUS BLD VENIPUNCTURE: CPT | Performed by: INTERNAL MEDICINE

## 2022-08-31 PROCEDURE — 3288F PR FALLS RISK ASSESSMENT DOCUMENTED: ICD-10-PCS | Mod: CPTII,S$GLB,, | Performed by: INTERNAL MEDICINE

## 2022-08-31 PROCEDURE — 86160 COMPLEMENT ANTIGEN: CPT | Mod: 59 | Performed by: INTERNAL MEDICINE

## 2022-08-31 PROCEDURE — 85025 COMPLETE CBC W/AUTO DIFF WBC: CPT | Performed by: INTERNAL MEDICINE

## 2022-08-31 PROCEDURE — 86038 ANTINUCLEAR ANTIBODIES: CPT | Performed by: INTERNAL MEDICINE

## 2022-08-31 PROCEDURE — 1101F PR PT FALLS ASSESS DOC 0-1 FALLS W/OUT INJ PAST YR: ICD-10-PCS | Mod: CPTII,S$GLB,, | Performed by: INTERNAL MEDICINE

## 2022-08-31 PROCEDURE — 1159F PR MEDICATION LIST DOCUMENTED IN MEDICAL RECORD: ICD-10-PCS | Mod: CPTII,S$GLB,, | Performed by: INTERNAL MEDICINE

## 2022-08-31 PROCEDURE — 1159F MED LIST DOCD IN RCRD: CPT | Mod: CPTII,S$GLB,, | Performed by: INTERNAL MEDICINE

## 2022-08-31 PROCEDURE — 3044F PR MOST RECENT HEMOGLOBIN A1C LEVEL <7.0%: ICD-10-PCS | Mod: CPTII,S$GLB,, | Performed by: INTERNAL MEDICINE

## 2022-08-31 PROCEDURE — 3074F SYST BP LT 130 MM HG: CPT | Mod: CPTII,S$GLB,, | Performed by: INTERNAL MEDICINE

## 2022-08-31 PROCEDURE — 83735 ASSAY OF MAGNESIUM: CPT | Performed by: INTERNAL MEDICINE

## 2022-08-31 PROCEDURE — 86235 NUCLEAR ANTIGEN ANTIBODY: CPT | Performed by: INTERNAL MEDICINE

## 2022-08-31 PROCEDURE — 3078F DIAST BP <80 MM HG: CPT | Mod: CPTII,S$GLB,, | Performed by: INTERNAL MEDICINE

## 2022-08-31 PROCEDURE — 3044F HG A1C LEVEL LT 7.0%: CPT | Mod: CPTII,S$GLB,, | Performed by: INTERNAL MEDICINE

## 2022-08-31 PROCEDURE — 86235 NUCLEAR ANTIGEN ANTIBODY: CPT | Mod: 59 | Performed by: INTERNAL MEDICINE

## 2022-08-31 PROCEDURE — 82550 ASSAY OF CK (CPK): CPT | Performed by: INTERNAL MEDICINE

## 2022-08-31 PROCEDURE — 85651 RBC SED RATE NONAUTOMATED: CPT | Performed by: INTERNAL MEDICINE

## 2022-08-31 PROCEDURE — 3074F PR MOST RECENT SYSTOLIC BLOOD PRESSURE < 130 MM HG: ICD-10-PCS | Mod: CPTII,S$GLB,, | Performed by: INTERNAL MEDICINE

## 2022-08-31 PROCEDURE — 99214 PR OFFICE/OUTPT VISIT, EST, LEVL IV, 30-39 MIN: ICD-10-PCS | Mod: S$GLB,,, | Performed by: INTERNAL MEDICINE

## 2022-08-31 PROCEDURE — 81001 URINALYSIS AUTO W/SCOPE: CPT | Performed by: INTERNAL MEDICINE

## 2022-08-31 PROCEDURE — 1125F AMNT PAIN NOTED PAIN PRSNT: CPT | Mod: CPTII,S$GLB,, | Performed by: INTERNAL MEDICINE

## 2022-08-31 PROCEDURE — 3008F BODY MASS INDEX DOCD: CPT | Mod: CPTII,S$GLB,, | Performed by: INTERNAL MEDICINE

## 2022-08-31 PROCEDURE — 3008F PR BODY MASS INDEX (BMI) DOCUMENTED: ICD-10-PCS | Mod: CPTII,S$GLB,, | Performed by: INTERNAL MEDICINE

## 2022-08-31 PROCEDURE — 3288F FALL RISK ASSESSMENT DOCD: CPT | Mod: CPTII,S$GLB,, | Performed by: INTERNAL MEDICINE

## 2022-08-31 PROCEDURE — 99214 OFFICE O/P EST MOD 30 MIN: CPT | Mod: S$GLB,,, | Performed by: INTERNAL MEDICINE

## 2022-08-31 PROCEDURE — 3078F PR MOST RECENT DIASTOLIC BLOOD PRESSURE < 80 MM HG: ICD-10-PCS | Mod: CPTII,S$GLB,, | Performed by: INTERNAL MEDICINE

## 2022-08-31 NOTE — PROGRESS NOTES
[facility] RHEUMATOLOGY            PROGRESS NOTE      Subjective:       Patient ID:   NAME: Erica Marsh : 1950     72 y.o. female    Referring Doc: No ref. provider found  Other Physicians:    Chief Complaint:  Lupus      History of Present Illness:     Patient returns today for a regularly scheduled follow-up visit for FM,Hx SLE ( had rashes,GMN 15 yrs ago, +dsDNA,+CARLENE)    The patient continues with fatigue and diffuse musculoskeletal pain.  No paresthesias.  Denies chest pains cough shortness of breath.  No mucosal ulcerations or Raynaud's.  Slight sicca symptoms      ROS:   GEN:  No  fever, night sweats . weight is stable   + fatigue  SKIN: no rashes, no bruising, no ulcerations, no Raynaud's  HEENT: no HA's, No visual changes, no mucosal ulcers, + sl sicca symptoms  CV:   no CP, SOB, PND, HUGHES, no orthopnea, no palpitations  PULM: normal with no SOB, cough, hemoptysis, sputum or pleuritic pain  GI:  no abdominal pain, nausea, vomiting, constipation, diarrhea, melanotic stools, BRBPR, hematemesis, no dysphagia  NEURO: no paresthesias, headaches, visual disturbances, muscle weakness  MUSCULOSKELETAL:no joint swelling, prolonged AM stiffness, + chronic back pain,+ muscle pain  Allergies:  Review of patient's allergies indicates:   Allergen Reactions    Codeine     Sulfa (sulfonamide antibiotics)     Codeine Rash and Other (See Comments)    Plaquenil [hydroxychloroquine] Rash and Other (See Comments)     ONE BRAND OF MED    Sulfa (sulfonamide antibiotics) Rash and Other (See Comments)       Medications:    Current Outpatient Medications:     albuterol (ACCUNEB) 1.25 mg/3 mL Nebu, INHALE THE CONTENTS OF 1 VIAL (1.25MG TOTAL) VIA NEBULIZER EVERY 6 HOURS AS NEEDED FOR RESCUE, Disp: 90 mL, Rfl: 11    albuterol-ipratropium (DUO-NEB) 2.5 mg-0.5 mg/3 mL nebulizer solution, USE 1 VIAL(3MLS) VIA NEBULIZER EVERY 6 HOURS AS NEEDED FOR WHEEZING, Disp: 990 mL, Rfl: 2    allopurinoL (ZYLOPRIM) 100 MG tablet, Take 1  tablet (100 mg total) by mouth once daily., Disp: 90 tablet, Rfl: 3    aspirin 81 MG Chew, Take 1 tablet (81 mg total) by mouth once daily., Disp: , Rfl: 0    blood sugar diagnostic (ACCU-CHEK JORDAN PLUS TEST STRP) Strp, TEST THREE TIMES DAILY BEFORE A MEAL AS DIRECTED, Disp: 500 strip, Rfl: 0    blood sugar diagnostic Strp, To check BG 4 times daily, to use with insurance preferred meter, Disp: 360 each, Rfl: 0    blood-glucose meter kit, AC meals x2, Disp: 1 each, Rfl: 0    cholecalciferol, vitamin D3, (VITAMIN D3) 25 mcg (1,000 unit) capsule, Take 1 capsule (1,000 Units total) by mouth once daily., Disp: 180 capsule, Rfl: 3    clonazePAM (KLONOPIN) 1 MG tablet, TAKE 1 TABLET BY MOUTH TWICE DAILY FOR ANXIETY, Disp: 60 tablet, Rfl: 3    cyanocobalamin 1,000 mcg/mL injection, Inject 1 mL (1,000 mcg total) into the muscle every 30 days., Disp: 10 mL, Rfl: 11    DULoxetine (CYMBALTA) 60 MG capsule, Take 1 capsule (60 mg total) by mouth 2 (two) times daily., Disp: 180 capsule, Rfl: 3    fluticasone propionate (FLONASE) 50 mcg/actuation nasal spray, 1 spray (50 mcg total) by Each Nostril route 2 (two) times daily., Disp: 48 g, Rfl: 3    folic acid (FOLVITE) 1 MG tablet, TAKE 1 TABLET (1,000 MCG TOTAL) BY MOUTH ONCE DAILY., Disp: 90 tablet, Rfl: 3    gabapentin (NEURONTIN) 400 MG capsule, Take 1 capsule (400 mg total) by mouth 3 (three) times daily., Disp: 270 capsule, Rfl: 3    glucagon (GLUCAGON EMERGENCY KIT, HUMAN,) 1 mg SolR, Inject 1 mg into the muscle as needed (Use for severe low blood glucose)., Disp: 1 each, Rfl: 3    lancets (ACCU-CHEK SOFTCLIX LANCETS) Misc, Use to test blood sugar three times a day   DX: E08.649, Disp: 300 each, Rfl: 3    levothyroxine (SYNTHROID) 100 MCG tablet, Take 1 tablet (100 mcg total) by mouth once daily., Disp: 90 tablet, Rfl: 3    multivitamin-min-iron-FA-vit K 45 mg iron- 800 mcg-120 mcg Cap, Take 1 capsule by mouth once daily., Disp: 30 capsule, Rfl: 11    vitamin E 1000 UNIT  capsule, Take 1,000 Units by mouth once daily., Disp: , Rfl:     amoxicillin-clavulanate 875-125mg (AUGMENTIN) 875-125 mg per tablet, Take 1 tablet by mouth 2 (two) times daily. (Patient not taking: Reported on 8/31/2022), Disp: 14 tablet, Rfl: 0    PMHx/PSHx Updates:      Objective:     Vitals:  Blood pressure 109/78, weight 74.6 kg (164 lb 6.4 oz).    Physical Examination:   GEN: no apparent distress, comfortable; AAOx3  SKIN: no rashes,no ulceration, no Raynaud's, no petechiae, no SQ nodules,  HEAD: normal  EYES: no pallor, no icterus  NECK: no masses, thyroid normal, trachea midline, no LAD/LN's, supple  CV: RRR with no murmur; l S1 and S2 reg. ,no gallop no rubs,   CHEST: Normal respiratory effort; CTAB; normal breath sounds; no wheeze or crackles  MUSC/Skeletal: ROM normal; no crepitus; joints without synovitis,  no deformities  No joint swelling or tenderness of PIP, MCP, wrist, elbow, shoulder, or knee joints  EXTREM: no clubbing, cyanosis, no edema,normal  pulses   NEURO: grossly intact; motor WNL; AAOx3;  PSYCH: + anxiety/depression  LYMPH: normal cervical, supraclavicular          Labs:   Lab Results   Component Value Date    WBC 9.99 08/31/2022    HGB 14.1 08/31/2022    HCT 43.5 08/31/2022    MCV 97 08/31/2022     08/31/2022    CMP  @LASTLAB(NA,K,CL,CO2,GLU,BUN,Creatinine,Calcium,PROT,Albumin,Bilitot,Alkphos,AST,ALT,CRP,ESR,RF,CCP,CARLENE,SSA,CPK,uric acid) )@  I have reviewed all available lab results and radiology reports.    Radiology/Diagnostic Studies:        Assessment/Plan:   (1) 72 y.o. female with history of lupus. ( GMN 15 years ago.,rashes,arthritis ,+ CARLENE,dsDNA, not for last yrs)  Most recently with negative CARLENE , positive SSA.  She has sicca symptoms.  Fibromyalgia.  This  is more debilitating.  Overlap with depression/anxiety  Osteoarthritis    Labs  She will follow-up with Rheumatology in [place]      Discussion:     I have explained all of the above in detail and the patient understands  all of the current recommendation(s). I have answered all questions to the best of my ability and to their complete satisfaction.       The patient is to continue with the current management plan         RTC in         Electronically signed by Mingo Alvarenga MD    Patient notified that this office will be closing December 22, 2022. They should begin looking for another rheumatologist as soon as possible.  A list with the names and contact details of rheumatologists in the surrounding area was provided.

## 2022-09-02 LAB
ANA TITR SER IF: NEGATIVE {TITER}
C3 SERPL-MCNC: 121 MG/DL (ref 82–167)
C4 SERPL-MCNC: 16 MG/DL (ref 12–38)
ENA SS-A AB SER-ACNC: >8 AI (ref 0–0.9)
ENA SS-B AB SER-ACNC: <0.2 AI (ref 0–0.9)

## 2022-10-11 ENCOUNTER — PATIENT OUTREACH (OUTPATIENT)
Dept: ADMINISTRATIVE | Facility: HOSPITAL | Age: 72
End: 2022-10-11
Payer: MEDICARE

## 2022-10-11 ENCOUNTER — PATIENT MESSAGE (OUTPATIENT)
Dept: ADMINISTRATIVE | Facility: HOSPITAL | Age: 72
End: 2022-10-11
Payer: MEDICARE

## 2022-10-11 NOTE — LETTER
AUTHORIZATION FOR RELEASE OF   CONFIDENTIAL INFORMATION    Dear Kourtney Lai,    We are seeing Erica Marsh, date of birth 1950, in the clinic at [facility]. Jose Guadalupe Ornelas MD is the patient's PCP. Erica Marsh has an outstanding lab/procedure at the time we reviewed her chart. In order to help keep her health information updated, she has authorized us to request the following medical record(s):        (  )  MAMMOGRAM                                      (  )  COLONOSCOPY      (  )  PAP SMEAR                                          (  )  OUTSIDE LAB RESULTS     (  )  DEXA SCAN                                          (X) EYE EXAM - most recent           (  )  FOOT EXAM                                          (  )  ENTIRE RECORD     (  )  OUTSIDE IMMUNIZATIONS                 (  )  _______________         Please fax records to [facility], Raymond Baez, MD, [phone]     If you have any questions, please contact [facility] at [phone].           Patient Name: Erica Marsh  : 1950  Patient Phone #: [phone]

## 2022-12-20 ENCOUNTER — PATIENT MESSAGE (OUTPATIENT)
Dept: FAMILY MEDICINE | Facility: CLINIC | Age: 72
End: 2022-12-20
Payer: MEDICARE

## 2022-12-20 DIAGNOSIS — J30.1 CHRONIC SEASONAL ALLERGIC RHINITIS DUE TO POLLEN: ICD-10-CM

## 2022-12-20 DIAGNOSIS — E43 SEVERE PROTEIN-CALORIE MALNUTRITION: ICD-10-CM

## 2022-12-20 RX ORDER — FLUTICASONE PROPIONATE 50 MCG
1 SPRAY, SUSPENSION (ML) NASAL 2 TIMES DAILY
Qty: 48 G | Refills: 3 | Status: SHIPPED | OUTPATIENT
Start: 2022-12-20 | End: 2023-11-03 | Stop reason: SDUPTHER

## 2022-12-20 RX ORDER — CYANOCOBALAMIN 1000 UG/ML
1000 INJECTION, SOLUTION INTRAMUSCULAR; SUBCUTANEOUS
Qty: 10 ML | Refills: 11 | Status: SHIPPED | OUTPATIENT
Start: 2022-12-20 | End: 2023-03-27

## 2022-12-20 NOTE — TELEPHONE ENCOUNTER
No new care gaps identified.  [facility] Embedded Care Gaps. Reference number: [id number]. 12/20/2022   12:19:48 PM CST

## 2023-01-17 ENCOUNTER — PATIENT MESSAGE (OUTPATIENT)
Dept: ADMINISTRATIVE | Facility: HOSPITAL | Age: 73
End: 2023-01-17
Payer: MEDICARE

## 2023-02-06 ENCOUNTER — TELEPHONE (OUTPATIENT)
Dept: RHEUMATOLOGY | Facility: CLINIC | Age: 73
End: 2023-02-06
Payer: MEDICARE

## 2023-02-06 NOTE — TELEPHONE ENCOUNTER
----- Message from Kailash Castano sent at 2/6/2023 11:56 AM CST -----  Regarding: Appt  Contact: KARISHMA MILLER [[id number]]  Name of Who is Calling: KARISHMA MILLER [[id number]]      What is the request in detail: Would like to speak with staff in regards to est care for a new provider. Please advise      Can the clinic reply by [facility]: yes      [facility]: [phone]

## 2023-02-07 DIAGNOSIS — Z00.00 ENCOUNTER FOR MEDICARE ANNUAL WELLNESS EXAM: ICD-10-CM

## 2023-02-09 DIAGNOSIS — Z00.00 ENCOUNTER FOR MEDICARE ANNUAL WELLNESS EXAM: ICD-10-CM

## 2023-02-15 ENCOUNTER — TELEPHONE (OUTPATIENT)
Dept: RHEUMATOLOGY | Facility: CLINIC | Age: 73
End: 2023-02-15
Payer: MEDICARE

## 2023-02-15 NOTE — TELEPHONE ENCOUNTER
----- Message from Yamileth Natarajan sent at 2/15/2023  1:41 PM CST -----  Regarding: Self/  [phone]  Type: Patient Call Back    Who called:  Patient    What is the request in detail:  Patient is needing a call back from staff regarding an appt.  Thank you    Would the patient rather a call back or a response via [facility]?  Call back    Best call back number: [phone] (home)               Thank you

## 2023-02-22 ENCOUNTER — TELEPHONE (OUTPATIENT)
Dept: RHEUMATOLOGY | Facility: CLINIC | Age: 73
End: 2023-02-22
Payer: MEDICARE

## 2023-02-22 DIAGNOSIS — E08.649: Chronic | ICD-10-CM

## 2023-02-22 DIAGNOSIS — E16.2 HYPOGLYCEMIA: ICD-10-CM

## 2023-02-22 RX ORDER — LANCETS
EACH MISCELLANEOUS
Qty: 300 EACH | Refills: 11 | Status: SHIPPED | OUTPATIENT
Start: 2023-02-22 | End: 2023-07-25

## 2023-02-22 NOTE — TELEPHONE ENCOUNTER
Refill Routing Note   Medication(s) are not appropriate for processing by [facility] for the following reason(s):       Due for refill >6 months ago    [facility] action(s):  Defer   Requires labs : Yes    Medication Therapy Plan: last ordered 3 years ago    Appointments  past 12m or future 3m with PCP    Date Provider   Last Visit   7/13/2022 Jose Guadalupe Ornelas MD   Next Visit   3/27/2023 Jose Guadalupe Ornelas MD   ED visits in past 90 days: 0        Note composed:3:40 PM 02/22/2023

## 2023-02-22 NOTE — TELEPHONE ENCOUNTER
Care Due:                  Date            Visit Type   Department     Provider  --------------------------------------------------------------------------------                                EP -                              PRIMARY      [facility]  Last Visit: 07-      CARE ([facility])   ANTHONY Ornelas                              EP -                              PRIMARY      [facility]  Next Visit: 03-      CARE ([facility])   ANTHONY Ornelas                                                            Last  Test          Frequency    Reason                     Performed    Due Date  --------------------------------------------------------------------------------    Uric Acid...  12 months..  allopurinoL..............  07- 07-    [facility] Embedded Care Gaps. Reference number: [id number]. 2/22/2023   11:30:37 AM CST   Chief Complaint(s):   Chief Complaint   Patient presents with   â¢ Follow-up   â¢ Back Pain     Date of Injury: 06/25/2020  Initial Treatment Date: 7/1/2020  Date Last Seen: 7/3/2020  Mechanism of Onset: gardening  Occupation: retired  Referred by: Rosalia Sorensen DC  Primary Care Physician: Malu Nick MD  I have reviewed the past medical history, family history, social history, medications and allergies listed in the medical record as obtained by my nursing staff and support staff and agree with their documentation. Marina Rivas  reports that she has been smoking cigarettes. She started smoking about 2 years ago. She has a 43.00 pack-year smoking history. She has never used smokeless tobacco.  Marina Rivas has No Known Allergies. Advance Directive Status: patient declined  Visit Number of Current Episode: 3  Date informed consent signed: 7/1/2020  Discharged from care: No  Initial pain rating: 15    Relevant Diagnostic Imaging/Testing:   Patient has previous MRI. Martin Hernandes returns today for continued treatment of sciatic pain,left. She rates the pain today at 9 out of 10, 10 being the worse. Patient reports her symptoms are the same in pain and the same in function today. Miranda Estrada has performed recommended stretching exercises daily as instructed. Patient denies headache today. Patient reports no new symptoms or injuries since last visit. Her pain continues in the left anterior thigh that extends down to the medial calf of left leg. She denies numbness and tingling. Edgar Dumas states she did have relief after the last treatement that lasted until the next morning. She has severe pain when sitting or walking more than a few steps.     Covid 19 screening: Negative    VITALS    Visit Vitals  /63 (BP Location: RUE - Right upper extremity, Patient Position: Sitting, Cuff Size: Large Adult)   Pulse 66   Temp 99.1 Â°F (37.3 Â°C) (Temporal)   SpO2 95%       ALLERGIES    ALLERGIES:  No Known Allergies    CURRENT MEDICATIONS    Current Outpatient Medications   Medication Sig Dispense Refill   â¢ oxyCODONE-acetaminophen (PERCOCET) 5-325 MG per tablet Take 1-2 tablets by mouth every 6 hours as needed for Pain. 30 tablet 0   â¢ tiZANidine (ZANAFLEX) 4 MG tablet One tablet every 6-8 hours prn pain. 120 tablet 1   â¢ gabapentin (NEURONTIN) 600 MG tablet 1/2 tab po q8hrs x 1 wk, if pain is not controlled and if tolerated increase as follows. inc. to one tab po q8hrs 90 tablet 0   â¢ methylPREDNISolone (MEDROL DOSEPAK) 4 MG tablet follow package directions 21 tablet 0   â¢ mirtazapine (REMERON) 15 MG tablet Take 1 tablet by mouth nightly 90 tablet 0   â¢ ropinirole (REQUIP) 3 MG tablet Take 1 tablet by mouth nightly 90 tablet 0   â¢ lisinopril (ZESTRIL) 20 MG tablet TAKE 1 TABLET BY MOUTH ONCE DAILY 90 tablet 3   â¢ EUTHYROX 137 MCG tablet TAKE 1 TABLET BY MOUTH ONCE DAILY 90 tablet 3   â¢ Ascorbic Acid (VITAMIN C) 500 MG tablet Take 500 mg by mouth daily. â¢ Multiple Vitamins-Minerals (MULTIVITAMIN ADULT PO) Take 1 tablet by mouth daily. â¢ omeprazole (PRILOSEC) 40 MG capsule Take 40 mg by mouth daily. â¢ VITAMIN D, CHOLECALCIFEROL, PO Take 1,000 Int'l Units by mouth. No current facility-administered medications for this visit. PAST MEDICAL HISTORY    Past Medical History:   Diagnosis Date   â¢ Anxiety     situational   â¢ Essential (primary) hypertension    â¢ GERD    â¢ Hypothyroidism    â¢ Insomnia    â¢ Mass of shoulder region     left shoulder   â¢ Prediabetes    â¢ RLS (restless legs syndrome)    â¢ Rotator cuff tear     left shoulder   â¢ Sleep apnea     CPAP   â¢ Unspecified vitamin D deficiency    â¢ Wears dentures    â¢ Wears glasses        SURGICAL HISTORY    Past Surgical History:   Procedure Laterality Date   â¢ Appendectomy     â¢ Bone biopsy Left 11/3/2015    proximal humerus- negative for malignancy. Dr Zara Robles.  [facility]   â¢ Colonoscopy  06/01/2011   â¢ Esophagogastroduodenoscopy  06/01/2011   â¢ Humerus biopsy Left 8/17/2015    [facility]   â¢ Remove tonsils/adenoids,<12 y/o      Tonsillectomy w Adenoids (child)   â¢ Shoulder arthroscopy Left 12-9-15    rotator cuff repair   â¢ Tonsillectomy and adenoidectomy         SOCIAL HISTORY    Social History     Tobacco Use   â¢ Smoking status: Current Every Day Smoker     Packs/day: 1.00     Years: 43.00     Pack years: 43.00     Types: Cigarettes     Start date: 7/9/2017   â¢ Smokeless tobacco: Never Used   â¢ Tobacco comment: Declines smoking cessation today. She has patches at home. Substance Use Topics   â¢ Alcohol use: Yes     Alcohol/week: 2.0 standard drinks     Types: 2 Shots of liquor per week     Frequency: Monthly or less     Drinks per session: 1 or 2     Binge frequency: Never   â¢ Drug use: No       FAMILY HISTORY    Family History   Problem Relation Age of Onset   â¢ Cancer Mother         Breast   â¢ Cancer Father         Stomach   â¢ Depression Brother    â¢ Asthma Daughter        Patient Emotional screening was completed 7/1/2020; [place] Pain Supplemental Questionnaire score was 36 out of 40. OBJECTIVE:      GENERAL:  Denies fever, chills, tiredness, malaise, unintentional weight changes. HEENT: Denies ear pain, eye drainage, rhinorrhea, sore throat, speech issues or swallowing problems. CARDIOVASCULAR:  Denies chest pain, shortness of breath. RESPIRATORY: Denies wheezing, frequent cough, shortness of breath. GASTROINTESTINAL: Denies issues with bowel movements, abdominal pain, or issues with food consumption. PSYCHIATRIC: Oriented in time, place, and person. Denies problems with bowel or bladder incontinence. Motion units are composed of 2 bones and are identified as one joint/region except for C7-T1, T12-L1, or L5-S1 which are motion units that are composed of 2 regions.     Lumbar, Sacral and  Pelvic Evaluation    SIJ Compression: Positive  Left  SIJ Distraction: Positive  Left    Slump Test: Positive  Left     Point tenderness noted in Gluteus Scott, Left and Gluteus Medius, Left muscles. Trigger points noted. Pelvic and Sacral spine facet joint function is within normal limits except for her L5/S1, Left and Sacroiliac, Left facet joints that exhibited limited passive range of motion and segmental restriction and tenderness on palpation. The following muscles were examined for flexibility and tone at rest; right and left hip flexor muscle; right and left hip adductor muscle; right and left hip rotator muscle; right and left piriformis muscle; right and left hamstring muscle; right and left Gluteus medius muscle and gluteus scott muscle; right and left quadratus lumborum muscle; left and right Tensor fasciae latae muscle; left and right internal hip rotators muscle; right and left quadriceps muscle. These muscles were found to be within normal limits except for  her Gluteus Scott, Left, Gluteus Medius, Left, Dorsal Sacral Ligament, Left and Piriformis, Left that exhibited limited flexibility and were hypertonic at rest. Skin inspection in this area appeared to be free of lesions, rashes, or ulcers. Lymph nodes appeared to be not swollen and non tender in this area. ASSESSMENT:   1. Segmental and somatic dysfunction of pelvic region    2. Segmental and somatic dysfunction of sacral region    3. Segmental and somatic dysfunction of lumbar region    4. Chronic left-sided low back pain with left-sided sciatica    5. Myalgia      Complicating Factors/Co-morbidities:   See Hx    PLAN:    Functional Goal/Measure: standing and walking    Manipulation/Mobilization: Patient was evaluated and then treated with FLEXION DISTRACTION TECHNIQUE over the LUMBAR, SACRAL and PELVIC region(s) and the musculature noted as taut in objective findings of this progress report to improve flexibility and decrease strain to spinal structures.      Soft Tissue Treatment: Myofascial release was performed over the previously documented Dorsal Sacral Ligaments, Right, Dorsal Sacral Ligaments, Left, Gluteus scott muscle, Right, Gluteus scott muscle, Left, Gluteus medius muscle, Right, Gluteus medius muscle, Left, Piriformis, Right and Piriformis, Left musculature restricted and tender to palpation for 6 Minute for the purpose of neuromuscular reeducation and pain relief. Exercises/Stretches: Patient instructed on modified figure 4 stretch and sciatic nerve floss stretch  to tolerance. Patient demonstrated exercises and stretches without fail. Patient instructed to perform exercises hourly as tolerated. Time of instruction is less than 8 minutes. Patient informed that increased muscle soreness after chiropractic treatment is normal and usually lasts 24-48 hours. Patient instructed to call if any questions or concerns arise. Patient states that they are the same in PAIN post treatment. Patient states that they are the same in FUNCTION post treatment. Other treatment options discussed with patient massage therapy, physical therapy, dry needling, physiatry, and trigger point injections. Patient is to return 3-7 days for continued care and treatment of her condition consistent with plan of care. Length of Visit Treatment and/or Consultation: 20 minutes     Goals of Care: Goal of care is to improve muscular and skeletal function and provide symptom relief as well as increased function. Care is initially planned for 1-3 times/week for 2-3 weeks and the duration of this plan is contingent upon patient response. On 7/8/2020, Zoraida ROJAS CT scribed the services personally performed by Chris Palmer DC. The documentation recorded by the scribe accurately reflects the service I personally performed and the decisions made by Dyllan edgar DC. Portions of this note were transcribed by a scribe.  Dr. Marixa ROJAS personally performed the history, physical exam and medical decision making; and confirmed the accuracy of the information in the transcribed "note.  Authenticated by Dr. Mt Grullon on 7/16/2020    INFORMED Mitesh Casiano    The nature of the chiropractic adjustment (manipulation): A Doctor of Chiropractic may use his/her hands or manipulation under traction in such a way as to move one or more joints. That may cause an audible ""pop\"" or \""click,\"" much as one might experience when they \""crack\"" a knuckle. A sense of movement may be felt. The material risks inherent in chiropractic adjustment (manipulation): As with any health care procedure, there are certain complications that may arise during a chiropractic adjustment. Those complications may include but are not limited to, fracture, disc injury, dislocation, strain/sprain, vascular accident, or stroke. Some patients will feel some stiffness or soreness after the first few days of treatment. The probability of those risks occurring:  Vascular accidents or stroke with manipulation or adjustments are rare occurrences. Studies have shown approximately one in 1.4 million chance of occurrence with manipulation of the neck. We use tests in our examinations that are designed to identify if you have increased susceptibility to that kind of injury. The other complications are also generally described as \""rare. \""  Other possible chiropractic treatment options include:  Electrical muscle stimulation, T.E.N.S., ultrasound therapy, stretching/exercises, hot or cold packs, massage, nutritional or other lifestyle modifications; The risks and benefits of Electrical muscle stimulation, Ultrasound Therapy, Myofascial release, and Exercises. Other possible treatment options for your condition include:  Acupuncture. Self-administered, over-the-counter analgesics, and /or rest.  Medical care with prescription drugs such as anti-inflammatories, muscle relaxants, and/or painkillers. Surgery.   The above-listed options have their own inherent risks, which should be discussed with the treating provider if one of " these options is selected. The risks of remaining untreated:  Remaining untreated allows the formation of adhesions and reduces mobility, which may set up a pain reaction further reducing mobility. Over time this may complicate care, making chiropractic treatment more difficult and less effective. Patient Instruction/Education:   Patient educated in the nature of the condition and likely pain generators as well as a plan of care to resolve symptoms and improve muscular and skeletal function. The patient noted verbal understanding of condition and is agreeable to plan of care. Patient stated understanding of and was in agreement with, the discussed instructions. All questions and comments were answered and addressed during the visit. The patient tolerated and responded well to visit. Verbal consent was obtained prior to any manual medicine treatments/procedures as well as but not excluding manipulation. Patient understands and agrees to the procedure.

## 2023-02-22 NOTE — TELEPHONE ENCOUNTER
----- Message from Milady Lawson sent at 2/22/2023 12:46 PM CST -----  Regarding: appt r/s'd  Contact: pt @ [phone]  Patient is calling to reschedule appointment on 04/24 2:30 pm rescheduled Tuesday work the best for pt; she is unable to make the drive to [place] and [place] [facility]       Please call to advise thank you.

## 2023-02-24 ENCOUNTER — TELEPHONE (OUTPATIENT)
Dept: RHEUMATOLOGY | Facility: CLINIC | Age: 73
End: 2023-02-24
Payer: MEDICARE

## 2023-02-24 NOTE — TELEPHONE ENCOUNTER
----- Message from Jovan Tipton sent at 2/24/2023  2:14 PM CST -----  Type:  Patient Returning Call    Who Called: Self     Who Left Message for Patient: Tierney     Does the patient know what this is regarding?:Yes     Would the patient rather a call back or a response via [facility]? CALL     Best Call Back Number: [phone] (home)

## 2023-02-28 ENCOUNTER — TELEPHONE (OUTPATIENT)
Dept: RHEUMATOLOGY | Facility: CLINIC | Age: 73
End: 2023-02-28
Payer: MEDICARE

## 2023-02-28 NOTE — TELEPHONE ENCOUNTER
----- Message from Néstor Martin MA sent at 2/28/2023 11:48 AM CST -----  Type:  Patient Returning Call    Who Called: Self    Who Left Message for Patient: CHARMAINE ROGER    Does the patient know what this is regarding?:NO    Would the patient rather a call back or a response via [facility]? yes    Best Call Back Number:[phone] (home)

## 2023-02-28 NOTE — TELEPHONE ENCOUNTER
I spoke to patient and change her appointment with Dr Jasmine in [facility]. Patient voices understanding.

## 2023-03-27 ENCOUNTER — OFFICE VISIT (OUTPATIENT)
Dept: FAMILY MEDICINE | Facility: CLINIC | Age: 73
End: 2023-03-27
Payer: MEDICARE

## 2023-03-27 VITALS
BODY MASS INDEX: 26.65 KG/M2 | DIASTOLIC BLOOD PRESSURE: 84 MMHG | WEIGHT: 165.81 LBS | OXYGEN SATURATION: 98 % | TEMPERATURE: 98 F | SYSTOLIC BLOOD PRESSURE: 130 MMHG | HEART RATE: 69 BPM | HEIGHT: 66 IN

## 2023-03-27 DIAGNOSIS — F33.9 RECURRENT MAJOR DEPRESSION RESISTANT TO TREATMENT: ICD-10-CM

## 2023-03-27 DIAGNOSIS — F11.221 OPIOID DEPENDENCE WITH INTOXICATION DELIRIUM: ICD-10-CM

## 2023-03-27 DIAGNOSIS — E43 SEVERE PROTEIN-CALORIE MALNUTRITION: ICD-10-CM

## 2023-03-27 DIAGNOSIS — F16.921: ICD-10-CM

## 2023-03-27 DIAGNOSIS — M43.26 ANKYLOSIS OF LUMBAR SPINE: ICD-10-CM

## 2023-03-27 DIAGNOSIS — G57.00 SCIATIC NEUROPATHY, UNSPECIFIED LATERALITY: ICD-10-CM

## 2023-03-27 DIAGNOSIS — Z12.31 OTHER SCREENING MAMMOGRAM: Primary | ICD-10-CM

## 2023-03-27 DIAGNOSIS — M54.42 CHRONIC LOW BACK PAIN WITH BILATERAL SCIATICA, UNSPECIFIED BACK PAIN LATERALITY: ICD-10-CM

## 2023-03-27 DIAGNOSIS — G89.29 CHRONIC LOW BACK PAIN WITH BILATERAL SCIATICA, UNSPECIFIED BACK PAIN LATERALITY: ICD-10-CM

## 2023-03-27 DIAGNOSIS — M45.5 ANKYLOSING SPONDYLITIS OF THORACOLUMBAR REGION: ICD-10-CM

## 2023-03-27 DIAGNOSIS — E08.649 DIABETES MELLITUS DUE TO UNDERLYING CONDITION WITH HYPOGLYCEMIA WITHOUT COMA, WITHOUT LONG-TERM CURRENT USE OF INSULIN: ICD-10-CM

## 2023-03-27 DIAGNOSIS — M54.41 CHRONIC LOW BACK PAIN WITH BILATERAL SCIATICA, UNSPECIFIED BACK PAIN LATERALITY: ICD-10-CM

## 2023-03-27 DIAGNOSIS — M32.9 SYSTEMIC LUPUS ERYTHEMATOSUS, UNSPECIFIED SLE TYPE, UNSPECIFIED ORGAN INVOLVEMENT STATUS: ICD-10-CM

## 2023-03-27 PROCEDURE — 99999 PR PBB SHADOW E&M-EST. PATIENT-LVL IV: CPT | Mod: PBBFAC,HCNC,, | Performed by: FAMILY MEDICINE

## 2023-03-27 PROCEDURE — 1160F RVW MEDS BY RX/DR IN RCRD: CPT | Mod: HCNC,CPTII,S$GLB, | Performed by: FAMILY MEDICINE

## 2023-03-27 PROCEDURE — 3288F PR FALLS RISK ASSESSMENT DOCUMENTED: ICD-10-PCS | Mod: HCNC,CPTII,S$GLB, | Performed by: FAMILY MEDICINE

## 2023-03-27 PROCEDURE — 1101F PT FALLS ASSESS-DOCD LE1/YR: CPT | Mod: HCNC,CPTII,S$GLB, | Performed by: FAMILY MEDICINE

## 2023-03-27 PROCEDURE — 1159F PR MEDICATION LIST DOCUMENTED IN MEDICAL RECORD: ICD-10-PCS | Mod: HCNC,CPTII,S$GLB, | Performed by: FAMILY MEDICINE

## 2023-03-27 PROCEDURE — 4010F ACE/ARB THERAPY RXD/TAKEN: CPT | Mod: HCNC,CPTII,S$GLB, | Performed by: FAMILY MEDICINE

## 2023-03-27 PROCEDURE — 3079F DIAST BP 80-89 MM HG: CPT | Mod: HCNC,CPTII,S$GLB, | Performed by: FAMILY MEDICINE

## 2023-03-27 PROCEDURE — 99499 RISK ADDL DX/OHS AUDIT: ICD-10-PCS | Mod: S$GLB,,, | Performed by: FAMILY MEDICINE

## 2023-03-27 PROCEDURE — 1159F MED LIST DOCD IN RCRD: CPT | Mod: HCNC,CPTII,S$GLB, | Performed by: FAMILY MEDICINE

## 2023-03-27 PROCEDURE — 3075F SYST BP GE 130 - 139MM HG: CPT | Mod: HCNC,CPTII,S$GLB, | Performed by: FAMILY MEDICINE

## 2023-03-27 PROCEDURE — 99999 PR PBB SHADOW E&M-EST. PATIENT-LVL IV: ICD-10-PCS | Mod: PBBFAC,HCNC,, | Performed by: FAMILY MEDICINE

## 2023-03-27 PROCEDURE — 1125F AMNT PAIN NOTED PAIN PRSNT: CPT | Mod: HCNC,CPTII,S$GLB, | Performed by: FAMILY MEDICINE

## 2023-03-27 PROCEDURE — 1101F PR PT FALLS ASSESS DOC 0-1 FALLS W/OUT INJ PAST YR: ICD-10-PCS | Mod: HCNC,CPTII,S$GLB, | Performed by: FAMILY MEDICINE

## 2023-03-27 PROCEDURE — 3008F BODY MASS INDEX DOCD: CPT | Mod: HCNC,CPTII,S$GLB, | Performed by: FAMILY MEDICINE

## 2023-03-27 PROCEDURE — 1125F PR PAIN SEVERITY QUANTIFIED, PAIN PRESENT: ICD-10-PCS | Mod: HCNC,CPTII,S$GLB, | Performed by: FAMILY MEDICINE

## 2023-03-27 PROCEDURE — 3008F PR BODY MASS INDEX (BMI) DOCUMENTED: ICD-10-PCS | Mod: HCNC,CPTII,S$GLB, | Performed by: FAMILY MEDICINE

## 2023-03-27 PROCEDURE — 99499 UNLISTED E&M SERVICE: CPT | Mod: S$GLB,,, | Performed by: FAMILY MEDICINE

## 2023-03-27 PROCEDURE — 3075F PR MOST RECENT SYSTOLIC BLOOD PRESS GE 130-139MM HG: ICD-10-PCS | Mod: HCNC,CPTII,S$GLB, | Performed by: FAMILY MEDICINE

## 2023-03-27 PROCEDURE — 3288F FALL RISK ASSESSMENT DOCD: CPT | Mod: HCNC,CPTII,S$GLB, | Performed by: FAMILY MEDICINE

## 2023-03-27 PROCEDURE — 3079F PR MOST RECENT DIASTOLIC BLOOD PRESSURE 80-89 MM HG: ICD-10-PCS | Mod: HCNC,CPTII,S$GLB, | Performed by: FAMILY MEDICINE

## 2023-03-27 PROCEDURE — 4010F PR ACE/ARB THEARPY RXD/TAKEN: ICD-10-PCS | Mod: HCNC,CPTII,S$GLB, | Performed by: FAMILY MEDICINE

## 2023-03-27 PROCEDURE — 99214 PR OFFICE/OUTPT VISIT, EST, LEVL IV, 30-39 MIN: ICD-10-PCS | Mod: HCNC,S$GLB,, | Performed by: FAMILY MEDICINE

## 2023-03-27 PROCEDURE — 99214 OFFICE O/P EST MOD 30 MIN: CPT | Mod: HCNC,S$GLB,, | Performed by: FAMILY MEDICINE

## 2023-03-27 PROCEDURE — 1160F PR REVIEW ALL MEDS BY PRESCRIBER/CLIN PHARMACIST DOCUMENTED: ICD-10-PCS | Mod: HCNC,CPTII,S$GLB, | Performed by: FAMILY MEDICINE

## 2023-03-27 RX ORDER — CLONAZEPAM 1 MG/1
TABLET ORAL
Qty: 60 TABLET | Refills: 3 | Status: SHIPPED | OUTPATIENT
Start: 2023-03-27 | End: 2023-05-17 | Stop reason: SDUPTHER

## 2023-03-27 RX ORDER — GABAPENTIN 600 MG/1
600 TABLET ORAL 2 TIMES DAILY
Qty: 60 TABLET | Refills: 11 | Status: SHIPPED | OUTPATIENT
Start: 2023-03-27 | End: 2023-07-25

## 2023-03-27 RX ORDER — CITALOPRAM 20 MG/1
20 TABLET, FILM COATED ORAL DAILY
Qty: 90 TABLET | Refills: 3 | Status: SHIPPED | OUTPATIENT
Start: 2023-03-27

## 2023-03-27 RX ORDER — CYANOCOBALAMIN 1000 UG/ML
1000 INJECTION, SOLUTION INTRAMUSCULAR; SUBCUTANEOUS
Qty: 3 ML | Refills: 3 | Status: SHIPPED | OUTPATIENT
Start: 2023-03-27 | End: 2023-12-26

## 2023-03-27 RX ORDER — BUTALBITAL, ACETAMINOPHEN AND CAFFEINE 50; 325; 40 MG/1; MG/1; MG/1
1 TABLET ORAL EVERY 4 HOURS PRN
Qty: 40 TABLET | Refills: 1 | Status: SHIPPED | OUTPATIENT
Start: 2023-03-27 | End: 2023-04-26

## 2023-03-27 NOTE — PATIENT INSTRUCTIONS
DASH diet for Hypertension and Healthy Eating  Provided by the [facility]    Healthy Lifestyle   Nutrition and healthy eating  The DASH diet emphasizes portion size, eating a variety of foods and getting the right amount of nutrients. Discover how DASH can improve your health and lower your blood pressure.  By [facility] Staff   DASH stands for Dietary Approaches to Stop Hypertension. The DASH diet is a lifelong approach to healthy eating that's designed to help treat or prevent high blood pressure (hypertension). The DASH diet encourages you to reduce the sodium in your diet and eat a variety of foods rich in nutrients that help lower blood pressure, such as potassium, calcium and magnesium.  By following the DASH diet, you may be able to reduce your blood pressure by a few points in just two weeks. Over time, your systolic blood pressure could drop by eight to 14 points, which can make a significant difference in your health risks.  Because the DASH diet is a healthy way of eating, it offers health benefits besides just lowering blood pressure. The DASH diet is also in line with dietary recommendations to prevent osteoporosis, cancer, heart disease, stroke and diabetes.  The DASH diet emphasizes vegetables, fruits and low-fat dairy foods -- and moderate amounts of whole grains, fish, poultry and nuts.  In addition to the standard DASH diet, there is also a lower sodium version of the diet. You can choose the version of the diet that meets your health needs:  Standard DASH diet. You can consume up to 2,300 milligrams (mg) of sodium a day.   Lower sodium DASH diet. You can consume up to 1,500 mg of sodium a day.  Both versions of the DASH diet aim to reduce the amount of sodium in your diet compared with what you might get in a typical American diet, which can amount to a whopping 3,400 mg of sodium a day or more.  The standard DASH diet meets the recommendation from the Dietary Guidelines for Americans to keep  "daily sodium intake to less than 2,300 mg a day.  The American Heart Association recommends 1,500 mg a day of sodium as an upper limit for all adults. If you aren't sure what sodium level is right for you, talk to your doctor.  Both versions of the DASH diet include lots of whole grains, fruits, vegetables and low-fat dairy products. The DASH diet also includes some fish, poultry and legumes, and encourages a small amount of nuts and seeds a few times a week.   You can eat red meat, sweets and fats in small amounts. The DASH diet is low in saturated fat, cholesterol and total fat.  Here's a look at the recommended servings from each food group for the [id number]-day DASH diet.  Grains: 6 to 8 servings a day  Grains include bread, cereal, rice and pasta. Examples of one serving of grains include 1 slice whole-wheat bread, 1 ounce dry cereal, or 1/2 cup cooked cereal, rice or pasta.  Focus on whole grains because they have more fiber and nutrients than do refined grains. For instance, use brown rice instead of white rice, whole-wheat pasta instead of regular pasta and whole-grain bread instead of white bread. Look for products labeled "100 percent whole grain" or "100 percent whole wheat."   Grains are naturally low in fat. Keep them this way by avoiding butter, cream and cheese sauces.  Vegetables: 4 to 5 servings a day  Tomatoes, carrots, broccoli, sweet potatoes, greens and other vegetables are full of fiber, vitamins, and such minerals as potassium and magnesium. Examples of one serving include 1 cup raw leafy green vegetables or 1/2 cup cut-up raw or cooked vegetables.  Don't think of vegetables only as side dishes -- a hearty blend of vegetables served over brown rice or whole-wheat noodles can serve as the main dish for a meal.   Fresh and frozen vegetables are both good choices. When buying frozen and canned vegetables, choose those labeled as low sodium or without added salt.   To increase the number of " servings you fit in daily, be creative. In a stir-pérez, for instance, cut the amount of meat in half and double up on the vegetables.  Fruits: 4 to 5 servings a day  Many fruits need little preparation to become a healthy part of a meal or snack. Like vegetables, they're packed with fiber, potassium and magnesium and are typically low in fat -- coconuts are an exception. Examples of one serving include one medium fruit, 1/2 cup fresh, frozen or canned fruit, or 4 ounces of juice.  Have a piece of fruit with meals and one as a snack, then round out your day with a dessert of fresh fruits topped with a dollop of low-fat yogurt.   Leave on edible peels whenever possible. The peels of apples, pears and most fruits with pits add interesting texture to recipes and contain healthy nutrients and fiber.   Remember that citrus fruits and juices, such as grapefruit, can interact with certain medications, so check with your doctor or pharmacist to see if they're OK for you.   If you choose canned fruit or juice, make sure no sugar is added.  Dairy: 2 to 3 servings a day  Milk, yogurt, cheese and other dairy products are major sources of calcium, vitamin D and protein. But the key is to make sure that you choose dairy products that are low fat or fat-free because otherwise they can be a major source of fat -- and most of it is saturated. Examples of one serving include 1 cup skim or 1 percent milk, 1 cup low fat yogurt, or 1 1/2 ounces part-skim cheese.  Low-fat or fat-free frozen yogurt can help you boost the amount of dairy products you eat while offering a sweet treat. Add fruit for a healthy twist.   If you have trouble digesting dairy products, choose lactose-free products or consider taking an over-the-counter product that contains the enzyme lactase, which can reduce or prevent the symptoms of lactose intolerance.   Go easy on regular and even fat-free cheeses because they are typically high in sodium.  Lean meat, poultry  and fish: 6 servings or fewer a day  Meat can be a rich source of protein, B vitamins, iron and zinc. Choose lean varieties and aim for no more than 6 ounces a day. Cutting back on your meat portion will allow room for more vegetables.  Trim away skin and fat from poultry and meat and then bake, broil, grill or roast instead of frying in fat.   Eat heart-healthy fish, such as salmon, herring and tuna. These types of fish are high in omega-3 fatty acids, which can help lower your total cholesterol.  Nuts, seeds and legumes: 4 to 5 servings a week  Almonds, sunflower seeds, kidney beans, peas, lentils and other foods in this family are good sources of magnesium, potassium and protein. They're also full of fiber and phytochemicals, which are plant compounds that may protect against some cancers and cardiovascular disease.  Serving sizes are small and are intended to be consumed only a few times a week because these foods are high in calories. Examples of one serving include 1/3 cup nuts, 2 tablespoons seeds, or 1/2 cup cooked beans or peas.   Nuts sometimes get a bad rap because of their fat content, but they contain healthy types of fat -- monounsaturated fat and omega-3 fatty acids. They're high in calories, however, so eat them in moderation. Try adding them to stir-fries, salads or cereals.   Soybean-based products, such as tofu and tempeh, can be a good alternative to meat because they contain all of the amino acids your body needs to make a complete protein, just like meat.  Fats and oils: 2 to 3 servings a day  Fat helps your body absorb essential vitamins and helps your body's immune system. But too much fat increases your risk of heart disease, diabetes and obesity. The DASH diet strives for a healthy balance by limiting total fat to less than 30 percent of daily calories from fat, with a focus on the healthier monounsaturated fats.  Examples of one serving include 1 teaspoon soft margarine, 1 tablespoon  mayonnaise or 2 tablespoons salad dressing.  Saturated fat and trans fat are the main dietary culprits in increasing your risk of coronary artery disease. DASH helps keep your daily saturated fat to less than 6 percent of your total calories by limiting use of meat, butter, cheese, whole milk, cream and eggs in your diet, along with foods made from lard, solid shortenings, and palm and coconut oils.   Avoid trans fat, commonly found in such processed foods as crackers, baked goods and fried items.   Read food labels on margarine and salad dressing so that you can choose those that are lowest in saturated fat and free of trans fat.  Sweets: 5 servings or fewer a week  You don't have to banish sweets entirely while following the DASH diet -- just go easy on them. Examples of one serving include 1 tablespoon sugar, jelly or jam, 1/2 cup sorbet, or 1 cup lemonade.  When you eat sweets, choose those that are fat-free or low-fat, such as sorbets, fruit ices, jelly beans, hard candy, ran crackers or low-fat cookies.   Artificial sweeteners such as aspartame (NutraSweet, Equal) and sucralose (Splenda) may help satisfy your sweet tooth while sparing the sugar. But remember that you still must use them sensibly. It's OK to swap a diet cola for a regular cola, but not in place of a more nutritious beverage such as low-fat milk or even plain water.   Cut back on added sugar, which has no nutritional value but can pack on calories.  Drinking too much alcohol can increase blood pressure. The Dietary Guidelines for Americans recommends that men limit alcohol to no more than two drinks a day and women to one or less.  The DASH diet doesn't address caffeine consumption. The influence of caffeine on blood pressure remains unclear. But caffeine can cause your blood pressure to rise at least temporarily. If you already have high blood pressure or if you think caffeine is affecting your blood pressure, talk to your doctor about your  "caffeine consumption.  While the DASH diet is not a weight-loss program, you may indeed lose unwanted pounds because it can help guide you toward healthier food choices.  The DASH diet generally includes about 2,000 calories a day. If you're trying to lose weight, you may need to eat fewer calories. You may also need to adjust your serving goals based on your individual circumstances -- something your health care team can help you decide.  The foods at the core of the DASH diet are naturally low in sodium. So just by following the DASH diet, you're likely to reduce your sodium intake. You also reduce sodium further by:  Using sodium-free spices or flavorings with your food instead of salt   Not adding salt when cooking rice, pasta or hot cereal   Rinsing canned foods to remove some of the sodium   Buying foods labeled "no salt added," "sodium-free," "low sodium" or "very low sodium"  One teaspoon of table salt has 2,325 mg of sodium. When you read food labels, you may be surprised at just how much sodium some processed foods contain. Even low-fat soups, canned vegetables, ready-to-eat cereals and sliced turkey from the local deli -- foods you may have considered healthy -- often have lots of sodium.  You may notice a difference in taste when you choose low-sodium food and beverages. If things seem too bland, gradually introduce low-sodium foods and cut back on table salt until you reach your sodium goal. That'll give your palate time to adjust.  Using salt-free seasoning blends or herbs and spices may also ease the transition. It can take several weeks for your taste buds to get used to less salty foods.  Try these strategies to get started on the DASH diet:   Change gradually. If you now eat only one or two servings of fruits or vegetables a day, try to add a serving at lunch and one at dinner. Rather than switching to all whole grains, start by making one or two of your grain servings whole grains. Increasing " fruits, vegetables and whole grains gradually can also help prevent bloating or diarrhea that may occur if you aren't used to eating a diet with lots of fiber. You can also try over-the-counter products to help reduce gas from beans and vegetables.   Reward successes and forgive slip-ups. Reward yourself with a nonfood treat for your accomplishments -- rent a movie, purchase a book or get together with a friend. Everyone slips, especially when learning something new. Remember that changing your lifestyle is a long-term process. Find out what triggered your setback and then just  where you left off with the DASH diet.   Add physical activity. To boost your blood pressure lowering efforts even more, consider increasing your physical activity in addition to following the DASH diet. Combining both the DASH diet and physical activity makes it more likely that you'll reduce your blood pressure.   Get support if you need it. If you're having trouble sticking to your diet, talk to your doctor or dietitian about it. You might get some tips that will help you stick to the DASH diet.  Remember, healthy eating isn't an all-or-nothing proposition. What's most important is that, on average, you eat healthier foods with plenty of variety -- both to keep your diet nutritious and to avoid boredom or extremes. And with the DASH diet, you can have both. Home Care:  You may need to stay in bed the first few days. But, as soon as possible, begin sitting or walking to avoid problems with prolonged bed rest (muscle weakness, worsening back stiffness and pain, blood clots in the legs).  When in bed, try to find a position of comfort. A firm mattress is best. Try lying flat on your back with pillows under your knees. You can also try lying on your side with your knees bent up towards your chest and a pillow between your knees.  Avoid prolonged sitting. This puts more stress on the lower back than standing or walking.  During the  first two days after injury, apply an ICE PACK to the painful area for 20 minutes every 2-4 hours. This will reduce swelling and pain. HEAT (hot shower, hot bath or heating pad) works well for muscle spasm. You can start with ice, then switch to heat after two days. Some patients feel best alternating ice and heat treatments. Use the one method that feels the best to you.  You may use acetaminophen (Tylenol) or ibuprofen (Motrin, Advil) to control pain, unless another pain medicine was prescribed. [NOTE: If you have chronic liver or kidney disease or ever had a stomach ulcer or GI bleeding, talk with your doctor before using these medicines.]  Be aware of safe lifting methods and do not lift anything over 15 pounds until all the pain is gone

## 2023-03-29 ENCOUNTER — PATIENT MESSAGE (OUTPATIENT)
Dept: FAMILY MEDICINE | Facility: CLINIC | Age: 73
End: 2023-03-29
Payer: MEDICARE

## 2023-03-29 DIAGNOSIS — I10 HYPERTENSION, UNSPECIFIED TYPE: Primary | ICD-10-CM

## 2023-04-04 ENCOUNTER — PATIENT MESSAGE (OUTPATIENT)
Dept: FAMILY MEDICINE | Facility: CLINIC | Age: 73
End: 2023-04-04
Payer: MEDICARE

## 2023-04-05 RX ORDER — OLMESARTAN MEDOXOMIL 20 MG/1
10 TABLET ORAL DAILY
Qty: 45 TABLET | Refills: 3 | Status: SHIPPED | OUTPATIENT
Start: 2023-04-05 | End: 2023-04-25

## 2023-04-05 NOTE — TELEPHONE ENCOUNTER
olmedasartan 10m  [facility] Dr Ornelas in the medicine written above it doesnt show you have it ordered in either pharmacy did you forget to order it or you charged your mind about it,I showed it written in the pice of paper but it doesnt showed having being order.  I wish you a blessed day       A week 1/2 a go l send a question about Rx to Dr Ornelas he prescribed Olmesartan 10 mg but prescription was never send to [facility] or [facility] pharmacy,can you please ask him about it and let me know  ill apreciated

## 2023-04-06 ENCOUNTER — PATIENT MESSAGE (OUTPATIENT)
Dept: FAMILY MEDICINE | Facility: CLINIC | Age: 73
End: 2023-04-06
Payer: MEDICARE

## 2023-04-06 DIAGNOSIS — N30.90 RECURRENT BACTERIAL CYSTITIS: Primary | ICD-10-CM

## 2023-04-06 DIAGNOSIS — R32 INCONTINENCE OF URINE IN FEMALE: ICD-10-CM

## 2023-04-10 NOTE — PROGRESS NOTES
Subjective:       Patient ID: Erica Marsh is a 72 y.o. female.    Chief Complaint: Follow-up (Back & leg pain, headaches 3 months or more)    She has fibromyalgia with DD of lumbar spine.  She has generalized chronic pain.  She has chronic Major depression with partial results with medications .  She has improve will continue to have general fatigue.       Hypoglycemia post Gastric bypass, much improved.  She has controlled blood blood pressure, the blood sugar has improved.  He has gained approximately 5 lb within last 3 months.  BMI 26. SLE under Rheumatology, needs eye and rheumatology follow up  Fibromyalgia under Cymabalta with partial improvement.  New OB intake, healthy patient, no unusual issues, see progress notes and intake smartforms.   Active Diagnosis Review (HCC)     Chronic             HCC 18 - Diabetes with Chronic Complications     Type 2 diabetes mellitus with other skin complications [E11.628]     HCC 23 - Other Significant Endocrine and Metabolic Disorders     Adrenal insufficiency [E27.40]     HCC 40 - Rheumatoid Arthritis and Inflammatory Connective Tissue Disease       Ankylosing spondylitis of thoracolumbar region [M45.5]     Lupus (systemic lupus erythematosus) [M32.9]     Other systemic lupus erythematosus with lung involvement [M32.13]     HCC 59 - Major Depressive, Bipolar, and Paranoid Disorders     Recurrent major depression resistant to treatment [F33.9]      - Fibrosis of Lung and Other Chronic Lung Disorders     Other systemic lupus erythematosus with lung involvement [M32.13]      - Chronic Kidney Disease, Moderate (Stage 3)     Stage 3a chronic kidney disease [N18.31]          Acute             HCC 55 - Substance Use Disorder, Moderate/Severe, or Substance Use with   Complications     Opioid dependence, uncomplicated [F11.20]                  Follow-up  Associated symptoms include arthralgias, fatigue, a fever, myalgias and neck pain. Pertinent negatives include no  abdominal pain, chest pain, chills, coughing, diaphoresis, headaches, numbness or rash.   Back Pain  This is a chronic problem. The problem occurs intermittently. The problem has been gradually worsening since onset. The pain is present in the thoracic spine. The pain is at a severity of 7/10. The pain is moderate. The pain is Worse during the night. The symptoms are aggravated by lying down. Associated symptoms include a fever and leg pain. Pertinent negatives include no abdominal pain, bladder incontinence, bowel incontinence, chest pain, dysuria, headaches, numbness or pelvic pain.   Leg Pain   Pertinent negatives include no numbness.   Review of Systems   Constitutional:  Positive for fatigue and fever. Negative for activity change, appetite change, chills and diaphoresis.   HENT: Negative.  Negative for facial swelling, hearing loss, nosebleeds, postnasal drip, sneezing and trouble swallowing.         Painful right mandibular gum, recurrent gum infections it multiple episodes of losing teeth.   Eyes:  Positive for visual disturbance. Negative for photophobia, pain, discharge, redness and itching.   Respiratory:  Negative for apnea, cough, chest tightness, shortness of breath and wheezing.    Cardiovascular: Negative.  Negative for chest pain, palpitations and leg swelling.   Gastrointestinal: Negative.  Negative for abdominal distention, abdominal pain, anal bleeding, blood in stool, bowel incontinence and diarrhea.   Endocrine: Negative.  Negative for cold intolerance, heat intolerance, polydipsia, polyphagia and polyuria.   Genitourinary:  Positive for difficulty urinating and frequency. Negative for bladder incontinence, dysuria, genital sores, hematuria, pelvic pain, urgency, vaginal bleeding and vaginal discharge.   Musculoskeletal:  Positive for arthralgias, back pain, myalgias and neck pain. Negative for gait problem and neck stiffness.   Skin: Negative.  Negative for color change, pallor and rash.    Allergic/Immunologic: Negative.  Negative for environmental allergies and food allergies.   Neurological:  Positive for tremors and light-headedness. Negative for dizziness, seizures, syncope, speech difficulty, numbness and headaches.   Hematological:  Negative for adenopathy.   Psychiatric/Behavioral:  Positive for decreased concentration. Negative for agitation, behavioral problems, confusion, hallucinations, self-injury and sleep disturbance. The patient is nervous/anxious. The patient is not hyperactive.      Patient Active Problem List   Diagnosis    Lupus (systemic lupus erythematosus)    GERD (gastroesophageal reflux disease)    Arthritis    Trigger finger    Abdominal pain, other specified site    Constipation    Change in bowel habits    Rectal bleeding    Hypoglycemia following gastrointestinal surgery    Depression with anxiety    Other spondylosis, lumbar region    Sinus bradycardia    DDD (degenerative disc disease), thoracic    Palpitations    Chest pain, atypical    HUGHES (dyspnea on exertion)    Recurrent major depression resistant to treatment    Acquired hypothyroidism    Current chronic use of systemic steroids    H/O gastric bypass    Difficulty walking    Muscle weakness    Type 2 diabetes mellitus with other skin complications    Other systemic lupus erythematosus with lung involvement       Objective:      Physical Exam  Vitals reviewed.   Constitutional:       Appearance: She is well-developed. She is ill-appearing. She is not diaphoretic.   HENT:      Head: Normocephalic and atraumatic.      Right Ear: External ear normal. Decreased hearing noted.      Left Ear: External ear normal. Decreased hearing noted.      Nose: Nose normal.      Mouth/Throat:      Pharynx: No oropharyngeal exudate.   Eyes:      General: No scleral icterus.        Right eye: No discharge.         Left eye: No discharge.      Conjunctiva/sclera: Conjunctivae normal.      Pupils: Pupils are equal, round, and reactive to  light.   Neck:      Thyroid: No thyromegaly.      Vascular: No JVD.      Trachea: No tracheal deviation.   Cardiovascular:      Rate and Rhythm: Normal rate.      Heart sounds: Normal heart sounds. No murmur heard.    No friction rub. No gallop.   Pulmonary:      Effort: Pulmonary effort is normal. No respiratory distress.      Breath sounds: No stridor. No wheezing or rales.   Chest:      Chest wall: No tenderness.   Abdominal:      General: Bowel sounds are normal. There is no distension.      Palpations: Abdomen is soft. There is no mass.      Tenderness: There is no abdominal tenderness. There is no guarding or rebound.   Musculoskeletal:      Right shoulder: Tenderness present. Decreased range of motion.      Cervical back: Normal range of motion and neck supple.      Thoracic back: Tenderness and bony tenderness present. Decreased range of motion.      Lumbar back: Tenderness and bony tenderness present. Decreased range of motion.      Comments: She has progressive hyperesthesia, mild scoliosis, mild DDD, cervical and lumbar radiculopathy. Hx of Herpes. Poor anxiety controlled, poor exercise , poor family support.   Lymphadenopathy:      Cervical: No cervical adenopathy.   Skin:     General: Skin is dry.      Coloration: Skin is not pale.      Findings: No erythema or rash.   Neurological:      Mental Status: She is alert and oriented to person, place, and time.      Cranial Nerves: No cranial nerve deficit.      Motor: No abnormal muscle tone.      Coordination: Coordination normal.      Deep Tendon Reflexes: Reflexes normal.   Psychiatric:         Attention and Perception: Perception normal. She is attentive.         Mood and Affect: Mood is not depressed or elated. Affect is flat. Affect is not blunt.         Speech: Speech normal.         Behavior: Behavior is slowed and withdrawn.         Thought Content: Thought content normal.         Cognition and Memory: Cognition normal.         Judgment: Judgment  normal.       Lab Results   Component Value Date    WBC 9.99 08/31/2022    HGB 14.1 08/31/2022    HCT 43.5 08/31/2022     08/31/2022    CHOL 241 (H) 07/07/2022    TRIG 162 (H) 07/07/2022    HDL 90 (H) 07/07/2022    ALT 22 08/31/2022    AST 25 08/31/2022     08/31/2022    K 4.3 08/31/2022    CL 99 08/31/2022    CREATININE 0.8 08/31/2022    BUN 15 08/31/2022    CO2 28 08/31/2022    TSH 2.314 02/24/2022    INR 1.0 05/08/2020    HGBA1C 5.7 (H) 07/07/2022     The 10-year ASCVD risk score (Benjamín MINER, et al., 2019) is: 19.5%    Values used to calculate the score:      Age: 72 years      Sex: Female      Is Non- : No      Diabetic: Yes      Tobacco smoker: No      Systolic Blood Pressure: 130 mmHg      Is BP treated: No      HDL Cholesterol: 90 mg/dL      Total Cholesterol: 241 mg/dL  Point of care blood sugar 110. Patient compliant with diet on monitoring her blood sugars.  Chronic prior myalgia pain does not qualify for opiates.   Assessment:       1. Other screening mammogram    2. Systemic lupus erythematosus, unspecified SLE type, unspecified organ involvement status    3. Ankylosing spondylitis of thoracolumbar region    4. Recurrent major depression resistant to treatment    5. Sciatic neuropathy, unspecified laterality    6. Ankylosis of lumbar spine    7. Chronic low back pain with bilateral sciatica, unspecified back pain laterality    8. DM due to underlying condition with hypoglycemia w/o coma    9. Delirium due to dissociative drug    10. Opioid dependence with intoxication delirium    11. Severe protein-calorie malnutrition        Plan:       Thyroid disease  -  Iron deficiency anemia  Erica was seen today for follow-up.    Diagnoses and all orders for this visit:    Other screening mammogram  -     Mammo Digital Screening Bilat w/ [facility]; Future    Systemic lupus erythematosus, unspecified SLE type, unspecified organ involvement status  -     clonazePAM (KLONOPIN) 1 MG  tablet; TAKE 1 TABLET BY MOUTH TWICE DAILY FOR ANXIETY    Ankylosing spondylitis of thoracolumbar region  -     clonazePAM (KLONOPIN) 1 MG tablet; TAKE 1 TABLET BY MOUTH TWICE DAILY FOR ANXIETY  -     cyanocobalamin 1,000 mcg/mL injection; Inject 1 mL (1,000 mcg total) into the muscle every 30 days.  -     gabapentin (NEURONTIN) 600 MG tablet; Take 1 tablet (600 mg total) by mouth 2 (two) times daily.    Recurrent major depression resistant to treatment  -     clonazePAM (KLONOPIN) 1 MG tablet; TAKE 1 TABLET BY MOUTH TWICE DAILY FOR ANXIETY  -     citalopram (CELEXA) 20 MG tablet; Take 1 tablet (20 mg total) by mouth once daily.    Sciatic neuropathy, unspecified laterality  -     clonazePAM (KLONOPIN) 1 MG tablet; TAKE 1 TABLET BY MOUTH TWICE DAILY FOR ANXIETY    Ankylosis of lumbar spine  -     clonazePAM (KLONOPIN) 1 MG tablet; TAKE 1 TABLET BY MOUTH TWICE DAILY FOR ANXIETY    Chronic low back pain with bilateral sciatica, unspecified back pain laterality  -     clonazePAM (KLONOPIN) 1 MG tablet; TAKE 1 TABLET BY MOUTH TWICE DAILY FOR ANXIETY  -     blood sugar diagnostic (ACCU-CHEK JORDAN PLUS TEST STRP) Strp; TEST THREE TIMES DAILY BEFORE A MEAL AS DIRECTED  -     Microalbumin/creatinine urine ratio; Future    DM due to underlying condition with hypoglycemia w/o coma  -     blood sugar diagnostic (ACCU-CHEK JORDAN PLUS TEST STRP) Strp; TEST THREE TIMES DAILY BEFORE A MEAL AS DIRECTED  -     citalopram (CELEXA) 20 MG tablet; Take 1 tablet (20 mg total) by mouth once daily.  -     Comprehensive metabolic panel; Future  -     Lipid panel; Future  -     Hemoglobin A1c; Future  -     Microalbumin/creatinine urine ratio; Future    Delirium due to dissociative drug  -     citalopram (CELEXA) 20 MG tablet; Take 1 tablet (20 mg total) by mouth once daily.    Opioid dependence with intoxication delirium  -     citalopram (CELEXA) 20 MG tablet; Take 1 tablet (20 mg total) by mouth once daily.    Severe protein-calorie  malnutrition  -     cyanocobalamin 1,000 mcg/mL injection; Inject 1 mL (1,000 mcg total) into the muscle every 30 days.    Other orders  -     butalbital-acetaminophen-caffeine -40 mg (FIORICET, ESGIC) -40 mg per tablet; Take 1 tablet by mouth every 4 (four) hours as needed for Pain.        Ankylosis of lumbar spine  -     clonazePAM (KLONOPIN) 1 MG tablet; TAKE 1 TABLET BY MOUTH TWICE DAILY FOR ANXIETY  Dispense: 60 tablet; Refill: 3  -     LIDOcaine (LIDODERM) 5 %; Remove & Discard patch within 12 hours or as directed by MD  Dispense: 60 patch; Refill: 4    Review plate method  Review foods in home  Discuss food labels  Refer to exercise program  Assist with medical visit preparation  Review patient results  Review patient education about results  Review chronic disease interventions (see specific disease intervention)  Assess knowledge level  Provide and discuss information/education material  Encourage communication with physician  Monitor compliance  Educate on risk of non-compliance  Give food and resource list  Review patient education material (see patient instructions)  Review patient education material (see patient instructions)  Refer to exercise program  Review stress management techniques  Review patient education material (see patient instructions)  Assess support system  Discuss stress management techniques  Review normal ranges for labs    Ataxia due to recent stroke  -     gabapentin (NEURONTIN) 300 MG capsule; Take 1 capsule (300 mg total) by mouth every evening. TAKE 1 CAPSULE IN THE MORNING  AND TAKE 2 CAPSULES IN THE EVENING  Dispense: 90 capsule; Refill: 3    Neurological deficit present  -     gabapentin (NEURONTIN) 300 MG capsule; Take 1 capsule (300 mg total) by mouth every evening. TAKE 1 CAPSULE IN THE MORNING  AND TAKE 2 CAPSULES IN THE EVENING  Dispense: 90 capsule; Refill: 3    Delirium due to dissociative drug  -     gabapentin (NEURONTIN) 300 MG capsule; Take 1 capsule  (300 mg total) by mouth every evening. TAKE 1 CAPSULE IN THE MORNING  AND TAKE 2 CAPSULES IN THE EVENING  Dispense: 90 capsule; Refill: 3    Opioid dependence with intoxication delirium  -     gabapentin (NEURONTIN) 300 MG capsule; Take 1 capsule (300 mg total) by mouth every evening. TAKE 1 CAPSULE IN THE MORNING  AND TAKE 2 CAPSULES IN THE EVENING  Dispense: 90 capsule; Refill: 3    Chronic seasonal allergic rhinitis due to pollen  -     fluticasone propionate (FLONASE) 50 mcg/actuation nasal spray; USE 1 SPRAY IN EACH NOSTRIL TWICE DAILY  Dispense: 48 g; Refill: 0    Body mass index (BMI) 31.0-31.9, adult   -     Vitamin D; Future; Expected date: 07/30/2021    Discontinue Celexa due to high risk of serotonin, fatigue, poor concentration, and decreased alertness.    There have been some probable medication, dietary and lifestyle compliance issues here. I have discussed with her the great importance of following the treatment plan exactly as directed in order to achieve a good medical outcome.    Patient readiness: acceptance and barriers:poor sleep habits    During the course of the visit the patient was educated and counseled about the following:     Underweight:  Nutritional supplements    Goals: Underweight: Increase calorie intake and BMI      Did patient meet goals/outcomes: no  She needs more physical activities    Discussed health maintenance guidelines appropriate for age.  Discussed health maintenance guidelines appropriate for age.  Discussed health maintenance guidelines appropriate for age.

## 2023-04-11 ENCOUNTER — PATIENT MESSAGE (OUTPATIENT)
Dept: ADMINISTRATIVE | Facility: HOSPITAL | Age: 73
End: 2023-04-11
Payer: MEDICARE

## 2023-04-11 ENCOUNTER — TELEPHONE (OUTPATIENT)
Dept: FAMILY MEDICINE | Facility: CLINIC | Age: 73
End: 2023-04-11
Payer: MEDICARE

## 2023-04-11 NOTE — TELEPHONE ENCOUNTER
----- Message from Claire Moon sent at 4/11/2023  1:07 PM CDT -----  Contact: KARISHMA MILLER [[id number]][phone]  Patient is requesting a call back in regards to a referral that was put in and would like to schedule at the [facility] location. Patient received a missed call from urogyn but do not know which location it was. Patient can be reached at [phone]

## 2023-04-12 ENCOUNTER — TELEPHONE (OUTPATIENT)
Dept: UROGYNECOLOGY | Facility: CLINIC | Age: 73
End: 2023-04-12
Payer: MEDICARE

## 2023-04-17 ENCOUNTER — HOSPITAL ENCOUNTER (OUTPATIENT)
Dept: RADIOLOGY | Facility: HOSPITAL | Age: 73
Discharge: HOME | End: 2023-04-17
Attending: FAMILY MEDICINE
Payer: MEDICARE

## 2023-04-17 DIAGNOSIS — Z12.31 OTHER SCREENING MAMMOGRAM: ICD-10-CM

## 2023-04-17 PROCEDURE — 77067 MAMMO DIGITAL SCREENING BILAT WITH TOMO: ICD-10-PCS | Mod: 26,HCNC,, | Performed by: RADIOLOGY

## 2023-04-17 PROCEDURE — 77067 SCR MAMMO BI INCL CAD: CPT | Mod: 26,HCNC,, | Performed by: RADIOLOGY

## 2023-04-17 PROCEDURE — 77063 BREAST TOMOSYNTHESIS BI: CPT | Mod: 26,HCNC,, | Performed by: RADIOLOGY

## 2023-04-17 PROCEDURE — 77063 MAMMO DIGITAL SCREENING BILAT WITH TOMO: ICD-10-PCS | Mod: 26,HCNC,, | Performed by: RADIOLOGY

## 2023-04-17 PROCEDURE — 77067 SCR MAMMO BI INCL CAD: CPT | Mod: TC,HCNC,PO

## 2023-04-19 ENCOUNTER — PATIENT OUTREACH (OUTPATIENT)
Dept: ADMINISTRATIVE | Facility: HOSPITAL | Age: 73
End: 2023-04-19
Payer: MEDICARE

## 2023-04-25 ENCOUNTER — OFFICE VISIT (OUTPATIENT)
Dept: CARDIOLOGY | Facility: CLINIC | Age: 73
End: 2023-04-25
Payer: MEDICARE

## 2023-04-25 VITALS
DIASTOLIC BLOOD PRESSURE: 60 MMHG | HEART RATE: 73 BPM | RESPIRATION RATE: 18 BRPM | SYSTOLIC BLOOD PRESSURE: 100 MMHG | WEIGHT: 161.63 LBS | BODY MASS INDEX: 25.97 KG/M2 | OXYGEN SATURATION: 97 % | HEIGHT: 66 IN

## 2023-04-25 DIAGNOSIS — R00.1 SINUS BRADYCARDIA: Primary | ICD-10-CM

## 2023-04-25 DIAGNOSIS — R00.2 PALPITATIONS: ICD-10-CM

## 2023-04-25 DIAGNOSIS — R53.83 FATIGUE, UNSPECIFIED TYPE: ICD-10-CM

## 2023-04-25 DIAGNOSIS — R06.09 DOE (DYSPNEA ON EXERTION): ICD-10-CM

## 2023-04-25 DIAGNOSIS — R07.89 CHEST PAIN, ATYPICAL: ICD-10-CM

## 2023-04-25 PROCEDURE — 1125F PR PAIN SEVERITY QUANTIFIED, PAIN PRESENT: ICD-10-PCS | Mod: HCNC,CPTII,S$GLB, | Performed by: INTERNAL MEDICINE

## 2023-04-25 PROCEDURE — 3288F FALL RISK ASSESSMENT DOCD: CPT | Mod: HCNC,CPTII,S$GLB, | Performed by: INTERNAL MEDICINE

## 2023-04-25 PROCEDURE — 3008F PR BODY MASS INDEX (BMI) DOCUMENTED: ICD-10-PCS | Mod: HCNC,CPTII,S$GLB, | Performed by: INTERNAL MEDICINE

## 2023-04-25 PROCEDURE — 3078F DIAST BP <80 MM HG: CPT | Mod: HCNC,CPTII,S$GLB, | Performed by: INTERNAL MEDICINE

## 2023-04-25 PROCEDURE — 1159F MED LIST DOCD IN RCRD: CPT | Mod: HCNC,CPTII,S$GLB, | Performed by: INTERNAL MEDICINE

## 2023-04-25 PROCEDURE — 3008F BODY MASS INDEX DOCD: CPT | Mod: HCNC,CPTII,S$GLB, | Performed by: INTERNAL MEDICINE

## 2023-04-25 PROCEDURE — 99214 OFFICE O/P EST MOD 30 MIN: CPT | Mod: HCNC,S$GLB,, | Performed by: INTERNAL MEDICINE

## 2023-04-25 PROCEDURE — 99999 PR PBB SHADOW E&M-EST. PATIENT-LVL IV: CPT | Mod: PBBFAC,HCNC,, | Performed by: INTERNAL MEDICINE

## 2023-04-25 PROCEDURE — 4010F PR ACE/ARB THEARPY RXD/TAKEN: ICD-10-PCS | Mod: HCNC,CPTII,S$GLB, | Performed by: INTERNAL MEDICINE

## 2023-04-25 PROCEDURE — 1101F PT FALLS ASSESS-DOCD LE1/YR: CPT | Mod: HCNC,CPTII,S$GLB, | Performed by: INTERNAL MEDICINE

## 2023-04-25 PROCEDURE — 1159F PR MEDICATION LIST DOCUMENTED IN MEDICAL RECORD: ICD-10-PCS | Mod: HCNC,CPTII,S$GLB, | Performed by: INTERNAL MEDICINE

## 2023-04-25 PROCEDURE — 1125F AMNT PAIN NOTED PAIN PRSNT: CPT | Mod: HCNC,CPTII,S$GLB, | Performed by: INTERNAL MEDICINE

## 2023-04-25 PROCEDURE — 99214 PR OFFICE/OUTPT VISIT, EST, LEVL IV, 30-39 MIN: ICD-10-PCS | Mod: HCNC,S$GLB,, | Performed by: INTERNAL MEDICINE

## 2023-04-25 PROCEDURE — 1101F PR PT FALLS ASSESS DOC 0-1 FALLS W/OUT INJ PAST YR: ICD-10-PCS | Mod: HCNC,CPTII,S$GLB, | Performed by: INTERNAL MEDICINE

## 2023-04-25 PROCEDURE — 93000 EKG 12-LEAD: ICD-10-PCS | Mod: HCNC,S$GLB,, | Performed by: INTERNAL MEDICINE

## 2023-04-25 PROCEDURE — 3078F PR MOST RECENT DIASTOLIC BLOOD PRESSURE < 80 MM HG: ICD-10-PCS | Mod: HCNC,CPTII,S$GLB, | Performed by: INTERNAL MEDICINE

## 2023-04-25 PROCEDURE — 3074F PR MOST RECENT SYSTOLIC BLOOD PRESSURE < 130 MM HG: ICD-10-PCS | Mod: HCNC,CPTII,S$GLB, | Performed by: INTERNAL MEDICINE

## 2023-04-25 PROCEDURE — 3074F SYST BP LT 130 MM HG: CPT | Mod: HCNC,CPTII,S$GLB, | Performed by: INTERNAL MEDICINE

## 2023-04-25 PROCEDURE — 3288F PR FALLS RISK ASSESSMENT DOCUMENTED: ICD-10-PCS | Mod: HCNC,CPTII,S$GLB, | Performed by: INTERNAL MEDICINE

## 2023-04-25 PROCEDURE — 93000 ELECTROCARDIOGRAM COMPLETE: CPT | Mod: HCNC,S$GLB,, | Performed by: INTERNAL MEDICINE

## 2023-04-25 PROCEDURE — 99999 PR PBB SHADOW E&M-EST. PATIENT-LVL IV: ICD-10-PCS | Mod: PBBFAC,HCNC,, | Performed by: INTERNAL MEDICINE

## 2023-04-25 PROCEDURE — 4010F ACE/ARB THERAPY RXD/TAKEN: CPT | Mod: HCNC,CPTII,S$GLB, | Performed by: INTERNAL MEDICINE

## 2023-04-25 NOTE — PROGRESS NOTES
Subjective:   Patient ID:  Erica Marsh is a 72 y.o. female who presents for follow-up of No chief complaint on file.      HPI    Previously saw Dr Chavez 5/30/18  Patient ID: Erica Marsh is a 67 y.o. female with a past medical history of diabetes, hypertension  HPI  Patient is here to seek help with her bradycardia.  She is known to have bradycardia for a while.  She has been investigated with a Holter monitor.  Reports are dictated below.  Patient denies any episodes of presyncope or syncope  She complains of occasional chest pains and some heaviness in her neck.  This is not associated with shortness of breath.     Stress test 1/12/21    Normal myocardial perfusion scan. There is no evidence of myocardial ischemia or infarction.    There is a mild intensity defect in the anterior wall of the left ventricle, secondary to breast attenuation.    The gated perfusion images showed an ejection fraction of 87% at rest.    There is normal wall motion at rest and post stress.    The EKG portion of this study is negative for ischemia.    The patient reported no chest pain during the stress test.    There were no arrhythmias during stress.     Echo 6/17/20  Normal left ventricular systolic function. The estimated ejection fraction is 70%.  Normal LV diastolic function.  Normal right ventricular systolic function.  Mild mitral regurgitation.  The estimated PA systolic pressure is 22 mmHg.  There is no evidence of intracardiac shunting by bubble study    Stress echo 6/14/18  Tricuspid valve shows mild regurgitation.  Mild regurgitation is present in the aortic valve..  Left ventricle ejection fraction is normal at%  Normal LV diastolic function.  The left ventricle cavity is normal.  RV systolic function is normal.  Left atrium is mildly dilated.  RA cavity size is normal.  LV systolic function is normal  No stress induced wall motion abnormality     Holter 6/17/20  Sinus arrhythmia with heart rates varying between 48  and 105 bpm with an average of 68 bpm.  There were rare PVCs totalling 157 and averaging 3.27 per hour.  There were rare PACs totalling 149 and averaging 3.1 per hour.  There were occasional runs of non-sustained SVT and lasting for 3 to 6 beats.  The diary was properly completed. Pt complained of palps during holter, but these did not correlate directly with the short runs of SVT noted above.     Event monitor 3/3/21  At baseline, in the absence of symptoms, the rhythm was sinus with heart rates in the 70s.  There were multiple activations over the course of the recording he.  No symptom was never entered.  On 01/31 there was a routine test during exercise at which time sinus tachycardia was seen with heart rates 110-116 beats per minute range.  There was also a brief supraventricular tachycardia which was most consistent with nonsustained atrial tachycardia during that time.  Once again, no symptom was reported.     6/10/20 Reports recent worsening of palpitations and fatigue. Mild HUGHES. Denies CP  EKG 5/8/20 sinus bradycardia 55 otherwise ok  Reports possible recent TIA  Echo with bubble study for HUGHES and possible TIA  Holter for palpitations and bradycardia  If unremarkable and fatigue/hypotension continue consider midodrine      6/25/20 Still with fatigue and hypotension  Add midodrine 5 bid  OV 1 month       7/23/20 Feels close to baseline after taking midorine  Denies CP or SOB  BP improved  Cardiac stable  If hypotension worsens could titrate midodrine  OV 6 months     1/8/21 Recently noted fatigue with HUGHES and chest tightness doing basic activities like taking a shower. Some heart racing and palpitations with this. BP elevated today on midodrine but labile by home BP readings with dizziness when BP is low  Lexiscan myoview for CP and HUGHES  Event monitor for palpitations and dizziness  BP elevated but given continued labile readings will continue current dose of midodrine     4/8/21 Continues to have fatigue  and HUGHES  EKG NSR - ok  BP mostly controlled with midodrine   Stress test and event monitor unremarkable  Continue midodrine for hypotension  OV 3 months    4/25/23 Stopped midodrine due to elevated BP. Was placed on 10 mg benicar but BP has been running low. Denies CP or SOB  Reports generalized fatigue  EKG NSR PRWP    Review of Systems   Constitutional: Negative for decreased appetite.   HENT:  Negative for ear discharge.    Eyes:  Negative for blurred vision.   Respiratory:  Negative for hemoptysis.    Endocrine: Negative for polyphagia.   Hematologic/Lymphatic: Negative for adenopathy.   Skin:  Negative for color change.   Musculoskeletal:  Negative for joint swelling.   Genitourinary:  Negative for bladder incontinence.   Neurological:  Negative for brief paralysis.   Psychiatric/Behavioral:  Negative for hallucinations.    Allergic/Immunologic: Negative for hives.     Objective:   Physical Exam  Constitutional:       Appearance: She is well-developed.   HENT:      Head: Normocephalic and atraumatic.   Eyes:      Conjunctiva/sclera: Conjunctivae normal.      Pupils: Pupils are equal, round, and reactive to light.   Cardiovascular:      Rate and Rhythm: Normal rate.      Pulses: Intact distal pulses.      Heart sounds: Normal heart sounds.   Pulmonary:      Effort: Pulmonary effort is normal.      Breath sounds: Normal breath sounds.   Abdominal:      General: Bowel sounds are normal.      Palpations: Abdomen is soft.   Musculoskeletal:         General: Normal range of motion.      Cervical back: Normal range of motion and neck supple.   Skin:     General: Skin is warm and dry.   Neurological:      Mental Status: She is alert and oriented to person, place, and time.       Assessment:      1. Sinus bradycardia    2. Palpitations    3. HUGHES (dyspnea on exertion)    4. Chest pain, atypical        Plan:     Stop benicar with hypotension  Continue to observe BP and palpitations  OV 6 months

## 2023-05-09 ENCOUNTER — OFFICE VISIT (OUTPATIENT)
Dept: UROGYNECOLOGY | Facility: CLINIC | Age: 73
End: 2023-05-09
Payer: MEDICARE

## 2023-05-09 VITALS
WEIGHT: 161.63 LBS | DIASTOLIC BLOOD PRESSURE: 62 MMHG | HEART RATE: 66 BPM | HEIGHT: 67 IN | BODY MASS INDEX: 25.37 KG/M2 | SYSTOLIC BLOOD PRESSURE: 127 MMHG

## 2023-05-09 DIAGNOSIS — N39.46 MIXED INCONTINENCE: Primary | ICD-10-CM

## 2023-05-09 DIAGNOSIS — N30.90 RECURRENT BACTERIAL CYSTITIS: ICD-10-CM

## 2023-05-09 DIAGNOSIS — N95.2 ATROPHIC VAGINITIS: ICD-10-CM

## 2023-05-09 PROCEDURE — 3074F PR MOST RECENT SYSTOLIC BLOOD PRESSURE < 130 MM HG: ICD-10-PCS | Mod: CPTII,,, | Performed by: OBSTETRICS & GYNECOLOGY

## 2023-05-09 PROCEDURE — 3008F PR BODY MASS INDEX (BMI) DOCUMENTED: ICD-10-PCS | Mod: CPTII,,, | Performed by: OBSTETRICS & GYNECOLOGY

## 2023-05-09 PROCEDURE — 99999 PR PBB SHADOW E&M-EST. PATIENT-LVL V: CPT | Mod: PBBFAC,,, | Performed by: OBSTETRICS & GYNECOLOGY

## 2023-05-09 PROCEDURE — 3074F SYST BP LT 130 MM HG: CPT | Mod: CPTII,,, | Performed by: OBSTETRICS & GYNECOLOGY

## 2023-05-09 PROCEDURE — 87086 URINE CULTURE/COLONY COUNT: CPT | Performed by: OBSTETRICS & GYNECOLOGY

## 2023-05-09 PROCEDURE — 3078F PR MOST RECENT DIASTOLIC BLOOD PRESSURE < 80 MM HG: ICD-10-PCS | Mod: CPTII,,, | Performed by: OBSTETRICS & GYNECOLOGY

## 2023-05-09 PROCEDURE — 1159F MED LIST DOCD IN RCRD: CPT | Mod: CPTII,,, | Performed by: OBSTETRICS & GYNECOLOGY

## 2023-05-09 PROCEDURE — 3288F FALL RISK ASSESSMENT DOCD: CPT | Mod: CPTII,,, | Performed by: OBSTETRICS & GYNECOLOGY

## 2023-05-09 PROCEDURE — 3078F DIAST BP <80 MM HG: CPT | Mod: CPTII,,, | Performed by: OBSTETRICS & GYNECOLOGY

## 2023-05-09 PROCEDURE — 4010F ACE/ARB THERAPY RXD/TAKEN: CPT | Mod: CPTII,,, | Performed by: OBSTETRICS & GYNECOLOGY

## 2023-05-09 PROCEDURE — 88112 CYTOPATH CELL ENHANCE TECH: CPT | Performed by: PATHOLOGY

## 2023-05-09 PROCEDURE — 99999 PR PBB SHADOW E&M-EST. PATIENT-LVL V: ICD-10-PCS | Mod: PBBFAC,,, | Performed by: OBSTETRICS & GYNECOLOGY

## 2023-05-09 PROCEDURE — 88112 PR  CYTOPATH, CELL ENHANCE TECH: ICD-10-PCS | Mod: 26,,, | Performed by: PATHOLOGY

## 2023-05-09 PROCEDURE — 1159F PR MEDICATION LIST DOCUMENTED IN MEDICAL RECORD: ICD-10-PCS | Mod: CPTII,,, | Performed by: OBSTETRICS & GYNECOLOGY

## 2023-05-09 PROCEDURE — 3288F PR FALLS RISK ASSESSMENT DOCUMENTED: ICD-10-PCS | Mod: CPTII,,, | Performed by: OBSTETRICS & GYNECOLOGY

## 2023-05-09 PROCEDURE — 1101F PR PT FALLS ASSESS DOC 0-1 FALLS W/OUT INJ PAST YR: ICD-10-PCS | Mod: CPTII,,, | Performed by: OBSTETRICS & GYNECOLOGY

## 2023-05-09 PROCEDURE — 99205 PR OFFICE/OUTPT VISIT, NEW, LEVL V, 60-74 MIN: ICD-10-PCS | Mod: ,,, | Performed by: OBSTETRICS & GYNECOLOGY

## 2023-05-09 PROCEDURE — 88112 CYTOPATH CELL ENHANCE TECH: CPT | Mod: 26,,, | Performed by: PATHOLOGY

## 2023-05-09 PROCEDURE — 4010F PR ACE/ARB THEARPY RXD/TAKEN: ICD-10-PCS | Mod: CPTII,,, | Performed by: OBSTETRICS & GYNECOLOGY

## 2023-05-09 PROCEDURE — 1101F PT FALLS ASSESS-DOCD LE1/YR: CPT | Mod: CPTII,,, | Performed by: OBSTETRICS & GYNECOLOGY

## 2023-05-09 PROCEDURE — 1125F PR PAIN SEVERITY QUANTIFIED, PAIN PRESENT: ICD-10-PCS | Mod: CPTII,,, | Performed by: OBSTETRICS & GYNECOLOGY

## 2023-05-09 PROCEDURE — 1125F AMNT PAIN NOTED PAIN PRSNT: CPT | Mod: CPTII,,, | Performed by: OBSTETRICS & GYNECOLOGY

## 2023-05-09 PROCEDURE — 99205 OFFICE O/P NEW HI 60 MIN: CPT | Mod: ,,, | Performed by: OBSTETRICS & GYNECOLOGY

## 2023-05-09 PROCEDURE — 3008F BODY MASS INDEX DOCD: CPT | Mod: CPTII,,, | Performed by: OBSTETRICS & GYNECOLOGY

## 2023-05-09 RX ORDER — CEPHALEXIN 500 MG/1
500 CAPSULE ORAL EVERY 12 HOURS
Qty: 10 CAPSULE | Refills: 0 | Status: SHIPPED | OUTPATIENT
Start: 2023-05-09 | End: 2023-05-14

## 2023-05-09 RX ORDER — MIRABEGRON 25 MG/1
25 TABLET, FILM COATED, EXTENDED RELEASE ORAL DAILY
Qty: 30 TABLET | Refills: 11 | Status: SHIPPED | OUTPATIENT
Start: 2023-05-09 | End: 2023-06-20 | Stop reason: DRUGHIGH

## 2023-05-09 RX ORDER — CONJUGATED ESTROGENS 0.62 MG/G
CREAM VAGINAL
Qty: 45 G | Refills: 12 | Status: SHIPPED | OUTPATIENT
Start: 2023-05-09

## 2023-05-09 NOTE — PROGRESS NOTES
Subjective:       Patient ID: Erica Marsh is a 72 y.o. female.    Chief Complaint:  No chief complaint on file.        History of Present Illness  HPI 72 y.o.  female    has a past medical history of Arthritis, Depression, Diabetes mellitus, Diabetes mellitus type II, GERD (gastroesophageal reflux disease), Hypertension, Kidney disease, Lupus erythematosus, Shingles, Thyroid disease, and Trigger finger.  Referred by Dr. Ornelas for evaluation of urinary incontinence and recurrent UTI. She has a documented UTI in  E. Coli pan sensitive.     [facility]    Question 2023  2:34 PM CDT - Filed by Joann Khoury MA   General Urogynecology: Are you experiencing the following?    Dysuria (painful urination) Yes   Nocturia:  waking up at night to empty your bladder  Yes   If you answered yes to the previous question, how many times does this happen per night? 3-4   Enuresis (urine loss during sleep) No   Dribbling urine after you urinate Yes   Hematuria (urine appears red) No   Type of stream Strong   Urinary frequency: How often a day are you going to the bathroom per day?  Less than 10   Urinary Tract Infections: How many Urinary Tract Infections have you had in the past year? Three (3) infections in a year   If you have had a UTI in the past year, what treatments have you had so far?  Prophylactic antibiotics   Urinary Incontinence (General): Are you experiencing the following?    Past consultation for incontinence: Have you ever seen someone for the evaluation of incontinence? No   If you answered yes to the previous question, please select all the therapies you have tried.  N/a- I answered no to the previous question   Please note the effectiveness of the therapies.    Need to wear protection to keep clothes dry  Yes   If you answered yes to the previous question, please roula the protection you use.  Panty liner    Pads   If you wear protection, how much wetness is typically on each pad? Light  "  If you wear protection, how often do you have to change per day, if applicable?  1   Stress Symptoms: Are you experiencing the following?    Leakage of urine with cough, laugh and/or sneeze Yes   If you answered yes to the previous question, what is the frequency in days, weeks and/or months? Several times a day, only when congested    Leakage of urine with sex No   Leakage of urine with bending/ lifting No   Leakage of urine with briskly walking or jogging No   If you lose urine for any other reason not previously mentioned, please note it below, if applicable.     Urge Symptoms: Are you experiencing the following?    Urgency ("got to go" feeling) Yes   Urge: How frequently do you feel an urge to urinate (feeling like you "gotta go" to the bathroom and can't wait) Several times a day   Do you experience a leakage of urine when you have a feeling of urgency?  Yes, rare    Leakage of urine when unaware Yes   Past use of anticholinergics (medications used to treat overactive bladder) No   If you answered yes to the previous question, please roula the anticholinergics you have used:     Have you ever used Mirbetriq (aka Mirabegron)?  No   Prolapse Symptoms: Are you experiencing any of the following?     Falling out/ Bulging/ Heaviness in the vagina Yes   Vaginal/ Abdominal Pain/ Pressure Yes   Need to strain/ Push to void Yes   Need to wait on the toilet before you void Yes   Unusual position to urinate (using your hands to push back the vaginal bulge) Yes   Sensation of incomplete emptying No   Past use of pessary device No   If you answered yes to the previous question, please list the devices you have used below.     Bowel Symptoms: Are you experiencing any of the following?    Constipation No   Diarrhea  No   Hematochezia (bloody stool) No   Incomplete evacuation of stool Yes   Involuntary loss of formed stool No   Fecal smearing/urgency Yes   Involuntary loss of gas Yes   Vaginal Symptoms: Are you experiencing " any of the following?     Abnormal vaginal bleeding  No   Vaginal dryness Yes   Sexually active  No   Dyspareunia (painful intercourse) No   Estrogen use  No       GYN & OB History  No LMP recorded. Patient has had a hysterectomy.   Date of Last Pap: No result found    OB History    Para Term  AB Living   1 1 1         SAB IAB Ectopic Multiple Live Births                  # Outcome Date GA Lbr Royal/2nd Weight Sex Delivery Anes PTL Lv   1 Term              OB     x 1.  C/s x 0.    Largest: 7 # 14 oz   Forceps: no  Episiotomy:  yes  Degree: 3rd or 4th no    GYN  Menarche: 10  Menstrual cycle: n/a  Menopause: 30's  Hysterectomy:  open: indication: Fibroids  Ovaries: one side removed   Tubal ligation: No  Other abdominal surgeries: Hysterectomy; Appy; Gastric bypass, vanessa      Past Medical History:   Diagnosis Date    Arthritis     Depression     Diabetes mellitus     NO MED SINCE GASTRIC BYPASS has hypoglycemia    Diabetes mellitus type II     GERD (gastroesophageal reflux disease)     Hypertension     NO MED SINCE GASTRIC BYPASS    Kidney disease     Lupus erythematosus     Shingles     Thyroid disease     Trigger finger     RIGHT LONG     Past Surgical History:   Procedure Laterality Date    APPENDECTOMY      BREAST BIOPSY Left     benign    CHOLECYSTECTOMY      EPIDURAL STEROID INJECTION INTO THORACIC SPINE N/A 2018    Procedure: Injection-steroid-epidural-thoracic;  Surgeon: Christoph Pratt MD;  Location: [facility] OR;  Service: Pain Management;  Laterality: N/A;  T10-11    EPIDURAL STEROID INJECTION INTO THORACIC SPINE N/A 2018    Procedure: Injection-steroid-epidural-thoracic;  Surgeon: Christoph Pratt MD;  Location: [facility] OR;  Service: Pain Management;  Laterality: N/A;  T10-11    GASTRIC BYPASS      HAND SURGERY      HYSTERECTOMY      TONSILLECTOMY         Review of Systems  Review of Systems   Constitutional: Negative.  Negative for activity change, appetite change, chills, diaphoresis, fatigue,  fever and unexpected weight change.   HENT: Negative.     Eyes: Negative.    Respiratory: Negative.  Negative for apnea, cough and wheezing.    Cardiovascular: Negative.  Negative for chest pain and palpitations.   Gastrointestinal:  Negative for abdominal distention, abdominal pain, anal bleeding, blood in stool, constipation, diarrhea, nausea, rectal pain and vomiting.   Endocrine: Negative.    Genitourinary:  Positive for frequency and urgency. Negative for decreased urine volume, difficulty urinating, dyspareunia, dysuria, enuresis, flank pain, genital sores, hematuria, menstrual problem, pelvic pain, vaginal bleeding, vaginal discharge and vaginal pain.   Musculoskeletal:  Negative for back pain and gait problem.   Skin:  Negative for color change, pallor, rash and wound.   Allergic/Immunologic: Negative for immunocompromised state.   Neurological: Negative.  Negative for dizziness and speech difficulty.   Hematological:  Negative for adenopathy.   Psychiatric/Behavioral:  Negative for agitation, behavioral problems, confusion and sleep disturbance.          Objective:     Physical Exam   Constitutional: She is oriented to person, place, and time. She appears well-developed.   HENT:   Head: Normocephalic and atraumatic.   Eyes: Conjunctivae and EOM are normal.   Cardiovascular: Normal rate, regular rhythm, S1 normal, S2 normal, normal heart sounds and intact distal pulses.   Pulmonary/Chest: Effort normal and breath sounds normal. She exhibits no tenderness.   Abdominal: Soft. Bowel sounds are normal. She exhibits no distension and no mass. There is no splenomegaly or hepatomegaly. There is no abdominal tenderness. There is no rigidity, no rebound and no guarding. Hernia confirmed negative in the right inguinal area and confirmed negative in the left inguinal area.   Genitourinary: Pelvic exam was performed with patient supine. Rectum normal, vagina normal, skenes normal and bartholins normal. Right labia  normal and left labia normal. Urethra exhibits hypermobility. Urethra exhibits no urethral caruncle, no urethral diverticulum and no urethral mass. Right bartholin is not enlarged and not tender. Left bartholin is not enlarged and not tender. Rectal exam shows resting tone normal and active tone normal. Rectal exam shows no external hemorrhoid, no fissure, no tenderness, anal tone normal and no dovetailing. Guaiac negative stool. There is atrophy in the vagina. No foreign body, tenderness, bleeding, unspecified prolapse of vaginal walls, fistula, mesh exposure or lavator tenderness in the vagina. Right adnexum displays no tenderness. Left adnexum displays no tenderness. Uterus is absent.   PVR: 10 ML  Empty cough stress test: Negative.  Kegel: 3/5    POP-Q  Aa: -1 Ba: -1 C: -6   GH: 2 PB: 2 TVL: 8   Ap: -2 Bp: -2 D:                      Musculoskeletal:         General: Normal range of motion.      Cervical back: Normal range of motion and neck supple.   Neurological: She is alert and oriented to person, place, and time. She has normal strength and normal reflexes. Cranial nerves II through XII intact. No cranial nerve deficit.   Skin: Skin is warm and dry.   Psychiatric: She has a normal mood and affect. Her speech is normal and behavior is normal. Judgment normal.             HCM  Pap's: No history of abnormal's   Mammo: BIRADS: 1 ; Last imaging 2023    Colonoscopy: N/a: normal   Dexa:  n/a       Assessment:        1. Mixed incontinence    2. Recurrent bacterial cystitis    3. Atrophic vaginitis                Plan:    1. UTI   --Urine culture  --Rx for Keflex 500 mg bid for 5 days   --renal sonogram   --urine cytology   --Cysto     2.  Mixed urinary incontinence, urge > stress:   --Bladder diary for 3-7 days, write the number of times you go to the rest room and associated symptoms of urgency or leakage.   --Empty bladder every 3 hours.  Empty well: wait a minute, lean forward on toilet.    --Avoid dietary  irritants (see sheet).  Keep diary x 3-5 days to determine your irritants.  --KEGELS: do 10 in AM and 10 in PM, holding each x 10 seconds.  When you feel urge to go, STOP, KEGEL, and when urge has passed, then go to bathroom.  Start pelvic floor PT.     --URGE: Myrbetriq 25 mg daily.  SE profile reviewed.    Takes 2-4 weeks to see if will have effect.  For dry mouth: get sour, sugar free lozenge or gum.    --STRESS:  not too bothersome : Non surgical options: Pessary vs. Impressa vs Rivive - which represent urethral and bladder support devices; Surgical options including 1.  mid urethral sling; retropubic, transobturator vs single incision 2. Fascial slings 3. Mendoza procedure 4. Periurethral bulking     3. Vaginal atrophy (dryness):  Use 1 gram of estrogen cream in vagina nightly x 2 weeks, then twice a week thereafter.      Approximately 65 minutes of total time spent in consult, 75 % in discussion. This includes face to face time and non-face to face time preparing to see the patient, obtaining and/or reviewing separately obtained history, documenting clinical information in the electronic or other health record, independently interpreting results (not separately reported) and communicating results to the patient/family/caregiver, or care coordination (not separately reported).      Thank you for requesting consultation of your patient.  I look forward to participating in her care.    Federico Bang DO  Female Pelvic Medicine and Reconstructive Surgery  [facility] [place], [place]

## 2023-05-09 NOTE — PATIENT INSTRUCTIONS
1. UTI   --Urine culture  --Rx for Keflex 500 mg bid for 5 days   --renal sonogram   --urine cytology   --Cysto     2.  Mixed urinary incontinence, urge > stress:   --Bladder diary for 3-7 days, write the number of times you go to the rest room and associated symptoms of urgency or leakage.   --Empty bladder every 3 hours.  Empty well: wait a minute, lean forward on toilet.    --Avoid dietary irritants (see sheet).  Keep diary x 3-5 days to determine your irritants.  --KEGELS: do 10 in AM and 10 in PM, holding each x 10 seconds.  When you feel urge to go, STOP, KEGEL, and when urge has passed, then go to bathroom.  Start pelvic floor PT.     --URGE: Myrbetriq 25 mg daily.  SE profile reviewed.    Takes 2-4 weeks to see if will have effect.  For dry mouth: get sour, sugar free lozenge or gum.    --STRESS:  not too bothersome : Non surgical options: Pessary vs. Impressa vs Rivive - which represent urethral and bladder support devices; Surgical options including 1.  mid urethral sling; retropubic, transobturator vs single incision 2. Fascial slings 3. Mendoza procedure 4. Periurethral bulking     3. Vaginal atrophy (dryness):  Use 1 gram of estrogen cream in vagina nightly x 2 weeks, then twice a week thereafter.

## 2023-05-09 NOTE — TELEPHONE ENCOUNTER
Refill Routing Note   Medication(s) are not appropriate for processing by [facility] for the following reason(s):      Medication outside of protocol    [facility] action(s):  Route None identified          Appointments  past 12m or future 3m with PCP    Date Provider   Last Visit   3/27/2023 Jose Guadalupe Ornelas MD   Next Visit   Visit date not found Jose Guadalupe Ornelas MD   ED visits in past 90 days: 0        Note composed:4:56 PM 05/09/2023

## 2023-05-10 ENCOUNTER — PATIENT MESSAGE (OUTPATIENT)
Dept: UROGYNECOLOGY | Facility: CLINIC | Age: 73
End: 2023-05-10
Payer: MEDICARE

## 2023-05-10 LAB
FINAL PATHOLOGIC DIAGNOSIS: NORMAL
Lab: NORMAL

## 2023-05-10 RX ORDER — CHOLECALCIFEROL (VITAMIN D3) 25 MCG
TABLET ORAL
Qty: 180 TABLET | Refills: 3 | Status: SHIPPED | OUTPATIENT
Start: 2023-05-10

## 2023-05-11 ENCOUNTER — LAB VISIT (OUTPATIENT)
Dept: LAB | Facility: HOSPITAL | Age: 73
End: 2023-05-11
Attending: FAMILY MEDICINE
Payer: MEDICARE

## 2023-05-11 ENCOUNTER — PATIENT MESSAGE (OUTPATIENT)
Dept: UROGYNECOLOGY | Facility: CLINIC | Age: 73
End: 2023-05-11
Payer: MEDICARE

## 2023-05-11 DIAGNOSIS — Z12.11 COLON CANCER SCREENING: ICD-10-CM

## 2023-05-11 LAB — BACTERIA UR CULT: NO GROWTH

## 2023-05-11 PROCEDURE — 82274 ASSAY TEST FOR BLOOD FECAL: CPT | Performed by: FAMILY MEDICINE

## 2023-05-15 ENCOUNTER — PATIENT MESSAGE (OUTPATIENT)
Dept: FAMILY MEDICINE | Facility: CLINIC | Age: 73
End: 2023-05-15
Payer: MEDICARE

## 2023-05-15 DIAGNOSIS — M45.5 ANKYLOSING SPONDYLITIS OF THORACOLUMBAR REGION: Primary | ICD-10-CM

## 2023-05-16 ENCOUNTER — PATIENT MESSAGE (OUTPATIENT)
Dept: FAMILY MEDICINE | Facility: CLINIC | Age: 73
End: 2023-05-16
Payer: MEDICARE

## 2023-05-16 LAB — HEMOCCULT STL QL IA: NEGATIVE

## 2023-05-16 RX ORDER — BUTALBITAL, ACETAMINOPHEN AND CAFFEINE 50; 325; 40 MG/1; MG/1; MG/1
1 TABLET ORAL EVERY 4 HOURS PRN
Qty: 40 TABLET | Refills: 1 | Status: SHIPPED | OUTPATIENT
Start: 2023-05-16 | End: 2023-06-19 | Stop reason: SDUPTHER

## 2023-05-16 RX ORDER — HYDROCODONE BITARTRATE AND ACETAMINOPHEN 7.5; 325 MG/1; MG/1
TABLET ORAL
COMMUNITY
Start: 2023-05-02

## 2023-05-16 NOTE — TELEPHONE ENCOUNTER
Care Due:                  Date            Visit Type   Department     Provider  --------------------------------------------------------------------------------                                EP -                              PRIMARY      [facility]  Last Visit: 03-      CARE ([facility])   ANTHONY Ornelas                              EP -                              PRIMARY      [facility]  Next Visit: 11-      CARE ([facility])   ANTHONY Ornelas                                                            Last  Test          Frequency    Reason                     Performed    Due Date  --------------------------------------------------------------------------------    Uric Acid...  12 months..  allopurinoL..............  07- 07-    Health [facility] Embedded Care Due Messages. Reference number: [id number].   5/16/2023 11:00:41 AM CDT

## 2023-05-17 DIAGNOSIS — G89.29 CHRONIC LOW BACK PAIN WITH BILATERAL SCIATICA, UNSPECIFIED BACK PAIN LATERALITY: ICD-10-CM

## 2023-05-17 DIAGNOSIS — M32.9 SYSTEMIC LUPUS ERYTHEMATOSUS, UNSPECIFIED SLE TYPE, UNSPECIFIED ORGAN INVOLVEMENT STATUS: ICD-10-CM

## 2023-05-17 DIAGNOSIS — G57.00 SCIATIC NEUROPATHY, UNSPECIFIED LATERALITY: ICD-10-CM

## 2023-05-17 DIAGNOSIS — F33.9 RECURRENT MAJOR DEPRESSION RESISTANT TO TREATMENT: ICD-10-CM

## 2023-05-17 DIAGNOSIS — M45.5 ANKYLOSING SPONDYLITIS OF THORACOLUMBAR REGION: ICD-10-CM

## 2023-05-17 DIAGNOSIS — M54.41 CHRONIC LOW BACK PAIN WITH BILATERAL SCIATICA, UNSPECIFIED BACK PAIN LATERALITY: ICD-10-CM

## 2023-05-17 DIAGNOSIS — M54.42 CHRONIC LOW BACK PAIN WITH BILATERAL SCIATICA, UNSPECIFIED BACK PAIN LATERALITY: ICD-10-CM

## 2023-05-17 DIAGNOSIS — M43.26 ANKYLOSIS OF LUMBAR SPINE: ICD-10-CM

## 2023-05-18 RX ORDER — CLONAZEPAM 1 MG/1
TABLET ORAL
Qty: 60 TABLET | Refills: 3 | Status: SHIPPED | OUTPATIENT
Start: 2023-05-18 | End: 2023-07-18 | Stop reason: SDUPTHER

## 2023-05-18 NOTE — TELEPHONE ENCOUNTER
No care due was identified.  Health [facility] Embedded Care Due Messages. Reference number: [id number].   5/17/2023 11:41:47 PM CDT

## 2023-05-18 NOTE — TELEPHONE ENCOUNTER
Refill Routing Note   Medication(s) are not appropriate for processing by [facility] for the following reason(s):      Medication outside of protocol    [facility] action(s):  Route None identified          Appointments  past 12m or future 3m with PCP    Date Provider   Last Visit   3/27/2023 Jose Guadalupe Ornelas MD   Next Visit   Visit date not found Jose Guadalupe Ornelas MD   ED visits in past 90 days: 0        Note composed:9:10 AM 05/18/2023

## 2023-05-24 ENCOUNTER — PATIENT MESSAGE (OUTPATIENT)
Dept: FAMILY MEDICINE | Facility: CLINIC | Age: 73
End: 2023-05-24
Payer: MEDICARE

## 2023-06-13 ENCOUNTER — OFFICE VISIT (OUTPATIENT)
Dept: RHEUMATOLOGY | Facility: CLINIC | Age: 73
End: 2023-06-13
Payer: MEDICARE

## 2023-06-13 VITALS
SYSTOLIC BLOOD PRESSURE: 120 MMHG | BODY MASS INDEX: 25.67 KG/M2 | HEART RATE: 63 BPM | DIASTOLIC BLOOD PRESSURE: 65 MMHG | HEIGHT: 67 IN | WEIGHT: 163.56 LBS

## 2023-06-13 DIAGNOSIS — M54.16 LUMBAR RADICULOPATHY: ICD-10-CM

## 2023-06-13 DIAGNOSIS — M25.50 POLYARTHRALGIA: ICD-10-CM

## 2023-06-13 DIAGNOSIS — M32.9 SYSTEMIC LUPUS ERYTHEMATOSUS, UNSPECIFIED SLE TYPE, UNSPECIFIED ORGAN INVOLVEMENT STATUS: Primary | ICD-10-CM

## 2023-06-13 DIAGNOSIS — M54.6 DISCOGENIC THORACIC PAIN: ICD-10-CM

## 2023-06-13 PROCEDURE — 1125F AMNT PAIN NOTED PAIN PRSNT: CPT | Mod: CPTII,S$GLB,, | Performed by: INTERNAL MEDICINE

## 2023-06-13 PROCEDURE — 3078F DIAST BP <80 MM HG: CPT | Mod: CPTII,S$GLB,, | Performed by: INTERNAL MEDICINE

## 2023-06-13 PROCEDURE — 3074F SYST BP LT 130 MM HG: CPT | Mod: CPTII,S$GLB,, | Performed by: INTERNAL MEDICINE

## 2023-06-13 PROCEDURE — 3078F PR MOST RECENT DIASTOLIC BLOOD PRESSURE < 80 MM HG: ICD-10-PCS | Mod: CPTII,S$GLB,, | Performed by: INTERNAL MEDICINE

## 2023-06-13 PROCEDURE — 3074F PR MOST RECENT SYSTOLIC BLOOD PRESSURE < 130 MM HG: ICD-10-PCS | Mod: CPTII,S$GLB,, | Performed by: INTERNAL MEDICINE

## 2023-06-13 PROCEDURE — 99215 OFFICE O/P EST HI 40 MIN: CPT | Mod: S$GLB,,, | Performed by: INTERNAL MEDICINE

## 2023-06-13 PROCEDURE — 99215 PR OFFICE/OUTPT VISIT, EST, LEVL V, 40-54 MIN: ICD-10-PCS | Mod: S$GLB,,, | Performed by: INTERNAL MEDICINE

## 2023-06-13 PROCEDURE — 3008F PR BODY MASS INDEX (BMI) DOCUMENTED: ICD-10-PCS | Mod: CPTII,S$GLB,, | Performed by: INTERNAL MEDICINE

## 2023-06-13 PROCEDURE — 1125F PR PAIN SEVERITY QUANTIFIED, PAIN PRESENT: ICD-10-PCS | Mod: CPTII,S$GLB,, | Performed by: INTERNAL MEDICINE

## 2023-06-13 PROCEDURE — 99999 PR PBB SHADOW E&M-EST. PATIENT-LVL III: CPT | Mod: PBBFAC,,, | Performed by: INTERNAL MEDICINE

## 2023-06-13 PROCEDURE — 4010F PR ACE/ARB THEARPY RXD/TAKEN: ICD-10-PCS | Mod: CPTII,S$GLB,, | Performed by: INTERNAL MEDICINE

## 2023-06-13 PROCEDURE — 4010F ACE/ARB THERAPY RXD/TAKEN: CPT | Mod: CPTII,S$GLB,, | Performed by: INTERNAL MEDICINE

## 2023-06-13 PROCEDURE — 3008F BODY MASS INDEX DOCD: CPT | Mod: CPTII,S$GLB,, | Performed by: INTERNAL MEDICINE

## 2023-06-13 PROCEDURE — 99999 PR PBB SHADOW E&M-EST. PATIENT-LVL III: ICD-10-PCS | Mod: PBBFAC,,, | Performed by: INTERNAL MEDICINE

## 2023-06-13 RX ORDER — HYDROXYCHLOROQUINE SULFATE 200 MG/1
200 TABLET, FILM COATED ORAL 2 TIMES DAILY
Qty: 60 TABLET | Refills: 11 | Status: SHIPPED | OUTPATIENT
Start: 2023-06-13 | End: 2023-07-13

## 2023-06-13 RX ORDER — DICLOFENAC SODIUM 10 MG/G
2 GEL TOPICAL 3 TIMES DAILY PRN
Qty: 1 G | Refills: 11 | Status: SHIPPED | OUTPATIENT
Start: 2023-06-13

## 2023-06-13 ASSESSMENT — ROUTINE ASSESSMENT OF PATIENT INDEX DATA (RAPID3)
TOTAL RAPID3 SCORE: 7.55
PSYCHOLOGICAL DISTRESS SCORE: 3.3
PAIN SCORE: 9
AM STIFFNESS SCORE: 1, YES
FATIGUE SCORE: 10
MDHAQ FUNCTION SCORE: 1.1
PATIENT GLOBAL ASSESSMENT SCORE: 10

## 2023-06-13 NOTE — PROGRESS NOTES
Subjective:      Patient ID: Erica Marsh is a 72 y.o. female.    Chief Complaint: No chief complaint on file.    HPI  72 year old  F with PMH of FM,Hx SLE ( had rashes,GMN 15 yrs ago, +dsDNA,+CARLENE), sinus bradycardia, gastric bypass (around 2005)here for evaluation.  She is transferring from Dr. Alvarenga. She was diagnosed with SLE in 1995.  She was hospitalized at that time and was told she had kidney involvement. She took steroids. She took plaquenil all her life and stopped it 2 years ago. She has pain all over her body since stopping plaquenil.She has pain in shoulders, elbows,wrists, hands. She has tingling sensation of finger tips.  She also has pain in both ankles and in both feet.   Denies any rashes. She reports hair loss for 2 years. She reports swelling in wrists, knees, and ankles.  Reports she had no swelling on the plaquenil.    Family hx: mother: RA           Review of Systems see HPI      Objective:   There were no vitals taken for this visit.  Physical Exam   Constitutional: She is oriented to person, place, and time.   HENT:   Head: Normocephalic and atraumatic.   Right Ear: External ear normal.   Left Ear: External ear normal.   Nose: Nose normal.   Mouth/Throat: Oropharynx is clear and moist. No oropharyngeal exudate.   Eyes: Pupils are equal, round, and reactive to light. Conjunctivae are normal. Right eye exhibits no discharge. Left eye exhibits no discharge. No scleral icterus.   Neck: No JVD present. No thyromegaly present.   Cardiovascular: Normal rate, regular rhythm and normal heart sounds. Exam reveals no gallop and no friction rub.   No murmur heard.  Pulmonary/Chest: Effort normal and breath sounds normal. No respiratory distress. She has no wheezes. She has no rales. She exhibits no tenderness.   Abdominal: Soft. Bowel sounds are normal. She exhibits no distension and no mass. There is no abdominal tenderness. There is no rebound and no guarding.   Musculoskeletal:         General: No  tenderness.      Cervical back: Neck supple.   Lymphadenopathy:     She has no cervical adenopathy.   Neurological: She is alert and oriented to person, place, and time. No cranial nerve deficit. Gait normal. Coordination normal.   Skin: Skin is dry. No rash noted. No erythema. No pallor.   Psychiatric: Affect and judgment normal.       No data to display     Assessment:   72 year old  F with PMH of FM,Hx SLE ( had rashes,GMN 15 yrs ago, +dsDNA,+CARLENE), sinus bradycardia, gastric bypass (around 2005)here for evaluation.  She is transferring from Dr. Alvarenga.  She reports she stopped plaquenil and has increasing joint pain so will restart it.  Her main issue is FM  so will increase her gabapentin.    No diagnosis found.      Plan:     Problem List Items Addressed This Visit    None  labs  doctor juan carlos Lai ( saw eye doctor in 4/2023- per patient clear, she will get result faxed      #FM: increase gabapentin from 600mg po BID to TID    40 * minutes of total time spent on the encounter, which includes face to face time and non-face to face time preparing to see the patient (eg, review of tests), Obtaining and/or reviewing separately obtained history, Documenting clinical information in the electronic or other health record, Independently interpreting results (not separately reported) and communicating results to the patient/family/caregiver, or Care coordination (not separately reported).     Rtc in 6 to 8 months

## 2023-06-14 ENCOUNTER — HOSPITAL ENCOUNTER (OUTPATIENT)
Dept: RADIOLOGY | Facility: HOSPITAL | Age: 73
Discharge: HOME | End: 2023-06-14
Attending: INTERNAL MEDICINE
Payer: MEDICARE

## 2023-06-14 DIAGNOSIS — M25.50 POLYARTHRALGIA: ICD-10-CM

## 2023-06-14 PROCEDURE — 77077 XR ARTHRITIS SURVEY: ICD-10-PCS | Mod: 26,,, | Performed by: INTERNAL MEDICINE

## 2023-06-14 PROCEDURE — 77077 JOINT SURVEY SINGLE VIEW: CPT | Mod: TC,PO

## 2023-06-14 PROCEDURE — 72080 XR THORACOLUMBAR SPINE AP LATERAL: ICD-10-PCS | Mod: 26,,, | Performed by: INTERNAL MEDICINE

## 2023-06-14 PROCEDURE — 77077 JOINT SURVEY SINGLE VIEW: CPT | Mod: 26,,, | Performed by: INTERNAL MEDICINE

## 2023-06-14 PROCEDURE — 72080 X-RAY EXAM THORACOLMB 2/> VW: CPT | Mod: 26,,, | Performed by: INTERNAL MEDICINE

## 2023-06-14 PROCEDURE — 72080 X-RAY EXAM THORACOLMB 2/> VW: CPT | Mod: TC,PO

## 2023-06-16 ENCOUNTER — PATIENT MESSAGE (OUTPATIENT)
Dept: RHEUMATOLOGY | Facility: CLINIC | Age: 73
End: 2023-06-16

## 2023-06-17 ENCOUNTER — PATIENT MESSAGE (OUTPATIENT)
Dept: FAMILY MEDICINE | Facility: CLINIC | Age: 73
End: 2023-06-17

## 2023-06-17 DIAGNOSIS — M45.5 ANKYLOSING SPONDYLITIS OF THORACOLUMBAR REGION: Primary | ICD-10-CM

## 2023-06-17 DIAGNOSIS — E03.9 ACQUIRED HYPOTHYROIDISM: Primary | ICD-10-CM

## 2023-06-17 RX ORDER — GABAPENTIN 600 MG/1
600 TABLET ORAL 3 TIMES DAILY
Qty: 270 TABLET | Refills: 3 | Status: SHIPPED | OUTPATIENT
Start: 2023-06-17 | End: 2023-08-14 | Stop reason: SDUPTHER

## 2023-06-19 ENCOUNTER — OFFICE VISIT (OUTPATIENT)
Dept: PAIN MEDICINE | Facility: CLINIC | Age: 73
End: 2023-06-19
Payer: MEDICARE

## 2023-06-19 VITALS
SYSTOLIC BLOOD PRESSURE: 132 MMHG | RESPIRATION RATE: 18 BRPM | HEART RATE: 80 BPM | WEIGHT: 158.81 LBS | BODY MASS INDEX: 24.87 KG/M2 | OXYGEN SATURATION: 97 % | DIASTOLIC BLOOD PRESSURE: 61 MMHG

## 2023-06-19 DIAGNOSIS — M25.522 LEFT ELBOW PAIN: ICD-10-CM

## 2023-06-19 DIAGNOSIS — M54.12 CERVICAL RADICULOPATHY: ICD-10-CM

## 2023-06-19 DIAGNOSIS — M54.6 DISCOGENIC THORACIC PAIN: ICD-10-CM

## 2023-06-19 DIAGNOSIS — M79.7 FIBROMYALGIA: ICD-10-CM

## 2023-06-19 DIAGNOSIS — M45.5 ANKYLOSING SPONDYLITIS OF THORACOLUMBAR REGION: ICD-10-CM

## 2023-06-19 DIAGNOSIS — M50.30 DDD (DEGENERATIVE DISC DISEASE), CERVICAL: Primary | ICD-10-CM

## 2023-06-19 DIAGNOSIS — M47.812 CERVICAL SPONDYLOSIS: ICD-10-CM

## 2023-06-19 PROCEDURE — 1159F PR MEDICATION LIST DOCUMENTED IN MEDICAL RECORD: ICD-10-PCS | Mod: CPTII,S$GLB,, | Performed by: PAIN MEDICINE

## 2023-06-19 PROCEDURE — 99204 OFFICE O/P NEW MOD 45 MIN: CPT | Mod: S$GLB,,, | Performed by: PAIN MEDICINE

## 2023-06-19 PROCEDURE — 1101F PR PT FALLS ASSESS DOC 0-1 FALLS W/OUT INJ PAST YR: ICD-10-PCS | Mod: CPTII,S$GLB,, | Performed by: PAIN MEDICINE

## 2023-06-19 PROCEDURE — 3075F PR MOST RECENT SYSTOLIC BLOOD PRESS GE 130-139MM HG: ICD-10-PCS | Mod: CPTII,S$GLB,, | Performed by: PAIN MEDICINE

## 2023-06-19 PROCEDURE — 99204 PR OFFICE/OUTPT VISIT, NEW, LEVL IV, 45-59 MIN: ICD-10-PCS | Mod: S$GLB,,, | Performed by: PAIN MEDICINE

## 2023-06-19 PROCEDURE — 1160F PR REVIEW ALL MEDS BY PRESCRIBER/CLIN PHARMACIST DOCUMENTED: ICD-10-PCS | Mod: CPTII,S$GLB,, | Performed by: PAIN MEDICINE

## 2023-06-19 PROCEDURE — 1160F RVW MEDS BY RX/DR IN RCRD: CPT | Mod: CPTII,S$GLB,, | Performed by: PAIN MEDICINE

## 2023-06-19 PROCEDURE — 3075F SYST BP GE 130 - 139MM HG: CPT | Mod: CPTII,S$GLB,, | Performed by: PAIN MEDICINE

## 2023-06-19 PROCEDURE — 3288F PR FALLS RISK ASSESSMENT DOCUMENTED: ICD-10-PCS | Mod: CPTII,S$GLB,, | Performed by: PAIN MEDICINE

## 2023-06-19 PROCEDURE — 3288F FALL RISK ASSESSMENT DOCD: CPT | Mod: CPTII,S$GLB,, | Performed by: PAIN MEDICINE

## 2023-06-19 PROCEDURE — 3008F BODY MASS INDEX DOCD: CPT | Mod: CPTII,S$GLB,, | Performed by: PAIN MEDICINE

## 2023-06-19 PROCEDURE — 1101F PT FALLS ASSESS-DOCD LE1/YR: CPT | Mod: CPTII,S$GLB,, | Performed by: PAIN MEDICINE

## 2023-06-19 PROCEDURE — 1125F AMNT PAIN NOTED PAIN PRSNT: CPT | Mod: CPTII,S$GLB,, | Performed by: PAIN MEDICINE

## 2023-06-19 PROCEDURE — 99999 PR PBB SHADOW E&M-EST. PATIENT-LVL V: ICD-10-PCS | Mod: PBBFAC,,, | Performed by: PAIN MEDICINE

## 2023-06-19 PROCEDURE — 1125F PR PAIN SEVERITY QUANTIFIED, PAIN PRESENT: ICD-10-PCS | Mod: CPTII,S$GLB,, | Performed by: PAIN MEDICINE

## 2023-06-19 PROCEDURE — 1159F MED LIST DOCD IN RCRD: CPT | Mod: CPTII,S$GLB,, | Performed by: PAIN MEDICINE

## 2023-06-19 PROCEDURE — 99999 PR PBB SHADOW E&M-EST. PATIENT-LVL V: CPT | Mod: PBBFAC,,, | Performed by: PAIN MEDICINE

## 2023-06-19 PROCEDURE — 3078F DIAST BP <80 MM HG: CPT | Mod: CPTII,S$GLB,, | Performed by: PAIN MEDICINE

## 2023-06-19 PROCEDURE — 4010F ACE/ARB THERAPY RXD/TAKEN: CPT | Mod: CPTII,S$GLB,, | Performed by: PAIN MEDICINE

## 2023-06-19 PROCEDURE — 3078F PR MOST RECENT DIASTOLIC BLOOD PRESSURE < 80 MM HG: ICD-10-PCS | Mod: CPTII,S$GLB,, | Performed by: PAIN MEDICINE

## 2023-06-19 PROCEDURE — 4010F PR ACE/ARB THEARPY RXD/TAKEN: ICD-10-PCS | Mod: CPTII,S$GLB,, | Performed by: PAIN MEDICINE

## 2023-06-19 PROCEDURE — 3008F PR BODY MASS INDEX (BMI) DOCUMENTED: ICD-10-PCS | Mod: CPTII,S$GLB,, | Performed by: PAIN MEDICINE

## 2023-06-19 NOTE — PROGRESS NOTES
Subjective:     Patient ID: Erica Marsh is a 72 y.o. female    Chief Complaint: Fibromyalgia      Referred by: Mona Jasmine MD      HPI:    Initial Encounter (6/19/23):  Erica Marsh is a 72 y.o. female who presents today with multiple chronic pain complaints.  Patient has fibromyalgia and lupus.  States that the worst of her symptoms right now are related to fibromyalgia.  States that her lupus does not seem to be active at this time.  Is followed by Rheumatology.  Was taking Plaquenil for many years.  Felt this was helpful.  However, roughly 2 years ago this medication was discontinued for various reasons.  Patient reports that her symptoms significantly worsened as a result.  She was started on Plaquenil again roughly 1 week ago by her new rheumatologist.  She reports having whole body pain.  Specifically she cites having pain throughout her spine.  She also reports having more acute pain in the left elbow.  This very focal and will radiate to the medial forearm and hand.  She denies any associated numbness, tingling.  Does report the left upper extremity feels weak.  Denies any injuries.   This pain is described in detail below.    Physical Therapy:  Yes.    Non-pharmacologic Treatment:  Rest helps         TENS?  No    Pain Medications:         Currently taking:  Baclofen, Voltaren gel, gabapentin, Norco    Has tried in the past:  NSAIDs, Tylenol, muscle relaxants    Has not tried:  TCAs, SNRIs    Blood thinners:  None    Interventional Therapies:  None    Relevant Surgeries:  None    Affecting sleep?  Yes    Affecting daily activities? yes    Depressive symptoms? Yes          SI/HI? No    Work status: Retired    Pain Scores:    Best:       5/10  Worst:     9/10  Usually:   9/10  Today:    9/10    Pain Disability Index  Family/Home Responsibilities:: 10  Recreation:: 10  Social Activity:: 10  Occupation:: 0  Sexual Behavior:: 0  Self Care:: 9  Life-Support Activities:: 9  Pain Disability Index (PDI):  48    Review of Systems   Constitutional:  Negative for activity change, appetite change, chills, fatigue, fever and unexpected weight change.   HENT:  Negative for hearing loss.    Eyes:  Negative for visual disturbance.   Respiratory:  Negative for chest tightness and shortness of breath.    Cardiovascular:  Negative for chest pain.   Gastrointestinal:  Negative for abdominal pain, constipation, diarrhea, nausea and vomiting.   Genitourinary:  Negative for difficulty urinating.   Musculoskeletal:  Positive for arthralgias, back pain, gait problem, myalgias, neck pain and neck stiffness.   Skin:  Negative for rash.   Neurological:  Positive for weakness. Negative for dizziness, light-headedness, numbness and headaches.   Psychiatric/Behavioral:  Positive for sleep disturbance. Negative for hallucinations and suicidal ideas. The patient is not nervous/anxious.      Past Medical History:   Diagnosis Date    Arthritis     Depression     Diabetes mellitus     NO MED SINCE GASTRIC BYPASS has hypoglycemia    Diabetes mellitus type II     GERD (gastroesophageal reflux disease)     Hypertension     NO MED SINCE GASTRIC BYPASS    Kidney disease     Lupus erythematosus     Shingles     Thyroid disease     Trigger finger     RIGHT LONG       Past Surgical History:   Procedure Laterality Date    APPENDECTOMY      BREAST BIOPSY Left 1979    benign    CHOLECYSTECTOMY      EPIDURAL STEROID INJECTION INTO THORACIC SPINE N/A 6/22/2018    Procedure: Injection-steroid-epidural-thoracic;  Surgeon: Christoph Pratt MD;  Location: [facility] OR;  Service: Pain Management;  Laterality: N/A;  T10-11    EPIDURAL STEROID INJECTION INTO THORACIC SPINE N/A 8/6/2018    Procedure: Injection-steroid-epidural-thoracic;  Surgeon: Christoph Pratt MD;  Location: [facility] OR;  Service: Pain Management;  Laterality: N/A;  T10-11    GASTRIC BYPASS      HAND SURGERY      HYSTERECTOMY      TONSILLECTOMY         Social History     Socioeconomic History    Marital status:     Tobacco Use    Smoking status: Never    Smokeless tobacco: Never   Substance and Sexual Activity    Alcohol use: No    Drug use: No    Sexual activity: Never     Social Determinants of Health     Financial Resource Strain: Medium Risk    Difficulty of Paying Living Expenses: Somewhat hard   Food Insecurity: Food Insecurity Present    Worried About Running Out of Food in the Last Year: Sometimes true    Ran Out of Food in the Last Year: Never true   Transportation Needs: No Transportation Needs    Lack of Transportation (Medical): No    Lack of Transportation (Non-Medical): No   Physical Activity: Insufficiently Active    Days of Exercise per Week: 1 day    Minutes of Exercise per Session: 60 min   Stress: No Stress Concern Present    Feeling of Stress : Only a little   Social Connections: Unknown    Frequency of Communication with Friends and Family: Three times a week    Frequency of Social Gatherings with Friends and Family: Never    Active Member of Clubs or Organizations: No    Attends Club or Organization Meetings: Patient refused    Marital Status:    Housing Stability: Unknown    Unable to Pay for Housing in the Last Year: No    Unstable Housing in the Last Year: No       Review of patient's allergies indicates:   Allergen Reactions    Codeine     Sulfa (sulfonamide antibiotics)     Codeine Rash and Other (See Comments)    Plaquenil [hydroxychloroquine] Rash and Other (See Comments)     ONE BRAND OF MED    Sulfa (sulfonamide antibiotics) Rash and Other (See Comments)       Current Outpatient Medications on File Prior to Visit   Medication Sig Dispense Refill    albuterol (ACCUNEB) 1.25 mg/3 mL Nebu INHALE THE CONTENTS OF 1 VIAL (1.25MG TOTAL) VIA NEBULIZER EVERY 6 HOURS AS NEEDED FOR RESCUE 90 mL 11    albuterol-ipratropium (DUO-NEB) 2.5 mg-0.5 mg/3 mL nebulizer solution USE 1 VIAL(3MLS) VIA NEBULIZER EVERY 6 HOURS AS NEEDED FOR WHEEZING 990 mL 2    allopurinoL (ZYLOPRIM) 100 MG tablet TAKE 1  TABLET EVERY DAY 90 tablet 3    baclofen (LIORESAL) 20 MG tablet Take 1 tablet (20 mg total) by mouth 2 (two) times daily. 60 tablet 2    blood sugar diagnostic (ACCU-CHEK JORDAN PLUS TEST STRP) Strp TEST THREE TIMES DAILY BEFORE A MEAL AS DIRECTED 500 strip 11    blood sugar diagnostic Strp To check BG 4 times daily, to use with insurance preferred meter 360 each 11    citalopram (CELEXA) 20 MG tablet Take 1 tablet (20 mg total) by mouth once daily. 90 tablet 3    clonazePAM (KLONOPIN) 1 MG tablet TAKE 1 TABLET BY MOUTH TWICE DAILY FOR ANXIETY 60 tablet 3    conjugated estrogens (PREMARIN) vaginal cream 1 g with applicator or dime-sized amt with finger in vagina nightly x 2 weeks, then twice a week thereafter 45 g 12    cyanocobalamin 1,000 mcg/mL injection Inject 1 mL (1,000 mcg total) into the muscle every 30 days. 3 mL 3    diclofenac sodium (VOLTAREN) 1 % Gel Apply 2 g topically 3 (three) times daily as needed. 1 g 11    fluticasone propionate (FLONASE) 50 mcg/actuation nasal spray 1 spray (50 mcg total) by Each Nostril route 2 (two) times daily. 48 g 3    folic acid (FOLVITE) 1 MG tablet TAKE 1 TABLET (1,000 MCG TOTAL) BY MOUTH ONCE DAILY. 90 tablet 3    gabapentin (NEURONTIN) 600 MG tablet Take 1 tablet (600 mg total) by mouth 2 (two) times daily. 60 tablet 11    gabapentin (NEURONTIN) 600 MG tablet Take 1 tablet (600 mg total) by mouth 3 (three) times daily. 270 tablet 3    HYDROcodone-acetaminophen (NORCO) 7.5-325 mg per tablet Take by mouth.      hydrOXYchloroQUINE (PLAQUENIL) 200 mg tablet Take 1 tablet (200 mg total) by mouth 2 (two) times daily. 60 tablet 11    lancets (ACCU-CHEK SOFTCLIX LANCETS) Misc Use to test blood sugar three times a day   DX: E08.649 300 each 11    levothyroxine (SYNTHROID) 100 MCG tablet TAKE 1 TABLET EVERY DAY 90 tablet 1    mirabegron (MYRBETRIQ) 25 mg Tb24 ER tablet Take 1 tablet (25 mg total) by mouth once daily. 30 tablet 11    multivitamin-min-iron-FA-vit K 45 mg iron- 800  mcg-120 mcg Cap Take 1 capsule by mouth once daily. 30 capsule 11    vitamin D (VITAMIN D3) 1000 units Tab TAKE 1 TABLET TWICE DAILY 180 tablet 3    vitamin E 1000 UNIT capsule Take 1,000 Units by mouth once daily.      blood-glucose meter kit AC meals x2 1 each 0    [DISCONTINUED] quetiapine (SEROQUEL) 50 MG tablet Take 1 tablet (50 mg total) by mouth every evening. 30 tablet 11     No current facility-administered medications on file prior to visit.       Objective:      /61 (BP Location: Right arm, Patient Position: Sitting, BP Method: Medium (Automatic))   Pulse 80   Resp 18   Wt 72 kg (158 lb 12.8 oz)   SpO2 97%   BMI 24.87 kg/m²     Exam:  GEN:  Well developed, well nourished.  No acute distress.  Normal pain behavior.  HEENT:  No trauma.  Mucous membranes moist.  Nares patent bilaterally.  PSYCH: Normal affect. Thought content appropriate.  CHEST:  Breathing symmetric.  No audible wheezing.  ABD: Soft, non-distended.  SKIN:  Warm, pink, dry.  No rash on exposed areas.    EXT:  No cyanosis, clubbing, or edema.  No color change or changes in nail or hair growth.  NEURO/MUSCULOSKELETAL:  Fully alert, oriented, and appropriate. Speech normal iker. No cranial nerve deficits.   Gait:  Antalgic.  5/5 motor strength throughout upper extremities.   Sensory:  No sensory deficit in the upper extremities.   Reflexes:  2 + and symmetric throughout.  Absent Deal's bilaterally.  C-Spine:  Full ROM with pain on extension more than flexion.  Positive facet loading bilaterally.  Negative Spurling's bilaterally.    Diffusely TTP over cervical facet joints, cervical paraspinal muscles, shoulders    Exquisitely tender to light palpation of the medial aspect of the left elbow      Imaging:    Narrative & Impression    EXAMINATION:  CT CERVICAL SPINE WITHOUT CONTRAST     CLINICAL HISTORY:  C-spine trauma, NEXUS/CCR positive, low risk;     TECHNIQUE:  Low dose axial images, sagittal and coronal reformations were  performed though the cervical spine.  Contrast was not administered.     COMPARISON:  Cervical spine series 01/04/2017 and MRI cervical spine 09/10/2013     FINDINGS:  Mild levocurvature with straightening of the cervical lordosis which may be related to positioning or muscle strain.  Grossly similar degenerative related grade 1 retrolisthesis of C5 on 6 and to a lesser extent C6 on 7.  Vertebral body heights are unchanged.  No displaced fracture, dislocation or destructive osseous process.  No prevertebral soft tissue swelling.  No paraspinal mass or fluid collection.  No subcutaneous emphysema or radiodense retained foreign body.  Mild degenerative change about the atlantodental interval.  Dens and lateral masses are otherwise well aligned and intact.     C2-3: No significant spinal canal stenosis or neural foraminal narrowing.     C3-4: Minimal right neural foraminal narrowing.  No significant spinal canal stenosis or left neural foraminal narrowing.     C4-5: Posterior disc osteophyte complex resulting in borderline acquired canal stenosis.  Mild right and minimal left neural foraminal narrowing.     C5-6: Posterior disc osteophyte complex resulting in borderline acquired canal stenosis.  Mild right neural foraminal narrowing.  No significant left neural foraminal narrowing.     C6-7: Posterior disc osteophyte complex resulting in borderline acquired canal stenosis.  Mild to moderate right and mild left neural foraminal narrowing.     C7-T1: No significant spinal canal stenosis or neural foraminal narrowing.     Included airway is midline and patent.  Minimal biapical pleuroparenchymal scarring without pneumothorax.     Impression:     No CT evidence of cervical spine acute osseous traumatic injury.     Mild cervical spondylosis as above.        Electronically signed by: Elías Campbell MD  Date:                                            05/08/2020  Time:                                           15:24          Narrative & Impression    EXAMINATION:  XR THORACOLUMBAR SPINE AP LATERAL     CLINICAL HISTORY:  Pain in unspecified joint     TECHNIQUE:  AP and lateral views of the thoracolumbar spine were performed.     COMPARISON:  MRI 2018     FINDINGS:  There are surgical clips in the right upper quadrant.     There is mild thoracolumbar scoliosis convex to the right in the upper lumbar region.  There is osseous demineralization.  The vertebral bodies are normally aligned.  There is disc space narrowing.  Mild osteophytic spurring is present along vertebral endplates.     Impression:     As above        Electronically signed by: Alisha Paul MD  Date:                                            06/14/2023  Time:                                           13:26       Assessment:       Encounter Diagnoses   Name Primary?    Discogenic thoracic pain     DDD (degenerative disc disease), cervical Yes    Cervical spondylosis     Cervical radiculopathy     Left elbow pain     Fibromyalgia          Plan:       Erica was seen today for fibromyalgia.    Diagnoses and all orders for this visit:    DDD (degenerative disc disease), cervical    Discogenic thoracic pain  -     Ambulatory referral/consult to Pain Clinic    Cervical spondylosis    Cervical radiculopathy    Left elbow pain  -     Ambulatory referral/consult to Orthopedics; Future    Fibromyalgia        Erica Marsh is a 72 y.o. female with chronic pain complaints.  Has fibromyalgia and lupus.  The majority of her pain seems to be related to fibromyalgia at this time.  Does have some focal areas of pain.  The worst of these currently is her left elbow.  Has significant tenderness to the left elbow.  No injuries.  Exact etiology unclear.  Can not rule out cervical radicular pain though this seems less likely.    Pertinent imaging studies reviewed by me. Imaging results were discussed with patient.  Continue Plaquenil as per rheumatology.  Patient was recently  restarted on this medication.  We discussed it would be best to give this medication some time to begin taking effect.  Hopefully this will provide some relief of her pain and possibly provide some clarity regarding more focal sources of pain that may respond to interventional procedures.  Refer to Orthopedic surgery for evaluation of left elbow pain.  Return to clinic in 2 weeks.  At that time we will discuss efficacy of Plaquenil and discuss orthopedics evaluation/plan.  We can further evaluate her spine pain at that time to see if any interventional procedures are appropriate.            This note was created by combination of typed  and M-Modal dictation. Transcription and phonetic errors may be present.  If there are any questions, please contact me.

## 2023-06-20 ENCOUNTER — OFFICE VISIT (OUTPATIENT)
Dept: UROGYNECOLOGY | Facility: CLINIC | Age: 73
End: 2023-06-20
Payer: MEDICARE

## 2023-06-20 ENCOUNTER — HOSPITAL ENCOUNTER (OUTPATIENT)
Dept: RADIOLOGY | Facility: OTHER | Age: 73
Discharge: HOME | End: 2023-06-20
Attending: OBSTETRICS & GYNECOLOGY
Payer: MEDICARE

## 2023-06-20 VITALS
DIASTOLIC BLOOD PRESSURE: 78 MMHG | BODY MASS INDEX: 25.15 KG/M2 | SYSTOLIC BLOOD PRESSURE: 108 MMHG | WEIGHT: 160.25 LBS | HEIGHT: 67 IN

## 2023-06-20 DIAGNOSIS — N30.90 RECURRENT BACTERIAL CYSTITIS: ICD-10-CM

## 2023-06-20 PROBLEM — M79.7 FIBROMYALGIA: Status: ACTIVE | Noted: 2023-06-20

## 2023-06-20 LAB
BILIRUB SERPL-MCNC: NORMAL MG/DL
BLOOD URINE, POC: NORMAL
CLARITY, POC UA: CLEAR
COLOR, POC UA: NORMAL
GLUCOSE UR QL STRIP: NORMAL
KETONES UR QL STRIP: NORMAL
LEUKOCYTE ESTERASE URINE, POC: NORMAL
NITRITE, POC UA: NORMAL
PH, POC UA: 5
PROTEIN, POC: NORMAL
SPECIFIC GRAVITY, POC UA: 1.01
UROBILINOGEN, POC UA: NORMAL

## 2023-06-20 PROCEDURE — 99499 UNLISTED E&M SERVICE: CPT | Mod: S$GLB,,, | Performed by: OBSTETRICS & GYNECOLOGY

## 2023-06-20 PROCEDURE — 81002 POCT URINE DIPSTICK WITHOUT MICROSCOPE: ICD-10-PCS | Mod: S$GLB,,, | Performed by: OBSTETRICS & GYNECOLOGY

## 2023-06-20 PROCEDURE — 52000 PR CYSTOURETHROSCOPY: ICD-10-PCS | Mod: S$GLB,,, | Performed by: OBSTETRICS & GYNECOLOGY

## 2023-06-20 PROCEDURE — 76770 US EXAM ABDO BACK WALL COMP: CPT | Mod: 26,,, | Performed by: RADIOLOGY

## 2023-06-20 PROCEDURE — 99999 PR PBB SHADOW E&M-EST. PATIENT-LVL IV: ICD-10-PCS | Mod: PBBFAC,,,

## 2023-06-20 PROCEDURE — 81002 URINALYSIS NONAUTO W/O SCOPE: CPT | Mod: S$GLB,,, | Performed by: OBSTETRICS & GYNECOLOGY

## 2023-06-20 PROCEDURE — 52000 CYSTOURETHROSCOPY: CPT | Mod: S$GLB,,, | Performed by: OBSTETRICS & GYNECOLOGY

## 2023-06-20 PROCEDURE — 99999 PR PBB SHADOW E&M-EST. PATIENT-LVL IV: CPT | Mod: PBBFAC,,,

## 2023-06-20 PROCEDURE — 76770 US EXAM ABDO BACK WALL COMP: CPT | Mod: TC

## 2023-06-20 PROCEDURE — 99499 NO LOS: ICD-10-PCS | Mod: S$GLB,,, | Performed by: OBSTETRICS & GYNECOLOGY

## 2023-06-20 PROCEDURE — 76770 US RETROPERITONEAL COMPLETE: ICD-10-PCS | Mod: 26,,, | Performed by: RADIOLOGY

## 2023-06-20 RX ORDER — CIPROFLOXACIN 500 MG/1
500 TABLET ORAL
Status: COMPLETED | OUTPATIENT
Start: 2023-06-20 | End: 2023-06-20

## 2023-06-20 RX ORDER — MIRABEGRON 50 MG/1
1 TABLET, FILM COATED, EXTENDED RELEASE ORAL NIGHTLY
Qty: 30 TABLET | Refills: 11 | Status: SHIPPED | OUTPATIENT
Start: 2023-06-20 | End: 2023-07-13

## 2023-06-20 RX ORDER — LIDOCAINE HYDROCHLORIDE 20 MG/ML
JELLY TOPICAL ONCE
Status: COMPLETED | OUTPATIENT
Start: 2023-06-20 | End: 2023-06-20

## 2023-06-20 RX ORDER — BUTALBITAL, ACETAMINOPHEN AND CAFFEINE 50; 325; 40 MG/1; MG/1; MG/1
1 TABLET ORAL EVERY 4 HOURS PRN
Qty: 40 TABLET | Refills: 1 | Status: SHIPPED | OUTPATIENT
Start: 2023-06-20 | End: 2023-07-18 | Stop reason: ALTCHOICE

## 2023-06-20 RX ADMIN — CIPROFLOXACIN 500 MG: 500 TABLET ORAL at 03:06

## 2023-06-20 RX ADMIN — LIDOCAINE HYDROCHLORIDE 5 ML: 20 JELLY TOPICAL at 03:06

## 2023-06-20 NOTE — PROGRESS NOTES
Title of Operation:   Cystourethroscopy.     INDICATIONS:  72 y.o. Y O F  has a past medical history of Arthritis, Depression, Diabetes mellitus, Diabetes mellitus type II, GERD (gastroesophageal reflux disease), Hypertension, Kidney disease, Lupus erythematosus, Shingles, Thyroid disease, and Trigger finger.    PREOPERATIVE DIAGNOSIS  Mixed urinary incontinence   Recurrent UTI     POSTOPERATIVE DIAGNOSIS:   Mixed urinary incontinence   2.   No cystitic evidence of recurrent UTI     Anesthesia:   2% Xylocaine gel.    Specimen (Bacteriological, Pathological or other):   None.     Prosthetic Device/Implant:   None.     Surgeons Narrative:     After informed consent was obtained, the patient was placed in the lithotomy position. The urethral meatus was prepped with Betadine and 10 cubic centimeters of 2% Xylocaine gel were introduced into the urethra. A flexible cystourethroscope was introduced into the bladder. The bladder was distended with approximately 300 cubic centimeters of sterile water. A systematic survey was performed in which the bladder was surveyed using multiple sequential passes in a clockwise fashion from the bladder dome to the bladder base to the urethrovesical junction with deep trabeculation. The trigone and ureteral orifices were observed. The scope was then flipped back on itself, and the urethrovesical junction was viewed. A vaginal examining finger was then placed with pressure suburethrally at the urethrovesical junction as the telescope was withdrawn in order to perform positive pressure urethroscopy.  Standard maneuvers of cough, squeeze and Valsalva were performed. The telescope was then completely withdrawn.     Findings: Urethroscopy:  Normal.  Cystoscopy:  Normal bladder mucosa, bilateral ureteral flow was noted.      Assessment: Essentially normal cystourethroscopy.      Plan:   Review of urine culture demonstrated only one positive urine culture in two years, recommend obtaining  urine cultures prior to treating with abx. Burning with urination is likely due to vaginal atrophy for which Vaginal estrogen twice a week has been started. Deep trabeculations consistent with OAB, Myrbetriq 25 mg has helped her symptoms increase to 50 mg nightly.

## 2023-06-20 NOTE — TELEPHONE ENCOUNTER
No care due was identified.  [facility] Embedded Care Due Messages. Reference number: [id number].   6/19/2023 7:10:44 PM CDT

## 2023-06-21 ENCOUNTER — PATIENT MESSAGE (OUTPATIENT)
Dept: FAMILY MEDICINE | Facility: CLINIC | Age: 73
End: 2023-06-21

## 2023-06-21 RX ORDER — METHOCARBAMOL 750 MG/1
750 TABLET, FILM COATED ORAL 3 TIMES DAILY
Qty: 30 TABLET | Refills: 0 | Status: SHIPPED | OUTPATIENT
Start: 2023-06-21 | End: 2023-07-25 | Stop reason: SDUPTHER

## 2023-06-22 ENCOUNTER — PES CALL (OUTPATIENT)
Dept: ADMINISTRATIVE | Facility: CLINIC | Age: 73
End: 2023-06-22

## 2023-07-01 DIAGNOSIS — E07.9 THYROID DISEASE: ICD-10-CM

## 2023-07-01 RX ORDER — LEVOTHYROXINE SODIUM 100 UG/1
100 TABLET ORAL DAILY
Qty: 90 TABLET | Refills: 2 | Status: SHIPPED | OUTPATIENT
Start: 2023-07-01 | End: 2023-07-25

## 2023-07-01 NOTE — TELEPHONE ENCOUNTER
No care due was identified.  [facility] Embedded Care Due Messages. Reference number: [id number].   7/01/2023 2:27:39 AM CDT

## 2023-07-01 NOTE — TELEPHONE ENCOUNTER
Refill Decision Note   Erica Marsh  is requesting a refill authorization.  Brief Assessment and Rationale for Refill:  Approve     Medication Therapy Plan:  T4 WNL    Medication Reconciliation Completed: No    Comments:     No Care Gaps recommended.     Note composed:2:22 PM 07/01/2023

## 2023-07-05 DIAGNOSIS — M25.522 LEFT ELBOW PAIN: Primary | ICD-10-CM

## 2023-07-13 ENCOUNTER — OFFICE VISIT (OUTPATIENT)
Dept: UROGYNECOLOGY | Facility: CLINIC | Age: 73
End: 2023-07-13
Payer: MEDICARE

## 2023-07-13 VITALS — DIASTOLIC BLOOD PRESSURE: 70 MMHG | BODY MASS INDEX: 25.14 KG/M2 | SYSTOLIC BLOOD PRESSURE: 110 MMHG | WEIGHT: 160.5 LBS

## 2023-07-13 DIAGNOSIS — N39.46 MIXED INCONTINENCE: Primary | ICD-10-CM

## 2023-07-13 PROCEDURE — 3078F DIAST BP <80 MM HG: CPT | Mod: HCNC,CPTII,S$GLB, | Performed by: OBSTETRICS & GYNECOLOGY

## 2023-07-13 PROCEDURE — 1159F MED LIST DOCD IN RCRD: CPT | Mod: HCNC,CPTII,S$GLB, | Performed by: OBSTETRICS & GYNECOLOGY

## 2023-07-13 PROCEDURE — 3074F PR MOST RECENT SYSTOLIC BLOOD PRESSURE < 130 MM HG: ICD-10-PCS | Mod: HCNC,CPTII,S$GLB, | Performed by: OBSTETRICS & GYNECOLOGY

## 2023-07-13 PROCEDURE — 99213 OFFICE O/P EST LOW 20 MIN: CPT | Mod: HCNC,S$GLB,, | Performed by: OBSTETRICS & GYNECOLOGY

## 2023-07-13 PROCEDURE — 99999 PR PBB SHADOW E&M-EST. PATIENT-LVL IV: ICD-10-PCS | Mod: PBBFAC,HCNC,, | Performed by: OBSTETRICS & GYNECOLOGY

## 2023-07-13 PROCEDURE — 3008F PR BODY MASS INDEX (BMI) DOCUMENTED: ICD-10-PCS | Mod: HCNC,CPTII,S$GLB, | Performed by: OBSTETRICS & GYNECOLOGY

## 2023-07-13 PROCEDURE — 4010F PR ACE/ARB THEARPY RXD/TAKEN: ICD-10-PCS | Mod: HCNC,CPTII,S$GLB, | Performed by: OBSTETRICS & GYNECOLOGY

## 2023-07-13 PROCEDURE — 3074F SYST BP LT 130 MM HG: CPT | Mod: HCNC,CPTII,S$GLB, | Performed by: OBSTETRICS & GYNECOLOGY

## 2023-07-13 PROCEDURE — 1159F PR MEDICATION LIST DOCUMENTED IN MEDICAL RECORD: ICD-10-PCS | Mod: HCNC,CPTII,S$GLB, | Performed by: OBSTETRICS & GYNECOLOGY

## 2023-07-13 PROCEDURE — 99999 PR PBB SHADOW E&M-EST. PATIENT-LVL IV: CPT | Mod: PBBFAC,HCNC,, | Performed by: OBSTETRICS & GYNECOLOGY

## 2023-07-13 PROCEDURE — 4010F ACE/ARB THERAPY RXD/TAKEN: CPT | Mod: HCNC,CPTII,S$GLB, | Performed by: OBSTETRICS & GYNECOLOGY

## 2023-07-13 PROCEDURE — 3008F BODY MASS INDEX DOCD: CPT | Mod: HCNC,CPTII,S$GLB, | Performed by: OBSTETRICS & GYNECOLOGY

## 2023-07-13 PROCEDURE — 99213 PR OFFICE/OUTPT VISIT, EST, LEVL III, 20-29 MIN: ICD-10-PCS | Mod: HCNC,S$GLB,, | Performed by: OBSTETRICS & GYNECOLOGY

## 2023-07-13 PROCEDURE — 3078F PR MOST RECENT DIASTOLIC BLOOD PRESSURE < 80 MM HG: ICD-10-PCS | Mod: HCNC,CPTII,S$GLB, | Performed by: OBSTETRICS & GYNECOLOGY

## 2023-07-13 RX ORDER — MIRABEGRON 50 MG/1
50 TABLET, FILM COATED, EXTENDED RELEASE ORAL NIGHTLY
Qty: 90 TABLET | Refills: 3 | Status: SHIPPED | OUTPATIENT
Start: 2023-07-13 | End: 2024-07-12

## 2023-07-13 NOTE — PATIENT INSTRUCTIONS
1. UTI   --urine culture     2.  Mixed urinary incontinence, urge > stress:   --Bladder diary for 3-7 days, write the number of times you go to the rest room and associated symptoms of urgency or leakage.   --Empty bladder every 3 hours.  Empty well: wait a minute, lean forward on toilet.    --Avoid dietary irritants (see sheet).  Keep diary x 3-5 days to determine your irritants.  --KEGELS: do 10 in AM and 10 in PM, holding each x 10 seconds.  When you feel urge to go, STOP, KEGEL, and when urge has passed, then go to bathroom.  Start pelvic floor PT.     --URGE: Myrbetriq 50 mg daily.  SE profile reviewed.    Takes 2-4 weeks to see if will have effect.  For dry mouth: get sour, sugar free lozenge or gum.    --STRESS:  not too bothersome : Non surgical options: Pessary vs. Impressa vs Rivive - which represent urethral and bladder support devices; Surgical options including 1.  mid urethral sling; retropubic, transobturator vs single incision 2. Fascial slings 3. Mendoza procedure 4. Periurethral bulking     3. Vaginal atrophy (dryness):  Use 1 gram of estrogen cream in vagina nightly x 2 weeks, then twice a week thereafter.

## 2023-07-13 NOTE — PROGRESS NOTES
Subjective:       Patient ID: Erica Marsh is a 73 y.o. female.    Chief Complaint:  Follow-up        History of Present Illness  Follow-up  Pertinent negatives include no abdominal pain, chest pain, chills, coughing, diaphoresis, fatigue, fever, nausea, rash or vomiting.  73 y.o.  female    has a past medical history of Arthritis, Depression, Diabetes mellitus, Diabetes mellitus type II, GERD (gastroesophageal reflux disease), Hypertension, Kidney disease, Lupus erythematosus, Shingles, Thyroid disease, and Trigger finger.  Referred by Dr. Ornelas for evaluation of urinary incontinence and recurrent UTI. She has a documented UTI in  E. Coli pan sensitive.     2023  Myrbetriq 50 mg working well   Using the vaginal estrogen  Needs to start PT  Overall the urinary symptoms have improved         [facility]    Question 2023  2:34 PM CDT - Filed by Joann Khoury MA   General Urogynecology: Are you experiencing the following?    Dysuria (painful urination) Yes   Nocturia:  waking up at night to empty your bladder  Yes   If you answered yes to the previous question, how many times does this happen per night? 3-4   Enuresis (urine loss during sleep) No   Dribbling urine after you urinate Yes   Hematuria (urine appears red) No   Type of stream Strong   Urinary frequency: How often a day are you going to the bathroom per day?  Less than 10   Urinary Tract Infections: How many Urinary Tract Infections have you had in the past year? Three (3) infections in a year   If you have had a UTI in the past year, what treatments have you had so far?  Prophylactic antibiotics   Urinary Incontinence (General): Are you experiencing the following?    Past consultation for incontinence: Have you ever seen someone for the evaluation of incontinence? No   If you answered yes to the previous question, please select all the therapies you have tried.  N/a- I answered no to the previous question   Please note the  "effectiveness of the therapies.    Need to wear protection to keep clothes dry  Yes   If you answered yes to the previous question, please roula the protection you use.  Panty liner    Pads   If you wear protection, how much wetness is typically on each pad? Light   If you wear protection, how often do you have to change per day, if applicable?  1   Stress Symptoms: Are you experiencing the following?    Leakage of urine with cough, laugh and/or sneeze Yes   If you answered yes to the previous question, what is the frequency in days, weeks and/or months? Several times a day, only when congested    Leakage of urine with sex No   Leakage of urine with bending/ lifting No   Leakage of urine with briskly walking or jogging No   If you lose urine for any other reason not previously mentioned, please note it below, if applicable.     Urge Symptoms: Are you experiencing the following?    Urgency ("got to go" feeling) Yes   Urge: How frequently do you feel an urge to urinate (feeling like you "gotta go" to the bathroom and can't wait) Several times a day   Do you experience a leakage of urine when you have a feeling of urgency?  Yes, rare    Leakage of urine when unaware Yes   Past use of anticholinergics (medications used to treat overactive bladder) No   If you answered yes to the previous question, please roula the anticholinergics you have used:     Have you ever used Mirbetriq (aka Mirabegron)?  No   Prolapse Symptoms: Are you experiencing any of the following?     Falling out/ Bulging/ Heaviness in the vagina Yes   Vaginal/ Abdominal Pain/ Pressure Yes   Need to strain/ Push to void Yes   Need to wait on the toilet before you void Yes   Unusual position to urinate (using your hands to push back the vaginal bulge) Yes   Sensation of incomplete emptying No   Past use of pessary device No   If you answered yes to the previous question, please list the devices you have used below.     Bowel Symptoms: Are you experiencing " any of the following?    Constipation No   Diarrhea  No   Hematochezia (bloody stool) No   Incomplete evacuation of stool Yes   Involuntary loss of formed stool No   Fecal smearing/urgency Yes   Involuntary loss of gas Yes   Vaginal Symptoms: Are you experiencing any of the following?     Abnormal vaginal bleeding  No   Vaginal dryness Yes   Sexually active  No   Dyspareunia (painful intercourse) No   Estrogen use  No       GYN & OB History  No LMP recorded. Patient has had a hysterectomy.   Date of Last Pap: No result found    OB History    Para Term  AB Living   1 1 1         SAB IAB Ectopic Multiple Live Births                  # Outcome Date GA Lbr Royal/2nd Weight Sex Delivery Anes PTL Lv   1 Term              OB     x 1.  C/s x 0.    Largest: 7 # 14 oz   Forceps: no  Episiotomy:  yes  Degree: 3rd or 4th no    GYN  Menarche: 10  Menstrual cycle: n/a  Menopause: 30's  Hysterectomy:  open: indication: Fibroids  Ovaries: one side removed   Tubal ligation: No  Other abdominal surgeries: Hysterectomy; Appy; Gastric bypass, vanessa      Past Medical History:   Diagnosis Date    Arthritis     Depression     Diabetes mellitus     NO MED SINCE GASTRIC BYPASS has hypoglycemia    Diabetes mellitus type II     GERD (gastroesophageal reflux disease)     Hypertension     NO MED SINCE GASTRIC BYPASS    Kidney disease     Lupus erythematosus     Shingles     Thyroid disease     Trigger finger     RIGHT LONG     Past Surgical History:   Procedure Laterality Date    APPENDECTOMY      BREAST BIOPSY Left     benign    CHOLECYSTECTOMY      EPIDURAL STEROID INJECTION INTO THORACIC SPINE N/A 2018    Procedure: Injection-steroid-epidural-thoracic;  Surgeon: Christoph Pratt MD;  [facility]: [facility];  Service: Pain Management;  Laterality: N/A;  T10-11    EPIDURAL STEROID INJECTION INTO THORACIC SPINE N/A 2018    Procedure: Injection-steroid-epidural-thoracic;  Surgeon: Christoph Pratt MD;  Location: [facility];  Service:  Pain Management;  Laterality: N/A;  T10-11    GASTRIC BYPASS      HAND SURGERY      HYSTERECTOMY      TONSILLECTOMY         Review of Systems  Review of Systems   Constitutional: Negative.  Negative for activity change, appetite change, chills, diaphoresis, fatigue, fever and unexpected weight change.   HENT: Negative.     Eyes: Negative.    Respiratory: Negative.  Negative for apnea, cough and wheezing.    Cardiovascular: Negative.  Negative for chest pain and palpitations.   Gastrointestinal:  Negative for abdominal distention, abdominal pain, anal bleeding, blood in stool, constipation, diarrhea, nausea, rectal pain and vomiting.   Endocrine: Negative.    Genitourinary:  Positive for frequency and urgency. Negative for decreased urine volume, difficulty urinating, dyspareunia, dysuria, enuresis, flank pain, genital sores, hematuria, menstrual problem, pelvic pain, vaginal bleeding, vaginal discharge and vaginal pain.   Musculoskeletal:  Negative for back pain and gait problem.   Skin:  Negative for color change, pallor, rash and wound.   Allergic/Immunologic: Negative for immunocompromised state.   Neurological: Negative.  Negative for dizziness and speech difficulty.   Hematological:  Negative for adenopathy.   Psychiatric/Behavioral:  Negative for agitation, behavioral problems, confusion and sleep disturbance.          Objective:     Physical Exam   Constitutional: She is oriented to person, place, and time. She appears well-developed.   HENT:   Head: Normocephalic and atraumatic.   Eyes: Conjunctivae and EOM are normal.   Cardiovascular: Normal rate, regular rhythm, S1 normal, S2 normal, normal heart sounds and intact distal pulses.   Pulmonary/Chest: Effort normal and breath sounds normal. She exhibits no tenderness.   Abdominal: Soft. Bowel sounds are normal. She exhibits no distension and no mass. There is no splenomegaly or hepatomegaly. There is no abdominal tenderness. There is no rigidity, no rebound  and no guarding. Hernia confirmed negative in the right inguinal area and confirmed negative in the left inguinal area.   Genitourinary: Pelvic exam was performed with patient supine. Rectum normal, vagina normal, skenes normal and bartholins normal. Right labia normal and left labia normal. Urethra exhibits hypermobility. Urethra exhibits no urethral caruncle, no urethral diverticulum and no urethral mass. Right bartholin is not enlarged and not tender. Left bartholin is not enlarged and not tender. Rectal exam shows resting tone normal and active tone normal. Rectal exam shows no external hemorrhoid, no fissure, no tenderness, anal tone normal and no dovetailing. Guaiac negative stool. There is atrophy in the vagina. No foreign body, tenderness, bleeding, unspecified prolapse of vaginal walls, fistula, mesh exposure or lavator tenderness in the vagina. Right adnexum displays no tenderness. Left adnexum displays no tenderness. Uterus is absent.   PVR: 10 ML  Empty cough stress test: Negative.  Kegel: 3/5    POP-Q  Aa: -1 Ba: -1 C: -6   GH: 2 PB: 2 TVL: 8   Ap: -2 Bp: -2 D:                      Musculoskeletal:         General: Normal range of motion.      Cervical back: Normal range of motion and neck supple.   Neurological: She is alert and oriented to person, place, and time. She has normal strength and normal reflexes. Cranial nerves II through XII intact. No cranial nerve deficit.   Skin: Skin is warm and dry.   Psychiatric: She has a normal mood and affect. Her speech is normal and behavior is normal. Judgment normal.             HCM  Pap's: No history of abnormal's   Mammo: BIRADS: 1 ; Last imaging 2023    Colonoscopy: N/a: normal   Dexa:  n/a     6/20/2023: reviewed renal sonogram essentially negative     Assessment:        1. Mixed incontinence                  Plan:    1. UTI   --urine culture     2.  Mixed urinary incontinence, urge > stress:   --Bladder diary for 3-7 days, write the number of times you  go to the rest room and associated symptoms of urgency or leakage.   --Empty bladder every 3 hours.  Empty well: wait a minute, lean forward on toilet.    --Avoid dietary irritants (see sheet).  Keep diary x 3-5 days to determine your irritants.  --KEGELS: do 10 in AM and 10 in PM, holding each x 10 seconds.  When you feel urge to go, STOP, KEGEL, and when urge has passed, then go to bathroom.  Start pelvic floor PT.     --URGE: Myrbetriq 50 mg daily.  SE profile reviewed.    Takes 2-4 weeks to see if will have effect.  For dry mouth: get sour, sugar free lozenge or gum.    --STRESS:  not too bothersome : Non surgical options: Pessary vs. Impressa vs Rivive - which represent urethral and bladder support devices; Surgical options including 1.  mid urethral sling; retropubic, transobturator vs single incision 2. Fascial slings 3. Mendoza procedure 4. Periurethral bulking     3. Vaginal atrophy (dryness):  Use 1 gram of estrogen cream in vagina nightly x 2 weeks, then twice a week thereafter.      Approximately 25 minutes of total time spent in consult, 75 % in discussion. This includes face to face time and non-face to face time preparing to see the patient, obtaining and/or reviewing separately obtained history, documenting clinical information in the electronic or other health record, independently interpreting results (not separately reported) and communicating results to the patient/family/caregiver, or care coordination (not separately reported).        Federico Bang DO  Female Pelvic Medicine and Reconstructive Surgery  [facility] [place], [place]

## 2023-07-18 ENCOUNTER — LAB VISIT (OUTPATIENT)
Dept: LAB | Facility: HOSPITAL | Age: 73
End: 2023-07-18
Attending: FAMILY MEDICINE
Payer: MEDICARE

## 2023-07-18 ENCOUNTER — TELEPHONE (OUTPATIENT)
Dept: FAMILY MEDICINE | Facility: CLINIC | Age: 73
End: 2023-07-18

## 2023-07-18 DIAGNOSIS — F33.9 RECURRENT MAJOR DEPRESSION RESISTANT TO TREATMENT: ICD-10-CM

## 2023-07-18 DIAGNOSIS — E08.649 DIABETES MELLITUS DUE TO UNDERLYING CONDITION WITH HYPOGLYCEMIA WITHOUT COMA, WITHOUT LONG-TERM CURRENT USE OF INSULIN: ICD-10-CM

## 2023-07-18 DIAGNOSIS — E79.0 HYPERURICEMIA: ICD-10-CM

## 2023-07-18 DIAGNOSIS — M54.41 CHRONIC LOW BACK PAIN WITH BILATERAL SCIATICA, UNSPECIFIED BACK PAIN LATERALITY: ICD-10-CM

## 2023-07-18 DIAGNOSIS — M45.5 ANKYLOSING SPONDYLITIS OF THORACOLUMBAR REGION: ICD-10-CM

## 2023-07-18 DIAGNOSIS — M54.42 CHRONIC LOW BACK PAIN WITH BILATERAL SCIATICA, UNSPECIFIED BACK PAIN LATERALITY: ICD-10-CM

## 2023-07-18 DIAGNOSIS — E03.9 ACQUIRED HYPOTHYROIDISM: ICD-10-CM

## 2023-07-18 DIAGNOSIS — M43.26 ANKYLOSIS OF LUMBAR SPINE: ICD-10-CM

## 2023-07-18 DIAGNOSIS — G57.00 SCIATIC NEUROPATHY, UNSPECIFIED LATERALITY: ICD-10-CM

## 2023-07-18 DIAGNOSIS — M32.9 SYSTEMIC LUPUS ERYTHEMATOSUS, UNSPECIFIED SLE TYPE, UNSPECIFIED ORGAN INVOLVEMENT STATUS: ICD-10-CM

## 2023-07-18 DIAGNOSIS — G89.29 CHRONIC LOW BACK PAIN WITH BILATERAL SCIATICA, UNSPECIFIED BACK PAIN LATERALITY: ICD-10-CM

## 2023-07-18 LAB
ALBUMIN SERPL BCP-MCNC: 3.7 G/DL (ref 3.5–5.2)
ALP SERPL-CCNC: 110 U/L (ref 55–135)
ALT SERPL W/O P-5'-P-CCNC: 17 U/L (ref 10–44)
ANION GAP SERPL CALC-SCNC: 13 MMOL/L (ref 8–16)
AST SERPL-CCNC: 25 U/L (ref 10–40)
BILIRUB SERPL-MCNC: 0.4 MG/DL (ref 0.1–1)
BUN SERPL-MCNC: 10 MG/DL (ref 8–23)
CALCIUM SERPL-MCNC: 9.9 MG/DL (ref 8.7–10.5)
CHLORIDE SERPL-SCNC: 107 MMOL/L (ref 95–110)
CHOLEST SERPL-MCNC: 213 MG/DL (ref 120–199)
CHOLEST/HDLC SERPL: 2.5 {RATIO} (ref 2–5)
CO2 SERPL-SCNC: 25 MMOL/L (ref 23–29)
CREAT SERPL-MCNC: 0.9 MG/DL (ref 0.5–1.4)
EST. GFR  (NO RACE VARIABLE): >60 ML/MIN/1.73 M^2
ESTIMATED AVG GLUCOSE: 114 MG/DL (ref 68–131)
GLUCOSE SERPL-MCNC: 100 MG/DL (ref 70–110)
HBA1C MFR BLD: 5.6 % (ref 4–5.6)
HDLC SERPL-MCNC: 84 MG/DL (ref 40–75)
HDLC SERPL: 39.4 % (ref 20–50)
LDLC SERPL CALC-MCNC: 109 MG/DL (ref 63–159)
NONHDLC SERPL-MCNC: 129 MG/DL
POTASSIUM SERPL-SCNC: 4.4 MMOL/L (ref 3.5–5.1)
PROT SERPL-MCNC: 6.9 G/DL (ref 6–8.4)
SODIUM SERPL-SCNC: 145 MMOL/L (ref 136–145)
T4 FREE SERPL-MCNC: 0.88 NG/DL (ref 0.71–1.51)
THYROPEROXIDASE IGG SERPL-ACNC: <6 IU/ML
TRIGL SERPL-MCNC: 100 MG/DL (ref 30–150)
TSH SERPL DL<=0.005 MIU/L-ACNC: 0.28 UIU/ML (ref 0.4–4)

## 2023-07-18 PROCEDURE — 84443 ASSAY THYROID STIM HORMONE: CPT | Mod: HCNC | Performed by: FAMILY MEDICINE

## 2023-07-18 PROCEDURE — 84439 ASSAY OF FREE THYROXINE: CPT | Mod: HCNC | Performed by: FAMILY MEDICINE

## 2023-07-18 PROCEDURE — 36415 COLL VENOUS BLD VENIPUNCTURE: CPT | Mod: HCNC,PO | Performed by: FAMILY MEDICINE

## 2023-07-18 PROCEDURE — 86376 MICROSOMAL ANTIBODY EACH: CPT | Mod: HCNC | Performed by: FAMILY MEDICINE

## 2023-07-18 PROCEDURE — 80061 LIPID PANEL: CPT | Mod: HCNC | Performed by: FAMILY MEDICINE

## 2023-07-18 PROCEDURE — 80053 COMPREHEN METABOLIC PANEL: CPT | Mod: HCNC | Performed by: FAMILY MEDICINE

## 2023-07-18 PROCEDURE — 83036 HEMOGLOBIN GLYCOSYLATED A1C: CPT | Mod: HCNC | Performed by: FAMILY MEDICINE

## 2023-07-18 RX ORDER — CLONAZEPAM 1 MG/1
TABLET ORAL
Qty: 60 TABLET | Refills: 3 | Status: SHIPPED | OUTPATIENT
Start: 2023-07-18 | End: 2023-10-15

## 2023-07-18 RX ORDER — ALLOPURINOL 100 MG/1
100 TABLET ORAL DAILY
Qty: 90 TABLET | Refills: 3 | Status: SHIPPED | OUTPATIENT
Start: 2023-07-18 | End: 2023-07-25 | Stop reason: SDUPTHER

## 2023-07-18 NOTE — TELEPHONE ENCOUNTER
Spoke to pt advised thyroid labs are in, spoke to [facility] and advised labs were placed 6/23. Daniela advised she will send off this afternoon and pt will not need to return as they alfa an extra tube

## 2023-07-18 NOTE — TELEPHONE ENCOUNTER
----- Message from Fabiola Rivas sent at 7/18/2023 11:41 AM CDT -----  Type: Needs Medical Advice  Who Called:  pt     Best Call Back Number: [phone]    Additional Information: pt wants blood order for thyroid , clinic needs order blood has been drawn please advise

## 2023-07-18 NOTE — PROGRESS NOTES
Spoken with patient regarding about medications refilled.  Requested Prescriptions     Signed Prescriptions Disp Refills    allopurinoL (ZYLOPRIM) 100 MG tablet 90 tablet 3     Sig: Take 1 tablet (100 mg total) by mouth once daily.    clonazePAM (KLONOPIN) 1 MG tablet 60 tablet 3     Sig: TAKE 1 TABLET BY MOUTH TWICE DAILY FOR ANXIETY

## 2023-07-20 DIAGNOSIS — M32.9 SYSTEMIC LUPUS ERYTHEMATOSUS, UNSPECIFIED SLE TYPE, UNSPECIFIED ORGAN INVOLVEMENT STATUS: Primary | ICD-10-CM

## 2023-07-24 ENCOUNTER — PATIENT MESSAGE (OUTPATIENT)
Dept: RHEUMATOLOGY | Facility: CLINIC | Age: 73
End: 2023-07-24

## 2023-07-24 ENCOUNTER — PATIENT MESSAGE (OUTPATIENT)
Dept: FAMILY MEDICINE | Facility: CLINIC | Age: 73
End: 2023-07-24

## 2023-07-24 RX ORDER — HYDROXYCHLOROQUINE SULFATE 200 MG/1
200 TABLET, FILM COATED ORAL 2 TIMES DAILY
Qty: 180 TABLET | Refills: 4 | Status: SHIPPED | OUTPATIENT
Start: 2023-07-24 | End: 2023-08-14 | Stop reason: SDUPTHER

## 2023-07-25 ENCOUNTER — OFFICE VISIT (OUTPATIENT)
Dept: FAMILY MEDICINE | Facility: CLINIC | Age: 73
End: 2023-07-25
Payer: MEDICARE

## 2023-07-25 VITALS
BODY MASS INDEX: 24.78 KG/M2 | TEMPERATURE: 99 F | DIASTOLIC BLOOD PRESSURE: 72 MMHG | HEART RATE: 69 BPM | OXYGEN SATURATION: 96 % | WEIGHT: 157.88 LBS | SYSTOLIC BLOOD PRESSURE: 110 MMHG | HEIGHT: 67 IN

## 2023-07-25 DIAGNOSIS — E03.9 ACQUIRED HYPOTHYROIDISM: Primary | ICD-10-CM

## 2023-07-25 DIAGNOSIS — E11.628 TYPE 2 DIABETES MELLITUS WITH OTHER SKIN COMPLICATIONS: ICD-10-CM

## 2023-07-25 DIAGNOSIS — E79.0 HYPERURICEMIA: ICD-10-CM

## 2023-07-25 DIAGNOSIS — M50.30 DDD (DEGENERATIVE DISC DISEASE), CERVICAL: ICD-10-CM

## 2023-07-25 DIAGNOSIS — M45.5 ANKYLOSING SPONDYLITIS OF THORACOLUMBAR REGION: ICD-10-CM

## 2023-07-25 DIAGNOSIS — M32.13 OTHER SYSTEMIC LUPUS ERYTHEMATOSUS WITH LUNG INVOLVEMENT: Chronic | ICD-10-CM

## 2023-07-25 PROCEDURE — 3074F PR MOST RECENT SYSTOLIC BLOOD PRESSURE < 130 MM HG: ICD-10-PCS | Mod: HCNC,CPTII,S$GLB, | Performed by: NURSE PRACTITIONER

## 2023-07-25 PROCEDURE — 3061F PR NEG MICROALBUMINURIA RESULT DOCUMENTED/REVIEW: ICD-10-PCS | Mod: HCNC,CPTII,S$GLB, | Performed by: NURSE PRACTITIONER

## 2023-07-25 PROCEDURE — 99214 OFFICE O/P EST MOD 30 MIN: CPT | Mod: HCNC,S$GLB,, | Performed by: NURSE PRACTITIONER

## 2023-07-25 PROCEDURE — 99214 PR OFFICE/OUTPT VISIT, EST, LEVL IV, 30-39 MIN: ICD-10-PCS | Mod: HCNC,S$GLB,, | Performed by: NURSE PRACTITIONER

## 2023-07-25 PROCEDURE — 99999 PR PBB SHADOW E&M-EST. PATIENT-LVL III: ICD-10-PCS | Mod: PBBFAC,HCNC,, | Performed by: NURSE PRACTITIONER

## 2023-07-25 PROCEDURE — 4010F ACE/ARB THERAPY RXD/TAKEN: CPT | Mod: HCNC,CPTII,S$GLB, | Performed by: NURSE PRACTITIONER

## 2023-07-25 PROCEDURE — 99999 PR PBB SHADOW E&M-EST. PATIENT-LVL III: CPT | Mod: PBBFAC,HCNC,, | Performed by: NURSE PRACTITIONER

## 2023-07-25 PROCEDURE — 3288F FALL RISK ASSESSMENT DOCD: CPT | Mod: HCNC,CPTII,S$GLB, | Performed by: NURSE PRACTITIONER

## 2023-07-25 PROCEDURE — 1101F PR PT FALLS ASSESS DOC 0-1 FALLS W/OUT INJ PAST YR: ICD-10-PCS | Mod: HCNC,CPTII,S$GLB, | Performed by: NURSE PRACTITIONER

## 2023-07-25 PROCEDURE — 3044F PR MOST RECENT HEMOGLOBIN A1C LEVEL <7.0%: ICD-10-PCS | Mod: HCNC,CPTII,S$GLB, | Performed by: NURSE PRACTITIONER

## 2023-07-25 PROCEDURE — 3074F SYST BP LT 130 MM HG: CPT | Mod: HCNC,CPTII,S$GLB, | Performed by: NURSE PRACTITIONER

## 2023-07-25 PROCEDURE — 1159F MED LIST DOCD IN RCRD: CPT | Mod: HCNC,CPTII,S$GLB, | Performed by: NURSE PRACTITIONER

## 2023-07-25 PROCEDURE — 3008F BODY MASS INDEX DOCD: CPT | Mod: HCNC,CPTII,S$GLB, | Performed by: NURSE PRACTITIONER

## 2023-07-25 PROCEDURE — 3078F PR MOST RECENT DIASTOLIC BLOOD PRESSURE < 80 MM HG: ICD-10-PCS | Mod: HCNC,CPTII,S$GLB, | Performed by: NURSE PRACTITIONER

## 2023-07-25 PROCEDURE — 3288F PR FALLS RISK ASSESSMENT DOCUMENTED: ICD-10-PCS | Mod: HCNC,CPTII,S$GLB, | Performed by: NURSE PRACTITIONER

## 2023-07-25 PROCEDURE — 3008F PR BODY MASS INDEX (BMI) DOCUMENTED: ICD-10-PCS | Mod: HCNC,CPTII,S$GLB, | Performed by: NURSE PRACTITIONER

## 2023-07-25 PROCEDURE — 3061F NEG MICROALBUMINURIA REV: CPT | Mod: HCNC,CPTII,S$GLB, | Performed by: NURSE PRACTITIONER

## 2023-07-25 PROCEDURE — 1125F PR PAIN SEVERITY QUANTIFIED, PAIN PRESENT: ICD-10-PCS | Mod: HCNC,CPTII,S$GLB, | Performed by: NURSE PRACTITIONER

## 2023-07-25 PROCEDURE — 3066F PR DOCUMENTATION OF TREATMENT FOR NEPHROPATHY: ICD-10-PCS | Mod: HCNC,CPTII,S$GLB, | Performed by: NURSE PRACTITIONER

## 2023-07-25 PROCEDURE — 3066F NEPHROPATHY DOC TX: CPT | Mod: HCNC,CPTII,S$GLB, | Performed by: NURSE PRACTITIONER

## 2023-07-25 PROCEDURE — 1159F PR MEDICATION LIST DOCUMENTED IN MEDICAL RECORD: ICD-10-PCS | Mod: HCNC,CPTII,S$GLB, | Performed by: NURSE PRACTITIONER

## 2023-07-25 PROCEDURE — 3044F HG A1C LEVEL LT 7.0%: CPT | Mod: HCNC,CPTII,S$GLB, | Performed by: NURSE PRACTITIONER

## 2023-07-25 PROCEDURE — 1101F PT FALLS ASSESS-DOCD LE1/YR: CPT | Mod: HCNC,CPTII,S$GLB, | Performed by: NURSE PRACTITIONER

## 2023-07-25 PROCEDURE — 4010F PR ACE/ARB THEARPY RXD/TAKEN: ICD-10-PCS | Mod: HCNC,CPTII,S$GLB, | Performed by: NURSE PRACTITIONER

## 2023-07-25 PROCEDURE — 3078F DIAST BP <80 MM HG: CPT | Mod: HCNC,CPTII,S$GLB, | Performed by: NURSE PRACTITIONER

## 2023-07-25 PROCEDURE — 1125F AMNT PAIN NOTED PAIN PRSNT: CPT | Mod: HCNC,CPTII,S$GLB, | Performed by: NURSE PRACTITIONER

## 2023-07-25 RX ORDER — LEVOTHYROXINE SODIUM 88 UG/1
88 TABLET ORAL
COMMUNITY
End: 2023-07-25 | Stop reason: SDUPTHER

## 2023-07-25 RX ORDER — LEVOTHYROXINE SODIUM 88 UG/1
88 TABLET ORAL
Qty: 90 TABLET | Refills: 2 | Status: SHIPPED | OUTPATIENT
Start: 2023-07-25

## 2023-07-25 RX ORDER — METHOCARBAMOL 750 MG/1
750 TABLET, FILM COATED ORAL 3 TIMES DAILY
Qty: 30 TABLET | Refills: 2 | Status: SHIPPED | OUTPATIENT
Start: 2023-07-25 | End: 2023-07-31 | Stop reason: ALTCHOICE

## 2023-07-25 RX ORDER — ALLOPURINOL 100 MG/1
100 TABLET ORAL DAILY
Qty: 90 TABLET | Refills: 3 | Status: SHIPPED | OUTPATIENT
Start: 2023-07-25

## 2023-07-25 NOTE — PATIENT INSTRUCTIONS
Spencer Maldonado,     If you are due for any health screening(s) below please notify me so we can arrange them to be ordered and scheduled to maintain your health. Most healthy patients complete it. Don't lose out on improving your health.     All of your core healthy metrics are met.

## 2023-07-26 NOTE — PROGRESS NOTES
This dictation has been generated using Modal Fluency Dictation some phonetic errors may occur. Please contact author for clarification if needed.     Problem List Items Addressed This Visit       Lupus (systemic lupus erythematosus) (Chronic)    Acquired hypothyroidism - Primary    Relevant Orders    TSH    Type 2 diabetes mellitus with other skin complications    Relevant Orders    Comprehensive Metabolic Panel    Lipid Panel    DDD (degenerative disc disease), cervical     Other Visit Diagnoses       Ankylosing spondylitis of thoracolumbar region        Relevant Medications    methocarbamoL (ROBAXIN) 750 MG Tab    Hyperuricemia        Relevant Medications    allopurinoL (ZYLOPRIM) 100 MG tablet            Orders Placed This Encounter    Comprehensive Metabolic Panel    TSH    Lipid Panel    methocarbamoL (ROBAXIN) 750 MG Tab    allopurinoL (ZYLOPRIM) 100 MG tablet    levothyroxine (SYNTHROID) 88 MCG tablet     Lupus stable continue follow-up with specialist   Hypothyroidism due for reassessment 3 months. check TSH I will review and address accordingly   Diabetes due for update of labs check A1c in 3 months.  Update CMP 3 months.  Ankylosing spondylosis refill Robaxin.    Hyper uremia refill allopurinol  Remainder refilled by PCP this month  Patient needs handicap form for difficulty ambulating.  She does have arthritis.  She uses cane to walk.    Follow up in about 6 months (around 1/25/2024).    ________________________________________________________________  ________________________________________________________________      Chief Complaint   Patient presents with    Follow-up     History of present illness  This 73 y.o. presents today for complaint of follow-up.  Patient has lupus hypothyroidism diabetes degenerative disc disease and ankylosing spondylosis and hyper uremia.  Also has anxiety and GERD.  Patient just had refill of med sent by PCP but notes a couple of needs.  Also request a handicap  form.    Past Medical History:   Diagnosis Date    Arthritis     Depression     Diabetes mellitus     NO MED SINCE GASTRIC BYPASS has hypoglycemia    Diabetes mellitus type II     GERD (gastroesophageal reflux disease)     Hypertension     NO MED SINCE GASTRIC BYPASS    Kidney disease     Lupus erythematosus     Shingles     Thyroid disease     Trigger finger     RIGHT LONG       Past Surgical History:   Procedure Laterality Date    APPENDECTOMY      BREAST BIOPSY Left 1979    benign    CHOLECYSTECTOMY      EPIDURAL STEROID INJECTION INTO THORACIC SPINE N/A 6/22/2018    Procedure: Injection-steroid-epidural-thoracic;  Surgeon: Christoph Pratt MD;  Location: [facility] OR;  Service: Pain Management;  Laterality: N/A;  T10-11    EPIDURAL STEROID INJECTION INTO THORACIC SPINE N/A 8/6/2018    Procedure: Injection-steroid-epidural-thoracic;  Surgeon: Christoph Pratt MD;  Location: [facility] OR;  Service: Pain Management;  Laterality: N/A;  T10-11    GASTRIC BYPASS      HAND SURGERY      HYSTERECTOMY      TONSILLECTOMY         History reviewed. No pertinent family history.    Social History     Socioeconomic History    Marital status:    Tobacco Use    Smoking status: Never    Smokeless tobacco: Never   Substance and Sexual Activity    Alcohol use: No    Drug use: No    Sexual activity: Never     Social Determinants of Health     Financial Resource Strain: High Risk    Difficulty of Paying Living Expenses: Hard   Food Insecurity: Food Insecurity Present    Worried About Running Out of Food in the Last Year: Sometimes true    Ran Out of Food in the Last Year: Never true   Transportation Needs: No Transportation Needs    Lack of Transportation (Medical): No    Lack of Transportation (Non-Medical): No   Physical Activity: Inactive    Days of Exercise per Week: 0 days    Minutes of Exercise per Session: 60 min   Stress: No Stress Concern Present    Feeling of Stress : Only a little   Social Connections: Unknown    Frequency of  Communication with Friends and Family: More than three times a week    Frequency of Social Gatherings with Friends and Family: Never    Active Member of Clubs or Organizations: No    Attends Club or Organization Meetings: Patient refused    Marital Status:    Housing Stability: Unknown    Unable to Pay for Housing in the Last Year: No    Unstable Housing in the Last Year: No       Current Outpatient Medications   Medication Sig Dispense Refill    albuterol (ACCUNEB) 1.25 mg/3 mL Nebu INHALE THE CONTENTS OF 1 VIAL (1.25MG TOTAL) VIA NEBULIZER EVERY 6 HOURS AS NEEDED FOR RESCUE 90 mL 11    albuterol-ipratropium (DUO-NEB) 2.5 mg-0.5 mg/3 mL nebulizer solution USE 1 VIAL(3MLS) VIA NEBULIZER EVERY 6 HOURS AS NEEDED FOR WHEEZING 990 mL 2    blood sugar diagnostic (ACCU-CHEK JORDAN PLUS TEST STRP) Strp TEST THREE TIMES DAILY BEFORE A MEAL AS DIRECTED 500 strip 11    blood sugar diagnostic Strp To check BG 4 times daily, to use with insurance preferred meter 360 each 11    citalopram (CELEXA) 20 MG tablet Take 1 tablet (20 mg total) by mouth once daily. 90 tablet 3    clonazePAM (KLONOPIN) 1 MG tablet TAKE 1 TABLET BY MOUTH TWICE DAILY FOR ANXIETY 60 tablet 3    conjugated estrogens (PREMARIN) vaginal cream 1 g with applicator or dime-sized amt with finger in vagina nightly x 2 weeks, then twice a week thereafter 45 g 12    cyanocobalamin 1,000 mcg/mL injection Inject 1 mL (1,000 mcg total) into the muscle every 30 days. 3 mL 3    diclofenac sodium (VOLTAREN) 1 % Gel Apply 2 g topically 3 (three) times daily as needed. 1 g 11    fluticasone propionate (FLONASE) 50 mcg/actuation nasal spray 1 spray (50 mcg total) by Each Nostril route 2 (two) times daily. 48 g 3    folic acid (FOLVITE) 1 MG tablet TAKE 1 TABLET (1,000 MCG TOTAL) BY MOUTH ONCE DAILY. 90 tablet 3    gabapentin (NEURONTIN) 600 MG tablet Take 1 tablet (600 mg total) by mouth 3 (three) times daily. 270 tablet 3    HYDROcodone-acetaminophen (NORCO) 7.5-325  mg per tablet Take by mouth.      hydrOXYchloroQUINE (PLAQUENIL) 200 mg tablet Take 1 tablet (200 mg total) by mouth 2 (two) times daily. 180 tablet 4    mirabegron (MYRBETRIQ) 50 mg Tb24 Take 1 tablet (50 mg total) by mouth nightly. 90 tablet 3    multivitamin-min-iron-FA-vit K 45 mg iron- 800 mcg-120 mcg Cap Take 1 capsule by mouth once daily. 30 capsule 11    vitamin D (VITAMIN D3) 1000 units Tab TAKE 1 TABLET TWICE DAILY 180 tablet 3    vitamin E 1000 UNIT capsule Take 1,000 Units by mouth once daily.      allopurinoL (ZYLOPRIM) 100 MG tablet Take 1 tablet (100 mg total) by mouth once daily. 90 tablet 3    blood-glucose meter kit AC meals x2 1 each 0    levothyroxine (SYNTHROID) 88 MCG tablet Take 1 tablet (88 mcg total) by mouth before breakfast. 90 tablet 2    methocarbamoL (ROBAXIN) 750 MG Tab Take 1 tablet (750 mg total) by mouth 3 (three) times daily. 30 tablet 2     No current facility-administered medications for this visit.       Review of patient's allergies indicates:   Allergen Reactions    Codeine     Sulfa (sulfonamide antibiotics)     Codeine Rash and Other (See Comments)    Plaquenil [hydroxychloroquine] Rash and Other (See Comments)     ONE BRAND OF MED    Sulfa (sulfonamide antibiotics) Rash and Other (See Comments)       Physical examination  Vitals Reviewed\  Vitals:    07/25/23 1107   BP: 110/72   Pulse: 69   Temp: 98.5 °F (36.9 °C)     Body mass index is 24.72 kg/m².   Gen. Well-dressed well-nourished   Skin warm dry and intact.  No rashes noted.  Neck is supple without adenopathy  Chest.  Respirations are even unlabored.  Lungs are clear to auscultation.  Cardiac regular rate and rhythm.  No chest wall adenopathy noted.  Neuro. Awake alert oriented x4.  Normal judgment and cognition noted.  Extremities no clubbing cyanosis or edema noted.  Using a cane for ambulation.  Arthritic changes noted hands and knees.    Call or return to clinic prn if these symptoms worsen or fail to improve as  anticipated.

## 2023-07-31 DIAGNOSIS — M45.5 ANKYLOSING SPONDYLITIS OF THORACOLUMBAR REGION: ICD-10-CM

## 2023-07-31 RX ORDER — BACLOFEN 5 MG/1
5 TABLET ORAL 2 TIMES DAILY
Qty: 60 TABLET | Refills: 3 | Status: SHIPPED | OUTPATIENT
Start: 2023-07-31 | End: 2023-11-02

## 2023-07-31 NOTE — TELEPHONE ENCOUNTER
Refill Routing Note   Medication(s) are not appropriate for processing by [facility] for the following reason(s):      Medication outside of protocol    [facility] action(s):  Route Care Due:  None identified            Appointments  past 12m or future 3m with PCP    Date Provider   Last Visit   3/27/2023 Jose Guadalupe Ornelas MD   Next Visit   11/28/2023 Jose Guadalupe Ornelas MD   ED visits in past 90 days: 0        Note composed:2:51 PM 07/31/2023

## 2023-08-01 ENCOUNTER — TELEPHONE (OUTPATIENT)
Dept: FAMILY MEDICINE | Facility: CLINIC | Age: 73
End: 2023-08-01
Payer: MEDICARE

## 2023-08-01 NOTE — TELEPHONE ENCOUNTER
Spoke with Christy at [facility]   Please resend prescriptions for Baclofen and Klonopin   [place] 2 [place] from 8/18/2023 through 10/18/2023   Needs prescriptions to be rewritten to be able to fill the refills now.   Please advise       ----- Message from Ketty Greco sent at 8/1/2023 10:26 AM CDT -----  Contact: christy  Type: Needs Medical Advice  Who Called:  christy  Pharmacy name and phone #:  [phone]/[facility]  Additional Information: pt is going out of the country and the 2 refills that were sent need to be corrected.please call christy to discuss

## 2023-08-01 NOTE — TELEPHONE ENCOUNTER
I do not feel comfortable to renew her klonopin since has been written few days ago. Baclofen was taper down due to high risk of complications.She will have to call her pharmacist for 2 months.I will have my nurse to call her pharmacy.

## 2023-08-07 ENCOUNTER — PATIENT MESSAGE (OUTPATIENT)
Dept: FAMILY MEDICINE | Facility: CLINIC | Age: 73
End: 2023-08-07
Payer: MEDICARE

## 2023-08-10 ENCOUNTER — PATIENT MESSAGE (OUTPATIENT)
Dept: RHEUMATOLOGY | Facility: CLINIC | Age: 73
End: 2023-08-10
Payer: MEDICARE

## 2023-08-11 NOTE — TELEPHONE ENCOUNTER
Requested Prescriptions     Signed Prescriptions Disp Refills    butalbital-acetaminophen-caffeine -40 mg (FIORICET, ESGIC) -40 mg per tablet 40 tablet 1     Sig: Take 1 tablet by mouth every 4 (four) hours as needed for Pain.     Authorizing Provider: BI WISEMAN   Appointment within the next 3 months.     minimum assist (75% patients effort)

## 2023-08-14 RX ORDER — HYDROXYCHLOROQUINE SULFATE 200 MG/1
200 TABLET, FILM COATED ORAL 2 TIMES DAILY
Qty: 180 TABLET | Refills: 4 | Status: SHIPPED | OUTPATIENT
Start: 2023-08-14 | End: 2024-11-06

## 2023-08-14 RX ORDER — GABAPENTIN 600 MG/1
600 TABLET ORAL 3 TIMES DAILY
Qty: 270 TABLET | Refills: 3 | Status: SHIPPED | OUTPATIENT
Start: 2023-08-14 | End: 2024-08-13

## 2023-09-20 DIAGNOSIS — Z78.0 MENOPAUSE: ICD-10-CM

## 2023-10-12 DIAGNOSIS — M32.9 SYSTEMIC LUPUS ERYTHEMATOSUS, UNSPECIFIED SLE TYPE, UNSPECIFIED ORGAN INVOLVEMENT STATUS: ICD-10-CM

## 2023-10-12 DIAGNOSIS — M54.42 CHRONIC LOW BACK PAIN WITH BILATERAL SCIATICA, UNSPECIFIED BACK PAIN LATERALITY: ICD-10-CM

## 2023-10-12 DIAGNOSIS — G57.00 SCIATIC NEUROPATHY, UNSPECIFIED LATERALITY: ICD-10-CM

## 2023-10-12 DIAGNOSIS — M54.41 CHRONIC LOW BACK PAIN WITH BILATERAL SCIATICA, UNSPECIFIED BACK PAIN LATERALITY: ICD-10-CM

## 2023-10-12 DIAGNOSIS — M43.26 ANKYLOSIS OF LUMBAR SPINE: ICD-10-CM

## 2023-10-12 DIAGNOSIS — M45.5 ANKYLOSING SPONDYLITIS OF THORACOLUMBAR REGION: ICD-10-CM

## 2023-10-12 DIAGNOSIS — G89.29 CHRONIC LOW BACK PAIN WITH BILATERAL SCIATICA, UNSPECIFIED BACK PAIN LATERALITY: ICD-10-CM

## 2023-10-12 DIAGNOSIS — F33.9 RECURRENT MAJOR DEPRESSION RESISTANT TO TREATMENT: ICD-10-CM

## 2023-10-13 NOTE — TELEPHONE ENCOUNTER
No care due was identified.  [facility] Embedded Care Due Messages. Reference number: [id number].   10/12/2023 11:29:20 PM CDT

## 2023-10-15 RX ORDER — CLONAZEPAM 1 MG/1
1 TABLET ORAL 2 TIMES DAILY PRN
Qty: 60 TABLET | Refills: 0 | Status: SHIPPED | OUTPATIENT
Start: 2023-11-14 | End: 2023-12-12 | Stop reason: SDUPTHER

## 2023-10-15 RX ORDER — CLONAZEPAM 1 MG/1
TABLET ORAL
Qty: 60 TABLET | Refills: 0 | Status: SHIPPED | OUTPATIENT
Start: 2023-10-15 | End: 2023-10-15 | Stop reason: SDUPTHER

## 2023-10-15 RX ORDER — FOLIC ACID 1 MG/1
1 TABLET ORAL 2 TIMES DAILY
Qty: 60 TABLET | Refills: 0 | Status: SHIPPED | OUTPATIENT
Start: 2023-11-12 | End: 2023-11-12 | Stop reason: SDUPTHER

## 2023-11-02 DIAGNOSIS — M45.5 ANKYLOSING SPONDYLITIS OF THORACOLUMBAR REGION: ICD-10-CM

## 2023-11-02 RX ORDER — BACLOFEN 5 MG/1
5 TABLET ORAL 2 TIMES DAILY
Qty: 60 TABLET | Refills: 3 | Status: SHIPPED | OUTPATIENT
Start: 2023-11-02 | End: 2024-01-30

## 2023-11-03 DIAGNOSIS — J30.1 CHRONIC SEASONAL ALLERGIC RHINITIS DUE TO POLLEN: ICD-10-CM

## 2023-11-04 NOTE — TELEPHONE ENCOUNTER
No care due was identified.  [facility] Embedded Care Due Messages. Reference number: [id number].   11/03/2023 11:22:14 PM CDT

## 2023-11-05 RX ORDER — FLUTICASONE PROPIONATE 50 MCG
1 SPRAY, SUSPENSION (ML) NASAL 2 TIMES DAILY
Qty: 48 G | Refills: 1 | Status: SHIPPED | OUTPATIENT
Start: 2023-11-05 | End: 2024-03-30

## 2023-11-05 NOTE — TELEPHONE ENCOUNTER
Refill Decision Note   Erica Marsh  is requesting a refill authorization.  Brief Assessment and Rationale for Refill:  Approve     Medication Therapy Plan:         Comments:     Note composed:3:07 PM 11/05/2023

## 2023-11-09 DIAGNOSIS — G57.00 SCIATIC NEUROPATHY, UNSPECIFIED LATERALITY: ICD-10-CM

## 2023-11-09 NOTE — TELEPHONE ENCOUNTER
No care due was identified.  Health [facility] Embedded Care Due Messages. Reference number: [id number].   11/09/2023 4:44:31 PM CST

## 2023-11-12 RX ORDER — FOLIC ACID 1 MG/1
1000 TABLET ORAL DAILY
Qty: 30 TABLET | Refills: 2 | Status: SHIPPED | OUTPATIENT
Start: 2023-11-12 | End: 2024-01-30 | Stop reason: SDUPTHER

## 2023-11-23 ENCOUNTER — PATIENT MESSAGE (OUTPATIENT)
Dept: FAMILY MEDICINE | Facility: CLINIC | Age: 73
End: 2023-11-23
Payer: MEDICARE

## 2023-11-23 DIAGNOSIS — E08.649 DIABETES MELLITUS DUE TO UNDERLYING CONDITION WITH HYPOGLYCEMIA WITHOUT COMA, WITHOUT LONG-TERM CURRENT USE OF INSULIN: Primary | ICD-10-CM

## 2023-11-26 NOTE — TELEPHONE ENCOUNTER
Please see attached portal message from patient.  Patient's 11-28-23 appointment has been canceled as provider will not be in clinic on that date.  Patient requests to follow up at existing office appointment on 1-30-24.  Patient would like lab orders placed.  Existing orders for Lipid Panel, TSH, and CMP noted in Epic (placed on 7-25-23 per NP Daniel Rodriguez).  Order for A1C pended.  Please review and advise if any further lab orders are needed.  Thank you.

## 2023-11-28 ENCOUNTER — HOSPITAL ENCOUNTER (OUTPATIENT)
Dept: RADIOLOGY | Facility: CLINIC | Age: 73
Discharge: HOME | End: 2023-11-28
Attending: FAMILY MEDICINE
Payer: MEDICARE

## 2023-11-28 DIAGNOSIS — Z78.0 MENOPAUSE: ICD-10-CM

## 2023-11-28 PROCEDURE — 77080 DXA BONE DENSITY AXIAL: CPT | Mod: TC,HCNC,PO

## 2023-11-28 PROCEDURE — 77080 DXA BONE DENSITY AXIAL: CPT | Mod: 26,HCNC,, | Performed by: INTERNAL MEDICINE

## 2023-11-28 PROCEDURE — 77080 DXA BONE DENSITY AXIAL SKELETON 1 OR MORE SITES: ICD-10-PCS | Mod: 26,HCNC,, | Performed by: INTERNAL MEDICINE

## 2023-11-30 DIAGNOSIS — E11.628 TYPE 2 DIABETES MELLITUS WITH OTHER SKIN COMPLICATIONS: Primary | ICD-10-CM

## 2023-11-30 NOTE — TELEPHONE ENCOUNTER
No care due was identified.  Health [facility] Embedded Care Due Messages. Reference number: [id number].   11/30/2023 11:43:45 AM CST

## 2023-12-01 RX ORDER — LANCETS
EACH MISCELLANEOUS
Qty: 300 EACH | Refills: 0 | Status: SHIPPED | OUTPATIENT
Start: 2023-12-01

## 2023-12-01 NOTE — TELEPHONE ENCOUNTER
Refill Routing Note   Medication(s) are not appropriate for processing by [facility] for the following reason(s):        No active prescription written by provider    [facility] action(s):                  Appointments  past 12m or future 3m with PCP    Date Provider   Last Visit   3/27/2023 Jose Guadalupe Ornelas MD   Next Visit   1/30/2024 Jose Guadalupe Ornelas MD   ED visits in past 90 days: 0        Note composed:6:52 PM 11/30/2023

## 2023-12-12 DIAGNOSIS — M43.26 ANKYLOSIS OF LUMBAR SPINE: ICD-10-CM

## 2023-12-12 DIAGNOSIS — M45.5 ANKYLOSING SPONDYLITIS OF THORACOLUMBAR REGION: ICD-10-CM

## 2023-12-12 DIAGNOSIS — F33.9 RECURRENT MAJOR DEPRESSION RESISTANT TO TREATMENT: ICD-10-CM

## 2023-12-12 DIAGNOSIS — G89.29 CHRONIC LOW BACK PAIN WITH BILATERAL SCIATICA, UNSPECIFIED BACK PAIN LATERALITY: ICD-10-CM

## 2023-12-12 DIAGNOSIS — G57.00 SCIATIC NEUROPATHY, UNSPECIFIED LATERALITY: ICD-10-CM

## 2023-12-12 DIAGNOSIS — M32.9 SYSTEMIC LUPUS ERYTHEMATOSUS, UNSPECIFIED SLE TYPE, UNSPECIFIED ORGAN INVOLVEMENT STATUS: ICD-10-CM

## 2023-12-12 DIAGNOSIS — M54.41 CHRONIC LOW BACK PAIN WITH BILATERAL SCIATICA, UNSPECIFIED BACK PAIN LATERALITY: ICD-10-CM

## 2023-12-12 DIAGNOSIS — M54.42 CHRONIC LOW BACK PAIN WITH BILATERAL SCIATICA, UNSPECIFIED BACK PAIN LATERALITY: ICD-10-CM

## 2023-12-12 NOTE — TELEPHONE ENCOUNTER
No care due was identified.  [facility] Embedded Care Due Messages. Reference number: [id number].   12/12/2023 10:11:45 AM CST

## 2023-12-12 NOTE — TELEPHONE ENCOUNTER
Patient requesting refill of Clonazepam.  Preferred pharmacy is [facility].   LR--11-14-23  LOV--7-25-23  FOV--1-30-24    Checked [place] Prescription Monitoring Program site. No unusual or abnormal behavior noted.

## 2023-12-20 ENCOUNTER — TELEPHONE (OUTPATIENT)
Dept: FAMILY MEDICINE | Facility: CLINIC | Age: 73
End: 2023-12-20
Payer: MEDICARE

## 2023-12-20 NOTE — TELEPHONE ENCOUNTER
----- Message from Jose Guadalupe Ornelas MD sent at 12/15/2023 12:56 PM CST -----  I note some minor lab abnormalities that have been stable over time, these are of doubtful clinical significance.

## 2023-12-21 RX ORDER — CLONAZEPAM 1 MG/1
1 TABLET ORAL 2 TIMES DAILY PRN
Qty: 60 TABLET | Refills: 1 | Status: SHIPPED | OUTPATIENT
Start: 2023-12-21 | End: 2024-02-28 | Stop reason: SDUPTHER

## 2023-12-23 DIAGNOSIS — E43 SEVERE PROTEIN-CALORIE MALNUTRITION: ICD-10-CM

## 2023-12-23 DIAGNOSIS — M45.5 ANKYLOSING SPONDYLITIS OF THORACOLUMBAR REGION: ICD-10-CM

## 2023-12-24 NOTE — TELEPHONE ENCOUNTER
Refill Routing Note   Medication(s) are not appropriate for processing by [facility] for the following reason(s):      Medication outside of protocol    [facility] action(s):  Route Care Due:  None identified            Appointments  past 12m or future 3m with PCP    Date Provider   Last Visit   3/27/2023 Jose Guadalupe Ornelas MD   Next Visit   1/30/2024 Jose Guadalupe Ornelas MD   ED visits in past 90 days: 0        Note composed:2:21 PM 12/24/2023

## 2023-12-24 NOTE — TELEPHONE ENCOUNTER
Patient requesting refill of Cyanocobalamin Injection.  Preferred pharmacy is [facility].   LR--3-27-23  LOV--7-25-23  FOV--1-30-24

## 2023-12-26 RX ORDER — CYANOCOBALAMIN 1000 UG/ML
1000 INJECTION, SOLUTION INTRAMUSCULAR; SUBCUTANEOUS
Qty: 3 ML | Refills: 3 | Status: SHIPPED | OUTPATIENT
Start: 2023-12-26

## 2024-01-12 ENCOUNTER — LAB VISIT (OUTPATIENT)
Dept: LAB | Facility: HOSPITAL | Age: 74
End: 2024-01-12
Attending: INTERNAL MEDICINE
Payer: MEDICARE

## 2024-01-12 DIAGNOSIS — M32.9 SYSTEMIC LUPUS ERYTHEMATOSUS, UNSPECIFIED SLE TYPE, UNSPECIFIED ORGAN INVOLVEMENT STATUS: ICD-10-CM

## 2024-01-12 LAB
BILIRUB UR QL STRIP: NEGATIVE
CLARITY UR REFRACT.AUTO: CLEAR
COLOR UR AUTO: YELLOW
GLUCOSE UR QL STRIP: NEGATIVE
HGB UR QL STRIP: NEGATIVE
KETONES UR QL STRIP: NEGATIVE
LEUKOCYTE ESTERASE UR QL STRIP: ABNORMAL
MICROSCOPIC COMMENT: NORMAL
NITRITE UR QL STRIP: NEGATIVE
PH UR STRIP: 5 [PH] (ref 5–8)
PROT UR QL STRIP: NEGATIVE
RBC #/AREA URNS AUTO: 1 /HPF (ref 0–4)
SP GR UR STRIP: 1.01 (ref 1–1.03)
SQUAMOUS #/AREA URNS AUTO: 1 /HPF
URN SPEC COLLECT METH UR: ABNORMAL
WBC #/AREA URNS AUTO: 1 /HPF (ref 0–5)

## 2024-01-12 PROCEDURE — 81001 URINALYSIS AUTO W/SCOPE: CPT | Mod: HCNC | Performed by: INTERNAL MEDICINE

## 2024-01-30 ENCOUNTER — OFFICE VISIT (OUTPATIENT)
Dept: PRIMARY CARE CLINIC | Facility: CLINIC | Age: 74
End: 2024-01-30
Payer: MEDICARE

## 2024-01-30 VITALS
HEART RATE: 74 BPM | OXYGEN SATURATION: 97 % | WEIGHT: 146.06 LBS | BODY MASS INDEX: 22.92 KG/M2 | DIASTOLIC BLOOD PRESSURE: 62 MMHG | SYSTOLIC BLOOD PRESSURE: 100 MMHG | HEIGHT: 67 IN | TEMPERATURE: 98 F

## 2024-01-30 DIAGNOSIS — E27.1 ADRENAL INSUFFICIENCY (ADDISON'S DISEASE): Primary | ICD-10-CM

## 2024-01-30 DIAGNOSIS — E11.628 TYPE 2 DIABETES MELLITUS WITH OTHER SKIN COMPLICATIONS: ICD-10-CM

## 2024-01-30 DIAGNOSIS — M81.0 AGE-RELATED OSTEOPOROSIS WITHOUT CURRENT PATHOLOGICAL FRACTURE: ICD-10-CM

## 2024-01-30 DIAGNOSIS — E43 SEVERE PROTEIN-CALORIE MALNUTRITION: ICD-10-CM

## 2024-01-30 DIAGNOSIS — E27.40 ADRENAL INSUFFICIENCY: ICD-10-CM

## 2024-01-30 DIAGNOSIS — M45.5 ANKYLOSING SPONDYLITIS OF THORACOLUMBAR REGION: ICD-10-CM

## 2024-01-30 DIAGNOSIS — R05.2 SUBACUTE COUGH: ICD-10-CM

## 2024-01-30 DIAGNOSIS — M79.7 CHRONIC FATIGUE SYNDROME WITH FIBROMYALGIA: ICD-10-CM

## 2024-01-30 DIAGNOSIS — F11.221 OPIOID DEPENDENCE WITH INTOXICATION DELIRIUM: ICD-10-CM

## 2024-01-30 DIAGNOSIS — N18.31 STAGE 3A CHRONIC KIDNEY DISEASE: ICD-10-CM

## 2024-01-30 DIAGNOSIS — F33.9 RECURRENT MAJOR DEPRESSION RESISTANT TO TREATMENT: ICD-10-CM

## 2024-01-30 DIAGNOSIS — D84.821 DRUG-INDUCED IMMUNODEFICIENCY: ICD-10-CM

## 2024-01-30 DIAGNOSIS — G57.00 SCIATIC NEUROPATHY, UNSPECIFIED LATERALITY: ICD-10-CM

## 2024-01-30 DIAGNOSIS — M32.13 OTHER SYSTEMIC LUPUS ERYTHEMATOSUS WITH LUNG INVOLVEMENT: ICD-10-CM

## 2024-01-30 DIAGNOSIS — G93.32 CHRONIC FATIGUE SYNDROME WITH FIBROMYALGIA: ICD-10-CM

## 2024-01-30 DIAGNOSIS — Z79.899 DRUG-INDUCED IMMUNODEFICIENCY: ICD-10-CM

## 2024-01-30 PROCEDURE — 1101F PT FALLS ASSESS-DOCD LE1/YR: CPT | Mod: HCNC,CPTII,S$GLB, | Performed by: FAMILY MEDICINE

## 2024-01-30 PROCEDURE — 1159F MED LIST DOCD IN RCRD: CPT | Mod: HCNC,CPTII,S$GLB, | Performed by: FAMILY MEDICINE

## 2024-01-30 PROCEDURE — 3078F DIAST BP <80 MM HG: CPT | Mod: HCNC,CPTII,S$GLB, | Performed by: FAMILY MEDICINE

## 2024-01-30 PROCEDURE — 99214 OFFICE O/P EST MOD 30 MIN: CPT | Mod: HCNC,S$GLB,, | Performed by: FAMILY MEDICINE

## 2024-01-30 PROCEDURE — 3074F SYST BP LT 130 MM HG: CPT | Mod: HCNC,CPTII,S$GLB, | Performed by: FAMILY MEDICINE

## 2024-01-30 PROCEDURE — 1125F AMNT PAIN NOTED PAIN PRSNT: CPT | Mod: HCNC,CPTII,S$GLB, | Performed by: FAMILY MEDICINE

## 2024-01-30 PROCEDURE — 3044F HG A1C LEVEL LT 7.0%: CPT | Mod: HCNC,CPTII,S$GLB, | Performed by: FAMILY MEDICINE

## 2024-01-30 PROCEDURE — 3008F BODY MASS INDEX DOCD: CPT | Mod: HCNC,CPTII,S$GLB, | Performed by: FAMILY MEDICINE

## 2024-01-30 PROCEDURE — 1160F RVW MEDS BY RX/DR IN RCRD: CPT | Mod: HCNC,CPTII,S$GLB, | Performed by: FAMILY MEDICINE

## 2024-01-30 PROCEDURE — 99999 PR PBB SHADOW E&M-EST. PATIENT-LVL V: CPT | Mod: PBBFAC,HCNC,, | Performed by: FAMILY MEDICINE

## 2024-01-30 PROCEDURE — 3288F FALL RISK ASSESSMENT DOCD: CPT | Mod: HCNC,CPTII,S$GLB, | Performed by: FAMILY MEDICINE

## 2024-01-30 RX ORDER — ALBUTEROL SULFATE 1.25 MG/3ML
SOLUTION RESPIRATORY (INHALATION)
Qty: 90 ML | Refills: 11 | Status: CANCELLED | OUTPATIENT
Start: 2024-01-30

## 2024-01-30 RX ORDER — FOLIC ACID 1 MG/1
1000 TABLET ORAL DAILY
Qty: 30 TABLET | Refills: 2 | Status: SHIPPED | OUTPATIENT
Start: 2024-01-30 | End: 2024-04-08

## 2024-01-30 RX ORDER — IPRATROPIUM BROMIDE AND ALBUTEROL SULFATE 2.5; .5 MG/3ML; MG/3ML
SOLUTION RESPIRATORY (INHALATION)
Qty: 990 ML | Refills: 2 | Status: SHIPPED | OUTPATIENT
Start: 2024-01-30

## 2024-01-30 RX ORDER — RALOXIFENE HYDROCHLORIDE 60 MG/1
60 TABLET, FILM COATED ORAL DAILY
Qty: 30 TABLET | Refills: 11 | Status: SHIPPED | OUTPATIENT
Start: 2024-01-30 | End: 2025-01-29

## 2024-01-30 RX ORDER — BACLOFEN 10 MG/1
10 TABLET ORAL NIGHTLY
Qty: 30 TABLET | Refills: 3 | Status: SHIPPED | OUTPATIENT
Start: 2024-01-30 | End: 2024-04-08

## 2024-01-30 RX ORDER — ESTRADIOL 0.1 MG/G
1 CREAM VAGINAL
Qty: 8 G | Refills: 11 | Status: SHIPPED | OUTPATIENT
Start: 2024-02-01 | End: 2025-01-31

## 2024-02-03 NOTE — PROGRESS NOTES
Subjective:       Patient ID: Erica Marsh is a 73 y.o. female.    Chief Complaint: Follow-up (6 month f/u)    She has fibromyalgia with DD of lumbar spine.  She has generalized chronic pain.  She has chronic Major depression with partial results with medications .  She has improve will continue to have general fatigue.       Hypoglycemia post Gastric bypass, much improved.  She has controlled blood blood pressure, the blood sugar has improved.  He has gained approximately 5 lb within last 3 months.  BMI 26. SLE under Rheumatology, needs eye and rheumatology follow up  Fibromyalgia under Cymabalta with partial improvement.  New OB intake, healthy patient, no unusual issues, see progress notes and intake smartforms.   Active Diagnosis Review (HCC)     Chronic             HCC 18 - Diabetes with Chronic Complications     Type 2 diabetes mellitus with other skin complications [E11.628]     HCC 23 - Other Significant Endocrine and Metabolic Disorders     Adrenal insufficiency [E27.40]     HCC 40 - Rheumatoid Arthritis and Inflammatory Connective Tissue Disease       Ankylosing spondylitis of thoracolumbar region [M45.5]     Lupus (systemic lupus erythematosus) [M32.9]     Other systemic lupus erythematosus with lung involvement [M32.13]     HCC 59 - Major Depressive, Bipolar, and Paranoid Disorders     Recurrent major depression resistant to treatment [F33.9]      - Fibrosis of Lung and Other Chronic Lung Disorders     Other systemic lupus erythematosus with lung involvement [M32.13]      - Chronic Kidney Disease, Moderate (Stage 3)     Stage 3a chronic kidney disease [N18.31]          Acute             HCC 55 - Substance Use Disorder, Moderate/Severe, or Substance Use with   Complications     Opioid dependence, uncomplicated [F11.20]                  Follow-up  This is a chronic (Headaches. tension, DGD, fibromyalgia and rebound headaches.) problem. The problem occurs constantly. The problem has been  waxing and waning. Associated symptoms include arthralgias, fatigue, a fever, myalgias and neck pain. Pertinent negatives include no abdominal pain, chest pain, chills, coughing, diaphoresis, headaches, numbness or rash.   Back Pain  This is a chronic problem. The problem occurs intermittently. The problem has been gradually worsening since onset. The pain is present in the thoracic spine. The pain is at a severity of 7/10. The pain is moderate. The pain is Worse during the night. The symptoms are aggravated by lying down. Associated symptoms include a fever and leg pain. Pertinent negatives include no abdominal pain, bladder incontinence, bowel incontinence, chest pain, dysuria, headaches, numbness or pelvic pain.   Leg Pain   Pertinent negatives include no numbness.     Review of Systems   Constitutional:  Positive for fatigue and fever. Negative for activity change, appetite change, chills and diaphoresis.   HENT: Negative.  Negative for facial swelling, hearing loss, nosebleeds, postnasal drip, sneezing and trouble swallowing.         Painful right mandibular gum, recurrent gum infections it multiple episodes of losing teeth.   Eyes:  Positive for visual disturbance. Negative for photophobia, pain, discharge, redness and itching.   Respiratory:  Negative for apnea, cough, chest tightness, shortness of breath and wheezing.    Cardiovascular: Negative.  Negative for chest pain, palpitations and leg swelling.   Gastrointestinal: Negative.  Negative for abdominal distention, abdominal pain, anal bleeding, blood in stool, bowel incontinence and diarrhea.   Endocrine: Negative.  Negative for cold intolerance, heat intolerance, polydipsia, polyphagia and polyuria.   Genitourinary:  Positive for difficulty urinating and frequency. Negative for bladder incontinence, dysuria, genital sores, hematuria, pelvic pain, urgency, vaginal bleeding and vaginal discharge.   Musculoskeletal:  Positive for arthralgias, back pain,  myalgias and neck pain. Negative for gait problem and neck stiffness.   Skin: Negative.  Negative for color change, pallor and rash.   Allergic/Immunologic: Negative.  Negative for environmental allergies and food allergies.   Neurological:  Positive for tremors and light-headedness. Negative for dizziness, seizures, syncope, speech difficulty, numbness and headaches.   Hematological:  Negative for adenopathy.   Psychiatric/Behavioral:  Positive for decreased concentration. Negative for agitation, behavioral problems, confusion, hallucinations, self-injury and sleep disturbance. The patient is nervous/anxious. The patient is not hyperactive.        Patient Active Problem List   Diagnosis    Lupus (systemic lupus erythematosus)    GERD (gastroesophageal reflux disease)    Arthritis    Trigger finger    Abdominal pain, other specified site    Constipation    Change in bowel habits    Rectal bleeding    Hypoglycemia following gastrointestinal surgery    Depression with anxiety    Other spondylosis, lumbar region    Sinus bradycardia    DDD (degenerative disc disease), thoracic    Palpitations    Chest pain, atypical    HUGHES (dyspnea on exertion)    Adrenal insufficiency    Recurrent major depression resistant to treatment    Acquired hypothyroidism    Current chronic use of systemic steroids    H/O gastric bypass    Difficulty walking    Muscle weakness    Type 2 diabetes mellitus with other skin complications    Other systemic lupus erythematosus with lung involvement    Mixed incontinence    Atrophic vaginitis    Cervical radiculopathy    Cervical spondylosis    DDD (degenerative disc disease), cervical    Fibromyalgia    Adrenal insufficiency (Tarik's disease)    Ankylosing spondylitis of thoracolumbar region    Opioid dependence with intoxication delirium    Stage 3a chronic kidney disease    Severe protein-calorie malnutrition    Drug-induced immunodeficiency       Objective:      Physical Exam  Vitals reviewed.    Constitutional:       Appearance: She is well-developed. She is ill-appearing. She is not diaphoretic.   HENT:      Head: Normocephalic and atraumatic.      Right Ear: External ear normal. Decreased hearing noted.      Left Ear: External ear normal. Decreased hearing noted.      Nose: Nose normal.      Mouth/Throat:      Pharynx: No oropharyngeal exudate.   Eyes:      General: No scleral icterus.        Right eye: No discharge.         Left eye: No discharge.      Conjunctiva/sclera: Conjunctivae normal.      Pupils: Pupils are equal, round, and reactive to light.   Neck:      Thyroid: No thyromegaly.      Vascular: No JVD.      Trachea: No tracheal deviation.   Cardiovascular:      Rate and Rhythm: Normal rate.      Heart sounds: Normal heart sounds. No murmur heard.     No friction rub. No gallop.   Pulmonary:      Effort: Pulmonary effort is normal. No respiratory distress.      Breath sounds: No stridor. No wheezing or rales.   Chest:      Chest wall: No tenderness.   Abdominal:      General: Bowel sounds are normal. There is no distension.      Palpations: Abdomen is soft. There is no mass.      Tenderness: There is no abdominal tenderness. There is no guarding or rebound.   Musculoskeletal:      Right shoulder: Tenderness present. Decreased range of motion.      Cervical back: Normal range of motion and neck supple.      Thoracic back: Tenderness and bony tenderness present. Decreased range of motion.      Lumbar back: Tenderness and bony tenderness present. Decreased range of motion.      Comments: She has progressive hyperesthesia, mild scoliosis, mild DDD, cervical and lumbar radiculopathy. Hx of Herpes. Poor anxiety controlled, poor exercise , poor family support.   Lymphadenopathy:      Cervical: No cervical adenopathy.   Skin:     General: Skin is dry.      Coloration: Skin is not pale.      Findings: No erythema or rash.   Neurological:      Mental Status: She is alert and oriented to person, place,  and time.      Cranial Nerves: No cranial nerve deficit.      Motor: No abnormal muscle tone.      Coordination: Coordination normal.      Deep Tendon Reflexes: Reflexes normal.   Psychiatric:         Attention and Perception: Perception normal. She is attentive.         Mood and Affect: Mood is not depressed or elated. Affect is flat. Affect is not blunt.         Speech: Speech normal.         Behavior: Behavior is slowed and withdrawn.         Thought Content: Thought content normal.         Cognition and Memory: Cognition normal.         Judgment: Judgment normal.         Lab Results   Component Value Date    WBC 6.87 01/12/2024    HGB 14.0 01/12/2024    HCT 43.4 01/12/2024     01/12/2024    CHOL 233 (H) 01/12/2024    TRIG 114 01/12/2024    HDL 97 (H) 01/12/2024    ALT 21 01/12/2024    ALT 21 01/12/2024    AST 26 01/12/2024    AST 26 01/12/2024     01/12/2024     01/12/2024    K 4.3 01/12/2024    K 4.3 01/12/2024     01/12/2024     01/12/2024    CREATININE 1.0 01/12/2024    CREATININE 1.0 01/12/2024    BUN 10 01/12/2024    BUN 10 01/12/2024    CO2 21 (L) 01/12/2024    CO2 21 (L) 01/12/2024    TSH 1.433 01/12/2024    INR 1.0 05/08/2020    HGBA1C 5.0 01/12/2024     The 10-year ASCVD risk score (Benjamín MINER, et al., 2019) is: 13.5%    Values used to calculate the score:      Age: 73 years      Sex: Female      Is Non- : No      Diabetic: Yes      Tobacco smoker: No      Systolic Blood Pressure: 100 mmHg      Is BP treated: No      HDL Cholesterol: 97 mg/dL      Total Cholesterol: 233 mg/dL  Point of care blood sugar 110. Patient compliant with diet on monitoring her blood sugars.  Chronic prior myalgia pain does not qualify for opiates.   Assessment:       1. Adrenal insufficiency (Bruno's disease)    2. Chronic fatigue syndrome with fibromyalgia    3. Cough    4. Ankylosing spondylitis of thoracolumbar region    5. Opioid dependence with intoxication delirium     6. Stage 3a chronic kidney disease    7. Severe protein-calorie malnutrition    8. Drug-induced immunodeficiency    9. Other systemic lupus erythematosus with lung involvement    10. Type 2 diabetes mellitus with other skin complications    11. Recurrent major depression resistant to treatment    12. Adrenal insufficiency    13. Sciatic neuropathy, unspecified laterality    14. Age-related osteoporosis without current pathological fracture        Plan:       Thyroid disease  -  Iron deficiency anemia  Erica was seen today for follow-up.    Diagnoses and all orders for this visit:    Adrenal insufficiency (Rhea's disease)    Chronic fatigue syndrome with fibromyalgia    Cough  -     albuterol-ipratropium (DUO-NEB) 2.5 mg-0.5 mg/3 mL nebulizer solution; USE 1 VIAL(3MLS) VIA NEBULIZER EVERY 6 HOURS AS NEEDED FOR WHEEZING    Ankylosing spondylitis of thoracolumbar region  -     baclofen (LIORESAL) 10 MG tablet; Take 1 tablet (10 mg total) by mouth every evening.    Opioid dependence with intoxication delirium    Stage 3a chronic kidney disease    Severe protein-calorie malnutrition    Drug-induced immunodeficiency    Other systemic lupus erythematosus with lung involvement    Type 2 diabetes mellitus with other skin complications  -     Comprehensive metabolic panel; Future  -     Hemoglobin A1c; Future  -     TSH; Future    Recurrent major depression resistant to treatment    Adrenal insufficiency    Sciatic neuropathy, unspecified laterality  -     folic acid (FOLVITE) 1 MG tablet; Take 1 tablet (1,000 mcg total) by mouth once daily.    Age-related osteoporosis without current pathological fracture  -     raloxifene (EVISTA) 60 mg tablet; Take 1 tablet (60 mg total) by mouth once daily.    Other orders  -     estradioL (ESTRACE) 0.01 % (0.1 mg/gram) vaginal cream; Place 1 g vaginally twice a week.  The following orders have not been finalized:  -     Cancel: albuterol (ACCUNEB) 1.25 mg/3 mL Nebu        Ankylosis  of lumbar spine  -     clonazePAM (KLONOPIN) 1 MG tablet; TAKE 1 TABLET BY MOUTH TWICE DAILY FOR ANXIETY  Dispense: 60 tablet; Refill: 3  -     LIDOcaine (LIDODERM) 5 %; Remove & Discard patch within 12 hours or as directed by MD  Dispense: 60 patch; Refill: 4    Review plate method  Review foods in home  Discuss food labels  Refer to exercise program  Assist with medical visit preparation  Review patient results  Review patient education about results  Review chronic disease interventions (see specific disease intervention)  Assess knowledge level  Provide and discuss information/education material  Encourage communication with physician  Monitor compliance  Educate on risk of non-compliance  Give food and resource list  Review patient education material (see patient instructions)  Review patient education material (see patient instructions)  Refer to exercise program  Review stress management techniques  Review patient education material (see patient instructions)  Assess support system  Discuss stress management techniques  Review normal ranges for labs    Ataxia due to recent stroke  -     gabapentin (NEURONTIN) 300 MG capsule; Take 1 capsule (300 mg total) by mouth every evening. TAKE 1 CAPSULE IN THE MORNING  AND TAKE 2 CAPSULES IN THE EVENING  Dispense: 90 capsule; Refill: 3    Neurological deficit present  -     gabapentin (NEURONTIN) 300 MG capsule; Take 1 capsule (300 mg total) by mouth every evening. TAKE 1 CAPSULE IN THE MORNING  AND TAKE 2 CAPSULES IN THE EVENING  Dispense: 90 capsule; Refill: 3    Delirium due to dissociative drug  -     gabapentin (NEURONTIN) 300 MG capsule; Take 1 capsule (300 mg total) by mouth every evening. TAKE 1 CAPSULE IN THE MORNING  AND TAKE 2 CAPSULES IN THE EVENING  Dispense: 90 capsule; Refill: 3    Opioid dependence with intoxication delirium  -     gabapentin (NEURONTIN) 300 MG capsule; Take 1 capsule (300 mg total) by mouth every evening. TAKE 1 CAPSULE IN THE MORNING   AND TAKE 2 CAPSULES IN THE EVENING  Dispense: 90 capsule; Refill: 3    Chronic seasonal allergic rhinitis due to pollen  -     fluticasone propionate (FLONASE) 50 mcg/actuation nasal spray; USE 1 SPRAY IN EACH NOSTRIL TWICE DAILY  Dispense: 48 g; Refill: 0    Body mass index (BMI) 31.0-31.9, adult   -     Vitamin D; Future; Expected date: 07/30/2021    Discontinue Celexa due to high risk of serotonin, fatigue, poor concentration, and decreased alertness.    There have been some probable medication, dietary and lifestyle compliance issues here. I have discussed with her the great importance of following the treatment plan exactly as directed in order to achieve a good medical outcome.    Patient readiness: acceptance and barriers:poor sleep habits    During the course of the visit the patient was educated and counseled about the following:     Underweight:  Nutritional supplements    Goals: Underweight: Increase calorie intake and BMI      Did patient meet goals/outcomes: no  She needs more physical activities    Discussed health maintenance guidelines appropriate for age.  Discussed health maintenance guidelines appropriate for age.  Discussed health maintenance guidelines appropriate for age.  Discussed health maintenance guidelines appropriate for age.

## 2024-02-20 ENCOUNTER — PATIENT MESSAGE (OUTPATIENT)
Dept: PRIMARY CARE CLINIC | Facility: CLINIC | Age: 74
End: 2024-02-20
Payer: MEDICARE

## 2024-02-20 DIAGNOSIS — K86.89 PANCREATIC INSUFFICIENCY: Primary | ICD-10-CM

## 2024-02-21 RX ORDER — PANCRELIPASE 60000; 12000; 38000 [USP'U]/1; [USP'U]/1; [USP'U]/1
1 CAPSULE, DELAYED RELEASE PELLETS ORAL
Qty: 90 CAPSULE | Refills: 11 | Status: SHIPPED | OUTPATIENT
Start: 2024-02-21 | End: 2025-02-20

## 2024-02-26 ENCOUNTER — PATIENT MESSAGE (OUTPATIENT)
Dept: PRIMARY CARE CLINIC | Facility: CLINIC | Age: 74
End: 2024-02-26
Payer: MEDICARE

## 2024-02-26 DIAGNOSIS — M43.26 ANKYLOSIS OF LUMBAR SPINE: ICD-10-CM

## 2024-02-26 DIAGNOSIS — M54.42 CHRONIC LOW BACK PAIN WITH BILATERAL SCIATICA, UNSPECIFIED BACK PAIN LATERALITY: ICD-10-CM

## 2024-02-26 DIAGNOSIS — M45.5 ANKYLOSING SPONDYLITIS OF THORACOLUMBAR REGION: ICD-10-CM

## 2024-02-26 DIAGNOSIS — F33.9 RECURRENT MAJOR DEPRESSION RESISTANT TO TREATMENT: ICD-10-CM

## 2024-02-26 DIAGNOSIS — G57.00 SCIATIC NEUROPATHY, UNSPECIFIED LATERALITY: ICD-10-CM

## 2024-02-26 DIAGNOSIS — G89.29 CHRONIC LOW BACK PAIN WITH BILATERAL SCIATICA, UNSPECIFIED BACK PAIN LATERALITY: ICD-10-CM

## 2024-02-26 DIAGNOSIS — M54.41 CHRONIC LOW BACK PAIN WITH BILATERAL SCIATICA, UNSPECIFIED BACK PAIN LATERALITY: ICD-10-CM

## 2024-02-26 DIAGNOSIS — M32.9 SYSTEMIC LUPUS ERYTHEMATOSUS, UNSPECIFIED SLE TYPE, UNSPECIFIED ORGAN INVOLVEMENT STATUS: ICD-10-CM

## 2024-02-28 RX ORDER — CLONAZEPAM 1 MG/1
TABLET ORAL
Qty: 60 TABLET | Refills: 0 | OUTPATIENT
Start: 2024-02-28

## 2024-02-28 RX ORDER — CLONAZEPAM 1 MG/1
1 TABLET ORAL 2 TIMES DAILY PRN
Qty: 60 TABLET | Refills: 2 | Status: SHIPPED | OUTPATIENT
Start: 2024-02-28 | End: 2024-02-29 | Stop reason: SDUPTHER

## 2024-02-29 RX ORDER — CLONAZEPAM 1 MG/1
1 TABLET ORAL 2 TIMES DAILY PRN
Qty: 60 TABLET | Refills: 2 | Status: SHIPPED | OUTPATIENT
Start: 2024-03-04 | End: 2024-06-10 | Stop reason: SDUPTHER

## 2024-03-28 ENCOUNTER — PATIENT MESSAGE (OUTPATIENT)
Dept: FAMILY MEDICINE | Facility: CLINIC | Age: 74
End: 2024-03-28
Payer: MEDICARE

## 2024-03-29 DIAGNOSIS — J30.1 CHRONIC SEASONAL ALLERGIC RHINITIS DUE TO POLLEN: ICD-10-CM

## 2024-03-30 RX ORDER — BLOOD SUGAR DIAGNOSTIC
STRIP MISCELLANEOUS
Qty: 300 STRIP | Refills: 3 | Status: SHIPPED | OUTPATIENT
Start: 2024-03-30

## 2024-03-30 RX ORDER — FLUTICASONE PROPIONATE 50 MCG
SPRAY, SUSPENSION (ML) NASAL
Qty: 48 G | Refills: 3 | Status: SHIPPED | OUTPATIENT
Start: 2024-03-30

## 2024-03-30 NOTE — TELEPHONE ENCOUNTER
Care Due:                  Date            Visit Type   Department     Provider  --------------------------------------------------------------------------------                                EP -                              PRIMARY  Last Visit: 01-      CARE ([facility])   None Found     Jose Guadalupe Ornelas                              EP -                              PRIMARY  Next Visit: 05-      CARE ([facility])   None Found     Jose Guadalupe Ornelas                                                            Last  Test          Frequency    Reason                     Performed    Due Date  --------------------------------------------------------------------------------    Mg Level....  12 months..  raloxifene...............  08- 08-    Phosphate...  15 months..  raloxifene...............  Not Found    Overdue    Vitamin D...  12 months..  raloxifene, vitamin......  Not Found    Overdue    [employer] Embedded Care Due Messages. Reference number: [id number].   3/30/2024 11:38:47 AM CDT

## 2024-03-30 NOTE — TELEPHONE ENCOUNTER
Refill Decision Note   Erica Marsh  is requesting a refill authorization.  Brief Assessment and Rationale for Refill:  Approve     Medication Therapy Plan:         Comments:     Note composed:6:43 PM 03/30/2024

## 2024-04-05 RX ORDER — LEVOTHYROXINE SODIUM 88 UG/1
88 TABLET ORAL
Qty: 90 TABLET | Refills: 0 | Status: SHIPPED | OUTPATIENT
Start: 2024-04-05

## 2024-04-05 RX ORDER — LEVOTHYROXINE SODIUM 50 UG/1
TABLET ORAL
Qty: 90 TABLET | Refills: 0 | OUTPATIENT
Start: 2024-04-05

## 2024-04-05 NOTE — TELEPHONE ENCOUNTER
Refill Decision Note   Erica Marsh  is requesting a refill authorization.  Brief Assessment and Rationale for Refill:  Quick Discontinue     Medication Therapy Plan: Pharmacy is requesting new scripts for the following medications without required information, (sig/ frequency/qty/etc)     Medication Reconciliation Completed: No   Comments:     No Care Gaps recommended.     Note composed:2:25 PM 04/05/2024

## 2024-04-07 DIAGNOSIS — G57.00 SCIATIC NEUROPATHY, UNSPECIFIED LATERALITY: ICD-10-CM

## 2024-04-07 DIAGNOSIS — M45.5 ANKYLOSING SPONDYLITIS OF THORACOLUMBAR REGION: ICD-10-CM

## 2024-04-08 RX ORDER — BACLOFEN 10 MG/1
10 TABLET ORAL NIGHTLY
Qty: 90 TABLET | Refills: 3 | Status: SHIPPED | OUTPATIENT
Start: 2024-04-08

## 2024-04-08 RX ORDER — FOLIC ACID 1 MG/1
1000 TABLET ORAL
Qty: 90 TABLET | Refills: 3 | Status: SHIPPED | OUTPATIENT
Start: 2024-04-08

## 2024-04-08 NOTE — TELEPHONE ENCOUNTER
Refill Routing Note   Medication(s) are not appropriate for processing by [facility] for the following reason(s):        Outside of protocol    [facility] action(s):  Route               Appointments  past 12m or future 3m with PCP    Date Provider   Last Visit   1/30/2024 Jose Guadalupe Ornelas MD   Next Visit   5/31/2024 Jose Guadalupe Ornelas MD   ED visits in past 90 days: 0        Note composed:8:41 AM 04/08/2024

## 2024-04-23 DIAGNOSIS — M45.5 ANKYLOSING SPONDYLITIS OF THORACOLUMBAR REGION: ICD-10-CM

## 2024-04-24 DIAGNOSIS — E11.9 TYPE 2 DIABETES MELLITUS WITHOUT COMPLICATION, UNSPECIFIED WHETHER LONG TERM INSULIN USE: ICD-10-CM

## 2024-04-24 RX ORDER — BACLOFEN 5 MG/1
5 TABLET ORAL 2 TIMES DAILY
Qty: 60 TABLET | Refills: 3 | OUTPATIENT
Start: 2024-04-24

## 2024-05-22 RX ORDER — CHOLECALCIFEROL (VITAMIN D3) 25 MCG
TABLET ORAL
Qty: 180 TABLET | Refills: 3 | Status: SHIPPED | OUTPATIENT
Start: 2024-05-22

## 2024-05-22 NOTE — TELEPHONE ENCOUNTER
Refill Routing Note   Medication(s) are not appropriate for processing by [facility] for the following reason(s):        Outside of protocol    [facility] action(s):  Route               Appointments  past 12m or future 3m with PCP    Date Provider   Last Visit   1/30/2024 Jose Guadalupe Ornelas MD   Next Visit   6/10/2024 Jose Guadalupe Ornelas MD   ED visits in past 90 days: 0        Note composed:1:52 PM 05/22/2024

## 2024-05-22 NOTE — TELEPHONE ENCOUNTER
No care due was identified.  [facility] Embedded Care Due Messages. Reference number: [id number].   5/22/2024 1:45:48 PM CDT

## 2024-05-24 ENCOUNTER — LAB VISIT (OUTPATIENT)
Dept: LAB | Facility: HOSPITAL | Age: 74
End: 2024-05-24
Attending: FAMILY MEDICINE
Payer: MEDICARE

## 2024-05-24 DIAGNOSIS — E11.628 TYPE 2 DIABETES MELLITUS WITH OTHER SKIN COMPLICATIONS: ICD-10-CM

## 2024-05-24 LAB
ALBUMIN SERPL BCP-MCNC: 3.8 G/DL (ref 3.5–5.2)
ALP SERPL-CCNC: 75 U/L (ref 55–135)
ALT SERPL W/O P-5'-P-CCNC: 21 U/L (ref 10–44)
ANION GAP SERPL CALC-SCNC: 13 MMOL/L (ref 8–16)
AST SERPL-CCNC: 25 U/L (ref 10–40)
BILIRUB SERPL-MCNC: 0.3 MG/DL (ref 0.1–1)
BUN SERPL-MCNC: 19 MG/DL (ref 8–23)
CALCIUM SERPL-MCNC: 9.2 MG/DL (ref 8.7–10.5)
CHLORIDE SERPL-SCNC: 109 MMOL/L (ref 95–110)
CO2 SERPL-SCNC: 20 MMOL/L (ref 23–29)
CREAT SERPL-MCNC: 1 MG/DL (ref 0.5–1.4)
EST. GFR  (NO RACE VARIABLE): 59.9 ML/MIN/1.73 M^2
ESTIMATED AVG GLUCOSE: 100 MG/DL (ref 68–131)
GLUCOSE SERPL-MCNC: 91 MG/DL (ref 70–110)
HBA1C MFR BLD: 5.1 % (ref 4–5.6)
POTASSIUM SERPL-SCNC: 4.5 MMOL/L (ref 3.5–5.1)
PROT SERPL-MCNC: 6.7 G/DL (ref 6–8.4)
SODIUM SERPL-SCNC: 142 MMOL/L (ref 136–145)
T4 FREE SERPL-MCNC: 1.05 NG/DL (ref 0.71–1.51)
TSH SERPL DL<=0.005 MIU/L-ACNC: 0.16 UIU/ML (ref 0.4–4)

## 2024-05-24 PROCEDURE — 84443 ASSAY THYROID STIM HORMONE: CPT | Mod: HCNC | Performed by: FAMILY MEDICINE

## 2024-05-24 PROCEDURE — 36415 COLL VENOUS BLD VENIPUNCTURE: CPT | Mod: HCNC,PO | Performed by: FAMILY MEDICINE

## 2024-05-24 PROCEDURE — 83036 HEMOGLOBIN GLYCOSYLATED A1C: CPT | Mod: HCNC | Performed by: FAMILY MEDICINE

## 2024-05-24 PROCEDURE — 80053 COMPREHEN METABOLIC PANEL: CPT | Mod: HCNC | Performed by: FAMILY MEDICINE

## 2024-05-24 PROCEDURE — 84439 ASSAY OF FREE THYROXINE: CPT | Mod: HCNC | Performed by: FAMILY MEDICINE

## 2024-05-27 DIAGNOSIS — F33.9 RECURRENT MAJOR DEPRESSION RESISTANT TO TREATMENT: ICD-10-CM

## 2024-05-27 DIAGNOSIS — F16.921: ICD-10-CM

## 2024-05-27 DIAGNOSIS — E08.649 DIABETES MELLITUS DUE TO UNDERLYING CONDITION WITH HYPOGLYCEMIA WITHOUT COMA, WITHOUT LONG-TERM CURRENT USE OF INSULIN: ICD-10-CM

## 2024-05-27 DIAGNOSIS — F11.221 OPIOID DEPENDENCE WITH INTOXICATION DELIRIUM: ICD-10-CM

## 2024-05-27 RX ORDER — CITALOPRAM 20 MG/1
20 TABLET, FILM COATED ORAL
Qty: 90 TABLET | Refills: 2 | Status: SHIPPED | OUTPATIENT
Start: 2024-05-27

## 2024-05-27 NOTE — TELEPHONE ENCOUNTER
Refill Routing Note   Medication(s) are not appropriate for processing by [facility] for the following reason(s):        Drug-disease interaction    [facility] action(s):  Defer        Medication Therapy Plan: citalopram and Adrenal insufficiency ([place]'s disease) AND SINUS MARYJANE    Pharmacist review requested: Yes     Appointments  past 12m or future 3m with PCP    Date Provider   Last Visit   1/30/2024 Jose Guadalupe Ornelas MD   Next Visit   6/10/2024 Jose Guadalupe Ornelas MD   ED visits in past 90 days: 0        Note composed:1:22 PM 05/27/2024

## 2024-05-27 NOTE — TELEPHONE ENCOUNTER
No care due was identified.  [facility] Embedded Care Due Messages. Reference number: [id number].   5/27/2024 7:26:35 AM CDT

## 2024-05-27 NOTE — TELEPHONE ENCOUNTER
Refill Decision Note   Erica Marsh  is requesting a refill authorization.  Brief Assessment and Rationale for Refill:  Approve     Medication Therapy Plan:         Pharmacist review requested: Yes   Extended chart review required: Yes   Comments:     Note composed:2:13 PM 05/27/2024

## 2024-06-10 ENCOUNTER — OFFICE VISIT (OUTPATIENT)
Dept: PRIMARY CARE CLINIC | Facility: CLINIC | Age: 74
End: 2024-06-10
Payer: MEDICARE

## 2024-06-10 ENCOUNTER — LAB VISIT (OUTPATIENT)
Dept: LAB | Facility: HOSPITAL | Age: 74
End: 2024-06-10
Attending: FAMILY MEDICINE
Payer: MEDICARE

## 2024-06-10 VITALS
DIASTOLIC BLOOD PRESSURE: 70 MMHG | BODY MASS INDEX: 23.76 KG/M2 | OXYGEN SATURATION: 97 % | SYSTOLIC BLOOD PRESSURE: 98 MMHG | HEART RATE: 71 BPM | HEIGHT: 67 IN | TEMPERATURE: 98 F | WEIGHT: 151.38 LBS

## 2024-06-10 DIAGNOSIS — M79.7 CHRONIC FATIGUE SYNDROME WITH FIBROMYALGIA: ICD-10-CM

## 2024-06-10 DIAGNOSIS — D50.8 IRON DEFICIENCY ANEMIA SECONDARY TO INADEQUATE DIETARY IRON INTAKE: ICD-10-CM

## 2024-06-10 DIAGNOSIS — M81.0 AGE-RELATED OSTEOPOROSIS WITHOUT CURRENT PATHOLOGICAL FRACTURE: Primary | Chronic | ICD-10-CM

## 2024-06-10 DIAGNOSIS — M54.41 CHRONIC LOW BACK PAIN WITH BILATERAL SCIATICA, UNSPECIFIED BACK PAIN LATERALITY: ICD-10-CM

## 2024-06-10 DIAGNOSIS — M32.9 SYSTEMIC LUPUS ERYTHEMATOSUS, UNSPECIFIED SLE TYPE, UNSPECIFIED ORGAN INVOLVEMENT STATUS: ICD-10-CM

## 2024-06-10 DIAGNOSIS — G89.29 CHRONIC LOW BACK PAIN WITH BILATERAL SCIATICA, UNSPECIFIED BACK PAIN LATERALITY: ICD-10-CM

## 2024-06-10 DIAGNOSIS — G93.32 CHRONIC FATIGUE SYNDROME WITH FIBROMYALGIA: ICD-10-CM

## 2024-06-10 DIAGNOSIS — M43.26 ANKYLOSIS OF LUMBAR SPINE: ICD-10-CM

## 2024-06-10 DIAGNOSIS — M54.42 CHRONIC LOW BACK PAIN WITH BILATERAL SCIATICA, UNSPECIFIED BACK PAIN LATERALITY: ICD-10-CM

## 2024-06-10 DIAGNOSIS — F33.9 RECURRENT MAJOR DEPRESSION RESISTANT TO TREATMENT: ICD-10-CM

## 2024-06-10 DIAGNOSIS — M45.5 ANKYLOSING SPONDYLITIS OF THORACOLUMBAR REGION: ICD-10-CM

## 2024-06-10 DIAGNOSIS — G57.00 SCIATIC NEUROPATHY, UNSPECIFIED LATERALITY: ICD-10-CM

## 2024-06-10 LAB
ALBUMIN SERPL BCP-MCNC: 3.7 G/DL (ref 3.5–5.2)
ALP SERPL-CCNC: 66 U/L (ref 55–135)
ALT SERPL W/O P-5'-P-CCNC: 19 U/L (ref 10–44)
ANION GAP SERPL CALC-SCNC: 10 MMOL/L (ref 8–16)
AST SERPL-CCNC: 26 U/L (ref 10–40)
BASOPHILS # BLD AUTO: 0.09 K/UL (ref 0–0.2)
BASOPHILS NFR BLD: 1.2 % (ref 0–1.9)
BILIRUB SERPL-MCNC: 0.3 MG/DL (ref 0.1–1)
BUN SERPL-MCNC: 18 MG/DL (ref 8–23)
CALCIUM SERPL-MCNC: 9.1 MG/DL (ref 8.7–10.5)
CHLORIDE SERPL-SCNC: 109 MMOL/L (ref 95–110)
CO2 SERPL-SCNC: 22 MMOL/L (ref 23–29)
CREAT SERPL-MCNC: 1.1 MG/DL (ref 0.5–1.4)
CRP SERPL-MCNC: 0.3 MG/L (ref 0–8.2)
DIFFERENTIAL METHOD BLD: ABNORMAL
EOSINOPHIL # BLD AUTO: 0.4 K/UL (ref 0–0.5)
EOSINOPHIL NFR BLD: 5.3 % (ref 0–8)
ERYTHROCYTE [DISTWIDTH] IN BLOOD BY AUTOMATED COUNT: 12.8 % (ref 11.5–14.5)
ERYTHROCYTE [SEDIMENTATION RATE] IN BLOOD BY WESTERGREN METHOD: 6 MM/HR (ref 0–20)
EST. GFR  (NO RACE VARIABLE): 53 ML/MIN/1.73 M^2
FERRITIN SERPL-MCNC: 69 NG/ML (ref 20–300)
GLUCOSE SERPL-MCNC: 103 MG/DL (ref 70–110)
HCT VFR BLD AUTO: 39.8 % (ref 37–48.5)
HGB BLD-MCNC: 13.2 G/DL (ref 12–16)
IMM GRANULOCYTES # BLD AUTO: 0.02 K/UL (ref 0–0.04)
IMM GRANULOCYTES NFR BLD AUTO: 0.3 % (ref 0–0.5)
IRON SERPL-MCNC: 90 UG/DL (ref 30–160)
LYMPHOCYTES # BLD AUTO: 3.4 K/UL (ref 1–4.8)
LYMPHOCYTES NFR BLD: 45.7 % (ref 18–48)
MCH RBC QN AUTO: 33.2 PG (ref 27–31)
MCHC RBC AUTO-ENTMCNC: 33.2 G/DL (ref 32–36)
MCV RBC AUTO: 100 FL (ref 82–98)
MONOCYTES # BLD AUTO: 0.6 K/UL (ref 0.3–1)
MONOCYTES NFR BLD: 8.2 % (ref 4–15)
NEUTROPHILS # BLD AUTO: 2.9 K/UL (ref 1.8–7.7)
NEUTROPHILS NFR BLD: 39.3 % (ref 38–73)
NRBC BLD-RTO: 0 /100 WBC
PLATELET # BLD AUTO: 254 K/UL (ref 150–450)
PMV BLD AUTO: 10.6 FL (ref 9.2–12.9)
POTASSIUM SERPL-SCNC: 4.5 MMOL/L (ref 3.5–5.1)
PROT SERPL-MCNC: 6.4 G/DL (ref 6–8.4)
RBC # BLD AUTO: 3.98 M/UL (ref 4–5.4)
SATURATED IRON: 23 % (ref 20–50)
SODIUM SERPL-SCNC: 141 MMOL/L (ref 136–145)
TOTAL IRON BINDING CAPACITY: 385 UG/DL (ref 250–450)
TRANSFERRIN SERPL-MCNC: 275 MG/DL (ref 200–375)
WBC # BLD AUTO: 7.36 K/UL (ref 3.9–12.7)

## 2024-06-10 PROCEDURE — 36415 COLL VENOUS BLD VENIPUNCTURE: CPT | Performed by: FAMILY MEDICINE

## 2024-06-10 PROCEDURE — 84466 ASSAY OF TRANSFERRIN: CPT | Performed by: FAMILY MEDICINE

## 2024-06-10 PROCEDURE — 80053 COMPREHEN METABOLIC PANEL: CPT | Performed by: FAMILY MEDICINE

## 2024-06-10 PROCEDURE — 85025 COMPLETE CBC W/AUTO DIFF WBC: CPT | Performed by: FAMILY MEDICINE

## 2024-06-10 PROCEDURE — 85651 RBC SED RATE NONAUTOMATED: CPT | Performed by: FAMILY MEDICINE

## 2024-06-10 PROCEDURE — 99999 PR PBB SHADOW E&M-EST. PATIENT-LVL V: CPT | Mod: PBBFAC,HCNC,, | Performed by: FAMILY MEDICINE

## 2024-06-10 PROCEDURE — 86140 C-REACTIVE PROTEIN: CPT | Performed by: FAMILY MEDICINE

## 2024-06-10 PROCEDURE — 82728 ASSAY OF FERRITIN: CPT | Performed by: FAMILY MEDICINE

## 2024-06-10 RX ORDER — METHOCARBAMOL 750 MG/1
750 TABLET, FILM COATED ORAL 3 TIMES DAILY
COMMUNITY
Start: 2024-05-08

## 2024-06-10 RX ORDER — HYDROCODONE BITARTRATE AND ACETAMINOPHEN 7.5; 325 MG/1; MG/1
1 TABLET ORAL EVERY 8 HOURS PRN
Qty: 28 TABLET | Refills: 0 | Status: SHIPPED | OUTPATIENT
Start: 2024-06-10

## 2024-06-10 RX ORDER — HYDROXYCHLOROQUINE SULFATE 200 MG/1
200 TABLET, FILM COATED ORAL 2 TIMES DAILY
Qty: 180 TABLET | Refills: 4 | Status: SHIPPED | OUTPATIENT
Start: 2024-06-10 | End: 2025-09-03

## 2024-06-10 RX ORDER — HYDROCODONE BITARTRATE AND ACETAMINOPHEN 7.5; 325 MG/1; MG/1
1 TABLET ORAL EVERY 12 HOURS PRN
Qty: 60 TABLET | Refills: 0 | Status: SHIPPED | OUTPATIENT
Start: 2024-06-17

## 2024-06-10 RX ORDER — KETOCONAZOLE 20 MG/G
CREAM TOPICAL 2 TIMES DAILY
COMMUNITY
Start: 2024-02-01

## 2024-06-10 RX ORDER — CLONAZEPAM 1 MG/1
1 TABLET ORAL 2 TIMES DAILY PRN
Qty: 60 TABLET | Refills: 2 | Status: SHIPPED | OUTPATIENT
Start: 2024-06-10

## 2024-06-10 RX ORDER — NAPROXEN SODIUM 550 MG/1
550 TABLET ORAL EVERY 12 HOURS PRN
COMMUNITY
Start: 2024-04-01

## 2024-06-10 NOTE — PATIENT INSTRUCTIONS
DASH diet for Hypertension and Healthy Eating  Provided by the [facility]    Healthy Lifestyle   Nutrition and healthy eating  The DASH diet emphasizes portion size, eating a variety of foods and getting the right amount of nutrients. Discover how DASH can improve your health and lower your blood pressure.  By [facility] Staff   DASH stands for Dietary Approaches to Stop Hypertension. The DASH diet is a lifelong approach to healthy eating that's designed to help treat or prevent high blood pressure (hypertension). The DASH diet encourages you to reduce the sodium in your diet and eat a variety of foods rich in nutrients that help lower blood pressure, such as potassium, calcium and magnesium.  By following the DASH diet, you may be able to reduce your blood pressure by a few points in just two weeks. Over time, your systolic blood pressure could drop by eight to 14 points, which can make a significant difference in your health risks.  Because the DASH diet is a healthy way of eating, it offers health benefits besides just lowering blood pressure. The DASH diet is also in line with dietary recommendations to prevent osteoporosis, cancer, heart disease, stroke and diabetes.  The DASH diet emphasizes vegetables, fruits and low-fat dairy foods -- and moderate amounts of whole grains, fish, poultry and nuts.  In addition to the standard DASH diet, there is also a lower sodium version of the diet. You can choose the version of the diet that meets your health needs:  Standard DASH diet. You can consume up to 2,300 milligrams (mg) of sodium a day.   Lower sodium DASH diet. You can consume up to 1,500 mg of sodium a day.  Both versions of the DASH diet aim to reduce the amount of sodium in your diet compared with what you might get in a typical American diet, which can amount to a whopping 3,400 mg of sodium a day or more.  The standard DASH diet meets the recommendation from the Dietary Guidelines for Americans to keep  "daily sodium intake to less than 2,300 mg a day.  The American Heart Association recommends 1,500 mg a day of sodium as an upper limit for all adults. If you aren't sure what sodium level is right for you, talk to your doctor.  Both versions of the DASH diet include lots of whole grains, fruits, vegetables and low-fat dairy products. The DASH diet also includes some fish, poultry and legumes, and encourages a small amount of nuts and seeds a few times a week.   You can eat red meat, sweets and fats in small amounts. The DASH diet is low in saturated fat, cholesterol and total fat.  Here's a look at the recommended servings from each food group for the [id number] DASH diet.  Grains: 6 to 8 servings a day  Grains include bread, cereal, rice and pasta. Examples of one serving of grains include 1 slice whole-wheat bread, 1 ounce dry cereal, or 1/2 cup cooked cereal, rice or pasta.  Focus on whole grains because they have more fiber and nutrients than do refined grains. For instance, use brown rice instead of white rice, whole-wheat pasta instead of regular pasta and whole-grain bread instead of white bread. Look for products labeled "100 percent whole grain" or "100 percent whole wheat."   Grains are naturally low in fat. Keep them this way by avoiding butter, cream and cheese sauces.  Vegetables: 4 to 5 servings a day  Tomatoes, carrots, broccoli, sweet potatoes, greens and other vegetables are full of fiber, vitamins, and such minerals as potassium and magnesium. Examples of one serving include 1 cup raw leafy green vegetables or 1/2 cup cut-up raw or cooked vegetables.  Don't think of vegetables only as side dishes -- a hearty blend of vegetables served over brown rice or whole-wheat noodles can serve as the main dish for a meal.   Fresh and frozen vegetables are both good choices. When buying frozen and canned vegetables, choose those labeled as low sodium or without added salt.   To increase the number of " servings you fit in daily, be creative. In a stir-pérez, for instance, cut the amount of meat in half and double up on the vegetables.  Fruits: 4 to 5 servings a day  Many fruits need little preparation to become a healthy part of a meal or snack. Like vegetables, they're packed with fiber, potassium and magnesium and are typically low in fat -- coconuts are an exception. Examples of one serving include one medium fruit, 1/2 cup fresh, frozen or canned fruit, or 4 ounces of juice.  Have a piece of fruit with meals and one as a snack, then round out your day with a dessert of fresh fruits topped with a dollop of low-fat yogurt.   Leave on edible peels whenever possible. The peels of apples, pears and most fruits with pits add interesting texture to recipes and contain healthy nutrients and fiber.   Remember that citrus fruits and juices, such as grapefruit, can interact with certain medications, so check with your doctor or pharmacist to see if they're OK for you.   If you choose canned fruit or juice, make sure no sugar is added.  Dairy: 2 to 3 servings a day  Milk, yogurt, cheese and other dairy products are major sources of calcium, vitamin D and protein. But the key is to make sure that you choose dairy products that are low fat or fat-free because otherwise they can be a major source of fat -- and most of it is saturated. Examples of one serving include 1 cup skim or 1 percent milk, 1 cup low fat yogurt, or 1 1/2 ounces part-skim cheese.  Low-fat or fat-free frozen yogurt can help you boost the amount of dairy products you eat while offering a sweet treat. Add fruit for a healthy twist.   If you have trouble digesting dairy products, choose lactose-free products or consider taking an over-the-counter product that contains the enzyme lactase, which can reduce or prevent the symptoms of lactose intolerance.   Go easy on regular and even fat-free cheeses because they are typically high in sodium.  Lean meat, poultry  and fish: 6 servings or fewer a day  Meat can be a rich source of protein, B vitamins, iron and zinc. Choose lean varieties and aim for no more than 6 ounces a day. Cutting back on your meat portion will allow room for more vegetables.  Trim away skin and fat from poultry and meat and then bake, broil, grill or roast instead of frying in fat.   Eat heart-healthy fish, such as salmon, herring and tuna. These types of fish are high in omega-3 fatty acids, which can help lower your total cholesterol.  Nuts, seeds and legumes: 4 to 5 servings a week  Almonds, sunflower seeds, kidney beans, peas, lentils and other foods in this family are good sources of magnesium, potassium and protein. They're also full of fiber and phytochemicals, which are plant compounds that may protect against some cancers and cardiovascular disease.  Serving sizes are small and are intended to be consumed only a few times a week because these foods are high in calories. Examples of one serving include 1/3 cup nuts, 2 tablespoons seeds, or 1/2 cup cooked beans or peas.   Nuts sometimes get a bad rap because of their fat content, but they contain healthy types of fat -- monounsaturated fat and omega-3 fatty acids. They're high in calories, however, so eat them in moderation. Try adding them to stir-fries, salads or cereals.   Soybean-based products, such as tofu and tempeh, can be a good alternative to meat because they contain all of the amino acids your body needs to make a complete protein, just like meat.  Fats and oils: 2 to 3 servings a day  Fat helps your body absorb essential vitamins and helps your body's immune system. But too much fat increases your risk of heart disease, diabetes and obesity. The DASH diet strives for a healthy balance by limiting total fat to less than 30 percent of daily calories from fat, with a focus on the healthier monounsaturated fats.  Examples of one serving include 1 teaspoon soft margarine, 1 tablespoon  mayonnaise or 2 tablespoons salad dressing.  Saturated fat and trans fat are the main dietary culprits in increasing your risk of coronary artery disease. DASH helps keep your daily saturated fat to less than 6 percent of your total calories by limiting use of meat, butter, cheese, whole milk, cream and eggs in your diet, along with foods made from lard, solid shortenings, and palm and coconut oils.   Avoid trans fat, commonly found in such processed foods as crackers, baked goods and fried items.   Read food labels on margarine and salad dressing so that you can choose those that are lowest in saturated fat and free of trans fat.  Sweets: 5 servings or fewer a week  You don't have to banish sweets entirely while following the DASH diet -- just go easy on them. Examples of one serving include 1 tablespoon sugar, jelly or jam, 1/2 cup sorbet, or 1 cup lemonade.  When you eat sweets, choose those that are fat-free or low-fat, such as sorbets, fruit ices, jelly beans, hard candy, ran crackers or low-fat cookies.   Artificial sweeteners such as aspartame (NutraSweet, Equal) and sucralose (Splenda) may help satisfy your sweet tooth while sparing the sugar. But remember that you still must use them sensibly. It's OK to swap a diet cola for a regular cola, but not in place of a more nutritious beverage such as low-fat milk or even plain water.   Cut back on added sugar, which has no nutritional value but can pack on calories.  Drinking too much alcohol can increase blood pressure. The Dietary Guidelines for Americans recommends that men limit alcohol to no more than two drinks a day and women to one or less.  The DASH diet doesn't address caffeine consumption. The influence of caffeine on blood pressure remains unclear. But caffeine can cause your blood pressure to rise at least temporarily. If you already have high blood pressure or if you think caffeine is affecting your blood pressure, talk to your doctor about your  "caffeine consumption.  While the DASH diet is not a weight-loss program, you may indeed lose unwanted pounds because it can help guide you toward healthier food choices.  The DASH diet generally includes about 2,000 calories a day. If you're trying to lose weight, you may need to eat fewer calories. You may also need to adjust your serving goals based on your individual circumstances -- something your health care team can help you decide.  The foods at the core of the DASH diet are naturally low in sodium. So just by following the DASH diet, you're likely to reduce your sodium intake. You also reduce sodium further by:  Using sodium-free spices or flavorings with your food instead of salt   Not adding salt when cooking rice, pasta or hot cereal   Rinsing canned foods to remove some of the sodium   Buying foods labeled "no salt added," "sodium-free," "low sodium" or "very low sodium"  One teaspoon of table salt has 2,325 mg of sodium. When you read food labels, you may be surprised at just how much sodium some processed foods contain. Even low-fat soups, canned vegetables, ready-to-eat cereals and sliced turkey from the local deli -- foods you may have considered healthy -- often have lots of sodium.  You may notice a difference in taste when you choose low-sodium food and beverages. If things seem too bland, gradually introduce low-sodium foods and cut back on table salt until you reach your sodium goal. That'll give your palate time to adjust.  Using salt-free seasoning blends or herbs and spices may also ease the transition. It can take several weeks for your taste buds to get used to less salty foods.  Try these strategies to get started on the DASH diet:   Change gradually. If you now eat only one or two servings of fruits or vegetables a day, try to add a serving at lunch and one at dinner. Rather than switching to all whole grains, start by making one or two of your grain servings whole grains. Increasing " fruits, vegetables and whole grains gradually can also help prevent bloating or diarrhea that may occur if you aren't used to eating a diet with lots of fiber. You can also try over-the-counter products to help reduce gas from beans and vegetables.   Reward successes and forgive slip-ups. Reward yourself with a nonfood treat for your accomplishments -- rent a movie, purchase a book or get together with a friend. Everyone slips, especially when learning something new. Remember that changing your lifestyle is a long-term process. Find out what triggered your setback and then just  where you left off with the DASH diet.   Add physical activity. To boost your blood pressure lowering efforts even more, consider increasing your physical activity in addition to following the DASH diet. Combining both the DASH diet and physical activity makes it more likely that you'll reduce your blood pressure.   Get support if you need it. If you're having trouble sticking to your diet, talk to your doctor or dietitian about it. You might get some tips that will help you stick to the DASH diet.  Remember, healthy eating isn't an all-or-nothing proposition. What's most important is that, on average, you eat healthier foods with plenty of variety -- both to keep your diet nutritious and to avoid boredom or extremes. And with the DASH diet, you can have both.

## 2024-06-11 ENCOUNTER — PATIENT MESSAGE (OUTPATIENT)
Dept: ADMINISTRATIVE | Facility: HOSPITAL | Age: 74
End: 2024-06-11
Payer: MEDICARE

## 2024-06-11 NOTE — PROGRESS NOTES
Subjective:       Patient ID: Erica Marsh is a 73 y.o. female.    Chief Complaint: Follow-up (Medications 90 day except creon and estradiol. Evista shot)    She has fibromyalgia adrenal insufficient and poor eating habits. with DD of lumbar spine.  She has generalized chronic pain.  She has chronic Major depression with partial results with medications .  She has improve will continue to have general fatigue.       Hypoglycemia post Gastric bypass, much improved.  She has controlled blood blood pressure, the blood sugar has improved.  He has gained approximately 5 lb within last 3 months.  BMI 26. SLE under Rheumatology, needs eye and rheumatology follow up  Fibromyalgia under Cymabalta with partial improvement.  New OB intake, healthy patient, no unusual issues, see progress notes and intake smartforms.   Active Diagnosis Review (HCC)     Chronic             HCC 18 - Diabetes with Chronic Complications     Type 2 diabetes mellitus with other skin complications [E11.628]     HCC 23 - Other Significant Endocrine and Metabolic Disorders     Adrenal insufficiency [E27.40]     HCC 40 - Rheumatoid Arthritis and Inflammatory Connective Tissue Disease       Ankylosing spondylitis of thoracolumbar region [M45.5]     Lupus (systemic lupus erythematosus) [M32.9]     Other systemic lupus erythematosus with lung involvement [M32.13]     HCC 59 - Major Depressive, Bipolar, and Paranoid Disorders     Recurrent major depression resistant to treatment [F33.9]      - Fibrosis of Lung and Other Chronic Lung Disorders     Other systemic lupus erythematosus with lung involvement [M32.13]      - Chronic Kidney Disease, Moderate (Stage 3)     Stage 3a chronic kidney disease [N18.31]          Acute             HCC 55 - Substance Use Disorder, Moderate/Severe, or Substance Use with   Complications     Opioid dependence, uncomplicated [F11.20]                  Follow-up  This is a chronic (Headaches. tension, DGD,  fibromyalgia and rebound headaches.) problem. The problem occurs constantly. The problem has been gradually worsening. Associated symptoms include arthralgias, chest pain, fatigue, a fever, myalgias, neck pain and weakness. Pertinent negatives include no abdominal pain, chills, coughing, diaphoresis, headaches, numbness or rash.   Back Pain  This is a chronic problem. The problem occurs intermittently. The problem has been gradually worsening since onset. The pain is present in the thoracic spine. The pain is at a severity of 7/10. The pain is moderate. The pain is Worse during the night. The symptoms are aggravated by lying down. Associated symptoms include chest pain, a fever, leg pain and weakness. Pertinent negatives include no abdominal pain, bladder incontinence, bowel incontinence, dysuria, headaches, numbness or pelvic pain.   Leg Pain   Pertinent negatives include no numbness.     Review of Systems   Constitutional:  Positive for fatigue and fever. Negative for activity change, appetite change, chills and diaphoresis.   HENT: Negative.  Negative for facial swelling, hearing loss, nosebleeds, postnasal drip, sneezing and trouble swallowing.         Painful right mandibular gum, recurrent gum infections it multiple episodes of losing teeth.   Eyes:  Positive for visual disturbance. Negative for photophobia, pain, discharge, redness and itching.   Respiratory:  Negative for apnea, cough, chest tightness, shortness of breath and wheezing.    Cardiovascular:  Positive for chest pain. Negative for palpitations and leg swelling.   Gastrointestinal: Negative.  Negative for abdominal distention, abdominal pain, anal bleeding, blood in stool, bowel incontinence and diarrhea.   Endocrine: Negative.  Negative for cold intolerance, heat intolerance, polydipsia, polyphagia and polyuria.   Genitourinary:  Positive for difficulty urinating and frequency. Negative for bladder incontinence, dysuria, genital sores, hematuria,  pelvic pain, urgency, vaginal bleeding and vaginal discharge.   Musculoskeletal:  Positive for arthralgias, back pain, myalgias and neck pain. Negative for gait problem and neck stiffness.   Skin: Negative.  Negative for color change, pallor and rash.   Allergic/Immunologic: Negative.  Negative for environmental allergies and food allergies.   Neurological:  Positive for tremors, weakness and light-headedness. Negative for dizziness, seizures, syncope, speech difficulty, numbness and headaches.   Hematological:  Negative for adenopathy.   Psychiatric/Behavioral:  Positive for decreased concentration. Negative for agitation, behavioral problems, confusion, hallucinations, self-injury and sleep disturbance. The patient is nervous/anxious. The patient is not hyperactive.        Patient Active Problem List   Diagnosis    Lupus (systemic lupus erythematosus)    GERD (gastroesophageal reflux disease)    Arthritis    Trigger finger    Abdominal pain, other specified site    Constipation    Change in bowel habits    Rectal bleeding    Hypoglycemia following gastrointestinal surgery    Depression with anxiety    Other spondylosis, lumbar region    Sinus bradycardia    DDD (degenerative disc disease), thoracic    Palpitations    Chest pain, atypical    HUGHES (dyspnea on exertion)    Adrenal insufficiency    Recurrent major depression resistant to treatment    Acquired hypothyroidism    Current chronic use of systemic steroids    H/O gastric bypass    Difficulty walking    Muscle weakness    Type 2 diabetes mellitus with other skin complications    Other systemic lupus erythematosus with lung involvement    Mixed incontinence    Atrophic vaginitis    Cervical radiculopathy    Cervical spondylosis    DDD (degenerative disc disease), cervical    Fibromyalgia    Adrenal insufficiency (Blackford's disease)    Ankylosing spondylitis of thoracolumbar region    Opioid dependence with intoxication  delirium    Stage 3a chronic kidney disease    Severe protein-calorie malnutrition    Drug-induced immunodeficiency    Age-related osteoporosis without current pathological fracture       Objective:      Physical Exam  Vitals reviewed.   Constitutional:       Appearance: She is well-developed. She is ill-appearing. She is not diaphoretic.   HENT:      Head: Normocephalic and atraumatic.      Right Ear: External ear normal. Decreased hearing noted.      Left Ear: External ear normal. Decreased hearing noted.      Nose: Nose normal.      Mouth/Throat:      Pharynx: No oropharyngeal exudate.   Eyes:      General: No scleral icterus.        Right eye: No discharge.         Left eye: No discharge.      Conjunctiva/sclera: Conjunctivae normal.      Pupils: Pupils are equal, round, and reactive to light.   Neck:      Thyroid: No thyromegaly.      Vascular: No JVD.      Trachea: No tracheal deviation.   Cardiovascular:      Rate and Rhythm: Normal rate.      Heart sounds: Normal heart sounds. No murmur heard.     No friction rub. No gallop.   Pulmonary:      Effort: Pulmonary effort is normal. No respiratory distress.      Breath sounds: No stridor. No wheezing or rales.   Chest:      Chest wall: No tenderness.   Abdominal:      General: Bowel sounds are normal. There is no distension.      Palpations: Abdomen is soft. There is no mass.      Tenderness: There is no abdominal tenderness. There is no guarding or rebound.   Musculoskeletal:      Right shoulder: Tenderness present. Decreased range of motion.      Cervical back: Normal range of motion and neck supple.      Thoracic back: Tenderness and bony tenderness present. Decreased range of motion.      Lumbar back: Tenderness and bony tenderness present. Decreased range of motion.      Comments: She has progressive hyperesthesia, mild scoliosis, mild DDD, cervical and lumbar radiculopathy. Hx of Herpes. Poor anxiety controlled, poor exercise , poor family support.    Lymphadenopathy:      Cervical: No cervical adenopathy.   Skin:     General: Skin is dry.      Coloration: Skin is not pale.      Findings: No erythema or rash.   Neurological:      Mental Status: She is alert and oriented to person, place, and time.      Cranial Nerves: No cranial nerve deficit.      Motor: No abnormal muscle tone.      Coordination: Coordination normal.      Deep Tendon Reflexes: Reflexes normal.   Psychiatric:         Attention and Perception: Perception normal. She is attentive.         Mood and Affect: Mood is not depressed or elated. Affect is flat. Affect is not blunt.         Speech: Speech normal.         Behavior: Behavior is slowed and withdrawn.         Thought Content: Thought content normal.         Cognition and Memory: Cognition normal.         Judgment: Judgment normal.       Lab Results   Component Value Date    WBC 7.36 06/10/2024    HGB 13.2 06/10/2024    HCT 39.8 06/10/2024     06/10/2024    CHOL 233 (H) 01/12/2024    TRIG 114 01/12/2024    HDL 97 (H) 01/12/2024    ALT 19 06/10/2024    AST 26 06/10/2024     06/10/2024    K 4.5 06/10/2024     06/10/2024    CREATININE 1.1 06/10/2024    BUN 18 06/10/2024    CO2 22 (L) 06/10/2024    TSH 0.164 (L) 05/24/2024    INR 1.0 05/08/2020    HGBA1C 5.1 05/24/2024     The 10-year ASCVD risk score (Benjamín MINER, et al., 2019) is: 13%    Values used to calculate the score:      Age: 73 years      Sex: Female      Is Non- : No      Diabetic: Yes      Tobacco smoker: No      Systolic Blood Pressure: 98 mmHg      Is BP treated: No      HDL Cholesterol: 97 mg/dL      Total Cholesterol: 233 mg/dL  Point of care blood sugar 110. Patient compliant with diet on monitoring her blood sugars.  Chronic prior myalgia pain does not qualify for opiates.   Assessment:       1. Age-related osteoporosis without current pathological fracture    2. Sciatic neuropathy, unspecified laterality    3. Systemic lupus  erythematosus, unspecified SLE type, unspecified organ involvement status    4. Ankylosing spondylitis of thoracolumbar region    5. Recurrent major depression resistant to treatment    6. Ankylosis of lumbar spine    7. Chronic low back pain with bilateral sciatica, unspecified back pain laterality    8. Chronic fatigue syndrome with fibromyalgia    9. Iron deficiency anemia secondary to inadequate dietary iron intake        Plan:       Thyroid disease  -  Iron deficiency anemia  Erica was seen today for follow-up.    Diagnoses and all orders for this visit:    Age-related osteoporosis without current pathological fracture    Sciatic neuropathy, unspecified laterality  -     clonazePAM (KLONOPIN) 1 MG tablet; Take 1 tablet (1 mg total) by mouth 2 (two) times daily as needed for Anxiety.    Systemic lupus erythematosus, unspecified SLE type, unspecified organ involvement status  -     clonazePAM (KLONOPIN) 1 MG tablet; Take 1 tablet (1 mg total) by mouth 2 (two) times daily as needed for Anxiety.  -     COMPREHENSIVE METABOLIC PANEL; Future  -     Sedimentation rate; Future  -     C-REACTIVE PROTEIN; Future    Ankylosing spondylitis of thoracolumbar region  -     clonazePAM (KLONOPIN) 1 MG tablet; Take 1 tablet (1 mg total) by mouth 2 (two) times daily as needed for Anxiety.  -     HYDROcodone-acetaminophen (NORCO) 7.5-325 mg per tablet; Take 1 tablet by mouth every 8 (eight) hours as needed for Pain.  -     HYDROcodone-acetaminophen (NORCO) 7.5-325 mg per tablet; Take 1 tablet by mouth every 12 (twelve) hours as needed for Pain.    Recurrent major depression resistant to treatment  -     clonazePAM (KLONOPIN) 1 MG tablet; Take 1 tablet (1 mg total) by mouth 2 (two) times daily as needed for Anxiety.    Ankylosis of lumbar spine  -     clonazePAM (KLONOPIN) 1 MG tablet; Take 1 tablet (1 mg total) by mouth 2 (two) times daily as needed for Anxiety.  -     hydroxychloroquine (PLAQUENIL) 200 mg tablet; Take 1 tablet  (200 mg total) by mouth 2 (two) times daily.    Chronic low back pain with bilateral sciatica, unspecified back pain laterality  -     clonazePAM (KLONOPIN) 1 MG tablet; Take 1 tablet (1 mg total) by mouth 2 (two) times daily as needed for Anxiety.    Chronic fatigue syndrome with fibromyalgia  -     CBC W/ AUTO DIFFERENTIAL; Future  -     Iron and TIBC; Future  -     Ferritin; Future  -     IN OFFICE EKG 12-LEAD (to Muse)    Iron deficiency anemia secondary to inadequate dietary iron intake  -     CBC W/ AUTO DIFFERENTIAL; Future  -     Iron and TIBC; Future  -     Ferritin; Future    Other orders  -     denosumab (PROLIA) injection 60 mg        Ankylosis of lumbar spine  -     clonazePAM (KLONOPIN) 1 MG tablet; TAKE 1 TABLET BY MOUTH TWICE DAILY FOR ANXIETY  Dispense: 60 tablet; Refill: 3  -     LIDOcaine (LIDODERM) 5 %; Remove & Discard patch within 12 hours or as directed by MD  Dispense: 60 patch; Refill: 4    Review plate method  Review foods in home  Discuss food labels  Refer to exercise program  Assist with medical visit preparation  Review patient results  Review patient education about results  Review chronic disease interventions (see specific disease intervention)  Assess knowledge level  Provide and discuss information/education material  Encourage communication with physician  Monitor compliance  Educate on risk of non-compliance  Give food and resource list  Review patient education material (see patient instructions)  Review patient education material (see patient instructions)  Refer to exercise program  Review stress management techniques  Review patient education material (see patient instructions)  Assess support system  Discuss stress management techniques  Review normal ranges for labs    Ataxia due to recent stroke  -     gabapentin (NEURONTIN) 300 MG capsule; Take 1 capsule (300 mg total) by mouth every evening. TAKE 1 CAPSULE IN THE MORNING  AND TAKE 2 CAPSULES IN THE EVENING  Dispense: 90  capsule; Refill: 3    Neurological deficit present  -     gabapentin (NEURONTIN) 300 MG capsule; Take 1 capsule (300 mg total) by mouth every evening. TAKE 1 CAPSULE IN THE MORNING  AND TAKE 2 CAPSULES IN THE EVENING  Dispense: 90 capsule; Refill: 3    Delirium due to dissociative drug  -     gabapentin (NEURONTIN) 300 MG capsule; Take 1 capsule (300 mg total) by mouth every evening. TAKE 1 CAPSULE IN THE MORNING  AND TAKE 2 CAPSULES IN THE EVENING  Dispense: 90 capsule; Refill: 3    Opioid dependence with intoxication delirium  -     gabapentin (NEURONTIN) 300 MG capsule; Take 1 capsule (300 mg total) by mouth every evening. TAKE 1 CAPSULE IN THE MORNING  AND TAKE 2 CAPSULES IN THE EVENING  Dispense: 90 capsule; Refill: 3    Chronic seasonal allergic rhinitis due to pollen  -     fluticasone propionate (FLONASE) 50 mcg/actuation nasal spray; USE 1 SPRAY IN EACH NOSTRIL TWICE DAILY  Dispense: 48 g; Refill: 0    Body mass index (BMI) 31.0-31.9, adult   -     Vitamin D; Future; Expected date: 07/30/2021    Discontinue Celexa due to high risk of serotonin, fatigue, poor concentration, and decreased alertness.    There have been some probable medication, dietary and lifestyle compliance issues here. I have discussed with her the great importance of following the treatment plan exactly as directed in order to achieve a good medical outcome.    Patient readiness: acceptance and barriers:poor sleep habits    During the course of the visit the patient was educated and counseled about the following:     Underweight:  Nutritional supplements    Goals: Underweight: Increase calorie intake and BMI      Did patient meet goals/outcomes: no  She needs more physical activities    Discussed health maintenance guidelines appropriate for age.  Discussed health maintenance guidelines appropriate for age.  Discussed health maintenance guidelines appropriate for age.  Discussed health maintenance guidelines appropriate for age.   Discussed health maintenance guidelines appropriate for age.

## 2024-06-12 DIAGNOSIS — Z12.11 SCREENING FOR COLON CANCER: ICD-10-CM

## 2024-07-11 RX ORDER — METHOCARBAMOL 750 MG/1
TABLET, FILM COATED ORAL
Qty: 30 TABLET | Refills: 0 | Status: SHIPPED | OUTPATIENT
Start: 2024-07-11

## 2024-07-19 ENCOUNTER — PATIENT MESSAGE (OUTPATIENT)
Dept: PRIMARY CARE CLINIC | Facility: CLINIC | Age: 74
End: 2024-07-19
Payer: MEDICARE

## 2024-07-19 DIAGNOSIS — E11.628 TYPE 2 DIABETES MELLITUS WITH OTHER SKIN COMPLICATIONS: Primary | ICD-10-CM

## 2024-07-24 DIAGNOSIS — E11.9 TYPE 2 DIABETES MELLITUS WITHOUT COMPLICATION: ICD-10-CM

## 2024-08-01 ENCOUNTER — PATIENT MESSAGE (OUTPATIENT)
Dept: PRIMARY CARE CLINIC | Facility: CLINIC | Age: 74
End: 2024-08-01
Payer: MEDICARE

## 2024-08-01 DIAGNOSIS — M43.26 ANKYLOSIS OF LUMBAR SPINE: ICD-10-CM

## 2024-08-01 DIAGNOSIS — M45.5 ANKYLOSING SPONDYLITIS OF THORACOLUMBAR REGION: ICD-10-CM

## 2024-08-01 DIAGNOSIS — E79.0 HYPERURICEMIA: ICD-10-CM

## 2024-08-01 RX ORDER — ALLOPURINOL 100 MG/1
TABLET ORAL
Qty: 90 TABLET | Refills: 0 | OUTPATIENT
Start: 2024-08-01

## 2024-08-01 RX ORDER — MIRABEGRON 50 MG/1
TABLET, EXTENDED RELEASE ORAL
Qty: 90 TABLET | Refills: 0 | OUTPATIENT
Start: 2024-08-01

## 2024-08-01 RX ORDER — LEVOTHYROXINE SODIUM 88 UG/1
TABLET ORAL
Qty: 90 TABLET | Refills: 0 | OUTPATIENT
Start: 2024-08-01

## 2024-08-01 RX ORDER — BACLOFEN 10 MG/1
TABLET ORAL
Qty: 90 TABLET | Refills: 0 | OUTPATIENT
Start: 2024-08-01

## 2024-08-01 RX ORDER — HYDROXYCHLOROQUINE SULFATE 200 MG/1
TABLET, FILM COATED ORAL
Qty: 180 TABLET | Refills: 0 | OUTPATIENT
Start: 2024-08-01

## 2024-08-01 NOTE — TELEPHONE ENCOUNTER
No care due was identified.  [facility] Embedded Care Due Messages. Reference number: [id number].   8/01/2024 4:27:13 PM CDT

## 2024-08-02 ENCOUNTER — PATIENT MESSAGE (OUTPATIENT)
Dept: PRIMARY CARE CLINIC | Facility: CLINIC | Age: 74
End: 2024-08-02
Payer: MEDICARE

## 2024-08-02 RX ORDER — BACLOFEN 10 MG/1
10 TABLET ORAL NIGHTLY
Qty: 90 TABLET | Refills: 3 | OUTPATIENT
Start: 2024-08-02

## 2024-08-02 RX ORDER — LEVOTHYROXINE SODIUM 88 UG/1
88 TABLET ORAL
Qty: 90 TABLET | Refills: 3 | Status: SHIPPED | OUTPATIENT
Start: 2024-08-02

## 2024-08-02 RX ORDER — METHOCARBAMOL 750 MG/1
TABLET, FILM COATED ORAL
Qty: 30 TABLET | Refills: 0 | Status: SHIPPED | OUTPATIENT
Start: 2024-08-02

## 2024-08-02 RX ORDER — HYDROXYCHLOROQUINE SULFATE 200 MG/1
200 TABLET, FILM COATED ORAL 2 TIMES DAILY
Qty: 180 TABLET | Refills: 4 | Status: SHIPPED | OUTPATIENT
Start: 2024-08-02 | End: 2025-10-26

## 2024-08-02 RX ORDER — ALLOPURINOL 100 MG/1
100 TABLET ORAL DAILY
Qty: 90 TABLET | Refills: 3 | Status: SHIPPED | OUTPATIENT
Start: 2024-08-02

## 2024-08-02 NOTE — TELEPHONE ENCOUNTER
Refill Decision Note   Erica Marsh  is requesting a refill authorization.  Brief Assessment and Rationale for Refill:  Quick Discontinue     Medication Therapy Plan: Pharmacy is requesting new scripts for the following medications without required information, (sig/ frequency/qty/etc)      Comments:     Note composed:10:50 PM 08/01/2024

## 2024-08-02 NOTE — TELEPHONE ENCOUNTER
Refill Routing Note   Medication(s) are not appropriate for processing by [facility] for the following reason(s):        No active prescription written by provider: Synthroid  Required labs outdated: Zyloprim  Outside of protocol: Plaquenil, Baclofen, Robaxin    [facility] action(s):  Defer  Route        Medication Therapy Plan: Levothyroxine Last ordered: 3 months ago (4/5/2024) by Daniel Rodriguez NP      Appointments  past 12m or future 3m with PCP    Date Provider   Last Visit   6/10/2024 Jose Guadalupe Ornelas MD   Next Visit   10/7/2024 Jose Guadalupe Ornelas MD   ED visits in past 90 days: 0        Note composed:11:04 PM 08/01/2024

## 2024-08-05 RX ORDER — MIRABEGRON 50 MG/1
1 TABLET, EXTENDED RELEASE ORAL NIGHTLY
Qty: 90 TABLET | Refills: 0 | Status: SHIPPED | OUTPATIENT
Start: 2024-08-05

## 2024-08-07 RX ORDER — LEVOTHYROXINE SODIUM 88 UG/1
TABLET ORAL
Refills: 0 | OUTPATIENT
Start: 2024-08-07

## 2024-09-19 DIAGNOSIS — E43 SEVERE PROTEIN-CALORIE MALNUTRITION: ICD-10-CM

## 2024-09-19 DIAGNOSIS — M45.5 ANKYLOSING SPONDYLITIS OF THORACOLUMBAR REGION: ICD-10-CM

## 2024-09-20 RX ORDER — CYANOCOBALAMIN 1000 UG/ML
1000 INJECTION, SOLUTION INTRAMUSCULAR; SUBCUTANEOUS
Qty: 3 ML | Refills: 3 | Status: SHIPPED | OUTPATIENT
Start: 2024-09-20

## 2024-09-20 NOTE — TELEPHONE ENCOUNTER
Refill Routing Note   Medication(s) are not appropriate for processing by [facility] for the following reason(s):        Outside of protocol    [facility] action(s):  Route             Appointments  past 12m or future 3m with PCP    Date Provider   Last Visit   6/10/2024 Jose Guadalupe Ornelas MD   Next Visit   11/27/2024 Jose Guadalupe Ornelas MD   ED visits in past 90 days: 0        Note composed:7:41 AM 09/20/2024

## 2024-11-21 ENCOUNTER — PATIENT MESSAGE (OUTPATIENT)
Dept: PRIMARY CARE CLINIC | Facility: CLINIC | Age: 74
End: 2024-11-21
Payer: MEDICARE

## 2024-11-21 DIAGNOSIS — M43.26 ANKYLOSIS OF LUMBAR SPINE: ICD-10-CM

## 2024-11-21 DIAGNOSIS — M45.5 ANKYLOSING SPONDYLITIS OF THORACOLUMBAR REGION: ICD-10-CM

## 2024-11-21 RX ORDER — DICLOFENAC SODIUM 10 MG/G
2 GEL TOPICAL 3 TIMES DAILY PRN
Qty: 1 G | Refills: 11 | Status: SHIPPED | OUTPATIENT
Start: 2024-11-21

## 2024-11-22 NOTE — TELEPHONE ENCOUNTER
Refill Routing Note   Medication(s) are not appropriate for processing by [facility] for the following reason(s):        Outside of protocol    [facility] action(s):  Route             Appointments  past 12m or future 3m with PCP    Date Provider   Last Visit   6/10/2024 Jose Guadalupe Ornelas MD   Next Visit   11/27/2024 Jose Guadalupe Ornelas MD   ED visits in past 90 days: 0        Note composed:11:55 PM 11/21/2024

## 2024-11-24 RX ORDER — HYDROXYCHLOROQUINE SULFATE 200 MG/1
200 TABLET, FILM COATED ORAL 2 TIMES DAILY
Qty: 180 TABLET | Refills: 4 | Status: SHIPPED | OUTPATIENT
Start: 2024-11-24 | End: 2024-11-27 | Stop reason: SDUPTHER

## 2024-11-24 RX ORDER — GABAPENTIN 600 MG/1
600 TABLET ORAL 3 TIMES DAILY
Qty: 270 TABLET | Refills: 3 | Status: SHIPPED | OUTPATIENT
Start: 2024-11-24 | End: 2024-11-27 | Stop reason: SDUPTHER

## 2024-11-24 RX ORDER — HYDROCODONE BITARTRATE AND ACETAMINOPHEN 7.5; 325 MG/1; MG/1
1 TABLET ORAL EVERY 8 HOURS PRN
Qty: 28 TABLET | Refills: 0 | Status: SHIPPED | OUTPATIENT
Start: 2024-11-24

## 2024-11-24 RX ORDER — METHOCARBAMOL 750 MG/1
750 TABLET, FILM COATED ORAL 3 TIMES DAILY
Qty: 30 TABLET | Refills: 2 | Status: SHIPPED | OUTPATIENT
Start: 2024-11-24 | End: 2024-11-27 | Stop reason: SDUPTHER

## 2024-11-27 ENCOUNTER — OFFICE VISIT (OUTPATIENT)
Dept: PRIMARY CARE CLINIC | Facility: CLINIC | Age: 74
End: 2024-11-27
Payer: MEDICARE

## 2024-11-27 ENCOUNTER — LAB VISIT (OUTPATIENT)
Dept: LAB | Facility: HOSPITAL | Age: 74
End: 2024-11-27
Attending: FAMILY MEDICINE
Payer: MEDICARE

## 2024-11-27 VITALS
OXYGEN SATURATION: 98 % | HEART RATE: 60 BPM | WEIGHT: 144.31 LBS | SYSTOLIC BLOOD PRESSURE: 112 MMHG | BODY MASS INDEX: 22.65 KG/M2 | HEIGHT: 67 IN | DIASTOLIC BLOOD PRESSURE: 60 MMHG

## 2024-11-27 DIAGNOSIS — M32.9 SYSTEMIC LUPUS ERYTHEMATOSUS, UNSPECIFIED SLE TYPE, UNSPECIFIED ORGAN INVOLVEMENT STATUS: ICD-10-CM

## 2024-11-27 DIAGNOSIS — F33.9 RECURRENT MAJOR DEPRESSION RESISTANT TO TREATMENT: ICD-10-CM

## 2024-11-27 DIAGNOSIS — M54.41 CHRONIC LOW BACK PAIN WITH BILATERAL SCIATICA, UNSPECIFIED BACK PAIN LATERALITY: ICD-10-CM

## 2024-11-27 DIAGNOSIS — M54.42 CHRONIC LOW BACK PAIN WITH BILATERAL SCIATICA, UNSPECIFIED BACK PAIN LATERALITY: ICD-10-CM

## 2024-11-27 DIAGNOSIS — M45.5 ANKYLOSING SPONDYLITIS OF THORACOLUMBAR REGION: ICD-10-CM

## 2024-11-27 DIAGNOSIS — G57.00 SCIATIC NEUROPATHY, UNSPECIFIED LATERALITY: ICD-10-CM

## 2024-11-27 DIAGNOSIS — J44.9 COPD MIXED TYPE: ICD-10-CM

## 2024-11-27 DIAGNOSIS — K86.89 PANCREATIC INSUFFICIENCY: ICD-10-CM

## 2024-11-27 DIAGNOSIS — H81.313 VERTIGO, AURAL, BILATERAL: Primary | ICD-10-CM

## 2024-11-27 DIAGNOSIS — G89.29 CHRONIC LOW BACK PAIN WITH BILATERAL SCIATICA, UNSPECIFIED BACK PAIN LATERALITY: ICD-10-CM

## 2024-11-27 DIAGNOSIS — M43.26 ANKYLOSIS OF LUMBAR SPINE: ICD-10-CM

## 2024-11-27 LAB
ALBUMIN SERPL BCP-MCNC: 3.6 G/DL (ref 3.5–5.2)
ALP SERPL-CCNC: 65 U/L (ref 40–150)
ALT SERPL W/O P-5'-P-CCNC: 19 U/L (ref 10–44)
ANION GAP SERPL CALC-SCNC: 8 MMOL/L (ref 8–16)
AST SERPL-CCNC: 25 U/L (ref 10–40)
BASOPHILS # BLD AUTO: 0.08 K/UL (ref 0–0.2)
BASOPHILS NFR BLD: 1 % (ref 0–1.9)
BILIRUB SERPL-MCNC: 0.3 MG/DL (ref 0.1–1)
BUN SERPL-MCNC: 21 MG/DL (ref 8–23)
CALCIUM SERPL-MCNC: 9 MG/DL (ref 8.7–10.5)
CHLORIDE SERPL-SCNC: 106 MMOL/L (ref 95–110)
CO2 SERPL-SCNC: 25 MMOL/L (ref 23–29)
CREAT SERPL-MCNC: 1 MG/DL (ref 0.5–1.4)
CRP SERPL-MCNC: <0.3 MG/L (ref 0–8.2)
DIFFERENTIAL METHOD BLD: ABNORMAL
EOSINOPHIL # BLD AUTO: 0.3 K/UL (ref 0–0.5)
EOSINOPHIL NFR BLD: 3.7 % (ref 0–8)
ERYTHROCYTE [DISTWIDTH] IN BLOOD BY AUTOMATED COUNT: 13.3 % (ref 11.5–14.5)
ERYTHROCYTE [SEDIMENTATION RATE] IN BLOOD BY WESTERGREN METHOD: 5 MM/HR (ref 0–20)
EST. GFR  (NO RACE VARIABLE): 59.5 ML/MIN/1.73 M^2
GLUCOSE SERPL-MCNC: 85 MG/DL (ref 70–110)
HCT VFR BLD AUTO: 43 % (ref 37–48.5)
HGB BLD-MCNC: 14 G/DL (ref 12–16)
IMM GRANULOCYTES # BLD AUTO: 0.02 K/UL (ref 0–0.04)
IMM GRANULOCYTES NFR BLD AUTO: 0.3 % (ref 0–0.5)
LYMPHOCYTES # BLD AUTO: 3.4 K/UL (ref 1–4.8)
LYMPHOCYTES NFR BLD: 42.9 % (ref 18–48)
MCH RBC QN AUTO: 32.3 PG (ref 27–31)
MCHC RBC AUTO-ENTMCNC: 32.6 G/DL (ref 32–36)
MCV RBC AUTO: 99 FL (ref 82–98)
MONOCYTES # BLD AUTO: 0.7 K/UL (ref 0.3–1)
MONOCYTES NFR BLD: 9.3 % (ref 4–15)
NEUTROPHILS # BLD AUTO: 3.4 K/UL (ref 1.8–7.7)
NEUTROPHILS NFR BLD: 42.8 % (ref 38–73)
NRBC BLD-RTO: 0 /100 WBC
PLATELET # BLD AUTO: 252 K/UL (ref 150–450)
PMV BLD AUTO: 11.9 FL (ref 9.2–12.9)
POTASSIUM SERPL-SCNC: 4.6 MMOL/L (ref 3.5–5.1)
PROT SERPL-MCNC: 6.5 G/DL (ref 6–8.4)
RBC # BLD AUTO: 4.34 M/UL (ref 4–5.4)
SODIUM SERPL-SCNC: 139 MMOL/L (ref 136–145)
WBC # BLD AUTO: 7.83 K/UL (ref 3.9–12.7)

## 2024-11-27 PROCEDURE — 99999 PR PBB SHADOW E&M-EST. PATIENT-LVL III: CPT | Mod: PBBFAC,HCNC,, | Performed by: FAMILY MEDICINE

## 2024-11-27 PROCEDURE — 85651 RBC SED RATE NONAUTOMATED: CPT | Mod: HCNC,PO | Performed by: FAMILY MEDICINE

## 2024-11-27 PROCEDURE — 80053 COMPREHEN METABOLIC PANEL: CPT | Mod: HCNC | Performed by: FAMILY MEDICINE

## 2024-11-27 PROCEDURE — 86140 C-REACTIVE PROTEIN: CPT | Mod: HCNC | Performed by: FAMILY MEDICINE

## 2024-11-27 PROCEDURE — 85025 COMPLETE CBC W/AUTO DIFF WBC: CPT | Mod: HCNC | Performed by: FAMILY MEDICINE

## 2024-11-27 PROCEDURE — 36415 COLL VENOUS BLD VENIPUNCTURE: CPT | Mod: HCNC,PO | Performed by: FAMILY MEDICINE

## 2024-11-27 RX ORDER — PANCRELIPASE 60000; 12000; 38000 [USP'U]/1; [USP'U]/1; [USP'U]/1
1 CAPSULE, DELAYED RELEASE PELLETS ORAL
Qty: 90 CAPSULE | Refills: 11 | Status: SHIPPED | OUTPATIENT
Start: 2024-11-27 | End: 2025-11-27

## 2024-11-27 RX ORDER — CLONAZEPAM 1 MG/1
1 TABLET ORAL 2 TIMES DAILY PRN
Qty: 60 TABLET | Refills: 2 | Status: SHIPPED | OUTPATIENT
Start: 2024-11-27

## 2024-11-27 RX ORDER — HYDROCODONE BITARTRATE AND ACETAMINOPHEN 7.5; 325 MG/1; MG/1
1 TABLET ORAL EVERY 12 HOURS PRN
Qty: 60 TABLET | Refills: 0 | Status: SHIPPED | OUTPATIENT
Start: 2024-11-27

## 2024-11-27 RX ORDER — DICLOFENAC SODIUM 10 MG/G
4 GEL TOPICAL 3 TIMES DAILY PRN
Qty: 100 G | Refills: 11 | Status: SHIPPED | OUTPATIENT
Start: 2024-11-27

## 2024-11-27 RX ORDER — NAPROXEN 375 MG/1
375 TABLET ORAL 2 TIMES DAILY WITH MEALS
Qty: 60 TABLET | Refills: 4 | Status: SHIPPED | OUTPATIENT
Start: 2024-11-27

## 2024-11-27 RX ORDER — HYDROXYCHLOROQUINE SULFATE 200 MG/1
200 TABLET, FILM COATED ORAL 2 TIMES DAILY
Qty: 180 TABLET | Refills: 4 | Status: SHIPPED | OUTPATIENT
Start: 2024-11-27 | End: 2026-02-20

## 2024-11-27 RX ORDER — METHOCARBAMOL 750 MG/1
750 TABLET, FILM COATED ORAL 3 TIMES DAILY
Qty: 30 TABLET | Refills: 2 | Status: SHIPPED | OUTPATIENT
Start: 2024-11-27

## 2024-11-27 RX ORDER — HYDROCODONE BITARTRATE AND ACETAMINOPHEN 7.5; 325 MG/1; MG/1
1 TABLET ORAL EVERY 12 HOURS PRN
Qty: 60 TABLET | Refills: 0 | Status: SHIPPED | OUTPATIENT
Start: 2024-12-26

## 2024-11-27 RX ORDER — SOLIFENACIN SUCCINATE 10 MG/1
10 TABLET, FILM COATED ORAL DAILY
Qty: 90 TABLET | Refills: 3 | Status: SHIPPED | OUTPATIENT
Start: 2024-11-27 | End: 2025-11-27

## 2024-11-27 RX ORDER — GABAPENTIN 600 MG/1
600 TABLET ORAL 3 TIMES DAILY
Qty: 270 TABLET | Refills: 3 | Status: SHIPPED | OUTPATIENT
Start: 2024-11-27 | End: 2025-11-27

## 2024-11-27 RX ORDER — HYDROCODONE BITARTRATE AND ACETAMINOPHEN 7.5; 325 MG/1; MG/1
1 TABLET ORAL EVERY 12 HOURS PRN
Qty: 60 TABLET | Refills: 0 | Status: SHIPPED | OUTPATIENT
Start: 2025-01-26

## 2024-11-27 RX ORDER — SCOLOPAMINE TRANSDERMAL SYSTEM 1 MG/1
1 PATCH, EXTENDED RELEASE TRANSDERMAL
Qty: 10 PATCH | Refills: 3 | Status: SHIPPED | OUTPATIENT
Start: 2024-11-27

## 2024-11-27 NOTE — PATIENT INSTRUCTIONS
DASH diet for Hypertension and Healthy Eating  Provided by the [facility]    Healthy Lifestyle   Nutrition and healthy eating  The DASH diet emphasizes portion size, eating a variety of foods and getting the right amount of nutrients. Discover how DASH can improve your health and lower your blood pressure.  By [facility] Staff   DASH stands for Dietary Approaches to Stop Hypertension. The DASH diet is a lifelong approach to healthy eating that's designed to help treat or prevent high blood pressure (hypertension). The DASH diet encourages you to reduce the sodium in your diet and eat a variety of foods rich in nutrients that help lower blood pressure, such as potassium, calcium and magnesium.  By following the DASH diet, you may be able to reduce your blood pressure by a few points in just two weeks. Over time, your systolic blood pressure could drop by eight to 14 points, which can make a significant difference in your health risks.  Because the DASH diet is a healthy way of eating, it offers health benefits besides just lowering blood pressure. The DASH diet is also in line with dietary recommendations to prevent osteoporosis, cancer, heart disease, stroke and diabetes.  The DASH diet emphasizes vegetables, fruits and low-fat dairy foods -- and moderate amounts of whole grains, fish, poultry and nuts.  In addition to the standard DASH diet, there is also a lower sodium version of the diet. You can choose the version of the diet that meets your health needs:  Standard DASH diet. You can consume up to 2,300 milligrams (mg) of sodium a day.   Lower sodium DASH diet. You can consume up to 1,500 mg of sodium a day.  Both versions of the DASH diet aim to reduce the amount of sodium in your diet compared with what you might get in a typical American diet, which can amount to a whopping 3,400 mg of sodium a day or more.  The standard DASH diet meets the recommendation from the Dietary Guidelines for Americans to keep  "daily sodium intake to less than 2,300 mg a day.  The American Heart Association recommends 1,500 mg a day of sodium as an upper limit for all adults. If you aren't sure what sodium level is right for you, talk to your doctor.  Both versions of the DASH diet include lots of whole grains, fruits, vegetables and low-fat dairy products. The DASH diet also includes some fish, poultry and legumes, and encourages a small amount of nuts and seeds a few times a week.   You can eat red meat, sweets and fats in small amounts. The DASH diet is low in saturated fat, cholesterol and total fat.  Here's a look at the recommended servings from each food group for the 2,455-[id number]-r-day DASH diet.  Grains: 6 to 8 servings a day  Grains include bread, cereal, rice and pasta. Examples of one serving of grains include 1 slice whole-wheat bread, 1 ounce dry cereal, or 1/2 cup cooked cereal, rice or pasta.  Focus on whole grains because they have more fiber and nutrients than do refined grains. For instance, use brown rice instead of white rice, whole-wheat pasta instead of regular pasta and whole-grain bread instead of white bread. Look for products labeled "100 percent whole grain" or "100 percent whole wheat."   Grains are naturally low in fat. Keep them this way by avoiding butter, cream and cheese sauces.  Vegetables: 4 to 5 servings a day  Tomatoes, carrots, broccoli, sweet potatoes, greens and other vegetables are full of fiber, vitamins, and such minerals as potassium and magnesium. Examples of one serving include 1 cup raw leafy green vegetables or 1/2 cup cut-up raw or cooked vegetables.  Don't think of vegetables only as side dishes -- a hearty blend of vegetables served over brown rice or whole-wheat noodles can serve as the main dish for a meal.   Fresh and frozen vegetables are both good choices. When buying frozen and canned vegetables, choose those labeled as low sodium or without added salt.   To increase the number of " servings you fit in daily, be creative. In a stir-pérez, for instance, cut the amount of meat in half and double up on the vegetables.  Fruits: 4 to 5 servings a day  Many fruits need little preparation to become a healthy part of a meal or snack. Like vegetables, they're packed with fiber, potassium and magnesium and are typically low in fat -- coconuts are an exception. Examples of one serving include one medium fruit, 1/2 cup fresh, frozen or canned fruit, or 4 ounces of juice.  Have a piece of fruit with meals and one as a snack, then round out your day with a dessert of fresh fruits topped with a dollop of low-fat yogurt.   Leave on edible peels whenever possible. The peels of apples, pears and most fruits with pits add interesting texture to recipes and contain healthy nutrients and fiber.   Remember that citrus fruits and juices, such as grapefruit, can interact with certain medications, so check with your doctor or pharmacist to see if they're OK for you.   If you choose canned fruit or juice, make sure no sugar is added.  Dairy: 2 to 3 servings a day  Milk, yogurt, cheese and other dairy products are major sources of calcium, vitamin D and protein. But the key is to make sure that you choose dairy products that are low fat or fat-free because otherwise they can be a major source of fat -- and most of it is saturated. Examples of one serving include 1 cup skim or 1 percent milk, 1 cup low fat yogurt, or 1 1/2 ounces part-skim cheese.  Low-fat or fat-free frozen yogurt can help you boost the amount of dairy products you eat while offering a sweet treat. Add fruit for a healthy twist.   If you have trouble digesting dairy products, choose lactose-free products or consider taking an over-the-counter product that contains the enzyme lactase, which can reduce or prevent the symptoms of lactose intolerance.   Go easy on regular and even fat-free cheeses because they are typically high in sodium.  Lean meat, poultry  and fish: 6 servings or fewer a day  Meat can be a rich source of protein, B vitamins, iron and zinc. Choose lean varieties and aim for no more than 6 ounces a day. Cutting back on your meat portion will allow room for more vegetables.  Trim away skin and fat from poultry and meat and then bake, broil, grill or roast instead of frying in fat.   Eat heart-healthy fish, such as salmon, herring and tuna. These types of fish are high in omega-3 fatty acids, which can help lower your total cholesterol.  Nuts, seeds and legumes: 4 to 5 servings a week  Almonds, sunflower seeds, kidney beans, peas, lentils and other foods in this family are good sources of magnesium, potassium and protein. They're also full of fiber and phytochemicals, which are plant compounds that may protect against some cancers and cardiovascular disease.  Serving sizes are small and are intended to be consumed only a few times a week because these foods are high in calories. Examples of one serving include 1/3 cup nuts, 2 tablespoons seeds, or 1/2 cup cooked beans or peas.   Nuts sometimes get a bad rap because of their fat content, but they contain healthy types of fat -- monounsaturated fat and omega-3 fatty acids. They're high in calories, however, so eat them in moderation. Try adding them to stir-fries, salads or cereals.   Soybean-based products, such as tofu and tempeh, can be a good alternative to meat because they contain all of the amino acids your body needs to make a complete protein, just like meat.  Fats and oils: 2 to 3 servings a day  Fat helps your body absorb essential vitamins and helps your body's immune system. But too much fat increases your risk of heart disease, diabetes and obesity. The DASH diet strives for a healthy balance by limiting total fat to less than 30 percent of daily calories from fat, with a focus on the healthier monounsaturated fats.  Examples of one serving include 1 teaspoon soft margarine, 1 tablespoon  mayonnaise or 2 tablespoons salad dressing.  Saturated fat and trans fat are the main dietary culprits in increasing your risk of coronary artery disease. DASH helps keep your daily saturated fat to less than 6 percent of your total calories by limiting use of meat, butter, cheese, whole milk, cream and eggs in your diet, along with foods made from lard, solid shortenings, and palm and coconut oils.   Avoid trans fat, commonly found in such processed foods as crackers, baked goods and fried items.   Read food labels on margarine and salad dressing so that you can choose those that are lowest in saturated fat and free of trans fat.  Sweets: 5 servings or fewer a week  You don't have to banish sweets entirely while following the DASH diet -- just go easy on them. Examples of one serving include 1 tablespoon sugar, jelly or jam, 1/2 cup sorbet, or 1 cup lemonade.  When you eat sweets, choose those that are fat-free or low-fat, such as sorbets, fruit ices, jelly beans, hard candy, ran crackers or low-fat cookies.   Artificial sweeteners such as aspartame (NutraSweet, Equal) and sucralose (Splenda) may help satisfy your sweet tooth while sparing the sugar. But remember that you still must use them sensibly. It's OK to swap a diet cola for a regular cola, but not in place of a more nutritious beverage such as low-fat milk or even plain water.   Cut back on added sugar, which has no nutritional value but can pack on calories.  Drinking too much alcohol can increase blood pressure. The Dietary Guidelines for Americans recommends that men limit alcohol to no more than two drinks a day and women to one or less.  The DASH diet doesn't address caffeine consumption. The influence of caffeine on blood pressure remains unclear. But caffeine can cause your blood pressure to rise at least temporarily. If you already have high blood pressure or if you think caffeine is affecting your blood pressure, talk to your doctor about your  "caffeine consumption.  While the DASH diet is not a weight-loss program, you may indeed lose unwanted pounds because it can help guide you toward healthier food choices.  The DASH diet generally includes about 2,000 calories a day. If you're trying to lose weight, you may need to eat fewer calories. You may also need to adjust your serving goals based on your individual circumstances -- something your health care team can help you decide.  The foods at the core of the DASH diet are naturally low in sodium. So just by following the DASH diet, you're likely to reduce your sodium intake. You also reduce sodium further by:  Using sodium-free spices or flavorings with your food instead of salt   Not adding salt when cooking rice, pasta or hot cereal   Rinsing canned foods to remove some of the sodium   Buying foods labeled "no salt added," "sodium-free," "low sodium" or "very low sodium"  One teaspoon of table salt has 2,325 mg of sodium. When you read food labels, you may be surprised at just how much sodium some processed foods contain. Even low-fat soups, canned vegetables, ready-to-eat cereals and sliced turkey from the local deli -- foods you may have considered healthy -- often have lots of sodium.  You may notice a difference in taste when you choose low-sodium food and beverages. If things seem too bland, gradually introduce low-sodium foods and cut back on table salt until you reach your sodium goal. That'll give your palate time to adjust.  Using salt-free seasoning blends or herbs and spices may also ease the transition. It can take several weeks for your taste buds to get used to less salty foods.  Try these strategies to get started on the DASH diet:   Change gradually. If you now eat only one or two servings of fruits or vegetables a day, try to add a serving at lunch and one at dinner. Rather than switching to all whole grains, start by making one or two of your grain servings whole grains. Increasing " fruits, vegetables and whole grains gradually can also help prevent bloating or diarrhea that may occur if you aren't used to eating a diet with lots of fiber. You can also try over-the-counter products to help reduce gas from beans and vegetables.   Reward successes and forgive slip-ups. Reward yourself with a nonfood treat for your accomplishments -- rent a movie, purchase a book or get together with a friend. Everyone slips, especially when learning something new. Remember that changing your lifestyle is a long-term process. Find out what triggered your setback and then just  where you left off with the DASH diet.   Add physical activity. To boost your blood pressure lowering efforts even more, consider increasing your physical activity in addition to following the DASH diet. Combining both the DASH diet and physical activity makes it more likely that you'll reduce your blood pressure.   Get support if you need it. If you're having trouble sticking to your diet, talk to your doctor or dietitian about it. You might get some tips that will help you stick to the DASH diet.  Remember, healthy eating isn't an all-or-nothing proposition. What's most important is that, on average, you eat healthier foods with plenty of variety -- both to keep your diet nutritious and to avoid boredom or extremes. And with the DASH diet, you can have both.

## 2024-11-29 NOTE — PROGRESS NOTES
History of Present Illness    CHIEF COMPLAINT:  Erica presents for follow-up due to multiple medical problems, primarily positional vertigo with severe nausea, dizziness, and possible presyncope for the last three weeks.    HPI:  Erica has been having positional vertigo for the last 3 weeks, with severe nausea, dizziness, and possible presyncope. She has a history of hysterectitis and hearing loss. Her hypoglycemia and depression have shown significant improvement. She has been diagnosed with an anxiety disorder and is currently taking Klonopin with partial results. Erica has a mild form of lupus and a history of complications from a past gastric bypass surgery. She has a history of iron deficiency due to poor intake. Erica is taking medication PRN for osteoarthritis and ankylosing spondylitis. She also has urinary incontinence and is using Vesicare with partial response.    Erica denies suicidal thoughts, homicidal thoughts, psychosis, and OCD.    MEDICATIONS:  Erica is on Klonopin for anxiety disorder with partial results. She is also taking Vesicare for urinary incontinence, experiencing partial response. She is on 9% PRN for osteoarthritis and ankylosing spondylitis.    MEDICAL HISTORY:  Erica has a history of hysterectitis, hearing loss, asthma, hypoglycemia, depression, anxiety disorder, and a mild form of lupus. She also has iron deficiency, osteoarthritis, ankylosing spondylitis, urinary incontinence, and pancreatic insufficiency. 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 Erica has undergone a hysterectomy.    SURGICAL HISTORY:  Erica has a history of gastric bypass surgery, which was complicated by some issues.      ROS:  General: -fever, -chills, +fatigue, -weight gain, -weight loss  Eyes: -vision changes, -redness, -discharge  ENT: -ear pain, -nasal congestion, -sore throat  Cardiovascular: -chest pain, -palpitations, -lower extremity  "edema  Respiratory: -cough, -shortness of breath  Gastrointestinal: -abdominal pain, +nausea, -vomiting, -diarrhea, -constipation, -blood in stool  Genitourinary: -dysuria, -hematuria, -frequency, +urinary incontinence  Musculoskeletal: +joint pain, -muscle pain  Skin: -rash, -lesion  Neurological: -headache, +dizziness, -numbness, -tingling  Psychiatric: -anxiety, -depression, -sleep difficulty, -homicidal ideation, -suicidal ideation     Physical Exam  /60 (BP Location: Left arm, Patient Position: Sitting)   Pulse 60   Ht 5' 7" (1.702 m)   Wt 65.5 kg (144 lb 4.7 oz)   SpO2 98%   BMI 22.60 kg/m²       General appearance - alert, well appearing, and in no distress, oriented to person, place, and time, normal appearing weight, acyanotic, in no respiratory distress, and well hydrated    Mental Status - alert, oriented to person, place, and time, depressed mood, anxious    Eyes - pupils equal and reactive, extraocular eye movements intact nystagmus present with a external neck rotation.  Indeed more less than 2 right with extension.  There is hearing loss and also tinnitus present    Ears - bilateral TM's and external ear canals normal   Mild-to-moderate hearing loss  Nose - normal and patent, no erythema, discharge or polyps     Sinuses - Normal paranasal sinuses without tenderness     Throat - mucous membranes moist, pharynx normal without lesions     Neck - supple, decreased range of motion no significant adenopathy     Thyroid - thyroid is normal in size without nodules or tenderness      Chest - clear to auscultation, no wheezes, rales or rhonchi, symmetric air entry     Heart - normal rate, regular rhythm, normal S1, S2, no murmurs, rubs, clicks or gallops     Abdomen - soft, nontender, nondistended, no masses or organomegaly     Back exam - antalgic gait, limited range of motion, pain with motion noted during exam     Neurological - alert, oriented, normal speech, no focal findings or movement " disorder noted, screening mental status exam normal, neck supple without rigidity, cranial nerves II through XII intact, abnormal neurological exam unchanged from prior examinations     Musculoskeletal - osteoarthritic changes noted in both hands, abnormal muscle strength and tone of lower extremities   Examination of the feet reveals warm, good capillary refill, normal DP and PT pulses, normal monofilament exam, and normal sensory exam.  Extremities - peripheral pulses normal, no pedal edema, no clubbing or cyanosis, no edema, redness or tenderness in the calves or thighs     Skin - normal coloration and turgor, no rashes, no suspicious skin lesions noted  Lab Results   Component Value Date    WBC 7.83 11/27/2024    HGB 14.0 11/27/2024    HCT 43.0 11/27/2024     11/27/2024    CHOL 233 (H) 01/12/2024    TRIG 114 01/12/2024    HDL 97 (H) 01/12/2024    ALT 19 11/27/2024    AST 25 11/27/2024     11/27/2024    K 4.6 11/27/2024     11/27/2024    CREATININE 1.0 11/27/2024    BUN 21 11/27/2024    CO2 25 11/27/2024    TSH 0.164 (L) 05/24/2024    INR 1.0 05/08/2020    HGBA1C 5.1 05/24/2024     1. Vertigo, aural, bilateral  Ambulatory referral/consult to Physical/Occupational Therapy    scopolamine (TRANSDERM-SCOP) 1.3-1.5 mg (1 mg over 3 days)      2. Sciatic neuropathy, unspecified laterality  clonazePAM (KLONOPIN) 1 MG tablet    gabapentin (NEURONTIN) 600 MG tablet      3. Systemic lupus erythematosus, unspecified SLE type, unspecified organ involvement status  clonazePAM (KLONOPIN) 1 MG tablet    Sedimentation rate    C-REACTIVE PROTEIN    CBC Auto Differential    COMPREHENSIVE METABOLIC PANEL      4. Ankylosing spondylitis of thoracolumbar region  clonazePAM (KLONOPIN) 1 MG tablet    HYDROcodone-acetaminophen (NORCO) 7.5-325 mg per tablet    HYDROcodone-acetaminophen (NORCO) 7.5-325 mg per tablet    HYDROcodone-acetaminophen (NORCO) 7.5-325 mg per tablet      5. Recurrent major depression resistant to  treatment  clonazePAM (KLONOPIN) 1 MG tablet      6. Ankylosis of lumbar spine  clonazePAM (KLONOPIN) 1 MG tablet    hydroxychloroquine (PLAQUENIL) 200 mg tablet      7. Chronic low back pain with bilateral sciatica, unspecified back pain laterality  clonazePAM (KLONOPIN) 1 MG tablet      8. Pancreatic insufficiency  lipase-protease-amylase 12,000-38,000-60,000 units (CREON) CpDR      9. COPD mixed type  NEBULIZER FOR HOME USE            Assessment & Plan    Assessed patient with multiple medical issues including positional vertigo, nausea, and possible presyncope  Noted improvement in hypoglycemia and depression  Evaluated anxiety disorder partially responsive to Klonopin  Considered mild form of lupus and complications from past gastric bypass  Assessed iron deficiency, osteoarthritis, ankylosing spondylitis, and urinary incontinence  Performed physical exam revealing positive vertigo, nystagmus, and wide-based gait  Reviewed opiates using  (physician monitoring website)    GENERALIZED ANXIETY DISORDER:  Opiates dependence  - Continued Klonopin for anxiety disorder (partial response noted).    URGE INCONTINENCE:  - Continued Vesicare for urinary incontinence (partial response noted).    LACK OF PHYSICAL EXERCISE:  Poor exercise condition  - Erica to improve exercise activity.    GENERAL HEALTH MONITORING:  Systemic lupus  - CBC, CMP, ESR, and C-reactive protein ordered.  - Flu vaccine administered.  - COVID-19 vaccination administered (repeat, as scheduled).  - Urine toxicology ordered (pending for next appointment).    FOLLOW-UP:  - Follow up in 3 months.  - Follow up for lab work.  - Follow up for mammogram.  - Review of immunizations and living will pending.     This note was generated with the assistance of ambient listening technology. Verbal consent was obtained by the patient and accompanying visitor(s) for the recording of patient appointment to facilitate this note. I attest to having reviewed and  edited the generated note for accuracy, though some syntax or spelling errors may persist. Please contact the author of this note for any clarification.Discussed health maintenance guidelines appropriate for age.

## 2024-12-06 DIAGNOSIS — M81.0 AGE-RELATED OSTEOPOROSIS WITHOUT CURRENT PATHOLOGICAL FRACTURE: ICD-10-CM

## 2024-12-08 NOTE — TELEPHONE ENCOUNTER
Refill Routing Note   Medication(s) are not appropriate for processing by [facility] for the following reason(s):        Outside of protocol    [facility] action(s):  Route             Appointments  past 12m or future 3m with PCP    Date Provider   Last Visit   11/27/2024 Jose Guadalupe Ornelas MD   Next Visit   3/27/2025 Jose Guadalupe Ornelas MD   ED visits in past 90 days: 0        Note composed:6:57 PM 12/07/2024

## 2024-12-09 RX ORDER — RALOXIFENE HYDROCHLORIDE 60 MG/1
60 TABLET, FILM COATED ORAL
Qty: 90 TABLET | Refills: 3 | Status: SHIPPED | OUTPATIENT
Start: 2024-12-09

## 2024-12-29 DIAGNOSIS — F11.221 OPIOID DEPENDENCE WITH INTOXICATION DELIRIUM: ICD-10-CM

## 2024-12-29 DIAGNOSIS — F33.9 RECURRENT MAJOR DEPRESSION RESISTANT TO TREATMENT: ICD-10-CM

## 2024-12-29 DIAGNOSIS — E08.649 DIABETES MELLITUS DUE TO UNDERLYING CONDITION WITH HYPOGLYCEMIA WITHOUT COMA, WITHOUT LONG-TERM CURRENT USE OF INSULIN: ICD-10-CM

## 2024-12-29 DIAGNOSIS — F16.921: ICD-10-CM

## 2024-12-30 RX ORDER — CITALOPRAM 20 MG/1
20 TABLET, FILM COATED ORAL
Qty: 90 TABLET | Refills: 3 | Status: SHIPPED | OUTPATIENT
Start: 2024-12-30

## 2024-12-30 NOTE — TELEPHONE ENCOUNTER
Provider Staff:  Action required for this patient    Requires labs      Please see care gap opportunities below in Care Due Message.    Thanks!  [facility]     Appointments      Date Provider   Last Visit   11/27/2024 Jose Guadalupe Ornelas MD   Next Visit   3/27/2025 Jose Guadalupe Ornelas MD     Refill Decision Note   Erica Marsh  is requesting a refill authorization.  Brief Assessment and Rationale for Refill:  Approve     Medication Therapy Plan:         Comments:     Note composed:12:51 PM 12/30/2024

## 2024-12-30 NOTE — TELEPHONE ENCOUNTER
Care Due:                  Date            Visit Type   Department     Provider  --------------------------------------------------------------------------------                                EP -                              PRIMARY  Last Visit: 11-      CARE ([facility])   None Found     Jose Guadalupe Ornelas                              EP -                              PRIMARY  Next Visit: 03-      CARE ([facility])   None Found     Jose Guadalupe Ornelas                                                            Last  Test          Frequency    Reason                     Performed    Due Date  --------------------------------------------------------------------------------    Mg Level....  12 months..  raloxifene...............  Not Found    Overdue    Phosphate...  15 months..  raloxifene...............  Not Found    Overdue    Uric Acid...  12 months..  allopurinoL..............  Not Found    Overdue    Vitamin D...  12 months..  raloxifene, vitamin......  Not Found    Overdue    [employer] Embedded Care Due Messages. Reference number: [id number].   12/30/2024 12:36:59 PM CST

## 2025-01-15 DIAGNOSIS — E11.9 TYPE 2 DIABETES MELLITUS WITHOUT COMPLICATION: ICD-10-CM

## 2025-01-23 DIAGNOSIS — J30.1 CHRONIC SEASONAL ALLERGIC RHINITIS DUE TO POLLEN: ICD-10-CM

## 2025-01-23 RX ORDER — FLUTICASONE PROPIONATE 50 MCG
SPRAY, SUSPENSION (ML) NASAL
Qty: 48 G | Refills: 3 | Status: SHIPPED | OUTPATIENT
Start: 2025-01-23

## 2025-01-23 NOTE — TELEPHONE ENCOUNTER
Refill Decision Note   Erica Marsh  is requesting a refill authorization.  Brief Assessment and Rationale for Refill:  Approve     Medication Therapy Plan:         Comments:     Note composed:6:50 AM 01/23/2025

## 2025-01-23 NOTE — TELEPHONE ENCOUNTER
No care due was identified.  [facility] Embedded Care Due Messages. Reference number: [id number].   1/23/2025 6:46:20 AM CST

## 2025-01-30 DIAGNOSIS — Z00.00 ENCOUNTER FOR MEDICARE ANNUAL WELLNESS EXAM: ICD-10-CM

## 2025-02-26 DIAGNOSIS — E79.0 HYPERURICEMIA: ICD-10-CM

## 2025-02-26 RX ORDER — ALLOPURINOL 100 MG/1
TABLET ORAL
Qty: 90 TABLET | Refills: 0 | OUTPATIENT
Start: 2025-02-26

## 2025-02-26 NOTE — TELEPHONE ENCOUNTER
Refill Decision Note   Erica Marsh  is requesting a refill authorization.  Brief Assessment and Rationale for Refill:  Quick Discontinue     Medication Therapy Plan: Pharmacy is requesting new scripts for the following medications without required information, (sig/ frequency/qty/etc)          Comments: Pharmacies have been requesting medications for patients without required information, (sig, frequency, qty, etc.). In addition, requests are sent for medication(s) pt. are currently not taking, and medications patients have never taken.    We have spoken to the pharmacies about these request types and advised their teams previously that we are unable to assess these New Script requests and require all details for these requests. This is a known issue and has been reported.     Note composed:11:33 AM 02/26/2025

## 2025-04-24 RX ORDER — NAPROXEN 375 MG/1
TABLET ORAL
Qty: 60 TABLET | Refills: 4 | OUTPATIENT
Start: 2025-04-24

## 2025-04-24 NOTE — TELEPHONE ENCOUNTER
Patient has an history of chronic kidney disease-Please avoid or minimize all NSAIDS (ibuprofen, motrin, aleve, indocin, naprosyn) to minimize the risk to your kidneys.

## 2025-04-24 NOTE — TELEPHONE ENCOUNTER
Refill Routing Note   Medication(s) are not appropriate for processing by [facility] for the following reason(s):        Outside of protocol    [facility] action(s):  Route               Appointments  past 12m or future 3m with PCP    Date Provider   Last Visit   11/27/2024 Jose Guadalupe Ornelas MD   Next Visit   6/23/2025 Jose Guadalupe Ornelas MD   ED visits in past 90 days: 0        Note composed:11:01 AM 04/24/2025

## 2025-04-28 DIAGNOSIS — F33.9 RECURRENT MAJOR DEPRESSION RESISTANT TO TREATMENT: ICD-10-CM

## 2025-04-28 DIAGNOSIS — G89.29 CHRONIC LOW BACK PAIN WITH BILATERAL SCIATICA, UNSPECIFIED BACK PAIN LATERALITY: ICD-10-CM

## 2025-04-28 DIAGNOSIS — G57.00 SCIATIC NEUROPATHY, UNSPECIFIED LATERALITY: ICD-10-CM

## 2025-04-28 DIAGNOSIS — M54.42 CHRONIC LOW BACK PAIN WITH BILATERAL SCIATICA, UNSPECIFIED BACK PAIN LATERALITY: ICD-10-CM

## 2025-04-28 DIAGNOSIS — M45.5 ANKYLOSING SPONDYLITIS OF THORACOLUMBAR REGION: ICD-10-CM

## 2025-04-28 DIAGNOSIS — M54.41 CHRONIC LOW BACK PAIN WITH BILATERAL SCIATICA, UNSPECIFIED BACK PAIN LATERALITY: ICD-10-CM

## 2025-04-28 DIAGNOSIS — M43.26 ANKYLOSIS OF LUMBAR SPINE: ICD-10-CM

## 2025-04-28 DIAGNOSIS — M32.9 SYSTEMIC LUPUS ERYTHEMATOSUS, UNSPECIFIED SLE TYPE, UNSPECIFIED ORGAN INVOLVEMENT STATUS: ICD-10-CM

## 2025-04-29 ENCOUNTER — PATIENT MESSAGE (OUTPATIENT)
Dept: PRIMARY CARE CLINIC | Facility: CLINIC | Age: 75
End: 2025-04-29
Payer: MEDICARE

## 2025-04-29 DIAGNOSIS — Z98.84 H/O GASTRIC BYPASS: ICD-10-CM

## 2025-04-29 RX ORDER — CLONAZEPAM 1 MG/1
TABLET ORAL
Qty: 60 TABLET | Refills: 0 | OUTPATIENT
Start: 2025-04-29

## 2025-04-29 RX ORDER — NAPROXEN 375 MG/1
TABLET ORAL
Qty: 180 TABLET | Refills: 0 | OUTPATIENT
Start: 2025-04-29

## 2025-04-29 NOTE — TELEPHONE ENCOUNTER
Refill Routing Note   Medication(s) are not appropriate for processing by [facility] for the following reason(s):        Outside of protocol    [facility] action(s):  Route               Appointments  past 12m or future 3m with PCP    Date Provider   Last Visit   11/27/2024 Jose Guadalupe Ornelas MD   Next Visit   6/23/2025 Jose Guadalupe Ornelas MD   ED visits in past 90 days: 0        Note composed:1:06 PM 04/29/2025

## 2025-04-30 RX ORDER — FOLIC ACID 1 MG/1
1000 TABLET ORAL
Qty: 90 TABLET | Refills: 3 | Status: SHIPPED | OUTPATIENT
Start: 2025-04-30

## 2025-05-13 RX ORDER — CHOLECALCIFEROL (VITAMIN D3) 25 MCG
1000 TABLET ORAL 2 TIMES DAILY
Qty: 180 TABLET | Refills: 3 | Status: SHIPPED | OUTPATIENT
Start: 2025-05-13

## 2025-05-13 NOTE — TELEPHONE ENCOUNTER
Refill Routing Note   Medication(s) are not appropriate for processing by [facility] for the following reason(s):        Outside of protocol    [facility] action(s):  Route             Appointments  past 12m or future 3m with PCP    Date Provider   Last Visit   11/27/2024 Jose Guadalupe Ornelas MD   Next Visit   6/23/2025 Jose Guadalupe Ornelas MD   ED visits in past 90 days: 0        Note composed:11:39 AM 05/13/2025

## 2025-05-19 ENCOUNTER — PATIENT MESSAGE (OUTPATIENT)
Dept: FAMILY MEDICINE | Facility: CLINIC | Age: 75
End: 2025-05-19
Payer: MEDICARE

## 2025-05-21 ENCOUNTER — RESULTS FOLLOW-UP (OUTPATIENT)
Dept: PRIMARY CARE CLINIC | Facility: CLINIC | Age: 75
End: 2025-05-21

## 2025-05-21 ENCOUNTER — LAB VISIT (OUTPATIENT)
Dept: LAB | Facility: HOSPITAL | Age: 75
End: 2025-05-21
Attending: FAMILY MEDICINE
Payer: MEDICARE

## 2025-05-21 DIAGNOSIS — E11.9 TYPE 2 DIABETES MELLITUS WITHOUT COMPLICATION: ICD-10-CM

## 2025-05-21 LAB
ALBUMIN/CREAT UR: NORMAL
CHOLEST SERPL-MCNC: 182 MG/DL (ref 120–199)
CHOLEST/HDLC SERPL: 2.4 {RATIO} (ref 2–5)
CREAT UR-MCNC: 31 MG/DL (ref 15–325)
HDLC SERPL-MCNC: 77 MG/DL (ref 40–75)
HDLC SERPL: 42.3 % (ref 20–50)
LDLC SERPL CALC-MCNC: 89.4 MG/DL (ref 63–159)
MICROALBUMIN UR-MCNC: <5 UG/ML (ref ?–5000)
NONHDLC SERPL-MCNC: 105 MG/DL
TRIGL SERPL-MCNC: 78 MG/DL (ref 30–150)

## 2025-05-21 PROCEDURE — 82043 UR ALBUMIN QUANTITATIVE: CPT

## 2025-05-21 PROCEDURE — 36415 COLL VENOUS BLD VENIPUNCTURE: CPT | Mod: PO

## 2025-05-21 PROCEDURE — 80061 LIPID PANEL: CPT

## 2025-05-26 ENCOUNTER — OFFICE VISIT (OUTPATIENT)
Dept: PRIMARY CARE CLINIC | Facility: CLINIC | Age: 75
End: 2025-05-26
Payer: MEDICARE

## 2025-05-26 ENCOUNTER — TELEPHONE (OUTPATIENT)
Dept: PRIMARY CARE CLINIC | Facility: CLINIC | Age: 75
End: 2025-05-26
Payer: MEDICARE

## 2025-05-26 ENCOUNTER — LAB VISIT (OUTPATIENT)
Dept: LAB | Facility: HOSPITAL | Age: 75
End: 2025-05-26
Attending: NURSE PRACTITIONER
Payer: MEDICARE

## 2025-05-26 VITALS
HEIGHT: 67 IN | OXYGEN SATURATION: 97 % | TEMPERATURE: 99 F | DIASTOLIC BLOOD PRESSURE: 60 MMHG | SYSTOLIC BLOOD PRESSURE: 110 MMHG | HEART RATE: 62 BPM | BODY MASS INDEX: 24.49 KG/M2 | WEIGHT: 156.06 LBS

## 2025-05-26 DIAGNOSIS — M47.812 CERVICAL SPONDYLOSIS: Primary | ICD-10-CM

## 2025-05-26 DIAGNOSIS — E03.9 ACQUIRED HYPOTHYROIDISM: ICD-10-CM

## 2025-05-26 DIAGNOSIS — F41.8 DEPRESSION WITH ANXIETY: Chronic | ICD-10-CM

## 2025-05-26 DIAGNOSIS — M32.13 OTHER SYSTEMIC LUPUS ERYTHEMATOSUS WITH LUNG INVOLVEMENT: ICD-10-CM

## 2025-05-26 DIAGNOSIS — E11.628 TYPE 2 DIABETES MELLITUS WITH OTHER SKIN COMPLICATIONS: ICD-10-CM

## 2025-05-26 DIAGNOSIS — K86.89 PANCREATIC INSUFFICIENCY: ICD-10-CM

## 2025-05-26 DIAGNOSIS — G57.00 SCIATIC NEUROPATHY, UNSPECIFIED LATERALITY: ICD-10-CM

## 2025-05-26 DIAGNOSIS — Z12.31 ENCOUNTER FOR SCREENING MAMMOGRAM FOR MALIGNANT NEOPLASM OF BREAST: ICD-10-CM

## 2025-05-26 DIAGNOSIS — J44.9 COPD MIXED TYPE: ICD-10-CM

## 2025-05-26 DIAGNOSIS — M45.5 ANKYLOSING SPONDYLITIS OF THORACOLUMBAR REGION: ICD-10-CM

## 2025-05-26 DIAGNOSIS — N18.31 STAGE 3A CHRONIC KIDNEY DISEASE: ICD-10-CM

## 2025-05-26 DIAGNOSIS — M43.26 ANKYLOSIS OF LUMBAR SPINE: ICD-10-CM

## 2025-05-26 DIAGNOSIS — Z79.899 LONG-TERM USE OF PLAQUENIL: ICD-10-CM

## 2025-05-26 DIAGNOSIS — F11.221 OPIOID DEPENDENCE WITH INTOXICATION DELIRIUM: ICD-10-CM

## 2025-05-26 DIAGNOSIS — E27.1 ADRENAL INSUFFICIENCY (ADDISON'S DISEASE): ICD-10-CM

## 2025-05-26 DIAGNOSIS — M32.9 SYSTEMIC LUPUS ERYTHEMATOSUS, UNSPECIFIED SLE TYPE, UNSPECIFIED ORGAN INVOLVEMENT STATUS: ICD-10-CM

## 2025-05-26 LAB
ABSOLUTE EOSINOPHIL (OHS): 0.28 K/UL
ABSOLUTE MONOCYTE (OHS): 0.72 K/UL (ref 0.3–1)
ABSOLUTE NEUTROPHIL COUNT (OHS): 2.88 K/UL (ref 1.8–7.7)
ALBUMIN SERPL BCP-MCNC: 3.7 G/DL (ref 3.5–5.2)
ALP SERPL-CCNC: 60 UNIT/L (ref 40–150)
ALT SERPL W/O P-5'-P-CCNC: 19 UNIT/L (ref 10–44)
ANION GAP (OHS): 8 MMOL/L (ref 8–16)
AST SERPL-CCNC: 28 UNIT/L (ref 11–45)
BASOPHILS # BLD AUTO: 0.09 K/UL
BASOPHILS NFR BLD AUTO: 1.3 %
BILIRUB SERPL-MCNC: 0.3 MG/DL (ref 0.1–1)
BUN SERPL-MCNC: 19 MG/DL (ref 8–23)
CALCIUM SERPL-MCNC: 9.1 MG/DL (ref 8.7–10.5)
CHLORIDE SERPL-SCNC: 106 MMOL/L (ref 95–110)
CO2 SERPL-SCNC: 25 MMOL/L (ref 23–29)
CREAT SERPL-MCNC: 1.1 MG/DL (ref 0.5–1.4)
EAG (OHS): 103 MG/DL (ref 68–131)
ERYTHROCYTE [DISTWIDTH] IN BLOOD BY AUTOMATED COUNT: 13.5 % (ref 11.5–14.5)
GFR SERPLBLD CREATININE-BSD FMLA CKD-EPI: 53 ML/MIN/1.73/M2
GLUCOSE SERPL-MCNC: 76 MG/DL (ref 70–110)
HBA1C MFR BLD: 5.2 % (ref 4–5.6)
HCT VFR BLD AUTO: 42.5 % (ref 37–48.5)
HGB BLD-MCNC: 13.7 GM/DL (ref 12–16)
IMM GRANULOCYTES # BLD AUTO: 0.06 K/UL (ref 0–0.04)
IMM GRANULOCYTES NFR BLD AUTO: 0.9 % (ref 0–0.5)
LYMPHOCYTES # BLD AUTO: 2.8 K/UL (ref 1–4.8)
MCH RBC QN AUTO: 32.5 PG (ref 27–31)
MCHC RBC AUTO-ENTMCNC: 32.2 G/DL (ref 32–36)
MCV RBC AUTO: 101 FL (ref 82–98)
NUCLEATED RBC (/100WBC) (OHS): 0 /100 WBC
PLATELET # BLD AUTO: 239 K/UL (ref 150–450)
PMV BLD AUTO: 11.7 FL (ref 9.2–12.9)
POTASSIUM SERPL-SCNC: 4.7 MMOL/L (ref 3.5–5.1)
PROT SERPL-MCNC: 6.8 GM/DL (ref 6–8.4)
RBC # BLD AUTO: 4.22 M/UL (ref 4–5.4)
RELATIVE EOSINOPHIL (OHS): 4.1 %
RELATIVE LYMPHOCYTE (OHS): 41 % (ref 18–48)
RELATIVE MONOCYTE (OHS): 10.5 % (ref 4–15)
RELATIVE NEUTROPHIL (OHS): 42.2 % (ref 38–73)
SODIUM SERPL-SCNC: 139 MMOL/L (ref 136–145)
T4 FREE SERPL-MCNC: 1.14 NG/DL (ref 0.71–1.51)
TSH SERPL-ACNC: 4.33 UIU/ML (ref 0.4–4)
WBC # BLD AUTO: 6.83 K/UL (ref 3.9–12.7)

## 2025-05-26 PROCEDURE — 36415 COLL VENOUS BLD VENIPUNCTURE: CPT | Mod: PO

## 2025-05-26 PROCEDURE — 85025 COMPLETE CBC W/AUTO DIFF WBC: CPT

## 2025-05-26 PROCEDURE — 99999 PR PBB SHADOW E&M-EST. PATIENT-LVL V: CPT | Mod: PBBFAC,,, | Performed by: NURSE PRACTITIONER

## 2025-05-26 PROCEDURE — 3066F NEPHROPATHY DOC TX: CPT | Mod: CPTII,S$GLB,, | Performed by: NURSE PRACTITIONER

## 2025-05-26 PROCEDURE — 1125F AMNT PAIN NOTED PAIN PRSNT: CPT | Mod: CPTII,S$GLB,, | Performed by: NURSE PRACTITIONER

## 2025-05-26 PROCEDURE — 82040 ASSAY OF SERUM ALBUMIN: CPT

## 2025-05-26 PROCEDURE — 1159F MED LIST DOCD IN RCRD: CPT | Mod: CPTII,S$GLB,, | Performed by: NURSE PRACTITIONER

## 2025-05-26 PROCEDURE — 84443 ASSAY THYROID STIM HORMONE: CPT

## 2025-05-26 PROCEDURE — 3061F NEG MICROALBUMINURIA REV: CPT | Mod: CPTII,S$GLB,, | Performed by: NURSE PRACTITIONER

## 2025-05-26 PROCEDURE — 3074F SYST BP LT 130 MM HG: CPT | Mod: CPTII,S$GLB,, | Performed by: NURSE PRACTITIONER

## 2025-05-26 PROCEDURE — 3288F FALL RISK ASSESSMENT DOCD: CPT | Mod: CPTII,S$GLB,, | Performed by: NURSE PRACTITIONER

## 2025-05-26 PROCEDURE — 99214 OFFICE O/P EST MOD 30 MIN: CPT | Mod: S$GLB,,, | Performed by: NURSE PRACTITIONER

## 2025-05-26 PROCEDURE — 1160F RVW MEDS BY RX/DR IN RCRD: CPT | Mod: CPTII,S$GLB,, | Performed by: NURSE PRACTITIONER

## 2025-05-26 PROCEDURE — 84439 ASSAY OF FREE THYROXINE: CPT

## 2025-05-26 PROCEDURE — 3008F BODY MASS INDEX DOCD: CPT | Mod: CPTII,S$GLB,, | Performed by: NURSE PRACTITIONER

## 2025-05-26 PROCEDURE — 3078F DIAST BP <80 MM HG: CPT | Mod: CPTII,S$GLB,, | Performed by: NURSE PRACTITIONER

## 2025-05-26 PROCEDURE — 83036 HEMOGLOBIN GLYCOSYLATED A1C: CPT

## 2025-05-26 PROCEDURE — 1101F PT FALLS ASSESS-DOCD LE1/YR: CPT | Mod: CPTII,S$GLB,, | Performed by: NURSE PRACTITIONER

## 2025-05-26 RX ORDER — CLONAZEPAM 1 MG/1
1 TABLET ORAL 2 TIMES DAILY PRN
Qty: 60 TABLET | Refills: 2 | Status: SHIPPED | OUTPATIENT
Start: 2025-05-26

## 2025-05-26 RX ORDER — CLONAZEPAM 1 MG/1
1 TABLET ORAL 2 TIMES DAILY PRN
Qty: 60 TABLET | Refills: 2 | Status: CANCELLED | OUTPATIENT
Start: 2025-05-26

## 2025-05-26 RX ORDER — NAPROXEN 375 MG/1
375 TABLET ORAL 2 TIMES DAILY WITH MEALS
Qty: 60 TABLET | Refills: 4 | Status: CANCELLED | OUTPATIENT
Start: 2025-05-26

## 2025-05-26 RX ORDER — DICLOFENAC SODIUM 10 MG/G
4 GEL TOPICAL 3 TIMES DAILY PRN
Qty: 100 G | Refills: 11 | Status: SHIPPED | OUTPATIENT
Start: 2025-05-26

## 2025-05-26 RX ORDER — HYDROCODONE BITARTRATE AND ACETAMINOPHEN 7.5; 325 MG/1; MG/1
1 TABLET ORAL EVERY 12 HOURS PRN
Qty: 60 TABLET | Refills: 0 | Status: CANCELLED | OUTPATIENT
Start: 2025-05-26

## 2025-05-26 NOTE — TELEPHONE ENCOUNTER
Please inform patient to continue plaqenil at current dosage-this will help with her pain-I have placed and referral for an eye exam-due to plaquenil medication.

## 2025-05-26 NOTE — PROGRESS NOTES
Subjective:       Patient ID: Erica Marsh is a 74 y.o. female.    Chief Complaint: Follow-up    Follow-up  Associated symptoms include arthralgias and neck pain. Pertinent negatives include no chest pain, headaches or rash.         Patient presents today for follow up  visit. She is new to me. Last visit with PCP- on 11/27/24-Norco (11/27/24) , klonopin (3/21/25) refill-on gabapentin        6/13/23 Rheumatology-Dr.Karen Jasmine: Systemic lupus erythematosus, unspecified SLE type, unspecified organ involvement status, Polyarthralgia  Discogenic thoracic pain, Lumbar radiculopathy-Hydroxychloroquine  Past Medical History:   Diagnosis Date    Arthritis     Depression     Diabetes mellitus     NO MED SINCE GASTRIC BYPASS has hypoglycemia    Diabetes mellitus type II     GERD (gastroesophageal reflux disease)     Hypertension     NO MED SINCE GASTRIC BYPASS    Kidney disease     Lupus erythematosus     Shingles     Thyroid disease     Trigger finger     RIGHT LONG       Review of patient's allergies indicates:   Allergen Reactions    Codeine     Codeine Rash and Other (See Comments)    Sulfa (sulfonamide antibiotics) Rash and Other (See Comments)       Current Medications[1]    Review of Systems   Constitutional:  Negative for unexpected weight change.   HENT:  Negative for trouble swallowing.    Eyes:  Negative for visual disturbance.   Respiratory:  Negative for shortness of breath.    Cardiovascular:  Negative for chest pain, palpitations and leg swelling.   Gastrointestinal:  Negative for blood in stool.   Genitourinary:  Negative for hematuria.   Musculoskeletal:  Positive for arthralgias and neck pain.   Skin:  Negative for rash.   Allergic/Immunologic: Negative for immunocompromised state.   Neurological:  Negative for headaches.   Hematological:  Does not bruise/bleed easily.   Psychiatric/Behavioral:  Negative for agitation. The patient is not nervous/anxious.        Objective:      /60 (BP  "Location: Left arm, Patient Position: Sitting)   Pulse 62   Temp 98.7 °F (37.1 °C) (Oral)   Ht 5' 7" (1.702 m)   Wt 70.8 kg (156 lb 1.4 oz)   SpO2 97%   BMI 24.45 kg/m²   Physical Exam  Constitutional:       Appearance: She is well-developed.   Eyes:      Pupils: Pupils are equal, round, and reactive to light.   Cardiovascular:      Rate and Rhythm: Normal rate and regular rhythm.      Heart sounds: Normal heart sounds.   Pulmonary:      Effort: Pulmonary effort is normal.      Breath sounds: Normal breath sounds.   Abdominal:      General: Bowel sounds are normal.      Palpations: Abdomen is soft.   Musculoskeletal:      Cervical back: Tenderness present. Decreased range of motion.        Back:    Skin:     General: Skin is warm and dry.   Neurological:      Mental Status: She is alert and oriented to person, place, and time.   Psychiatric:         Behavior: Behavior normal.         Thought Content: Thought content normal.         Judgment: Judgment normal.         Assessment:       1. Cervical spondylosis    2. Sciatic neuropathy, unspecified laterality    3. Type 2 diabetes mellitus with other skin complications    4. Systemic lupus erythematosus, unspecified SLE type, unspecified organ involvement status    5. Ankylosing spondylitis of thoracolumbar region    6. Ankylosis of lumbar spine    7. Pancreatic insufficiency    8. Adrenal insufficiency ([place]'s disease)    9. Other systemic lupus erythematosus with lung involvement    10. Acquired hypothyroidism    11. COPD mixed type    12. Encounter for screening mammogram for malignant neoplasm of breast    13. Depression with anxiety    14. Opioid dependence with intoxication delirium    15. Stage 3a chronic kidney disease    16. BMI 24.0-24.9, adult    17. Long-term use of Plaquenil        Plan:       Cervical spondylosis  Stable, on gabapentin    Type 2 diabetes mellitus with other skin complications  -     Hemoglobin A1C; Future; Expected date: " 05/26/2025  -     Comprehensive Metabolic Panel; Future; Expected date: 05/26/2025  -     CBC W/ AUTO DIFFERENTIAL; Future; Expected date: 05/26/2025  -     Diabetic Eye Screening Photo; Future  Stable, continue management  Not on medication   Follow the ADA diet  Hemoglobin A1C   Date Value Ref Range Status   05/24/2024 5.1 4.0 - 5.6 % Final     Comment:     ADA Screening Guidelines:  5.7-6.4%  Consistent with prediabetes  >or=6.5%  Consistent with diabetes    High levels of fetal hemoglobin interfere with the HbA1C  assay. Heterozygous hemoglobin variants (HbS, HgC, etc)do  not significantly interfere with this assay.   However, presence of multiple variants may affect accuracy.     01/12/2024 5.0 4.0 - 5.6 % Final     Comment:     ADA Screening Guidelines:  5.7-6.4%  Consistent with prediabetes  >or=6.5%  Consistent with diabetes    High levels of fetal hemoglobin interfere with the HbA1C  assay. Heterozygous hemoglobin variants (HbS, HgC, etc)do  not significantly interfere with this assay.   However, presence of multiple variants may affect accuracy.     07/18/2023 5.6 4.0 - 5.6 % Final     Comment:     ADA Screening Guidelines:  5.7-6.4%  Consistent with prediabetes  >or=6.5%  Consistent with diabetes    High levels of fetal hemoglobin interfere with the HbA1C  assay. Heterozygous hemoglobin variants (HbS, HgC, etc)do  not significantly interfere with this assay.   However, presence of multiple variants may affect accuracy.        Systemic lupus erythematosus, unspecified SLE type, unspecified organ involvement status  Stable, continue plaquenil  Ankylosing spondylitis of thoracolumbar region  -     diclofenac sodium (VOLTAREN) 1 % Gel; Apply 4 g topically 3 (three) times daily as needed (pain).  Dispense: 100 g; Refill: 11  -     clonazePAM (KLONOPIN) 1 MG tablet; Take 1 tablet (1 mg total) by mouth 2 (two) times daily as needed for Anxiety.  Dispense: 60 tablet; Refill: 2  Stable, continue  management  Ankylosis of lumbar spine  -     clonazePAM (KLONOPIN) 1 MG tablet; Take 1 tablet (1 mg total) by mouth 2 (two) times daily as needed for Anxiety.  Dispense: 60 tablet; Refill: 2    Pancreatic insufficiency  Stable, taking OTC papaya enzymes    Acquired hypothyroidism  -     TSH; Future; Expected date: 05/26/2025  -     T4, Free; Future; Expected date: 05/26/2025  Stable, on levothyroxine  COPD mixed type  -     NEBULIZER FOR HOME USE    Encounter for screening mammogram for malignant neoplasm of breast  -     Mammo Digital Screening Bilat w/ [place] (XPD); Future; Expected date: 05/26/2025    Depression with anxiety  -     clonazePAM (KLONOPIN) 1 MG tablet; Take 1 tablet (1 mg total) by mouth 2 (two) times daily as needed for Anxiety.  Dispense: 60 tablet; Refill: 2  Stable, continue management  Opioid dependence with intoxication delirium  Will not prescribed Norco  Stage 3a chronic kidney disease  Stable and chronic.  Will continue to monitor q3-6 months and control chronic conditions as optimally as possible to preserve function.   BMI 24.0-24.9, adult  Stable, continue management        Patient readiness: acceptance and barriers:none    During the course of the visit the patient was educated and counseled about the following:     Diabetes:  Discussed general issues about diabetes pathophysiology and management.  Encouraged aerobic exercise.    Goals: Diabetes: Maintain Hemoglobin A1C below 7    Did patient meet goals/outcomes: Yes    The following self management tools provided: declined    Patient Instructions (the written plan) was given to the patient/family.     Time spent with patient: 30 minutes    Barriers to medications present (no )    Adverse reactions to current medications (no)    Over the counter medications reviewed (Yes)               [1]   Current Outpatient Medications:     ACCU-CHEK GUIDE TEST STRIPS Strp, TEST BLOOD SUGAR AS DIRECTED, Disp: 300 strip, Rfl: 3    albuterol (ACCUNEB)  1.25 mg/3 mL Nebu, INHALE THE CONTENTS OF 1 VIAL (1.25MG TOTAL) VIA NEBULIZER EVERY 6 HOURS AS NEEDED FOR RESCUE, Disp: 90 mL, Rfl: 11    albuterol-ipratropium (DUO-NEB) 2.5 mg-0.5 mg/3 mL nebulizer solution, USE 1 VIAL(3MLS) VIA NEBULIZER EVERY 6 HOURS AS NEEDED FOR WHEEZING, Disp: 990 mL, Rfl: 2    allopurinoL (ZYLOPRIM) 100 MG tablet, Take 1 tablet (100 mg total) by mouth once daily., Disp: 90 tablet, Rfl: 3    cyanocobalamin 1,000 mcg/mL injection, INJECT 1 ML INTO THE MUSCLE EVERY 30 DAYS, Disp: 3 mL, Rfl: 3    fluticasone propionate (FLONASE) 50 mcg/actuation nasal spray, USE 1 SPRAY (50 MCG TOTAL) BY EACH NOSTRIL ROUTE 2 (TWO) TIMES DAILY., Disp: 48 g, Rfl: 3    folic acid (FOLVITE) 1 MG tablet, TAKE 1 TABLET EVERY DAY, Disp: 90 tablet, Rfl: 3    gabapentin (NEURONTIN) 600 MG tablet, Take 1 tablet (600 mg total) by mouth 3 (three) times daily., Disp: 270 tablet, Rfl: 3    HYDROcodone-acetaminophen (NORCO) 7.5-325 mg per tablet, Take 1 tablet by mouth every 8 (eight) hours as needed for Pain., Disp: 28 tablet, Rfl: 0    HYDROcodone-acetaminophen (NORCO) 7.5-325 mg per tablet, Take 1 tablet by mouth every 12 (twelve) hours as needed for Pain., Disp: 60 tablet, Rfl: 0    HYDROcodone-acetaminophen (NORCO) 7.5-325 mg per tablet, Take 1 tablet by mouth every 12 (twelve) hours as needed for Pain., Disp: 60 tablet, Rfl: 0    HYDROcodone-acetaminophen (NORCO) 7.5-325 mg per tablet, Take 1 tablet by mouth every 12 (twelve) hours as needed for Pain., Disp: 60 tablet, Rfl: 0    hydroxychloroquine (PLAQUENIL) 200 mg tablet, Take 1 tablet (200 mg total) by mouth 2 (two) times daily., Disp: 180 tablet, Rfl: 4    lancets (ACCU-CHEK SOFTCLIX LANCETS) Misc, TEST BLOOD SUGAR THREE TIMES DAILY, Disp: 300 each, Rfl: 3    levothyroxine (SYNTHROID) 88 MCG tablet, Take 1 tablet (88 mcg total) by mouth before breakfast., Disp: 90 tablet, Rfl: 3    methocarbamoL (ROBAXIN) 750 MG Tab, Take 1 tablet (750 mg total) by mouth 3 (three)  times daily., Disp: 30 tablet, Rfl: 2    multivitamin-min-iron-FA-vit K 45 mg iron- 800 mcg-120 mcg Cap, Take 1 capsule by mouth once daily., Disp: 30 capsule, Rfl: 11    naproxen (NAPROSYN) 375 MG tablet, Take 1 tablet (375 mg total) by mouth 2 (two) times daily with meals., Disp: 60 tablet, Rfl: 4    scopolamine (TRANSDERM-SCOP) 1.3-1.5 mg (1 mg over 3 days), Place 1 patch onto the skin every 72 hours., Disp: 10 patch, Rfl: 3    solifenacin (VESICARE) 10 MG tablet, Take 1 tablet (10 mg total) by mouth once daily., Disp: 90 tablet, Rfl: 3    vitamin D (VITAMIN D3) 1000 units Tab, TAKE 1 TABLET TWICE DAILY, Disp: 180 tablet, Rfl: 3    vitamin E 1000 UNIT capsule, Take 1,000 Units by mouth once daily., Disp: , Rfl:     blood-glucose meter kit, AC meals x2, Disp: 1 each, Rfl: 0    citalopram (CELEXA) 20 MG tablet, TAKE 1 TABLET EVERY DAY (Patient not taking: Reported on 5/26/2025), Disp: 90 tablet, Rfl: 3    clonazePAM (KLONOPIN) 1 MG tablet, Take 1 tablet (1 mg total) by mouth 2 (two) times daily as needed for Anxiety., Disp: 60 tablet, Rfl: 2    diclofenac sodium (VOLTAREN) 1 % Gel, Apply 4 g topically 3 (three) times daily as needed (pain)., Disp: 100 g, Rfl: 11    estradioL (ESTRACE) 0.01 % (0.1 mg/gram) vaginal cream, Place 1 g vaginally twice a week., Disp: 8 g, Rfl: 11    lipase-protease-amylase 12,000-38,000-60,000 units (CREON) CpDR, Take 1 capsule by mouth 3 (three) times daily with meals. (Patient not taking: Reported on 5/26/2025), Disp: 90 capsule, Rfl: 11    raloxifene (EVISTA) 60 mg tablet, TAKE 1 TABLET ONE TIME DAILY (Patient not taking: Reported on 5/26/2025), Disp: 90 tablet, Rfl: 3

## 2025-05-26 NOTE — TELEPHONE ENCOUNTER
Reviewed attached message from CONOR Khoury with patient who verbally indicated understanding.   Patient states she is already under the care of an eye doctor and has her eyes checked every 6 months.  Patient states her next eye examination is scheduled for the date of 10/28/25.

## 2025-05-28 ENCOUNTER — RESULTS FOLLOW-UP (OUTPATIENT)
Dept: PRIMARY CARE CLINIC | Facility: CLINIC | Age: 75
End: 2025-05-28

## 2025-06-02 NOTE — PROGRESS NOTES
Attempted to contact pt regarding lab results. No answer, LM for pt to contact office for results.

## 2025-06-10 ENCOUNTER — TELEPHONE (OUTPATIENT)
Dept: FAMILY MEDICINE | Facility: CLINIC | Age: 75
End: 2025-06-10
Payer: MEDICARE

## 2025-06-10 NOTE — TELEPHONE ENCOUNTER
TSH is only slightly elevated. Is she taking Synthroid every morning before breakfast and with only water? No need to increase dose at this time.   Mona Parada NP

## 2025-06-10 NOTE — TELEPHONE ENCOUNTER
Copied from CRM #[id number]. Topic: General Inquiry - Patient Advice  >> Jun 9, 2025  2:17 PM Jamie wrote:  Type: Needs Medical Advice  Who Called:  pt   Best Call Back Number: [phone]  Additional Information: Pt is returning jensen. Please lvm if she doesn't answer, thanks!

## 2025-06-10 NOTE — TELEPHONE ENCOUNTER
Spoke with patient.   She is concerned about her TSH 4.327   She is taking Levothyroxine 88 mcg daily.   Please advise

## 2025-06-18 ENCOUNTER — TELEPHONE (OUTPATIENT)
Dept: OPTOMETRY | Facility: CLINIC | Age: 75
End: 2025-06-18
Payer: MEDICARE

## 2025-07-04 DIAGNOSIS — E43 SEVERE PROTEIN-CALORIE MALNUTRITION: ICD-10-CM

## 2025-07-04 DIAGNOSIS — M45.5 ANKYLOSING SPONDYLITIS OF THORACOLUMBAR REGION: ICD-10-CM

## 2025-07-04 DIAGNOSIS — E11.628 TYPE 2 DIABETES MELLITUS WITH OTHER SKIN COMPLICATIONS: Primary | ICD-10-CM

## 2025-07-07 RX ORDER — CYANOCOBALAMIN 1000 UG/ML
1000 INJECTION, SOLUTION INTRAMUSCULAR; SUBCUTANEOUS
Qty: 3 ML | Refills: 3 | Status: SHIPPED | OUTPATIENT
Start: 2025-07-07

## 2025-07-07 NOTE — TELEPHONE ENCOUNTER
Schedule 3 months follow up and labs around-9/25. I have ordered a Vit B12 level with next labs-medication refilled

## 2025-07-24 DIAGNOSIS — M45.5 ANKYLOSING SPONDYLITIS OF THORACOLUMBAR REGION: ICD-10-CM

## 2025-07-24 RX ORDER — DICLOFENAC SODIUM 10 MG/G
4 GEL TOPICAL 3 TIMES DAILY PRN
Qty: 100 G | Refills: 11 | Status: CANCELLED | OUTPATIENT
Start: 2025-07-24

## 2025-07-24 RX ORDER — LEVOTHYROXINE SODIUM 88 UG/1
88 TABLET ORAL
Qty: 90 TABLET | Refills: 3 | Status: SHIPPED | OUTPATIENT
Start: 2025-07-24

## 2025-07-24 NOTE — TELEPHONE ENCOUNTER
Care Due:                  Date            Visit Type   Department     Provider  --------------------------------------------------------------------------------                                EP -                              PRIMARY  Last Visit: 11-      CARE ([facility])   None Found     Jose Guadalupe Ornelas  Next Visit: None Scheduled  None         None Found                                                            Last  Test          Frequency    Reason                     Performed    Due Date  --------------------------------------------------------------------------------    Office Visit  6 months...  hydroxychloroquine.......  11- 05-    Mg Level....  12 months..  raloxifene...............  Not Found    Overdue    Phosphate...  15 months..  raloxifene...............  Not Found    Overdue    Uric Acid...  12 months..  allopurinoL..............  Not Found    Overdue    Vitamin D...  15 months..  raloxifene...............  Not Found    Overdue    [employer] Embedded Care Due Messages. Reference number: [id number].   7/24/2025 3:40:05 AM CDT

## 2025-07-24 NOTE — TELEPHONE ENCOUNTER
Refill Routing Note   Medication(s) are not appropriate for processing by [facility] for the following reason(s):        Required labs abnormal    [facility] action(s):  Defer     Requires labs : Yes             Appointments  past 12m or future 3m with PCP    Date Provider   Last Visit   11/27/2024 Jose Guadalupe Ornelas MD   Next Visit   Visit date not found Jose Guadalupe Ornelas MD   ED visits in past 90 days: 0        Note composed:2:26 PM 07/24/2025

## 2025-07-25 ENCOUNTER — TELEPHONE (OUTPATIENT)
Dept: FAMILY MEDICINE | Facility: CLINIC | Age: 75
End: 2025-07-25
Payer: MEDICARE

## 2025-07-25 DIAGNOSIS — E11.628 TYPE 2 DIABETES MELLITUS WITH OTHER SKIN COMPLICATIONS: Primary | ICD-10-CM

## 2025-07-25 DIAGNOSIS — E03.9 ACQUIRED HYPOTHYROIDISM: ICD-10-CM

## 2025-07-25 NOTE — TELEPHONE ENCOUNTER
Requesting refill for multiple prescriptions      Last visit 5/26/2025  Next visit  None   changing to mail order pharamcy

## 2025-07-25 NOTE — TELEPHONE ENCOUNTER
Copied from CRM #[id number]. Topic: Medications - Medication Status Check   >> Jul 25, 2025  1:02 PM Beatriz wrote:  Type:  Needs Medical Advice    Who Called: [facility] pharmacy  [phone]    Additional Information: Waiting on two prescription refills  levothyroxine (SYNTHROID) 88 MCG tablet    Baclofen     Pt would like this to go to [facility]. Please call and advise. Thank you.

## 2025-07-26 RX ORDER — BACLOFEN 10 MG/1
TABLET ORAL
Qty: 90 TABLET | Refills: 0 | OUTPATIENT
Start: 2025-07-26

## 2025-07-26 RX ORDER — LEVOTHYROXINE SODIUM 88 UG/1
88 TABLET ORAL
Qty: 90 TABLET | Refills: 3 | OUTPATIENT
Start: 2025-07-26

## 2025-07-26 RX ORDER — BLOOD GLUCOSE CONTROL HIGH,LOW
EACH MISCELLANEOUS
Qty: 1 EACH | Refills: 0 | OUTPATIENT
Start: 2025-07-26

## 2025-07-27 NOTE — TELEPHONE ENCOUNTER
Refill Decision Note   Erica Marsh  is requesting a refill authorization.  Brief Assessment and Rationale for Refill:  Quick Discontinue     Medication Therapy Plan:   Pharmacy is requesting new scripts for the following medications without required information, (sig/ frequency/qty/etc)         Comments: Pharmacies have been requesting medications for patients without required information, (sig, frequency, qty, etc.). In addition, requests are sent for medication(s) pt. are currently not taking, and medications patients have never taken.    We have spoken to the pharmacies about these request types and advised their teams previously that we are unable to assess these New Script requests and require all details for these requests. This is a known issue and has been reported.      Note composed:8:22 PM 07/26/2025

## 2025-07-29 ENCOUNTER — PATIENT MESSAGE (OUTPATIENT)
Dept: ADMINISTRATIVE | Facility: HOSPITAL | Age: 75
End: 2025-07-29
Payer: MEDICARE

## 2025-07-30 DIAGNOSIS — Z12.11 SCREENING FOR COLON CANCER: ICD-10-CM

## 2025-08-06 DIAGNOSIS — H81.313 VERTIGO, AURAL, BILATERAL: ICD-10-CM

## 2025-08-06 RX ORDER — SCOPOLAMINE 1 MG/3D
PATCH, EXTENDED RELEASE TRANSDERMAL
Qty: 10 PATCH | Refills: 3 | Status: SHIPPED | OUTPATIENT
Start: 2025-08-06

## 2025-08-09 DIAGNOSIS — E79.0 HYPERURICEMIA: ICD-10-CM

## 2025-08-11 RX ORDER — ALLOPURINOL 100 MG/1
TABLET ORAL
Qty: 90 TABLET | Refills: 3 | Status: SHIPPED | OUTPATIENT
Start: 2025-08-11

## 2025-08-19 ENCOUNTER — PATIENT MESSAGE (OUTPATIENT)
Dept: PRIMARY CARE CLINIC | Facility: CLINIC | Age: 75
End: 2025-08-19
Payer: MEDICARE

## 2025-08-19 DIAGNOSIS — M45.5 ANKYLOSING SPONDYLITIS OF THORACOLUMBAR REGION: Primary | ICD-10-CM

## 2025-08-20 RX ORDER — BACLOFEN 10 MG/1
10 TABLET ORAL 3 TIMES DAILY PRN
Qty: 30 TABLET | Refills: 0 | Status: SHIPPED | OUTPATIENT
Start: 2025-08-20 | End: 2025-09-19

## (undated) DEVICE — CANNULA CVD 100MM X 20G

## (undated) DEVICE — NDL SAFETY 25G X 1.5 ECLIPSE

## (undated) DEVICE — SYR GLASS 5CC LUER LOK

## (undated) DEVICE — SYS LABEL CORRECT MED

## (undated) DEVICE — NDL TUOHY EPIDURAL 20G X 3.5

## (undated) DEVICE — APPLICATOR CHLORAPREP CLR 10.5

## (undated) DEVICE — SYR DISP LL 5CC

## (undated) DEVICE — NDL HYPODERMIC BLUNT 18G 1.5IN

## (undated) DEVICE — SHEET DRAPE MEDIUM

## (undated) DEVICE — TUBING MINIBORE EXTENSION

## (undated) DEVICE — SYR 10CC LUER LOCK

## (undated) DEVICE — GLOVE SURG ULTRA TOUCH 7.5

## (undated) DEVICE — NDL SPINAL SPINOCAN 22GX3.5

## (undated) DEVICE — PAD ELECTROSURGICAL PAT PLATE